# Patient Record
Sex: FEMALE | Race: WHITE | NOT HISPANIC OR LATINO | Employment: OTHER | URBAN - METROPOLITAN AREA
[De-identification: names, ages, dates, MRNs, and addresses within clinical notes are randomized per-mention and may not be internally consistent; named-entity substitution may affect disease eponyms.]

---

## 2017-01-04 ENCOUNTER — HOSPITAL ENCOUNTER (OUTPATIENT)
Dept: INFUSION THERAPY | Facility: HOSPITAL | Age: 62
Discharge: HOME | End: 2017-01-04
Admitting: ALLERGY & IMMUNOLOGY

## 2017-01-04 VITALS
RESPIRATION RATE: 20 BRPM | DIASTOLIC BLOOD PRESSURE: 90 MMHG | WEIGHT: 185 LBS | OXYGEN SATURATION: 99 % | HEART RATE: 91 BPM | TEMPERATURE: 98.1 F | BODY MASS INDEX: 30.79 KG/M2 | SYSTOLIC BLOOD PRESSURE: 124 MMHG

## 2017-01-04 DIAGNOSIS — D83.9 COMMON VARIABLE IMMUNODEFICIENCY (HCC): ICD-10-CM

## 2017-01-04 PROCEDURE — 96365 THER/PROPH/DIAG IV INF INIT: CPT

## 2017-01-04 PROCEDURE — 25010000002 IMMUNE GLOBULIN (HUMAN) 20 GM/200ML SOLUTION: Performed by: ALLERGY & IMMUNOLOGY

## 2017-01-04 PROCEDURE — 96361 HYDRATE IV INFUSION ADD-ON: CPT

## 2017-01-04 RX ORDER — ACETAMINOPHEN 500 MG
500 TABLET ORAL ONCE
Status: CANCELLED | OUTPATIENT
Start: 2017-01-04

## 2017-01-04 RX ORDER — EPINEPHRINE 1 MG/ML
0.3 INJECTION INTRAMUSCULAR; INTRAVENOUS; SUBCUTANEOUS AS NEEDED
Status: DISCONTINUED | OUTPATIENT
Start: 2017-01-04 | End: 2017-01-06 | Stop reason: HOSPADM

## 2017-01-04 RX ORDER — DIPHENHYDRAMINE HYDROCHLORIDE 50 MG/ML
50 INJECTION INTRAMUSCULAR; INTRAVENOUS AS NEEDED
Status: CANCELLED | OUTPATIENT
Start: 2017-01-04

## 2017-01-04 RX ORDER — ACETAMINOPHEN 500 MG
500 TABLET ORAL ONCE
Status: DISCONTINUED | OUTPATIENT
Start: 2017-01-04 | End: 2017-01-06 | Stop reason: HOSPADM

## 2017-01-04 RX ORDER — DIPHENHYDRAMINE HCL 25 MG
25 CAPSULE ORAL ONCE
Status: DISCONTINUED | OUTPATIENT
Start: 2017-01-04 | End: 2017-01-06 | Stop reason: HOSPADM

## 2017-01-04 RX ORDER — DIPHENHYDRAMINE HYDROCHLORIDE 50 MG/ML
50 INJECTION INTRAMUSCULAR; INTRAVENOUS AS NEEDED
Status: DISCONTINUED | OUTPATIENT
Start: 2017-01-04 | End: 2017-01-06 | Stop reason: HOSPADM

## 2017-01-04 RX ORDER — DIPHENHYDRAMINE HCL 25 MG
25 CAPSULE ORAL ONCE
Status: CANCELLED | OUTPATIENT
Start: 2017-01-04

## 2017-01-04 RX ORDER — EPINEPHRINE 1 MG/ML
0.3 INJECTION INTRAMUSCULAR; INTRAVENOUS; SUBCUTANEOUS AS NEEDED
Status: CANCELLED
Start: 2017-01-04

## 2017-01-04 RX ADMIN — IMMUNE GLOBULIN INFUSION (HUMAN) 20 G: 100 INJECTION, SOLUTION INTRAVENOUS; SUBCUTANEOUS at 09:23

## 2017-01-04 RX ADMIN — IMMUNE GLOBULIN INFUSION (HUMAN) 20 G: 100 INJECTION, SOLUTION INTRAVENOUS; SUBCUTANEOUS at 11:27

## 2017-01-04 NOTE — IP AVS SNAPSHOT
Dara Medina   1/4/2017  8:30 AM   IVIG    Provider:  ROOM 3 [facility] ACU   Department:  [facility] CARE   Dept Phone:  [phone]                Your Full Care Plan           To Do List     1/11/2017 9:30 AM     Appointment with Randi Yang MD at [facility] INTERNAL MEDICINE ([phone])   Arrive 15 minutes prior to appointment.   [address]  [facility] [address]       1/30/2017 10:00 AM     Appointment with ANJEL ENDOCRINOLOGY ANIL SANDERSON at [facility] ENDOCRINOLOGY ([phone])   [address]  [facility] [address]       2/6/2017 9:00 AM     Appointment with ROOM 4 [facility] ACU at [facility] ([phone])   [address]  [facility] [address]       2/13/2017 9:20 AM     Appointment with AIYANA Spear at [facility] ENDOCRINOLOGY ([phone])   Arrive 15 minutes prior to appointment.   [address]  [facility] [address]       7/31/2017 10:00 AM     Appointment with ANJEL ENDOCRINOLOGY ANIL SANDERSON at [facility] ENDOCRINOLOGY ([phone])   [address]  [facility] [address]       8/14/2017 9:40 AM     Appointment with Aidan Devries MD at [facility] ENDOCRINOLOGY ([phone])   Arrive 15 minutes prior to appointment.   [address]  [facility] [address]            Your Updated Medication List      ASK your doctor about these medications     buPROPion  MG 24 hr tablet   Commonly known as:  WELLBUTRIN XL   TAKE 1 TABLET BY MOUTH EVERY DAY       CELLCEPT INTRAVENOUS 500 MG injection   Generic drug:  mycophenolate       Cyanocobalamin 1000 MCG/ML kit   Inject 1,000 mcg as directed every 30 (thirty) days.       cyclobenzaprine 10 MG tablet   Commonly known as:  FLEXERIL       CYTOMEL 25 MCG tablet   Generic drug:  liothyronine   Take 1/2 twice daily       * DULoxetine 30  MG capsule   Commonly known as:  CYMBALTA       * DULoxetine 60 MG capsule   Commonly known as:  CYMBALTA       folic acid 1 MG tablet   Commonly known as:  FOLVITE       omeprazole 40 MG capsule   Commonly known as:  priLOSEC       SYNTHROID 125 MCG tablet   Generic drug:  levothyroxine   1 daily       vitamin D 15377 UNITS capsule capsule   Commonly known as:  ERGOCALCIFEROL   Take 1 cap twice weekly       VYTORIN 10-40 MG per tablet   Generic drug:  ezetimibe-simvastatin   TAKE 1 TABLET EVERY DAY       * Notice:  This list has 2 medication(s) that are the same as other medications prescribed for you. Read the directions carefully, and ask your doctor or other care provider to review them with you.            [employer] Signup     Our records indicate that you have an active [employer] account.    You can view your After Visit Summary by going to [employer] and logging in with your [employer] username and password.  If you don't have a [employer] username and password but a parent or guardian has access to your record, the parent or guardian should login with their own [employer] username and password and access your record to view the After Visit Summary.    If you have questions, you can email [email or web address] or call [phone] to talk to our [employer] staff.  Remember, [employer] is NOT to be used for urgent needs.  For medical emergencies, dial 911.               Other Info from Your Visit           Allergies     Codeine       Reason for Visit     Outpatient Infusion           Vital Signs     Blood Pressure Pulse Temperature Respirations Weight Oxygen Saturation    129/50 85 98.1 °F (36.7 °C) (Oral) 20 185 lb (83.9 kg) 99%    Body Mass Index Smoking Status                30.79 kg/m2 Former Smoker          Medications Administered     immune globulin (human) (GAMMAGARD, GAMUNEX-C) injection 20 g                  immune globulin (human) (GAMMAGARD, GAMUNEX-C) injection 20 g                   immune globulin (human) (GAMMAGARD, GAMUNEX-C) injection 20 g                  immune globulin (human) (GAMMAGARD, GAMUNEX-C) injection 20 g                  immune globulin (human) (GAMMAGARD, GAMUNEX-C) injection 20 g                    Discharge Instructions     None         SYMPTOMS OF A STROKE    Call 911 or have someone take you to the Emergency Department if you have any of the following:    · Sudden numbness or weakness of your face, arm or leg especially on one side of the body  · Sudden confusion, diffiiculty speaking or trouble understanding   · Changes in your vision or loss of sight in one eye  · Sudden severe headache with no known cause  · sudden dizziness, trouble walking, loss of balance or coordination    It is important to seek emergency care right away if you have further stroke symptoms. If you get emergency help quickly, the powerful clot-dissolving medicines can reduce the disabilities caused by a stroke.     For more information:    American Stroke Association  8-443-3-STROKE  www.strokeassociation.org           IF YOU SMOKE OR USE TOBACCO PLEASE READ THE FOLLOWING:    Why is smoking bad for me?  Smoking increases the risk of heart disease, lung disease, vascular disease, stroke, and cancer.     If you smoke, STOP!    If you would like more information on quitting smoking, please visit the [employer] website: www.[employer]/Execate/healthier-[email or web address]   This link will provide additional resources including the QUIT line and the Beat the Pack support groups.     For more information:    American Cancer Society  ([phone] 440-6890    American Heart Association  1-232.893.4877

## 2017-01-04 NOTE — PROGRESS NOTES
Tolerated infusion well, no complaints or distress noted. No lunch wanted, brought snacks from home. Warm blankets given. D/C'd per ambulation @1316.

## 2017-01-13 ENCOUNTER — OFFICE VISIT (OUTPATIENT)
Dept: INTERNAL MEDICINE | Facility: CLINIC | Age: 62
End: 2017-01-13

## 2017-01-13 VITALS
BODY MASS INDEX: 31.16 KG/M2 | SYSTOLIC BLOOD PRESSURE: 150 MMHG | OXYGEN SATURATION: 98 % | WEIGHT: 187 LBS | HEART RATE: 117 BPM | HEIGHT: 65 IN | DIASTOLIC BLOOD PRESSURE: 78 MMHG

## 2017-01-13 DIAGNOSIS — E03.9 ACQUIRED HYPOTHYROIDISM: ICD-10-CM

## 2017-01-13 DIAGNOSIS — F32.A DEPRESSION, UNSPECIFIED DEPRESSION TYPE: ICD-10-CM

## 2017-01-13 DIAGNOSIS — E11.9 CONTROLLED TYPE 2 DIABETES MELLITUS WITHOUT COMPLICATION, WITHOUT LONG-TERM CURRENT USE OF INSULIN (HCC): Primary | ICD-10-CM

## 2017-01-13 DIAGNOSIS — D51.0 PERNICIOUS ANEMIA: ICD-10-CM

## 2017-01-13 DIAGNOSIS — E78.5 HYPERLIPIDEMIA, UNSPECIFIED HYPERLIPIDEMIA TYPE: ICD-10-CM

## 2017-01-13 LAB
ALBUMIN SERPL-MCNC: 4.4 G/DL (ref 3.5–5.2)
ALBUMIN/GLOB SERPL: 1.2 G/DL
ALP SERPL-CCNC: 173 U/L (ref 39–117)
ALT SERPL-CCNC: 16 U/L (ref 1–33)
AST SERPL-CCNC: 17 U/L (ref 1–32)
BASOPHILS # BLD AUTO: 0.01 10*3/MM3 (ref 0–0.2)
BASOPHILS NFR BLD AUTO: 0.1 % (ref 0–1.5)
BILIRUB SERPL-MCNC: 0.4 MG/DL (ref 0.1–1.2)
BUN SERPL-MCNC: 10 MG/DL (ref 8–23)
BUN/CREAT SERPL: 10.5 (ref 7–25)
CALCIUM SERPL-MCNC: 9.5 MG/DL (ref 8.6–10.5)
CHLORIDE SERPL-SCNC: 102 MMOL/L (ref 98–107)
CHOLEST SERPL-MCNC: 159 MG/DL (ref 0–200)
CO2 SERPL-SCNC: 22.6 MMOL/L (ref 22–29)
CREAT SERPL-MCNC: 0.95 MG/DL (ref 0.57–1)
EOSINOPHIL # BLD AUTO: 0 10*3/MM3 (ref 0–0.7)
EOSINOPHIL NFR BLD AUTO: 0 % (ref 0.3–6.2)
ERYTHROCYTE [DISTWIDTH] IN BLOOD BY AUTOMATED COUNT: 17 % (ref 11.7–13)
GLOBULIN SER CALC-MCNC: 3.7 GM/DL
GLUCOSE SERPL-MCNC: 134 MG/DL (ref 65–99)
HBA1C MFR BLD: 5.4 % (ref 4.8–5.6)
HCT VFR BLD AUTO: 39.4 % (ref 35.6–45.5)
HDLC SERPL-MCNC: 61 MG/DL (ref 40–60)
HGB BLD-MCNC: 11.7 G/DL (ref 11.9–15.5)
IMM GRANULOCYTES # BLD: 0.04 10*3/MM3 (ref 0–0.03)
IMM GRANULOCYTES NFR BLD: 0.3 % (ref 0–0.5)
LDLC SERPL CALC-MCNC: 77 MG/DL (ref 0–100)
LYMPHOCYTES # BLD AUTO: 1.12 10*3/MM3 (ref 0.9–4.8)
LYMPHOCYTES NFR BLD AUTO: 9.5 % (ref 19.6–45.3)
MCH RBC QN AUTO: 25.5 PG (ref 26.9–32)
MCHC RBC AUTO-ENTMCNC: 29.7 G/DL (ref 32.4–36.3)
MCV RBC AUTO: 86 FL (ref 80.5–98.2)
MONOCYTES # BLD AUTO: 0.3 10*3/MM3 (ref 0.2–1.2)
MONOCYTES NFR BLD AUTO: 2.5 % (ref 5–12)
NEUTROPHILS # BLD AUTO: 10.32 10*3/MM3 (ref 1.9–8.1)
NEUTROPHILS NFR BLD AUTO: 87.6 % (ref 42.7–76)
NRBC BLD AUTO-RTO: 0 /100 WBC (ref 0–0)
PLATELET # BLD AUTO: 381 10*3/MM3 (ref 140–500)
POTASSIUM SERPL-SCNC: 5.1 MMOL/L (ref 3.5–5.2)
PROT SERPL-MCNC: 8.1 G/DL (ref 6–8.5)
RBC # BLD AUTO: 4.58 10*6/MM3 (ref 3.9–5.2)
SODIUM SERPL-SCNC: 139 MMOL/L (ref 136–145)
T4 FREE SERPL-MCNC: 0.87 NG/DL (ref 0.93–1.7)
TRIGL SERPL-MCNC: 104 MG/DL (ref 0–150)
TSH SERPL DL<=0.005 MIU/L-ACNC: <0.01 MIU/ML (ref 0.27–4.2)
VLDLC SERPL CALC-MCNC: 20.8 MG/DL (ref 5–40)
WBC # BLD AUTO: 11.79 10*3/MM3 (ref 4.5–10.7)

## 2017-01-13 PROCEDURE — 99214 OFFICE O/P EST MOD 30 MIN: CPT | Performed by: INTERNAL MEDICINE

## 2017-01-13 RX ORDER — PREDNISONE 20 MG/1
20 TABLET ORAL DAILY
COMMUNITY
Start: 2017-01-13 | End: 2017-01-24 | Stop reason: HOSPADM

## 2017-01-13 NOTE — MR AVS SNAPSHOT
Dara Medina   1/13/2017 9:45 AM   Office Visit    Dept Phone:  [phone]   Encounter #:  [id number]    Provider:  Randi Yang MD   Department:  [facility] INTERNAL MEDICINE                Your Full Care Plan              Your Updated Medication List          This list is accurate as of: 1/13/17 10:22 AM.  Always use your most recent med list.                buPROPion  MG 24 hr tablet   Commonly known as:  WELLBUTRIN XL   TAKE 1 TABLET BY MOUTH EVERY DAY       CELLCEPT INTRAVENOUS 500 MG injection   Generic drug:  mycophenolate       Cyanocobalamin 1000 MCG/ML kit   Inject 1,000 mcg as directed every 30 (thirty) days.       cyclobenzaprine 10 MG tablet   Commonly known as:  FLEXERIL       CYTOMEL 25 MCG tablet   Generic drug:  liothyronine   Take 1/2 twice daily       * DULoxetine 30 MG capsule   Commonly known as:  CYMBALTA       * DULoxetine 60 MG capsule   Commonly known as:  CYMBALTA       folic acid 1 MG tablet   Commonly known as:  FOLVITE       omeprazole 40 MG capsule   Commonly known as:  priLOSEC       predniSONE 20 MG tablet   Commonly known as:  DELTASONE       SYNTHROID 125 MCG tablet   Generic drug:  levothyroxine   1 daily       vitamin D 44593 UNITS capsule capsule   Commonly known as:  ERGOCALCIFEROL   Take 1 cap twice weekly       VYTORIN 10-40 MG per tablet   Generic drug:  ezetimibe-simvastatin   TAKE 1 TABLET EVERY DAY       * Notice:  This list has 2 medication(s) that are the same as other medications prescribed for you. Read the directions carefully, and ask your doctor or other care provider to review them with you.            We Performed the Following     CBC & Differential     Comprehensive Metabolic Panel     Hemoglobin A1c     Lipid Panel     TSH Rfx On Abnormal To Free T4       You Were Diagnosed With        Codes Comments    Controlled type 2 diabetes mellitus without complication, without long-term current use of insulin    -   "Primary ICD-10-CM: E11.9  ICD-9-CM: 250.00     Hyperlipidemia, unspecified hyperlipidemia type     ICD-10-CM: E78.5  ICD-9-CM: 272.4     Pernicious anemia     ICD-10-CM: D51.0  ICD-9-CM: 281.0     Depression, unspecified depression type     ICD-10-CM: F32.9  ICD-9-CM: 311     Acquired hypothyroidism     ICD-10-CM: E03.9  ICD-9-CM: 244.9       Instructions    DASH Eating Plan  DASH stands for \"Dietary Approaches to Stop Hypertension.\" The DASH eating plan is a healthy eating plan that has been shown to reduce high blood pressure (hypertension). Additional health benefits may include reducing the risk of type 2 diabetes mellitus, heart disease, and stroke. The DASH eating plan may also help with weight loss.  WHAT DO I NEED TO KNOW ABOUT THE DASH EATING PLAN?  For the DASH eating plan, you will follow these general guidelines:  · Choose foods with a percent daily value for sodium of less than 5% (as listed on the food label).  · Use salt-free seasonings or herbs instead of table salt or sea salt.  · Check with your health care provider or pharmacist before using salt substitutes.  · Eat lower-sodium products, often labeled as \"lower sodium\" or \"no salt added.\"  · Eat fresh foods.  · Eat more vegetables, fruits, and low-fat dairy products.  · Choose whole grains. Look for the word \"whole\" as the first word in the ingredient list.  · Choose fish and skinless chicken or turkey more often than red meat. Limit fish, poultry, and meat to 6 oz (170 g) each day.  · Limit sweets, desserts, sugars, and sugary drinks.  · Choose heart-healthy fats.  · Limit cheese to 1 oz (28 g) per day.  · Eat more home-cooked food and less restaurant, buffet, and fast food.  · Limit fried foods.  · Cook foods using methods other than frying.  · Limit canned vegetables. If you do use them, rinse them well to decrease the sodium.  · When eating at a restaurant, ask that your food be prepared with less salt, or no salt if possible.  WHAT FOODS CAN " I EAT?  Seek help from a dietitian for individual calorie needs.  Grains  Whole grain or whole wheat bread. Brown rice. Whole grain or whole wheat pasta. Quinoa, bulgur, and whole grain cereals. Low-sodium cereals. Corn or whole wheat flour tortillas. Whole grain cornbread. Whole grain crackers. Low-sodium crackers.  Vegetables  Fresh or frozen vegetables (raw, steamed, roasted, or grilled). Low-sodium or reduced-sodium tomato and vegetable juices. Low-sodium or reduced-sodium tomato sauce and paste. Low-sodium or reduced-sodium canned vegetables.   Fruits  All fresh, canned (in natural juice), or frozen fruits.  Meat and Other Protein Products  Ground beef (85% or leaner), grass-fed beef, or beef trimmed of fat. Skinless chicken or turkey. Ground chicken or turkey. Pork trimmed of fat. All fish and seafood. Eggs. Dried beans, peas, or lentils. Unsalted nuts and seeds. Unsalted canned beans.  Dairy  Low-fat dairy products, such as skim or 1% milk, 2% or reduced-fat cheeses, low-fat ricotta or cottage cheese, or plain low-fat yogurt. Low-sodium or reduced-sodium cheeses.  Fats and Oils  Tub margarines without trans fats. Light or reduced-fat mayonnaise and salad dressings (reduced sodium). Avocado. Safflower, olive, or canola oils. Natural peanut or almond butter.  Other  Unsalted popcorn and pretzels.  The items listed above may not be a complete list of recommended foods or beverages. Contact your dietitian for more options.  WHAT FOODS ARE NOT RECOMMENDED?  Grains  White bread. White pasta. White rice. Refined cornbread. Bagels and croissants. Crackers that contain trans fat.  Vegetables  Creamed or fried vegetables. Vegetables in a cheese sauce. Regular canned vegetables. Regular canned tomato sauce and paste. Regular tomato and vegetable juices.  Fruits  Dried fruits. Canned fruit in light or heavy syrup. Fruit juice.  Meat and Other Protein Products  Fatty cuts of meat. Ribs, chicken wings, majano, sausage,  bologna, salami, chitterlings, fatback, hot dogs, bratwurst, and packaged luncheon meats. Salted nuts and seeds. Canned beans with salt.  Dairy  Whole or 2% milk, cream, half-and-half, and cream cheese. Whole-fat or sweetened yogurt. Full-fat cheeses or blue cheese. Nondairy creamers and whipped toppings. Processed cheese, cheese spreads, or cheese curds.  Condiments  Onion and garlic salt, seasoned salt, table salt, and sea salt. Canned and packaged gravies. Worcestershire sauce. Tartar sauce. Barbecue sauce. Teriyaki sauce. Soy sauce, including reduced sodium. Steak sauce. Fish sauce. Oyster sauce. Cocktail sauce. Horseradish. Ketchup and mustard. Meat flavorings and tenderizers. Bouillon cubes. Hot sauce. Tabasco sauce. Marinades. Taco seasonings. Relishes.  Fats and Oils  Butter, stick margarine, lard, shortening, ghee, and majano fat. Coconut, palm kernel, or palm oils. Regular salad dressings.  Other  Pickles and olives. Salted popcorn and pretzels.  The items listed above may not be a complete list of foods and beverages to avoid. Contact your dietitian for more information.  WHERE CAN I FIND MORE INFORMATION?  National Heart, Lung, and Blood [place]: www.nhlbi.nih.gov/health/health-topics/topics/dash/     This information is not intended to replace advice given to you by your health care provider. Make sure you discuss any questions you have with your health care provider.     Document Released: 12/06/2012 Document Revised: 01/08/2016 Document Reviewed: 10/22/2014  [employer] Interactive Patient Education ©2016 Else[employer] Inc.       Patient Instructions History      Upcoming Appointments     Visit Type Date Time Department    FOLLOW UP 1/13/2017  9:45 AM [facility]    [facility] 1/30/2017 10:00 AM [facility]    IV[facility] 2/6/2017  9:00 AM [facility] OP INFU NON ONC    OFFICE VISIT 2/13/2017  9:20 AM [facility] ENDO [place]    OFFICE VISIT 4/12/2017  9:45 AM [facility] PC PAVILION    [facility] 7/31/2017 10:00 AM ANJEL JACOME  SANJAY SANDERSON    OFFICE VISIT 8/14/2017  9:40 AM [facility] ENDO SANJAY SANDERSON      MyChart Signup     Our records indicate that you have an active [facility] [employer] account.    You can view your After Visit Summary by going to [email or web address] and logging in with your [employer] username and password.  If you don't have a [employer] username and password but a parent or guardian has access to your record, the parent or guardian should login with their own [employer] username and password and access your record to view the After Visit Summary.    If you have questions, you can email [email or web address] or call [phone] to talk to our [employer] staff.  Remember, [employer] is NOT to be used for urgent needs.  For medical emergencies, dial 911.               Other Info from Your Visit           Your Appointments     Jan 30, 2017 10:00 AM EST   [facility] with ANJEL ENDOCRINOLOGY KIN [facility] ENDOCRINOLOGY (--)    [address] [place], [place] [address] [phone]            Feb 06, 2017  9:00 AM EST   IVIG with ROOM 4 [facility]    [address] [id number] [phone]            Feb 13, 2017  9:20 AM EST   Office Visit with AIYANA Spear   [facility] ENDOCRINOLOGY (--)    [address] [place] [address] [phone]           Arrive 15 minutes prior to appointment.            Apr 12, 2017  9:45 AM EDT   Office Visit with Randi Yang MD   [facility] INTERNAL MEDICINE (--)    [address]  [place] [id number] [phone]           Arrive 15 minutes prior to appointment.            Jul 31, 2017 10:00 AM EDT   [facility] with K ENDOCRINOLOGY KIN LABCOYAHAIRA   [facility] ENDOCRINOLOGY (--)    [address]  [place] [id number] [phone]              Allergies     Codeine        Reason for Visit      "Diabetes     Hyperlipidemia     Hypothyroidism           Vital Signs     Blood Pressure Pulse Height Weight Oxygen Saturation Body Mass Index    150/78 117 65\" (165.1 cm) 187 lb (84.8 kg) 98% 31.12 kg/m2    Smoking Status                   Former Smoker           Problems and Diagnoses Noted     Depression    Type 2 diabetes mellitus, controlled    High cholesterol or triglycerides    Underactive thyroid    Pernicious anemia        "

## 2017-01-13 NOTE — PROGRESS NOTES
Subjective     Dara Medina is a 61 y.o. female who presents with   Chief Complaint   Patient presents with   • Diabetes   • Hyperlipidemia   • Hypothyroidism       History of Present Illness     Patient asks me to manage DM, HLD, Hypothyroidism.  She has been seeing endo but feels she has too many doctors to see.   DM-2.  She is on steroids for pulmonary fibrosis.  She is due check of A1c.  HLD.  She is on Vytorin and due for labs.   Hypothyroidism.  She is due for thyroid check on Synthroid and Cytomel.    Pernicious anemia.  She is due for CBC check.   Depression.  She is stable with current regimen.     Review of Systems   Constitutional: Positive for unexpected weight change.   Respiratory: Positive for shortness of breath.    Cardiovascular: Negative for chest pain.       The following portions of the patient's history were reviewed and updated as appropriate: allergies, current medications and problem list.    Patient Active Problem List    Diagnosis Date Noted   • Rheumatoid arthritis 07/07/2016   • Pulmonary fibrosis 07/06/2016   • Iron deficiency anemia 07/06/2016   • Pernicious anemia 07/06/2016   • Common variable immunodeficiency 07/01/2016   • Depression 05/23/2016   • Hyperlipidemia 05/23/2016   • Hypothyroidism 05/23/2016   • Menopause present 05/23/2016   • Sensory neuropathy 05/23/2016   • Osteoporosis 05/23/2016   • Vitamin D deficiency 05/23/2016   • Diabetes type 2, controlled 05/23/2016       Current Outpatient Prescriptions on File Prior to Visit   Medication Sig Dispense Refill   • buPROPion XL (WELLBUTRIN XL) 300 MG 24 hr tablet TAKE 1 TABLET BY MOUTH EVERY DAY 30 tablet 5   • Cyanocobalamin 1000 MCG/ML kit Inject 1,000 mcg as directed every 30 (thirty) days. 1 kit 11   • cyclobenzaprine (FLEXERIL) 10 MG tablet Take 10 mg by mouth as needed.     • CYTOMEL 25 MCG tablet Take 1/2 twice daily 30 tablet 5   • DULoxetine (CYMBALTA) 30 MG capsule Take 30 mg by mouth Daily. For a total of 90 mg dose  "once daily     • DULoxetine (CYMBALTA) 60 MG capsule Take 60 mg by mouth daily.     • folic acid (FOLVITE) 1 MG tablet Take 1 mg by mouth daily.     • mycophenolate (CELLCEPT INTRAVENOUS) 500 MG injection Take  by mouth 2 (two) times a day.     • omeprazole (PriLOSEC) 40 MG capsule Take 40 mg by mouth daily.     • SYNTHROID 125 MCG tablet 1 daily 30 tablet 5   • vitamin D (ERGOCALCIFEROL) 82869 UNITS capsule capsule Take 1 cap twice weekly 26 capsule 3   • VYTORIN 10-40 MG per tablet TAKE 1 TABLET EVERY DAY 30 tablet 5     No current facility-administered medications on file prior to visit.        Objective     Visit Vitals   • /78   • Pulse 117   • Ht 65\" (165.1 cm)   • Wt 187 lb (84.8 kg)   • SpO2 98%   • BMI 31.12 kg/m2       Physical Exam   Constitutional: She is oriented to person, place, and time. She appears well-developed and well-nourished.   HENT:   Head: Normocephalic and atraumatic.   Cardiovascular: Normal rate, regular rhythm and normal heart sounds.    Pulmonary/Chest: Effort normal. She has rales.   Neurological: She is alert and oriented to person, place, and time.   Skin: Skin is warm and dry.   Psychiatric: She has a normal mood and affect. Her behavior is normal.       Assessment/Plan   Dara was seen today for diabetes, hyperlipidemia and hypothyroidism.    Diagnoses and all orders for this visit:    Controlled type 2 diabetes mellitus without complication, without long-term current use of insulin  -     CBC & Differential  -     Comprehensive Metabolic Panel  -     Lipid Panel  -     TSH Rfx On Abnormal To Free T4  -     Hemoglobin A1c    Hyperlipidemia, unspecified hyperlipidemia type  -     CBC & Differential  -     Comprehensive Metabolic Panel  -     Lipid Panel  -     TSH Rfx On Abnormal To Free T4  -     Hemoglobin A1c    Pernicious anemia  -     CBC & Differential  -     Comprehensive Metabolic Panel  -     Lipid Panel  -     TSH Rfx On Abnormal To Free T4  -     Hemoglobin " A1c    Depression, unspecified depression type  -     CBC & Differential  -     Comprehensive Metabolic Panel  -     Lipid Panel  -     TSH Rfx On Abnormal To Free T4  -     Hemoglobin A1c    Acquired hypothyroidism  -     CBC & Differential  -     Comprehensive Metabolic Panel  -     Lipid Panel  -     TSH Rfx On Abnormal To Free T4  -     Hemoglobin A1c        Discussion  Dm-2.  Continued attention to healthy diet and check labs.   HLD.  Continue Vytorin and check labs.   Hypothyroidism.  Check labs.   Depression.  Continue combo of Welbutrin and Cymbalta for now.    Pernicious anemia.  Continue B12 shots.  Check CBC today.         Future Appointments  Date Time Provider Department Center   1/30/2017 10:00 AM [facility] ENDOCRINOLOGY [facility], [facility] END [facility] None   2/6/2017 9:00 AM ROOM 4 [facility] ACU  [facility] ACU [facility]   2/13/2017 9:20 AM AIYANA Spear [facility] END [facility] None   4/12/2017 9:45 AM Randi Yang MD [facility] PC PAVIL None   7/31/2017 10:00 AM [facility] ENDOCRINOLOGY [facility], [facility] END [facility] None   8/14/2017 9:40 AM Aidan Devries MD [facility] END K[facility] None

## 2017-01-13 NOTE — PATIENT INSTRUCTIONS

## 2017-01-17 ENCOUNTER — OFFICE VISIT (OUTPATIENT)
Dept: INTERNAL MEDICINE | Facility: CLINIC | Age: 62
End: 2017-01-17

## 2017-01-17 VITALS
HEART RATE: 116 BPM | BODY MASS INDEX: 31.16 KG/M2 | DIASTOLIC BLOOD PRESSURE: 72 MMHG | SYSTOLIC BLOOD PRESSURE: 126 MMHG | WEIGHT: 187 LBS | OXYGEN SATURATION: 98 % | TEMPERATURE: 98.4 F | HEIGHT: 65 IN

## 2017-01-17 DIAGNOSIS — K52.9 GASTROENTERITIS: Primary | ICD-10-CM

## 2017-01-17 PROCEDURE — 99213 OFFICE O/P EST LOW 20 MIN: CPT | Performed by: INTERNAL MEDICINE

## 2017-01-17 RX ORDER — METRONIDAZOLE 500 MG/1
500 TABLET ORAL 3 TIMES DAILY
Qty: 30 TABLET | Refills: 0 | Status: ON HOLD | OUTPATIENT
Start: 2017-01-17 | End: 2017-01-24

## 2017-01-17 NOTE — MR AVS SNAPSHOT
Dara Medina   1/17/2017 12:45 PM   Office Visit    Dept Phone:  [phone]   Encounter #:  [id number]    Provider:  Randi Yang MD   Department:  [facility] INTERNAL MEDICINE                Your Full Care Plan              Today's Medication Changes          These changes are accurate as of: 1/17/17 12:55 PM.  If you have any questions, ask your nurse or doctor.               New Medication(s)Ordered:     metroNIDAZOLE 500 MG tablet   Commonly known as:  FLAGYL   Take 1 tablet by mouth 3 (Three) Times a Day.            Where to Get Your Medications      These medications were sent to [facility] #[id number] - [place], [place] - [address] [place] [address] - [phone]  - [phone] [place] [address]     Phone:  [phone]     metroNIDAZOLE 500 MG tablet                  Your Updated Medication List          This list is accurate as of: 1/17/17 12:55 PM.  Always use your most recent med list.                buPROPion  MG 24 hr tablet   Commonly known as:  WELLBUTRIN XL   TAKE 1 TABLET BY MOUTH EVERY DAY       CELLCEPT INTRAVENOUS 500 MG injection   Generic drug:  mycophenolate       Cyanocobalamin 1000 MCG/ML kit   Inject 1,000 mcg as directed every 30 (thirty) days.       cyclobenzaprine 10 MG tablet   Commonly known as:  FLEXERIL       * DULoxetine 30 MG capsule   Commonly known as:  CYMBALTA       * DULoxetine 60 MG capsule   Commonly known as:  CYMBALTA       folic acid 1 MG tablet   Commonly known as:  FOLVITE       metroNIDAZOLE 500 MG tablet   Commonly known as:  FLAGYL   Take 1 tablet by mouth 3 (Three) Times a Day.       omeprazole 40 MG capsule   Commonly known as:  priLOSEC       predniSONE 20 MG tablet   Commonly known as:  DELTASONE       SYNTHROID 125 MCG tablet   Generic drug:  levothyroxine   1 daily       vitamin D 65907 UNITS capsule capsule   Commonly known as:  ERGOCALCIFEROL   Take 1 cap twice weekly       VYTORIN 10-40 MG  per tablet   Generic drug:  ezetimibe-simvastatin   TAKE 1 TABLET EVERY DAY       * Notice:  This list has 2 medication(s) that are the same as other medications prescribed for you. Read the directions carefully, and ask your doctor or other care provider to review them with you.            We Performed the Following     Clostridium Difficile Toxin, PCR       You Were Diagnosed With        Codes Comments    Gastroenteritis    -  Primary ICD-10-CM: K52.9  ICD-9-CM: 558.9       Instructions     None    Patient Instructions History      Upcoming Appointments     Visit Type Date Time Department    OFFICE VISIT 1/17/2017 12:45 PM [facility]    [facility] 1/30/2017 10:00 AM [facility]    [facility] 2/6/2017  9:00 AM [facility] OP INFU NON ONC    OFFICE VISIT 2/13/2017  9:20 AM [facility]    OFFICE VISIT 4/12/2017  9:45 AM [facility]    [facility] 7/31/2017 10:00 AM [facility]    OFFICE VISIT 8/14/2017  9:40 AM [facility]      MyChart Signup     Our records indicate that you have an active [employer] account.    You can view your After Visit Summary by going to [employer] and logging in with your [employer] username and password.  If you don't have a [employer] username and password but a parent or guardian has access to your record, the parent or guardian should login with their own [employer] username and password and access your record to view the After Visit Summary.    If you have questions, you can email [email or web address] or call [phone] to talk to our [employer] staff.  Remember, [employer] is NOT to be used for urgent needs.  For medical emergencies, dial 911.               Other Info from Your Visit           Your Appointments     Jan 30, 2017 10:00 AM EST   [facility] with [facility], [facility]   [facility] (--)    [address]  [facility] [address]   [phone]            Feb 06, 2017  9:00 AM EST  "  IVIG with ROOM 4 [facility] ACU   [facility]    [address] [facility] [address] [phone]            Feb 13, 2017  9:20 AM EST   Office Visit with AIYANA Spear   [facility] ENDOCRINOLOGY (--)    [address]  [facility] [address] [phone]           Arrive 15 minutes prior to appointment.            Apr 12, 2017  9:45 AM EDT   Office Visit with Randi Yang MD   [facility] INTERNAL MEDICINE (--)    [address]  [facility] [address] [phone]           Arrive 15 minutes prior to appointment.            Jul 31, 2017 10:00 AM EDT   [facility] with [facility], [facility]   [facility] ENDOCRINOLOGY (--)    [address]  [facility] [address] [phone]              Allergies     Codeine        Reason for Visit     Diarrhea     Fever           Vital Signs     Blood Pressure Pulse Temperature Height Weight Oxygen Saturation    126/72 116 98.4 °F (36.9 °C) 65\" (165.1 cm) 187 lb (84.8 kg) 98%    Body Mass Index Smoking Status                31.12 kg/m2 Former Smoker          Problems and Diagnoses Noted     Gastroenteritis    -  Primary        "

## 2017-01-17 NOTE — PROGRESS NOTES
Subjective     Dara Medina is a 61 y.o. female who presents with   Chief Complaint   Patient presents with   • Diarrhea   • Fever       History of Present Illness     Diarrhea started two days ago.  20 BMs yesterday.  Blood tinged on TP.  Low grade fever.  ABD pain.  Nausea but no vomiting.  Started amoxil one week ago.  Crampy.  Water, yellow diarrhea.      Review of Systems   Constitutional: Positive for fatigue and fever.       The following portions of the patient's history were reviewed and updated as appropriate: allergies, current medications and problem list.    Patient Active Problem List    Diagnosis Date Noted   • Rheumatoid arthritis 07/07/2016   • Pulmonary fibrosis 07/06/2016   • Iron deficiency anemia 07/06/2016   • Pernicious anemia 07/06/2016   • Common variable immunodeficiency 07/01/2016   • Depression 05/23/2016   • Hyperlipidemia 05/23/2016   • Hypothyroidism 05/23/2016   • Menopause present 05/23/2016   • Sensory neuropathy 05/23/2016   • Osteoporosis 05/23/2016   • Vitamin D deficiency 05/23/2016   • Diabetes type 2, controlled 05/23/2016       Current Outpatient Prescriptions on File Prior to Visit   Medication Sig Dispense Refill   • buPROPion XL (WELLBUTRIN XL) 300 MG 24 hr tablet TAKE 1 TABLET BY MOUTH EVERY DAY 30 tablet 5   • Cyanocobalamin 1000 MCG/ML kit Inject 1,000 mcg as directed every 30 (thirty) days. 1 kit 11   • cyclobenzaprine (FLEXERIL) 10 MG tablet Take 10 mg by mouth as needed.     • DULoxetine (CYMBALTA) 30 MG capsule Take 30 mg by mouth Daily. For a total of 90 mg dose once daily     • DULoxetine (CYMBALTA) 60 MG capsule Take 60 mg by mouth daily.     • folic acid (FOLVITE) 1 MG tablet Take 1 mg by mouth daily.     • mycophenolate (CELLCEPT INTRAVENOUS) 500 MG injection Take  by mouth 2 (two) times a day.     • omeprazole (PriLOSEC) 40 MG capsule Take 40 mg by mouth daily.     • predniSONE (DELTASONE) 20 MG tablet Take 1 tablet by mouth Daily.     • SYNTHROID 125 MCG tablet  "1 daily 30 tablet 5   • vitamin D (ERGOCALCIFEROL) 93243 UNITS capsule capsule Take 1 cap twice weekly 26 capsule 3   • VYTORIN 10-40 MG per tablet TAKE 1 TABLET EVERY DAY 30 tablet 5     No current facility-administered medications on file prior to visit.        Objective     Visit Vitals   • /72   • Pulse 116   • Temp 98.4 °F (36.9 °C)   • Ht 65\" (165.1 cm)   • Wt 187 lb (84.8 kg)   • SpO2 98%   • BMI 31.12 kg/m2       Physical Exam   Constitutional: She is oriented to person, place, and time. She appears well-developed and well-nourished.   HENT:   Head: Normocephalic and atraumatic.   Pulmonary/Chest: Effort normal.   Abdominal: Soft. Bowel sounds are normal. She exhibits no distension and no mass. There is tenderness. There is no rebound and no guarding.   Neurological: She is alert and oriented to person, place, and time.   Psychiatric: She has a normal mood and affect. Her behavior is normal.       Assessment/Plan   Dara was seen today for diarrhea and fever.    Diagnoses and all orders for this visit:    Gastroenteritis  -     Clostridium Difficile Toxin, PCR    Other orders  -     metroNIDAZOLE (FLAGYL) 500 MG tablet; Take 1 tablet by mouth 3 (Three) Times a Day.        Discussion    Patient presents with gastroenteritis.  Discussed increasing fluids.  Empirically treat for C. Diff given her risk factors.  Check stool for C.Diff and get back with her on that.  Let me know if not feeling better over the next 2 days or if there is any change in symptoms.             Future Appointments  Date Time Provider Department Center   1/30/2017 10:00 AM [facility], [facility] None   2/6/2017 9:00 AM ROOM 4 [facility] ACU  [facility]   2/13/2017 9:20 AM AIYANA Spear [facility] None   4/12/2017 9:45 AM Randi Yang MD [facility]IL None   7/31/2017 10:00 AM [facility], [facility] None   8/14/2017 9:40 AM Aidan Devries MD [facility] None         "

## 2017-01-18 ENCOUNTER — APPOINTMENT (OUTPATIENT)
Dept: CT IMAGING | Facility: HOSPITAL | Age: 62
End: 2017-01-18

## 2017-01-18 ENCOUNTER — HOSPITAL ENCOUNTER (INPATIENT)
Facility: HOSPITAL | Age: 62
LOS: 6 days | Discharge: HOME | End: 2017-01-24
Attending: EMERGENCY MEDICINE | Admitting: INTERNAL MEDICINE

## 2017-01-18 DIAGNOSIS — K52.9 ACUTE COLITIS: Primary | ICD-10-CM

## 2017-01-18 PROBLEM — A41.9 SEPSIS, UNSPECIFIED ORGANISM: Status: ACTIVE | Noted: 2017-01-18

## 2017-01-18 PROBLEM — D84.9 IMMUNOSUPPRESSION: Status: ACTIVE | Noted: 2017-01-18

## 2017-01-18 LAB
ALBUMIN SERPL-MCNC: 3.5 G/DL (ref 3.5–5.2)
ALBUMIN/GLOB SERPL: 1 G/DL
ALP SERPL-CCNC: 128 U/L (ref 39–117)
ALT SERPL W P-5'-P-CCNC: 14 U/L (ref 1–33)
ANION GAP SERPL CALCULATED.3IONS-SCNC: 12.8 MMOL/L
AST SERPL-CCNC: 20 U/L (ref 1–32)
BASOPHILS # BLD AUTO: 0.02 10*3/MM3 (ref 0–0.2)
BASOPHILS NFR BLD AUTO: 0.1 % (ref 0–1.5)
BILIRUB SERPL-MCNC: 0.4 MG/DL (ref 0.1–1.2)
BILIRUB UR QL STRIP: NEGATIVE
BUN BLD-MCNC: 7 MG/DL (ref 8–23)
BUN/CREAT SERPL: 7.4 (ref 7–25)
C DIFF TOX GENS STL QL NAA+PROBE: NEGATIVE
C DIFF TOX GENS STL QL NAA+PROBE: NEGATIVE
CALCIUM SPEC-SCNC: 8.5 MG/DL (ref 8.6–10.5)
CHLORIDE SERPL-SCNC: 102 MMOL/L (ref 98–107)
CLARITY UR: CLEAR
CO2 SERPL-SCNC: 22.2 MMOL/L (ref 22–29)
COLOR UR: YELLOW
CREAT BLD-MCNC: 0.95 MG/DL (ref 0.57–1)
D-LACTATE SERPL-SCNC: 0.7 MMOL/L (ref 0.5–2)
DEPRECATED RDW RBC AUTO: 51.5 FL (ref 37–54)
EOSINOPHIL # BLD AUTO: 0.03 10*3/MM3 (ref 0–0.7)
EOSINOPHIL NFR BLD AUTO: 0.2 % (ref 0.3–6.2)
ERYTHROCYTE [DISTWIDTH] IN BLOOD BY AUTOMATED COUNT: 16.9 % (ref 11.7–13)
GFR SERPL CREATININE-BSD FRML MDRD: 60 ML/MIN/1.73
GLOBULIN UR ELPH-MCNC: 3.6 GM/DL
GLUCOSE BLD-MCNC: 108 MG/DL (ref 65–99)
GLUCOSE UR STRIP-MCNC: NEGATIVE MG/DL
HCT VFR BLD AUTO: 36.2 % (ref 35.6–45.5)
HGB BLD-MCNC: 11.2 G/DL (ref 11.9–15.5)
HGB UR QL STRIP.AUTO: NEGATIVE
HOLD SPECIMEN: NORMAL
HOLD SPECIMEN: NORMAL
IMM GRANULOCYTES # BLD: 0.04 10*3/MM3 (ref 0–0.03)
IMM GRANULOCYTES NFR BLD: 0.2 % (ref 0–0.5)
KETONES UR QL STRIP: NEGATIVE
LEUKOCYTE ESTERASE UR QL STRIP.AUTO: NEGATIVE
LIPASE SERPL-CCNC: 18 U/L (ref 13–60)
LYMPHOCYTES # BLD AUTO: 1.88 10*3/MM3 (ref 0.9–4.8)
LYMPHOCYTES NFR BLD AUTO: 11.5 % (ref 19.6–45.3)
MCH RBC QN AUTO: 26 PG (ref 26.9–32)
MCHC RBC AUTO-ENTMCNC: 30.9 G/DL (ref 32.4–36.3)
MCV RBC AUTO: 84 FL (ref 80.5–98.2)
MONOCYTES # BLD AUTO: 1.32 10*3/MM3 (ref 0.2–1.2)
MONOCYTES NFR BLD AUTO: 8.1 % (ref 5–12)
NEUTROPHILS # BLD AUTO: 13 10*3/MM3 (ref 1.9–8.1)
NEUTROPHILS NFR BLD AUTO: 79.9 % (ref 42.7–76)
NITRITE UR QL STRIP: NEGATIVE
PH UR STRIP.AUTO: 5.5 [PH] (ref 5–8)
PLATELET # BLD AUTO: 333 10*3/MM3 (ref 140–500)
PMV BLD AUTO: 10.6 FL (ref 6–12)
POTASSIUM BLD-SCNC: 3.7 MMOL/L (ref 3.5–5.2)
PROT SERPL-MCNC: 7.1 G/DL (ref 6–8.5)
PROT UR QL STRIP: NEGATIVE
RBC # BLD AUTO: 4.31 10*6/MM3 (ref 3.9–5.2)
SODIUM BLD-SCNC: 137 MMOL/L (ref 136–145)
SP GR UR STRIP: >=1.03 (ref 1–1.03)
UROBILINOGEN UR QL STRIP: NORMAL
WBC NRBC COR # BLD: 16.29 10*3/MM3 (ref 4.5–10.7)
WHOLE BLOOD HOLD SPECIMEN: NORMAL
WHOLE BLOOD HOLD SPECIMEN: NORMAL

## 2017-01-18 PROCEDURE — 81003 URINALYSIS AUTO W/O SCOPE: CPT | Performed by: EMERGENCY MEDICINE

## 2017-01-18 PROCEDURE — 25010000002 DICYCLOMINE PER 20 MG: Performed by: NURSE PRACTITIONER

## 2017-01-18 PROCEDURE — 25010000002 HYDROMORPHONE PER 4 MG: Performed by: INTERNAL MEDICINE

## 2017-01-18 PROCEDURE — 0 IOPAMIDOL 61 % SOLUTION: Performed by: EMERGENCY MEDICINE

## 2017-01-18 PROCEDURE — 25810000003 SODIUM CHLORIDE 0.9 % WITH KCL 20 MEQ 20-0.9 MEQ/L-% SOLUTION: Performed by: INTERNAL MEDICINE

## 2017-01-18 PROCEDURE — 74177 CT ABD & PELVIS W/CONTRAST: CPT

## 2017-01-18 PROCEDURE — 87046 STOOL CULTR AEROBIC BACT EA: CPT | Performed by: INTERNAL MEDICINE

## 2017-01-18 PROCEDURE — 87045 FECES CULTURE AEROBIC BACT: CPT | Performed by: INTERNAL MEDICINE

## 2017-01-18 PROCEDURE — 99284 EMERGENCY DEPT VISIT MOD MDM: CPT

## 2017-01-18 PROCEDURE — 83690 ASSAY OF LIPASE: CPT | Performed by: EMERGENCY MEDICINE

## 2017-01-18 PROCEDURE — 87493 C DIFF AMPLIFIED PROBE: CPT | Performed by: INTERNAL MEDICINE

## 2017-01-18 PROCEDURE — 25010000002 ONDANSETRON PER 1 MG: Performed by: NURSE PRACTITIONER

## 2017-01-18 PROCEDURE — 80053 COMPREHEN METABOLIC PANEL: CPT | Performed by: EMERGENCY MEDICINE

## 2017-01-18 PROCEDURE — 85025 COMPLETE CBC W/AUTO DIFF WBC: CPT | Performed by: EMERGENCY MEDICINE

## 2017-01-18 PROCEDURE — 25010000002 HYDROMORPHONE PER 4 MG: Performed by: NURSE PRACTITIONER

## 2017-01-18 PROCEDURE — 83605 ASSAY OF LACTIC ACID: CPT | Performed by: INTERNAL MEDICINE

## 2017-01-18 RX ORDER — LEVOTHYROXINE SODIUM 0.12 MG/1
125 TABLET ORAL
Status: DISCONTINUED | OUTPATIENT
Start: 2017-01-19 | End: 2017-01-24 | Stop reason: HOSPADM

## 2017-01-18 RX ORDER — MYCOPHENOLATE MOFETIL 500 MG/1
1000 TABLET ORAL 2 TIMES DAILY
COMMUNITY
End: 2017-01-24 | Stop reason: HOSPADM

## 2017-01-18 RX ORDER — SODIUM CHLORIDE 0.9 % (FLUSH) 0.9 %
10 SYRINGE (ML) INJECTION AS NEEDED
Status: DISCONTINUED | OUTPATIENT
Start: 2017-01-18 | End: 2017-01-24 | Stop reason: HOSPADM

## 2017-01-18 RX ORDER — BUPROPION HYDROCHLORIDE 300 MG/1
300 TABLET ORAL DAILY
Status: DISCONTINUED | OUTPATIENT
Start: 2017-01-18 | End: 2017-01-22

## 2017-01-18 RX ORDER — NALOXONE HCL 0.4 MG/ML
0.4 VIAL (ML) INJECTION
Status: DISCONTINUED | OUTPATIENT
Start: 2017-01-18 | End: 2017-01-20

## 2017-01-18 RX ORDER — SODIUM CHLORIDE AND POTASSIUM CHLORIDE 150; 900 MG/100ML; MG/100ML
50 INJECTION, SOLUTION INTRAVENOUS CONTINUOUS
Status: DISCONTINUED | OUTPATIENT
Start: 2017-01-18 | End: 2017-01-23

## 2017-01-18 RX ORDER — ACETAMINOPHEN 325 MG/1
650 TABLET ORAL EVERY 4 HOURS PRN
Status: DISCONTINUED | OUTPATIENT
Start: 2017-01-18 | End: 2017-01-24 | Stop reason: HOSPADM

## 2017-01-18 RX ORDER — ONDANSETRON 2 MG/ML
4 INJECTION INTRAMUSCULAR; INTRAVENOUS ONCE
Status: COMPLETED | OUTPATIENT
Start: 2017-01-18 | End: 2017-01-18

## 2017-01-18 RX ORDER — SODIUM CHLORIDE 0.9 % (FLUSH) 0.9 %
1-10 SYRINGE (ML) INJECTION AS NEEDED
Status: DISCONTINUED | OUTPATIENT
Start: 2017-01-18 | End: 2017-01-24 | Stop reason: HOSPADM

## 2017-01-18 RX ORDER — ONDANSETRON 2 MG/ML
4 INJECTION INTRAMUSCULAR; INTRAVENOUS EVERY 6 HOURS PRN
Status: DISCONTINUED | OUTPATIENT
Start: 2017-01-18 | End: 2017-01-24 | Stop reason: HOSPADM

## 2017-01-18 RX ORDER — METRONIDAZOLE 500 MG/1
500 TABLET ORAL EVERY 8 HOURS SCHEDULED
Status: DISCONTINUED | OUTPATIENT
Start: 2017-01-18 | End: 2017-01-24 | Stop reason: HOSPADM

## 2017-01-18 RX ORDER — DICYCLOMINE HYDROCHLORIDE 10 MG/ML
20 INJECTION INTRAMUSCULAR ONCE
Status: COMPLETED | OUTPATIENT
Start: 2017-01-18 | End: 2017-01-18

## 2017-01-18 RX ORDER — PANTOPRAZOLE SODIUM 40 MG/1
40 TABLET, DELAYED RELEASE ORAL
Status: DISCONTINUED | OUTPATIENT
Start: 2017-01-19 | End: 2017-01-24 | Stop reason: HOSPADM

## 2017-01-18 RX ORDER — FOLIC ACID 1 MG/1
1 TABLET ORAL DAILY
Status: DISCONTINUED | OUTPATIENT
Start: 2017-01-18 | End: 2017-01-24 | Stop reason: HOSPADM

## 2017-01-18 RX ORDER — HYDROMORPHONE HYDROCHLORIDE 1 MG/ML
0.5 INJECTION, SOLUTION INTRAMUSCULAR; INTRAVENOUS; SUBCUTANEOUS
Status: DISCONTINUED | OUTPATIENT
Start: 2017-01-18 | End: 2017-01-20

## 2017-01-18 RX ADMIN — IOPAMIDOL 85 ML: 612 INJECTION, SOLUTION INTRAVENOUS at 10:00

## 2017-01-18 RX ADMIN — METRONIDAZOLE 500 MG: 500 TABLET ORAL at 15:47

## 2017-01-18 RX ADMIN — BUPROPION HYDROCHLORIDE 300 MG: 300 TABLET, EXTENDED RELEASE ORAL at 17:41

## 2017-01-18 RX ADMIN — FOLIC ACID 1 MG: 1 TABLET ORAL at 17:41

## 2017-01-18 RX ADMIN — HYDROMORPHONE HYDROCHLORIDE 0.5 MG: 1 INJECTION, SOLUTION INTRAMUSCULAR; INTRAVENOUS; SUBCUTANEOUS at 19:26

## 2017-01-18 RX ADMIN — HYDROMORPHONE HYDROCHLORIDE 1 MG: 1 INJECTION, SOLUTION INTRAMUSCULAR; INTRAVENOUS; SUBCUTANEOUS at 09:49

## 2017-01-18 RX ADMIN — DICYCLOMINE HYDROCHLORIDE 20 MG: 20 INJECTION, SOLUTION INTRAMUSCULAR at 09:54

## 2017-01-18 RX ADMIN — ONDANSETRON 4 MG: 2 INJECTION INTRAMUSCULAR; INTRAVENOUS at 09:49

## 2017-01-18 RX ADMIN — POTASSIUM CHLORIDE AND SODIUM CHLORIDE 100 ML/HR: 900; 150 INJECTION, SOLUTION INTRAVENOUS at 15:48

## 2017-01-18 RX ADMIN — METRONIDAZOLE 500 MG: 500 TABLET ORAL at 21:55

## 2017-01-18 RX ADMIN — SODIUM CHLORIDE 1000 ML: 9 INJECTION, SOLUTION INTRAVENOUS at 09:48

## 2017-01-18 RX ADMIN — HYDROMORPHONE HYDROCHLORIDE 0.5 MG: 1 INJECTION, SOLUTION INTRAMUSCULAR; INTRAVENOUS; SUBCUTANEOUS at 23:21

## 2017-01-18 RX ADMIN — DULOXETINE HYDROCHLORIDE 90 MG: 60 CAPSULE, DELAYED RELEASE ORAL at 17:41

## 2017-01-18 RX ADMIN — HYDROMORPHONE HYDROCHLORIDE 0.5 MG: 1 INJECTION, SOLUTION INTRAMUSCULAR; INTRAVENOUS; SUBCUTANEOUS at 14:22

## 2017-01-18 NOTE — H&P
"    Name: Dara Medina ADMIT: 2017   : 1955  PCP: Randi Yang MD    MRN: [id number] LOS: 0 days   AGE/SEX: 61 y.o. female  ROOM:      Chief Complaint   Patient presents with   • Abdominal Pain   • Diarrhea     \"THEY THINK I HAVE C.DIFF\"   • Rectal Bleeding       Subjective   Patient is a 61 y.o. female presents with the following...    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  She was placed on amoxicillin one week ago for ear infection.  2 days ago she started with severe diarrhea up to 50 times which was bloody and with mucus.  It's associated with severe abdominal pain and nausea.  Every time she eats she has a bowel movement.  It is improved now since she is not eating as much.  Yesterday her primary doctor checked a C. difficile PCR which is still pending.  Empirically started her on oral Flagyl.  She presented to the emergency room hemodynamically stable with a white blood cell count of 16,000.  She also had fever at home up to 101 Fahrenheit.  A CT of her abdomen revealed ascending colitis with surrounding inflammatory changes and a mild associated small bowel ileus.  He also showed interstitial lung changes at the bases (she is followed by Dr. Camara at [facility]).  She was admitted to our service for further care and management.    Note, Hemoccult was positive in the emergency room  Past Medical History   Diagnosis Date   • Diabetes mellitus    • Fracture of rib    • Hyperlipidemia    • Hypothyroidism    • Hypothyroidism    • Iron deficiency    • Obesity    • Osteoporosis    • RA (rheumatoid arthritis)    • Type 2 diabetes mellitus    • Vitamin D deficiency      Past Surgical History "   Procedure Laterality Date   • Adenoidectomy     • Hysterectomy     • Tonsillectomy     • Sinus surgery       Family History   Problem Relation Age of Onset   • Hyperlipidemia Mother    • Hypertension Mother    • Colon cancer Father    • Diabetes Father    • Hyperlipidemia Brother      Social History   Substance Use Topics   • Smoking status: Former Smoker   • Smokeless tobacco: None      Comment: QUIT AGE 23   • Alcohol use No      Comment: STOPPED 1997       (Not in a hospital admission)  Allergies:  Codeine    Review of Systems   Constitutional: Positive for appetite change, fatigue and fever. Negative for activity change and unexpected weight change.   HENT: Negative for mouth sores, sore throat and trouble swallowing.    Eyes: Negative for pain and visual disturbance.   Respiratory: Negative for cough, chest tightness and shortness of breath.    Cardiovascular: Negative for chest pain, palpitations and leg swelling.   Gastrointestinal: Positive for abdominal pain, blood in stool, diarrhea and nausea. Negative for constipation and vomiting.   Endocrine:        Negative for diabetes but positive for thyroid disease   Genitourinary: Negative for dysuria and hematuria.   Musculoskeletal: Positive for arthralgias. Negative for back pain, neck pain and neck stiffness.        Chronic   Skin: Negative for rash and wound.   Neurological: Positive for weakness and headaches. Negative for dizziness, syncope and light-headedness.        Headache currently   Hematological: Negative for adenopathy. Does not bruise/bleed easily.   Psychiatric/Behavioral: Negative for agitation, behavioral problems and confusion.        Objective    Vital Signs  Temp:  [99.7 °F (37.6 °C)] 99.7 °F (37.6 °C)  Heart Rate:  [] 90  Resp:  [16-18] 18  BP: (128-149)/(62-95) 149/74  SpO2:  [90 %-96 %] 92 %  on   ;   O2 Device: room air  Body mass index is 30.95 kg/(m^2).    Physical Exam   Constitutional: She is oriented to person, place, and  time. She appears well-developed and well-nourished.   Appears uncomfortable and in pain in the abdomen   HENT:   Head: Normocephalic and atraumatic.   Mouth/Throat: Oropharynx is clear and moist. No oropharyngeal exudate.   Eyes: Conjunctivae and EOM are normal. Pupils are equal, round, and reactive to light. No scleral icterus.   Neck: Normal range of motion. Neck supple. No JVD present. No thyromegaly present.   Cardiovascular: Normal rate, regular rhythm and normal heart sounds.  Exam reveals no gallop and no friction rub.    No murmur heard.  Pulmonary/Chest: Effort normal and breath sounds normal. No stridor. No respiratory distress.   Dry crackles related to her chronic interstitial lung disease   Abdominal: Soft. Bowel sounds are normal. She exhibits distension. There is tenderness. There is guarding. There is no rebound.   Diffuse tenderness   Musculoskeletal: She exhibits no edema or tenderness.   Lymphadenopathy:     She has no cervical adenopathy.   Neurological: She is alert and oriented to person, place, and time. No cranial nerve deficit.   Skin: Skin is warm and dry.   Psychiatric: She has a normal mood and affect. Her behavior is normal.       Results Review:   I reviewed the patient's new clinical results.  I have reviewed the CT scan and she does appear to have thickening of the ascending colon consistent with colitis  Imaging Results (last 24 hours)     Procedure Component Value Units Date/Time    CT Abdomen Pelvis With Contrast [[id number]] Collected:  01/18/17 1142     Updated:  01/18/17 1142    Narrative:       CT ABDOMEN PELVIS WITH CONTRAST     HISTORY: Abdominal pain and distention, nausea     TECHNIQUE: Spiral axial CT imaging of the abdomen and pelvis was  performed at 3 mm intervals throughout. Intravenous contrast was  administered for this imaging.     COMPARISON: Previous contrasted CT imaging of the abdomen and pelvis  05/26/2015 is reviewed.     FINDINGS: There appears to be some  minimal fatty infiltration of the  liver. Gallbladder appears unremarkable. There is no biliary ductal  dilatation. Spleen is normal in size and configuration. The pancreas,  adrenal glands, and kidneys are unremarkable.     There is abnormal colonic wall thickening extending from the cecum  through the ascending colon to the hepatic flexure. There is some mild  associated pericolonic stranding and fluid within the region. There are  number of small shotty nodes within the adjacent mesentery. There also  appears to be a localized small bowel ileus within the immediately  adjacent ileum. No ileal wall thickening is present. A normal appendix  is visualized. There are few fluid-filled loops of small bowel, however  none of these are pathologically distended. I see no evidence for small  bowel obstruction. Stomach appears unremarkable. There has been a  hysterectomy. No adnexal masses are present. There is a trace amount  free fluid. There is no free air. Visualized lung bases demonstrate  chronic interstitial changes which clearly have progressed compared with  imaging of 05/26/2015, now with some early fibrosis present.       Impression:       1. There is an ascending colitis. Surrounding inflammatory changes are  present and there appears to be associated mild small bowel ileus.  2. No further acute process is identified at the abdomen or pelvis.  There is noted progressive interstitial change at the lung bases with  some developing fibrosis.     Note: The findings were discussed with AIYANA Woo in the  ER at the time of the dictation             Assessment/Plan     Principal Problem:    Acute colitis  Active Problems:    Hypothyroidism    Sepsis, unspecified organism    Immunosuppression      Assessment & Plan    Acute Colitis  -certainly has risk factor for c. Diff, recheck C. Diff pcr, continue PO flagyl as not severe C. Diff  -Has mild SB ileus so if has c. Diff and doesn't improve with PO flagyl  "then will change to PO vanc and Vanc suppository  -If doesn't have c. Diff will ask GI to see her for further recs since she does have h/o microscopic colitis and is immunosuppressed so other agents possible such as CMV or autoimmune colitis  -pain control  -IVF  -f/u cultures  -Clears for now but if develops nausea and vomiting will stop clears, get KUB and highly consider Vanc Supp (if C. Diff +)    Sepsis  -due to above  -fever 101, wbc, HR 90  -f/u cx and cont abx    RA on immunosuppression  -no active issues  -only intermittently takes prednisone so will not continue, only takes during \"flare\" of RA    ILD  -apparent progression on our CT scan  -on d/c will ask her Pulmonologist to f/u with this (Dr. Camara at [facility])    Cont wellbutrin, cymbalta, synthroid    I have discussed the CODE STATUS with the patient and she is a DO NOT RESUSCITATE.  Her healthcare surrogate is her daughter-in-law, Kera.  I also discussed the case with Kera who is known to me from school and residency with the permission of the patient.    I discussed the patients findings and my recommendations with patient and family.          German Mckeon MD  [facility]  01/18/17  1:19 PM      "

## 2017-01-18 NOTE — ED PROVIDER NOTES
I supervised care provided by the midlevel provider.    We have discussed this patient's history, physical exam, and treatment plan.   I have reviewed the note and personally saw and examined the patient and agree with the plan of care.    Pt with abd pain and diarrhea onset several days ago. She reports had blood and mucus in the diarrhea. Pt was started on Flagyl for the hematochezia. Pt states that sxs have not improved since taking Flagyl.    On exam, she is A&Ox3 and in NAD. She is non-toxic appearing. There is mild diffuse abd pain.     Iabs and CT show evidence of colitis. Will start on IV abx and check stool sample for possible C. Diff.       Documentation assistance provided by hany Alvarado for Dr. Gilmore.  Information recorded by the scribe was done at my direction and has been verified and validated by me.       Garrett Alvarado  01/18/17 1215       Tyrese Gilmore MD  01/18/17 9813

## 2017-01-18 NOTE — ED PROVIDER NOTES
EMERGENCY DEPARTMENT ENCOUNTER    CHIEF COMPLAINT  Chief Complaint: Abdominal Pain  History given by:Patient  History limited by:Nothing  Room Number: 03/03  PMD: Randi Yang MD      HPI:  Pt is a 61 y.o. female who complains of diffuse abdominal cramping, which began several days ago with no known injury. Patient states that the abdominal pain is worsened with palpation and improved with nothing. The patient was recently dx with an ear infection and started a course of Amoxicillin. Three days later she developed diarrhea  and was started on Flagyl for possible C Diff. The patient has had a fever as high as 100 and also developed nausea /vomiting two nights ago but denies chest pain or SOA. She denies any previous similar episodes and has no other complaints.       Past Medical History of RA, DM    Duration: 2-3 days  Timing:Constant  Location:Diffuse abdomen  Radiation:None   Quality:Cramping  Intensity/Severity:Moderate  Progression:No Change  Associated Symptoms:Abd pain, diarrhea, hematochezia, fever, vomiting, nausea   Aggravating Factors:Palpation  Alleviating Factors:None  Previous Episodes:None  Treatment before arrival:Amoxicillin for ear infection. Flagyl 1 day ago    PAST MEDICAL HISTORY  Active Ambulatory Problems     Diagnosis Date Noted   • Depression 05/23/2016   • Hyperlipidemia 05/23/2016   • Hypothyroidism 05/23/2016   • Menopause present 05/23/2016   • Sensory neuropathy 05/23/2016   • Osteoporosis 05/23/2016   • Vitamin D deficiency 05/23/2016   • Diabetes type 2, controlled 05/23/2016   • Common variable immunodeficiency 07/01/2016   • Pulmonary fibrosis 07/06/2016   • Iron deficiency anemia 07/06/2016   • Pernicious anemia 07/06/2016   • Rheumatoid arthritis 07/07/2016     Resolved Ambulatory Problems     Diagnosis Date Noted   • Adiposity 05/23/2016   • Uncontrolled type 2 diabetes mellitus 05/23/2016   • Pneumonia of both lungs due to infectious organism 06/06/2016   • Osteopenia  07/06/2016     Past Medical History   Diagnosis Date   • Diabetes mellitus    • Fracture of rib    • Iron deficiency    • Obesity    • RA (rheumatoid arthritis)    • Type 2 diabetes mellitus        PAST SURGICAL HISTORY  Past Surgical History   Procedure Laterality Date   • Adenoidectomy     • Hysterectomy     • Tonsillectomy     • Sinus surgery         FAMILY HISTORY  Family History   Problem Relation Age of Onset   • Hyperlipidemia Mother    • Hypertension Mother    • Colon cancer Father    • Diabetes Father    • Hyperlipidemia Brother        SOCIAL HISTORY  Social History     Social History   • Marital status:      Spouse name: N/A   • Number of children: N/A   • Years of education: N/A     Occupational History   • Not on file.     Social History Main Topics   • Smoking status: Former Smoker   • Smokeless tobacco: Not on file      Comment: QUIT AGE 23   • Alcohol use No      Comment: STOPPED 1997   • Drug use: No   • Sexual activity: Defer     Other Topics Concern   • Not on file     Social History Narrative     ALLERGIES  Codeine    REVIEW OF SYSTEMS  Review of Systems   Constitutional: Positive for fever. Negative for chills.   HENT: Negative for sore throat.    Respiratory: Negative for shortness of breath.    Cardiovascular: Negative for chest pain.   Gastrointestinal: Positive for abdominal pain, blood in stool, diarrhea, nausea and vomiting.   Genitourinary: Negative for dysuria.   Musculoskeletal: Negative for back pain.   Skin: Negative for rash.   Neurological: Negative for dizziness.   Psychiatric/Behavioral: The patient is not nervous/anxious.        PHYSICAL EXAM  ED Triage Vitals   Temp Heart Rate Resp BP SpO2   01/18/17 0834 01/18/17 0834 01/18/17 0838 01/18/17 0838 01/18/17 0834   99.7 °F (37.6 °C) 123 16 145/67 94 %      Temp src Heart Rate Source Patient Position BP Location FiO2 (%)   01/18/17 0834 01/18/17 0834 01/18/17 0838 -- --   Tympanic Monitor Sitting         Physical Exam    Constitutional: She is well-developed, well-nourished, and in no distress.   HENT:   Head: Normocephalic.   Mouth/Throat: Mucous membranes are normal.   Eyes: No scleral icterus.   Neck: Normal range of motion.   Cardiovascular: Normal rate, regular rhythm and normal heart sounds.    Pulmonary/Chest: Effort normal and breath sounds normal.   Abdominal: She exhibits distension. Bowel sounds are hyperactive. There is generalized tenderness.   Genitourinary:   Genitourinary Comments: Heme positive rectal exam with no gross blood.   Musculoskeletal: Normal range of motion.   Neurological: She is alert.   Skin: Skin is warm and dry.   Psychiatric: Mood and affect normal.   Nursing note and vitals reviewed.      LAB RESULTS  Recent Results (from the past 24 hour(s))   Comprehensive Metabolic Panel    Collection Time: 01/18/17  8:57 AM   Result Value Ref Range    Glucose 108 (H) 65 - 99 mg/dL    BUN 7 (L) 8 - 23 mg/dL    Creatinine 0.95 0.57 - 1.00 mg/dL    Sodium 137 136 - 145 mmol/L    Potassium 3.7 3.5 - 5.2 mmol/L    Chloride 102 98 - 107 mmol/L    CO2 22.2 22.0 - 29.0 mmol/L    Calcium 8.5 (L) 8.6 - 10.5 mg/dL    Total Protein 7.1 6.0 - 8.5 g/dL    Albumin 3.50 3.50 - 5.20 g/dL    ALT (SGPT) 14 1 - 33 U/L    AST (SGOT) 20 1 - 32 U/L    Alkaline Phosphatase 128 (H) 39 - 117 U/L    Total Bilirubin 0.4 0.1 - 1.2 mg/dL    eGFR Non African Amer 60 (L) >60 mL/min/1.73    Globulin 3.6 gm/dL    A/G Ratio 1.0 g/dL    BUN/Creatinine Ratio 7.4 7.0 - 25.0    Anion Gap 12.8 mmol/L   Lipase    Collection Time: 01/18/17  8:57 AM   Result Value Ref Range    Lipase 18 13 - 60 U/L   CBC Auto Differential    Collection Time: 01/18/17  8:57 AM   Result Value Ref Range    WBC 16.29 (H) 4.50 - 10.70 10*3/mm3    RBC 4.31 3.90 - 5.20 10*6/mm3    Hemoglobin 11.2 (L) 11.9 - 15.5 g/dL    Hematocrit 36.2 35.6 - 45.5 %    MCV 84.0 80.5 - 98.2 fL    MCH 26.0 (L) 26.9 - 32.0 pg    MCHC 30.9 (L) 32.4 - 36.3 g/dL    RDW 16.9 (H) 11.7 - 13.0 %     RDW-SD 51.5 37.0 - 54.0 fl    MPV 10.6 6.0 - 12.0 fL    Platelets 333 140 - 500 10*3/mm3    Neutrophil % 79.9 (H) 42.7 - 76.0 %    Lymphocyte % 11.5 (L) 19.6 - 45.3 %    Monocyte % 8.1 5.0 - 12.0 %    Eosinophil % 0.2 (L) 0.3 - 6.2 %    Basophil % 0.1 0.0 - 1.5 %    Immature Grans % 0.2 0.0 - 0.5 %    Neutrophils, Absolute 13.00 (H) 1.90 - 8.10 10*3/mm3    Lymphocytes, Absolute 1.88 0.90 - 4.80 10*3/mm3    Monocytes, Absolute 1.32 (H) 0.20 - 1.20 10*3/mm3    Eosinophils, Absolute 0.03 0.00 - 0.70 10*3/mm3    Basophils, Absolute 0.02 0.00 - 0.20 10*3/mm3    Immature Grans, Absolute 0.04 (H) 0.00 - 0.03 10*3/mm3       I ordered the above labs and reviewed the results    RADIOLOGY  CT Abdomen Pelvis With Contrast     Impression:      1. There is an ascending colitis. Surrounding inflammatory changes are  present and there appears to be associated mild small bowel ileus.  2. No further acute process is identified at the abdomen or pelvis.  There is noted progressive interstitial change at the lung bases with  some developing fibrosis.            I ordered the above noted radiological studies and reviewed the images on the PACS system.  Spoke with Dr. Shane regarding CT scan results      PROGRESS AND CONSULTS    09:48  Reviewed pt's history and workup with Dr. Gilmore.  At bedside evaluation, they agree with the plan of care.    10:42  Rechecked patient and they are resting comfortably. Discussed pertinent lab and imaging results, including CT abd/pel shows colitis and her WBC is elevated. Discussed the plan to admit the patient for further treatment. Patient agrees with plan and all questions were addressed.     11:16  Discussed case with Dr. Mckeon. Reviewed history, exam, results and treatments. Discussed concerns and plan of care. Dr Mckeon accepts pt to be admitted.      ADMISSION    Discussed treatment plan and reason for admission with pt/family and admitting physician.  Pt/family voiced understanding of  "the plan for admission for further testing/treatment as needed.      DIAGNOSIS  Final diagnoses:   Acute colitis       COURSE & MEDICAL DECISION MAKING  Pertinent Labs and Imaging studies that were ordered and reviewed are noted above.  Results were reviewed/discussed with the patient and they were also made aware of online assess.   Pt also made aware that some labs, such as cultures, will not be resulted during ER visit and follow up with PMD is necessary.     MEDICATIONS GIVEN IN ER  Medications   sodium chloride 0.9 % flush 10 mL (not administered)   sodium chloride 0.9 % flush 10 mL (not administered)   sodium chloride 0.9 % bolus 1,000 mL (1,000 mL Intravenous New Bag 1/18/17 0948)   ondansetron (ZOFRAN) injection 4 mg (4 mg Intravenous Given 1/18/17 0949)   dicyclomine (BENTYL) injection 20 mg (20 mg Intramuscular Given 1/18/17 0954)   HYDROmorphone (DILAUDID) injection 1 mg (1 mg Intravenous Given 1/18/17 0949)   iopamidol (ISOVUE-300) 61 % injection 100 mL (85 mL Intravenous Given 1/18/17 1000)       Visit Vitals   • /77 (BP Location: Right arm, Patient Position: Lying)   • Pulse 90   • Temp 99.7 °F (37.6 °C) (Tympanic)   • Resp 18   • Ht 65\" (165.1 cm)   • Wt 186 lb (84.4 kg)   • SpO2 95%   • BMI 30.95 kg/m2         I personally reviewed the past medical history, past surgical history, social history, family history, current medications and allergies as they appear in this chart.  The scribe's note accurately reflects the work and decisions made by me.     I personally scribed for VEDA Pa on 1/18/2017 at 9:26 AM.  Electronically signed by Delbert Khanna on 1/18/2017 at time 9:26 AM         Delbert Khanna  01/18/17 1118       AIYANA White  01/18/17 1408    "

## 2017-01-18 NOTE — Clinical Note
Level of Care: Med/Surg [1]   Diagnosis: Acute colitis [[id number]]   Admitting Physician: SUZIE MANUEL [[id number]]   Attending Physician: SUZIE MANUEL [[id number]]

## 2017-01-18 NOTE — PLAN OF CARE
Problem: Patient Care Overview (Adult)  Goal: Plan of Care Review  Outcome: Ongoing (interventions implemented as appropriate)    01/18/17 1350 01/18/17 1838   Patient Care Overview   Progress --  no change   Outcome Evaluation   Outcome Summary/Follow up Plan --  Patient admitted from ED today for colitis. On antibiotics and IV fluids. No stools since admission. PCP sent a damaso yesterday. Waiting to rule out C-diff.    Coping/Psychosocial Response Interventions   Plan Of Care Reviewed With patient --        Goal: Adult Individualization and Mutuality  Outcome: Ongoing (interventions implemented as appropriate)  Goal: Discharge Needs Assessment  Outcome: Ongoing (interventions implemented as appropriate)    Problem: Pain, Acute (Adult)  Goal: Acceptable Pain Control/Comfort Level  Outcome: Ongoing (interventions implemented as appropriate)    Problem: Diarrhea (Adult)  Goal: Improved/Reduced Symptoms  Outcome: Ongoing (interventions implemented as appropriate)    Problem: Fluid Volume Deficit (Adult)  Goal: Fluid/Electrolyte Balance  Outcome: Ongoing (interventions implemented as appropriate)  Goal: Comfort/Well Being  Outcome: Ongoing (interventions implemented as appropriate)

## 2017-01-18 NOTE — PLAN OF CARE
Problem: Pain, Acute (Adult)  Goal: Identify Related Risk Factors and Signs and Symptoms  Outcome: Outcome(s) achieved Date Met:  01/18/17    Problem: Diarrhea (Adult)  Goal: Identify Related Risk Factors and Signs and Symptoms  Outcome: Outcome(s) achieved Date Met:  01/18/17    Problem: Fluid Volume Deficit (Adult)  Goal: Identify Related Risk Factors and Signs and Symptoms  Outcome: Outcome(s) achieved Date Met:  01/18/17

## 2017-01-18 NOTE — IP AVS SNAPSHOT
AFTER VISIT SUMMARY             Dara Medina           About your hospitalization     You were admitted on:  January 18, 2017 You last received care in the:  [facility]       Procedures & Surgeries         Medications    If you or your caregiver advised us that you are currently taking a medication and that medication is marked below as “Resume”, this simply indicates that we have reviewed those medications to make sure our new therapy recommendations do not interfere.  If you have concerns about medications other than those new ones which we are prescribing today, please consult the physician who prescribed them (or your primary physician).  Our review of your home medications is not meant to indicate that we are directing their use.             Your Medications      CHANGE how you take these medications     DULoxetine 60 MG capsule   Take 60 mg by mouth daily.   Last time this was given:  1/24/2017  9:35 AM   Commonly known as:  CYMBALTA   What changed:  Another medication with the same name was removed. Continue taking this medication, and follow the directions you see here.           predniSONE 5 MG tablet   Take 3 tablets by mouth 2 (Two) Times a Day With Meals.   Last time this was given:  1/24/2017  9:35 AM   Commonly known as:  DELTASONE   What changed:    - medication strength  - how much to take  - when to take this             CONTINUE taking these medications     buPROPion  MG 24 hr tablet   TAKE 1 TABLET BY MOUTH EVERY DAY   Last time this was given:  1/24/2017  9:35 AM   Commonly known as:  WELLBUTRIN XL           Cyanocobalamin 1000 MCG/ML kit   Inject 1,000 mcg as directed every 30 (thirty) days.           cyclobenzaprine 10 MG tablet   Take 10 mg by mouth as needed.   Commonly known as:  FLEXERIL           DHEA PO   Take 5 mg by mouth Daily.           ESTRATEST PO   Take 2 doses by mouth 2 (Two) Times a Day. 1mg/ 35mg/ gm           FERREX 150 PO   Take 1 capsule by mouth  2 (Two) Times a Day.           folic acid 1 MG tablet   Take 1 mg by mouth daily.   Last time this was given:  1/24/2017  9:36 AM   Commonly known as:  FOLVITE           JOSE MAGNESIUM-POTASSIUM 250-100 MG tablet   Take 2 tablets by mouth Every Night.   Generic drug:  Magnesium-Potassium           metroNIDAZOLE 500 MG tablet   Take 1 tablet by mouth 3 (Three) Times a Day for 4 days.   Last time this was given:  1/24/2017  1:57 PM   Commonly known as:  FLAGYL           omeprazole 40 MG capsule   Take 40 mg by mouth daily.   Commonly known as:  priLOSEC           progesterone 200 MG capsule   Take 400 mg by mouth Daily.   Commonly known as:  PROMETRIUM           SYNTHROID 125 MCG tablet   1 daily   Last time this was given:  1/24/2017  6:07 AM   Generic drug:  levothyroxine           vitamin D 15880 UNITS capsule capsule   Take 1 cap twice weekly   Commonly known as:  ERGOCALCIFEROL           vitamin D3 5000 UNITS capsule capsule   Take 5,000 Units by mouth 2 (Two) Times a Day.             STOP taking these medications     CELLCEPT INTRAVENOUS 500 MG injection   Generic drug:  mycophenolate           mycophenolate 500 MG tablet   Commonly known as:  CELLCEPT           VYTORIN 10-40 MG per tablet   Generic drug:  ezetimibe-simvastatin                Where to Get Your Medications      You can get these medications from any pharmacy     Bring a paper prescription for each of these medications     metroNIDAZOLE 500 MG tablet    predniSONE 5 MG tablet                  Your Medications      Your Medication List           Morning Noon Evening Bedtime As Needed    buPROPion  MG 24 hr tablet   TAKE 1 TABLET BY MOUTH EVERY DAY   Commonly known as:  WELLBUTRIN XL                                   Cyanocobalamin 1000 MCG/ML kit   Inject 1,000 mcg as directed every 30 (thirty) days.                         Once a month       cyclobenzaprine 10 MG tablet   Take 10 mg by mouth as needed.   Commonly known as:  FLEXERIL                             For muscle spasms       DHEA PO   Take 5 mg by mouth Daily.                                   DULoxetine 60 MG capsule   Take 60 mg by mouth daily.   Commonly known as:  CYMBALTA                                   ESTRATEST PO   Take 2 doses by mouth 2 (Two) Times a Day. 1mg/ 35mg/ gm                                      FERREX 150 PO   Take 1 capsule by mouth 2 (Two) Times a Day.                                      folic acid 1 MG tablet   Take 1 mg by mouth daily.   Commonly known as:  FOLVITE                                   JOSE MAGNESIUM-POTASSIUM 250-100 MG tablet   Take 2 tablets by mouth Every Night.   Generic drug:  Magnesium-Potassium                                   metroNIDAZOLE 500 MG tablet   Take 1 tablet by mouth 3 (Three) Times a Day for 4 days.   Commonly known as:  FLAGYL                                         omeprazole 40 MG capsule   Take 40 mg by mouth daily.   Commonly known as:  priLOSEC                                   predniSONE 5 MG tablet   Take 3 tablets by mouth 2 (Two) Times a Day With Meals.   Commonly known as:  DELTASONE            With breakfast           With dinner               progesterone 200 MG capsule   Take 400 mg by mouth Daily.   Commonly known as:  PROMETRIUM                                   SYNTHROID 125 MCG tablet   1 daily   Generic drug:  levothyroxine                                   vitamin D 22151 UNITS capsule capsule   Take 1 cap twice weekly   Commonly known as:  ERGOCALCIFEROL                         Twice a week       vitamin D3 5000 UNITS capsule capsule   Take 5,000 Units by mouth 2 (Two) Times a Day.                                               Instructions for After Discharge        Activity Instructions     Activity as Tolerated                 Diet Instructions     Advance Diet As Tolerated                 Discharge References/Attachments     DULOXETINE DELAYED-RELEASE CAPSULES (ENGLISH)    PREDNISONE TABLETS (ENGLISH)     COLITIS (ENGLISH)       Follow-ups for After Discharge        Follow-up Information     Follow up with Prasad Celeste MD. Schedule an appointment as soon as possible for a visit in 4 week(s).    Specialty:  Gastroenterology    Contact information:    [address]  [facility] [address]  [place] [id number] [phone]          Follow up with Randi Yang MD. Schedule an appointment as soon as possible for a visit in 1 week(s).    Specialty:  Internal Medicine    Contact information:    [address] [facility] [address]  [place] [id number] [phone]          Follow up with Howard Contreras MD. Schedule an appointment as soon as possible for a visit in 2 week(s).    Specialty:  Rheumatology    Contact information:    [address] [facility] [address]  [facility] [id number] [phone]        Referrals and Follow-ups to Schedule     Follow-Up    As directed    Follow up with Rheumatology as directed.  Follow up with Gastroenterology as directed.   Follow Up Details:  PCP in 1 week             Scheduled Appointments     Feb 06, 2017  9:00 AM EST   IVIG with ROOM 4 [facility]    [address]  [facility] [address] [phone]            Apr 12, 2017  9:45 AM EDT   Office Visit with Randi Yang MD   [facility] INTERNAL MEDICINE (--)    [address] [facility]  [facility] [address] [phone]           Arrive 15 minutes prior to appointment.            Additional instructions:      [facility] patient and will need a one week hospital follow up appointment.               Shopparity Signup     Our records indicate that you have an active [facility] Shopparity account.    You can view your After Visit Summary by going to [place] and logging in with your Shopparity username and password.  If you don't have a Sh[employer] username and password but a parent or guardian has access to your record, the parent or guardian should login with their own  F[employer] username and password and access your record to view the After Visit Summary.    If you have questions, you can email [email or web address] or call [phone] to talk to our [employer] staff.  Remember, [employer] is NOT to be used for urgent needs.  For medical emergencies, dial 911.           Summary of Your Hospitalization        Reason for Hospitalization     Your primary diagnosis was:  Acute Colitis    Your diagnoses also included:  Infection In Bloodstream, Type 2 Diabetes Mellitus, Controlled, Underactive Thyroid, Immunosuppression      Care Providers     Provider Service Role Specialty    Sánchez Kumar MD Internal Medicine Attending Provider Internal Medicine    Prasad Celeste MD Gastroenterology Consulting Physician  Gastroenterology    Howard Contreras MD Rheumatology Consulting Physician  Rheumatology      Your Allergies  Date Reviewed: 1/18/2017    Allergen Reactions    Codeine Not Noted      Patient Belongings Returned     Document Return of Belongings Flowsheet     Were the patient bedside belongings sent home?   Yes   Belongings Retrieved from Security & Sent Home   N/A    Belongings Sent to Safe   --   Medications Retrieved from Pharmacy & Sent Home   N/A              More Information      Duloxetine delayed-release capsules  What is this medicine?  DULOXETINE (doo LOX e teen) is used to treat depression, anxiety, and different types of chronic pain.  This medicine may be used for other purposes; ask your health care provider or pharmacist if you have questions.  What should I tell my health care provider before I take this medicine?  They need to know if you have any of these conditions:  -bipolar disorder or a family history of bipolar disorder  -glaucoma  -kidney disease  -liver disease  -suicidal thoughts or a previous suicide attempt  -taken medicines called MAOIs like Carbex, Eldepryl, Marplan, Nardil, and Parnate within 14 days  -an unusual reaction to duloxetine,  other medicines, foods, dyes, or preservatives  -pregnant or trying to get pregnant  -breast-feeding  How should I use this medicine?  Take this medicine by mouth with a glass of water. Follow the directions on the prescription label. Do not cut, crush or chew this medicine. You can take this medicine with or without food. Take your medicine at regular intervals. Do not take your medicine more often than directed. Do not stop taking this medicine suddenly except upon the advice of your doctor. Stopping this medicine too quickly may cause serious side effects or your condition may worsen.  A special MedGuide will be given to you by the pharmacist with each prescription and refill. Be sure to read this information carefully each time.  Talk to your pediatrician regarding the use of this medicine in children. While this drug may be prescribed for children as young as 7 years of age for selected conditions, precautions do apply.  Overdosage: If you think you have taken too much of this medicine contact a poison control center or emergency room at once.  NOTE: This medicine is only for you. Do not share this medicine with others.  What if I miss a dose?  If you miss a dose, take it as soon as you can. If it is almost time for your next dose, take only that dose. Do not take double or extra doses.  What may interact with this medicine?  Do not take this medicine with any of the following medications:  -certain diet drugs like dexfenfluramine, fenfluramine  -desvenlafaxine  -linezolid  -MAOIs like Azilect, Carbex, Eldepryl, Marplan, Nardil, and Parnate  -methylene blue (intravenous)  -milnacipran  -thioridazine  -venlafaxine  This medicine may also interact with the following medications:  -alcohol  -aspirin and aspirin-like medicines  -certain antibiotics like ciprofloxacin and enoxacin  -certain medicines for blood pressure, heart disease, irregular heart beat  -certain medicines for depression, anxiety, or psychotic  disturbances  -certain medicines for migraine headache like almotriptan, eletriptan, frovatriptan, naratriptan, rizatriptan, sumatriptan, zolmitriptan  -certain medicines that treat or prevent blood clots like warfarin, enoxaparin, and dalteparin  -cimetidine  -fentanyl  -lithium  -NSAIDS, medicines for pain and inflammation, like ibuprofen or naproxen  -phentermine  -procarbazine  -sibutramine  -[place]'s wort  -theophylline  -tramadol  -tryptophan  This list may not describe all possible interactions. Give your health care provider a list of all the medicines, herbs, non-prescription drugs, or dietary supplements you use. Also tell them if you smoke, drink alcohol, or use illegal drugs. Some items may interact with your medicine.  What should I watch for while using this medicine?  Tell your doctor if your symptoms do not get better or if they get worse. Visit your doctor or health care professional for regular checks on your progress. Because it may take several weeks to see the full effects of this medicine, it is important to continue your treatment as prescribed by your doctor.  Patients and their families should watch out for new or worsening thoughts of suicide or depression. Also watch out for sudden changes in feelings such as feeling anxious, agitated, panicky, irritable, hostile, aggressive, impulsive, severely restless, overly excited and hyperactive, or not being able to sleep. If this happens, especially at the beginning of treatment or after a change in dose, call your health care professional.  You may get drowsy or dizzy. Do not drive, use machinery, or do anything that needs mental alertness until you know how this medicine affects you. Do not stand or sit up quickly, especially if you are an older patient. This reduces the risk of dizzy or fainting spells. Alcohol may interfere with the effect of this medicine. Avoid alcoholic drinks.  This medicine can cause an increase in blood pressure. This  medicine can also cause a sudden drop in your blood pressure, which may make you feel faint and increase the chance of a fall. These effects are most common when you first start the medicine or when the dose is increased, or during use of other medicines that can cause a sudden drop in blood pressure. Check with your doctor for instructions on monitoring your blood pressure while taking this medicine.  Your mouth may get dry. Chewing sugarless gum or sucking hard candy, and drinking plenty of water may help. Contact your doctor if the problem does not go away or is severe.  What side effects may I notice from receiving this medicine?  Side effects that you should report to your doctor or health care professional as soon as possible:  -allergic reactions like skin rash, itching or hives, swelling of the face, lips, or tongue  -changes in blood pressure  -confusion  -dark urine  -dizziness  -fast talking and excited feelings or actions that are out of control  -fast, irregular heartbeat  -fever  -general ill feeling or flu-like symptoms  -hallucination, loss of contact with reality  -light-colored stools  -loss of balance or coordination  -redness, blistering, peeling or loosening of the skin, including inside the mouth  -right upper belly pain  -seizures  -suicidal thoughts or other mood changes  -trouble concentrating  -trouble passing urine or change in the amount of urine  -unusual bleeding or bruising  -unusually weak or tired  -yellowing of the eyes or skin  Side effects that usually do not require medical attention (report to your doctor or health care professional if they continue or are bothersome):  -blurred vision  -change in appetite  -change in sex drive or performance  -headache  -increased sweating  -nausea  This list may not describe all possible side effects. Call your doctor for medical advice about side effects. You may report side effects to FDA at 6-357-FDA-6344.  Where should I keep my  medicine?  Keep out of the reach of children.  Store at room temperature between 20 and 25 degrees C (68 to 77 degrees F). Throw away any unused medicine after the expiration date.  NOTE: This sheet is a summary. It may not cover all possible information. If you have questions about this medicine, talk to your doctor, pharmacist, or health care provider.     © 2016, Elsevier/Gold Standard. (2014-12-09 16:28:32)          Prednisone tablets  What is this medicine?  PREDNISONE (PRED ni sone) is a corticosteroid. It is commonly used to treat inflammation of the skin, joints, lungs, and other organs. Common conditions treated include asthma, allergies, and arthritis. It is also used for other conditions, such as blood disorders and diseases of the adrenal glands.  This medicine may be used for other purposes; ask your health care provider or pharmacist if you have questions.  What should I tell my health care provider before I take this medicine?  They need to know if you have any of these conditions:  -Cushing's syndrome  -diabetes  -glaucoma  -heart disease  -high blood pressure  -infection (especially a virus infection such as chickenpox, cold sores, or herpes)  -kidney disease  -liver disease  -mental illness  -myasthenia gravis  -osteoporosis  -seizures  -stomach or intestine problems  -thyroid disease  -an unusual or allergic reaction to lactose, prednisone, other medicines, foods, dyes, or preservatives  -pregnant or trying to get pregnant  -breast-feeding  How should I use this medicine?  Take this medicine by mouth with a glass of water. Follow the directions on the prescription label. Take this medicine with food. If you are taking this medicine once a day, take it in the morning. Do not take more medicine than you are told to take. Do not suddenly stop taking your medicine because you may develop a severe reaction. Your doctor will tell you how much medicine to take. If your doctor wants you to stop the  medicine, the dose may be slowly lowered over time to avoid any side effects.  Talk to your pediatrician regarding the use of this medicine in children. Special care may be needed.  Overdosage: If you think you have taken too much of this medicine contact a poison control center or emergency room at once.  NOTE: This medicine is only for you. Do not share this medicine with others.  What if I miss a dose?  If you miss a dose, take it as soon as you can. If it is almost time for your next dose, talk to your doctor or health care professional. You may need to miss a dose or take an extra dose. Do not take double or extra doses without advice.  What may interact with this medicine?  Do not take this medicine with any of the following medications:  -metyrapone  -mifepristone  This medicine may also interact with the following medications:  -aminoglutethimide  -amphotericin B  -aspirin and aspirin-like medicines  -barbiturates  -certain medicines for diabetes, like glipizide or glyburide  -cholestyramine  -cholinesterase inhibitors  -cyclosporine  -digoxin  -diuretics  -ephedrine  -female hormones, like estrogens and birth control pills  -isoniazid  -ketoconazole  -NSAIDS, medicines for pain and inflammation, like ibuprofen or naproxen  -phenytoin  -rifampin  -toxoids  -vaccines  -warfarin  This list may not describe all possible interactions. Give your health care provider a list of all the medicines, herbs, non-prescription drugs, or dietary supplements you use. Also tell them if you smoke, drink alcohol, or use illegal drugs. Some items may interact with your medicine.  What should I watch for while using this medicine?  Visit your doctor or health care professional for regular checks on your progress. If you are taking this medicine over a prolonged period, carry an identification card with your name and address, the type and dose of your medicine, and your doctor's name and address.  This medicine may increase your  risk of getting an infection. Tell your doctor or health care professional if you are around anyone with measles or chickenpox, or if you develop sores or blisters that do not heal properly.  If you are going to have surgery, tell your doctor or health care professional that you have taken this medicine within the last twelve months.  Ask your doctor or health care professional about your diet. You may need to lower the amount of salt you eat.  This medicine may affect blood sugar levels. If you have diabetes, check with your doctor or health care professional before you change your diet or the dose of your diabetic medicine.  What side effects may I notice from receiving this medicine?  Side effects that you should report to your doctor or health care professional as soon as possible:  -allergic reactions like skin rash, itching or hives, swelling of the face, lips, or tongue  -changes in emotions or moods  -changes in vision  -depressed mood  -eye pain  -fever or chills, cough, sore throat, pain or difficulty passing urine  -increased thirst  -swelling of ankles, feet  Side effects that usually do not require medical attention (report to your doctor or health care professional if they continue or are bothersome):  -confusion, excitement, restlessness  -headache  -nausea, vomiting  -skin problems, acne, thin and shiny skin  -trouble sleeping  -weight gain  This list may not describe all possible side effects. Call your doctor for medical advice about side effects. You may report side effects to FDA at 0-980-FDA-8408.  Where should I keep my medicine?  Keep out of the reach of children.  Store at room temperature between 15 and 30 degrees C (59 and 86 degrees F). Protect from light. Keep container tightly closed. Throw away any unused medicine after the expiration date.  NOTE: This sheet is a summary. It may not cover all possible information. If you have questions about this medicine, talk to your doctor,  pharmacist, or health care provider.     © 2016, Elsevier/Gold Standard. (2012-08-02 10:57:14)          Colitis  Colitis is inflammation of the colon. Colitis may last a short time (acute) or it may last a long time (chronic).  CAUSES  This condition may be caused by:  · Viruses.  · Bacteria.  · Reactions to medicine.  · Certain autoimmune diseases, such as Crohn disease or ulcerative colitis.  SYMPTOMS  Symptoms of this condition include:  · Diarrhea.  · Passing bloody or tarry stool.  · Pain.  · Fever.  · Vomiting.  · Tiredness (fatigue).  · Weight loss.  · Bloating.  · Sudden increase in abdominal pain.  · Having fewer bowel movements than usual.  DIAGNOSIS  This condition is diagnosed with a stool test or a blood test. You may also have other tests, including X-rays, a CT scan, or a colonoscopy.  TREATMENT  Treatment may include:  · Resting the bowel. This involves not eating or drinking for a period of time.  · Fluids that are given through an IV tube.  · Medicine for pain and diarrhea.  · Antibiotic medicines.  · Cortisone medicines.  · Surgery.  HOME CARE INSTRUCTIONS  Eating and Drinking  · Follow instructions from your health care provider about eating or drinking restrictions.  · Drink enough fluid to keep your urine clear or pale yellow.  · Work with a dietitian to determine which foods cause your condition to flare up.  · Avoid foods that cause flare-ups.  · Eat a well-balanced diet.  Medicines  · Take over-the-counter and prescription medicines only as told by your health care provider.  · If you were prescribed an antibiotic medicine, take it as told by your health care provider. Do not stop taking the antibiotic even if you start to feel better.  General Instructions  · Keep all follow-up visits as told by your health care provider. This is important.  SEEK MEDICAL CARE IF:  · Your symptoms do not go away.  · You develop new symptoms.  SEEK IMMEDIATE MEDICAL CARE IF:  · You have a fever that does not  go away with treatment.  · You develop chills.  · You have extreme weakness, fainting, or dehydration.  · You have repeated vomiting.  · You develop severe pain in your abdomen.  · You pass bloody or tarry stool.     This information is not intended to replace advice given to you by your health care provider. Make sure you discuss any questions you have with your health care provider.     Document Released: 01/25/2006 Document Revised: 09/07/2016 Document Reviewed: 04/11/2016  [employer] Patient Education ©2016 [employer]            SYMPTOMS OF A STROKE    Call 911 or have someone take you to the Emergency Department if you have any of the following:    · Sudden numbness or weakness of your face, arm or leg especially on one side of the body  · Sudden confusion, diffiiculty speaking or trouble understanding   · Changes in your vision or loss of sight in one eye  · Sudden severe headache with no known cause  · sudden dizziness, trouble walking, loss of balance or coordination    It is important to seek emergency care right away if you have further stroke symptoms. If you get emergency help quickly, the powerful clot-dissolving medicines can reduce the disabilities caused by a stroke.     For more information:    American Stroke Association  6-265-9-STROKE  www.strokeassociation.org           IF YOU SMOKE OR USE TOBACCO PLEASE READ THE FOLLOWING:    Why is smoking bad for me?  Smoking increases the risk of heart disease, lung disease, vascular disease, stroke, and cancer.     If you smoke, STOP!    If you would like more information on quitting smoking, please visit the [employer] website: [email or web address]   This link will provide additional resources including the QUIT line and the [employer] the [employer] support groups.     For more information:    American Cancer Society  ([phone]    American Heart Association  1-581.744.1623               YOU ARE THE  MOST IMPORTANT FACTOR IN YOUR RECOVERY.     Follow all instructions carefully.     I have reviewed my discharge instructions with my nurse, including the following information, if applicable:     Information about my illness and diagnosis   Follow up appointments (including lab draws)   Wound Care   Equipment Needs   Medications (new and continuing) along with side effects   Preventative information such as vaccines and smoking cessations   Diet   Pain   I know when to contact my Doctor's office or seek emergency care      I want my nurse to describe the side effects of my medications: YES NO   If the answer is no, I understand the side effects of my medications: YES NO   My nurse described the side effects of my medications in a way that I could understand: YES NO   I have taken my personal belongings and my own medications with me at discharge: YES NO            I have received this information and my questions have been answered. I have discussed any concerns I see with this plan with the nurse or physician. I understand these instructions.    Signature of Patient or Responsible Person: _____________________________________    Date: _________________  Time: __________________    Signature of Healthcare Provider: _______________________________________  Date: _________________  Time: __________________

## 2017-01-19 ENCOUNTER — INPATIENT HOSPITAL (OUTPATIENT)
Dept: URBAN - METROPOLITAN AREA HOSPITAL 113 | Facility: HOSPITAL | Age: 62
End: 2017-01-19
Payer: COMMERCIAL

## 2017-01-19 DIAGNOSIS — K52.9 NONINFECTIVE GASTROENTERITIS AND COLITIS, UNSPECIFIED: ICD-10-CM

## 2017-01-19 LAB
ANION GAP SERPL CALCULATED.3IONS-SCNC: 6.4 MMOL/L
BASOPHILS # BLD AUTO: 0.02 10*3/MM3 (ref 0–0.2)
BASOPHILS NFR BLD AUTO: 0.2 % (ref 0–1.5)
BUN BLD-MCNC: 6 MG/DL (ref 8–23)
BUN/CREAT SERPL: 7.2 (ref 7–25)
CALCIUM SPEC-SCNC: 7.9 MG/DL (ref 8.6–10.5)
CHLORIDE SERPL-SCNC: 113 MMOL/L (ref 98–107)
CO2 SERPL-SCNC: 23.6 MMOL/L (ref 22–29)
CREAT BLD-MCNC: 0.83 MG/DL (ref 0.57–1)
DEPRECATED RDW RBC AUTO: 54.2 FL (ref 37–54)
EOSINOPHIL # BLD AUTO: 0.12 10*3/MM3 (ref 0–0.7)
EOSINOPHIL NFR BLD AUTO: 1.3 % (ref 0.3–6.2)
ERYTHROCYTE [DISTWIDTH] IN BLOOD BY AUTOMATED COUNT: 17.3 % (ref 11.7–13)
GFR SERPL CREATININE-BSD FRML MDRD: 70 ML/MIN/1.73
GLUCOSE BLD-MCNC: 182 MG/DL (ref 65–99)
HCT VFR BLD AUTO: 33.2 % (ref 35.6–45.5)
HGB BLD-MCNC: 10 G/DL (ref 11.9–15.5)
IMM GRANULOCYTES # BLD: 0.02 10*3/MM3 (ref 0–0.03)
IMM GRANULOCYTES NFR BLD: 0.2 % (ref 0–0.5)
LYMPHOCYTES # BLD AUTO: 1.59 10*3/MM3 (ref 0.9–4.8)
LYMPHOCYTES NFR BLD AUTO: 17.2 % (ref 19.6–45.3)
MCH RBC QN AUTO: 26.1 PG (ref 26.9–32)
MCHC RBC AUTO-ENTMCNC: 30.1 G/DL (ref 32.4–36.3)
MCV RBC AUTO: 86.7 FL (ref 80.5–98.2)
MONOCYTES # BLD AUTO: 0.7 10*3/MM3 (ref 0.2–1.2)
MONOCYTES NFR BLD AUTO: 7.6 % (ref 5–12)
NEUTROPHILS # BLD AUTO: 6.77 10*3/MM3 (ref 1.9–8.1)
NEUTROPHILS NFR BLD AUTO: 73.5 % (ref 42.7–76)
PLAT MORPH BLD: NORMAL
PLATELET # BLD AUTO: 271 10*3/MM3 (ref 140–500)
PMV BLD AUTO: 10.4 FL (ref 6–12)
POTASSIUM BLD-SCNC: 4.5 MMOL/L (ref 3.5–5.2)
RBC # BLD AUTO: 3.83 10*6/MM3 (ref 3.9–5.2)
RBC MORPH BLD: NORMAL
SODIUM BLD-SCNC: 143 MMOL/L (ref 136–145)
WBC MORPH BLD: NORMAL
WBC NRBC COR # BLD: 9.22 10*3/MM3 (ref 4.5–10.7)

## 2017-01-19 PROCEDURE — 25010000002 HYDROMORPHONE PER 4 MG: Performed by: INTERNAL MEDICINE

## 2017-01-19 PROCEDURE — 25010000002 LEVOFLOXACIN PER 250 MG: Performed by: INTERNAL MEDICINE

## 2017-01-19 PROCEDURE — 85007 BL SMEAR W/DIFF WBC COUNT: CPT | Performed by: INTERNAL MEDICINE

## 2017-01-19 PROCEDURE — 80048 BASIC METABOLIC PNL TOTAL CA: CPT | Performed by: INTERNAL MEDICINE

## 2017-01-19 PROCEDURE — 99253 IP/OBS CNSLTJ NEW/EST LOW 45: CPT

## 2017-01-19 PROCEDURE — 85025 COMPLETE CBC W/AUTO DIFF WBC: CPT | Performed by: INTERNAL MEDICINE

## 2017-01-19 PROCEDURE — 25810000003 SODIUM CHLORIDE 0.9 % WITH KCL 20 MEQ 20-0.9 MEQ/L-% SOLUTION: Performed by: INTERNAL MEDICINE

## 2017-01-19 RX ORDER — LEVOFLOXACIN 5 MG/ML
500 INJECTION, SOLUTION INTRAVENOUS EVERY 24 HOURS
Status: DISCONTINUED | OUTPATIENT
Start: 2017-01-19 | End: 2017-01-20

## 2017-01-19 RX ORDER — HYDROCODONE BITARTRATE AND ACETAMINOPHEN 5; 325 MG/1; MG/1
1 TABLET ORAL EVERY 6 HOURS PRN
Status: DISCONTINUED | OUTPATIENT
Start: 2017-01-19 | End: 2017-01-20

## 2017-01-19 RX ORDER — DICYCLOMINE HYDROCHLORIDE 10 MG/1
20 CAPSULE ORAL 4 TIMES DAILY
Status: DISCONTINUED | OUTPATIENT
Start: 2017-01-19 | End: 2017-01-24 | Stop reason: HOSPADM

## 2017-01-19 RX ORDER — SIMETHICONE 80 MG
80 TABLET,CHEWABLE ORAL 4 TIMES DAILY PRN
Status: DISCONTINUED | OUTPATIENT
Start: 2017-01-19 | End: 2017-01-24 | Stop reason: HOSPADM

## 2017-01-19 RX ADMIN — SIMETHICONE CHEW TAB 80 MG 80 MG: 80 TABLET ORAL at 09:22

## 2017-01-19 RX ADMIN — DULOXETINE HYDROCHLORIDE 90 MG: 60 CAPSULE, DELAYED RELEASE ORAL at 09:22

## 2017-01-19 RX ADMIN — HYDROMORPHONE HYDROCHLORIDE 0.5 MG: 1 INJECTION, SOLUTION INTRAMUSCULAR; INTRAVENOUS; SUBCUTANEOUS at 21:01

## 2017-01-19 RX ADMIN — DICYCLOMINE HYDROCHLORIDE 20 MG: 10 CAPSULE ORAL at 17:14

## 2017-01-19 RX ADMIN — HYDROCODONE BITARTRATE AND ACETAMINOPHEN 1 TABLET: 5; 325 TABLET ORAL at 09:25

## 2017-01-19 RX ADMIN — POTASSIUM CHLORIDE AND SODIUM CHLORIDE 100 ML/HR: 900; 150 INJECTION, SOLUTION INTRAVENOUS at 09:26

## 2017-01-19 RX ADMIN — SIMETHICONE CHEW TAB 80 MG 80 MG: 80 TABLET ORAL at 15:12

## 2017-01-19 RX ADMIN — BUPROPION HYDROCHLORIDE 300 MG: 300 TABLET, EXTENDED RELEASE ORAL at 09:22

## 2017-01-19 RX ADMIN — DICYCLOMINE HYDROCHLORIDE 20 MG: 10 CAPSULE ORAL at 11:49

## 2017-01-19 RX ADMIN — LEVOTHYROXINE SODIUM 125 MCG: 125 TABLET ORAL at 05:57

## 2017-01-19 RX ADMIN — METRONIDAZOLE 500 MG: 500 TABLET ORAL at 05:57

## 2017-01-19 RX ADMIN — METRONIDAZOLE 500 MG: 500 TABLET ORAL at 21:01

## 2017-01-19 RX ADMIN — LEVOFLOXACIN 500 MG: 500 INJECTION, SOLUTION INTRAVENOUS at 10:50

## 2017-01-19 RX ADMIN — FOLIC ACID 1 MG: 1 TABLET ORAL at 09:22

## 2017-01-19 RX ADMIN — POTASSIUM CHLORIDE AND SODIUM CHLORIDE 100 ML/HR: 900; 150 INJECTION, SOLUTION INTRAVENOUS at 01:05

## 2017-01-19 RX ADMIN — METRONIDAZOLE 500 MG: 500 TABLET ORAL at 13:26

## 2017-01-19 RX ADMIN — HYDROMORPHONE HYDROCHLORIDE 0.5 MG: 1 INJECTION, SOLUTION INTRAMUSCULAR; INTRAVENOUS; SUBCUTANEOUS at 15:05

## 2017-01-19 RX ADMIN — PANTOPRAZOLE SODIUM 40 MG: 40 TABLET, DELAYED RELEASE ORAL at 05:57

## 2017-01-19 RX ADMIN — HYDROMORPHONE HYDROCHLORIDE 0.5 MG: 1 INJECTION, SOLUTION INTRAMUSCULAR; INTRAVENOUS; SUBCUTANEOUS at 06:02

## 2017-01-19 RX ADMIN — DICYCLOMINE HYDROCHLORIDE 20 MG: 10 CAPSULE ORAL at 21:01

## 2017-01-19 NOTE — PLAN OF CARE
Problem: Patient Care Overview (Adult)  Goal: Plan of Care Review  Outcome: Ongoing (interventions implemented as appropriate)    01/19/17 0424   Patient Care Overview   Progress no change   Outcome Evaluation   Outcome Summary/Follow up Plan Pt c diff results negative. Pain controlled with dilaudid. Ambulating in room well. Tolerating clear liquid diet.   Coping/Psychosocial Response Interventions   Plan Of Care Reviewed With patient       Goal: Adult Individualization and Mutuality  Outcome: Ongoing (interventions implemented as appropriate)  Goal: Discharge Needs Assessment  Outcome: Ongoing (interventions implemented as appropriate)    Problem: Pain, Acute (Adult)  Goal: Acceptable Pain Control/Comfort Level  Outcome: Ongoing (interventions implemented as appropriate)    Problem: Diarrhea (Adult)  Goal: Improved/Reduced Symptoms  Outcome: Ongoing (interventions implemented as appropriate)    Problem: Fluid Volume Deficit (Adult)  Goal: Fluid/Electrolyte Balance  Outcome: Ongoing (interventions implemented as appropriate)  Goal: Comfort/Well Being  Outcome: Ongoing (interventions implemented as appropriate)

## 2017-01-19 NOTE — PAYOR COMM NOTE
"Dara Medina (61 y.o. Female)     Date of Birth Social Security Number Address Home Phone MRN    1955  [id number] [id number] [phone] [id number]    [employer] Marital Status          Unknown        Admission Date Admission Type Admitting Provider Attending Provider Department, Room/Bed    17 Emergency German Mckeon MD [place], German Barfield MD [address], [address]477/    Discharge Date Discharge Disposition Discharge Destination                      Attending Provider: German Mckeon MD     Allergies:  Codeine    Isolation:  None   Infection:  None   Code Status:  Conditional    Ht:  65\" (165.1 cm)   Wt:  188 lb 3.2 oz (85.4 kg)    Admission Cmt:  None   Principal Problem:  Acute colitis [K52.9]                 Active Insurance as of 2017     Primary Coverage     Payor Plan Insurance Group Employer/Plan Group    [employer] BLUE [employer]OSS [employer] BLUE CROSS BLUE [employer]IELD PPO [id number]     Payor Plan Address Payor Plan Phone Number Effective From Effective To    [address] [phone] 2016     [place] [address]       Subscriber Name Subscriber Birth Date Member ID       DARA MEDINA 1955 [id number]                 Emergency Contacts      (Rel.) Home Phone Work Phone Mobile Phone    Mildred Hendricks (Daughter) [phone] -- --    MaribelValeriano briscoe (Son) -- -- [phone]    LilinaKera (Daughter) [phone] -- --               History & Physical      German Mckeon MD at 2017  1:12 PM              Name: Dara Medina ADMIT: 2017   : 1955  PCP: Randi Yang MD    MRN: [id number] LOS: 0 days   AGE/SEX: 61 y.o. female  ROOM:      Chief Complaint   Patient presents with   • Abdominal Pain   • Diarrhea     \"THEY THINK I HAVE C.DIFF\"   • Rectal Bleeding       Subjective   Patient is a 61 y.o. female presents with the following...    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  She was placed on amoxicillin one week ago for ear infection.  2 days ago she started with severe diarrhea up to 50 times which was bloody and with mucus.  It's associated with severe abdominal pain and nausea.  Every time she eats she has a bowel movement.  It is improved now since she is not eating as much.  Yesterday her primary doctor checked a C. difficile PCR which is still pending.  Empirically started her on oral Flagyl.  She presented to the emergency room hemodynamically stable with a white blood cell count of 16,000.  She also had fever at home up to 101 Fahrenheit.  A CT of her abdomen revealed ascending colitis with surrounding inflammatory changes and a mild associated small bowel ileus.  He also showed interstitial lung changes at the bases (she is followed by Dr. Camara at [facility]).  She was admitted to our service for further care and management.    Note, Hemoccult was positive in the emergency room  Past Medical History   Diagnosis Date   • Diabetes mellitus    • Fracture of rib    • Hyperlipidemia    • Hypothyroidism    • Hypothyroidism    • Iron deficiency    • Obesity    • Osteoporosis    • RA (rheumatoid arthritis)    • Type 2 diabetes mellitus    • Vitamin D deficiency      Past Surgical History   Procedure Laterality Date   • Adenoidectomy     • Hysterectomy     • Tonsillectomy     • Sinus surgery       Family History   Problem Relation Age of Onset   • Hyperlipidemia Mother    • Hypertension Mother    • Colon cancer Father    • Diabetes Father    • Hyperlipidemia Brother      Social History   Substance Use Topics   • Smoking status: Former Smoker   • Smokeless tobacco: None      Comment: QUIT AGE 23    • Alcohol use No      Comment: STOPPED 1997       (Not in a hospital admission)  Allergies:  Codeine    Review of Systems   Constitutional: Positive for appetite change, fatigue and fever. Negative for activity change and unexpected weight change.   HENT: Negative for mouth sores, sore throat and trouble swallowing.    Eyes: Negative for pain and visual disturbance.   Respiratory: Negative for cough, chest tightness and shortness of breath.    Cardiovascular: Negative for chest pain, palpitations and leg swelling.   Gastrointestinal: Positive for abdominal pain, blood in stool, diarrhea and nausea. Negative for constipation and vomiting.   Endocrine:        Negative for diabetes but positive for thyroid disease   Genitourinary: Negative for dysuria and hematuria.   Musculoskeletal: Positive for arthralgias. Negative for back pain, neck pain and neck stiffness.        Chronic   Skin: Negative for rash and wound.   Neurological: Positive for weakness and headaches. Negative for dizziness, syncope and light-headedness.        Headache currently   Hematological: Negative for adenopathy. Does not bruise/bleed easily.   Psychiatric/Behavioral: Negative for agitation, behavioral problems and confusion.        Objective    Vital Signs  Temp:  [99.7 °F (37.6 °C)] 99.7 °F (37.6 °C)  Heart Rate:  [] 90  Resp:  [16-18] 18  BP: (128-149)/(62-95) 149/74  SpO2:  [90 %-96 %] 92 %  on   ;   O2 Device: room air  Body mass index is 30.95 kg/(m^2).    Physical Exam   Constitutional: She is oriented to person, place, and time. She appears well-developed and well-nourished.   Appears uncomfortable and in pain in the abdomen   HENT:   Head: Normocephalic and atraumatic.   Mouth/Throat: Oropharynx is clear and moist. No oropharyngeal exudate.   Eyes: Conjunctivae and EOM are normal. Pupils are equal, round, and reactive to light. No scleral icterus.   Neck: Normal range of motion. Neck supple. No JVD present. No thyromegaly  present.   Cardiovascular: Normal rate, regular rhythm and normal heart sounds.  Exam reveals no gallop and no friction rub.    No murmur heard.  Pulmonary/Chest: Effort normal and breath sounds normal. No stridor. No respiratory distress.   Dry crackles related to her chronic interstitial lung disease   Abdominal: Soft. Bowel sounds are normal. She exhibits distension. There is tenderness. There is guarding. There is no rebound.   Diffuse tenderness   Musculoskeletal: She exhibits no edema or tenderness.   Lymphadenopathy:     She has no cervical adenopathy.   Neurological: She is alert and oriented to person, place, and time. No cranial nerve deficit.   Skin: Skin is warm and dry.   Psychiatric: She has a normal mood and affect. Her behavior is normal.       Results Review:   I reviewed the patient's new clinical results.  I have reviewed the CT scan and she does appear to have thickening of the ascending colon consistent with colitis  Imaging Results (last 24 hours)     Procedure Component Value Units Date/Time    CT Abdomen Pelvis With Contrast [[id number]] Collected:  01/18/17 1142     Updated:  01/18/17 1142    Narrative:       CT ABDOMEN PELVIS WITH CONTRAST     HISTORY: Abdominal pain and distention, nausea     TECHNIQUE: Spiral axial CT imaging of the abdomen and pelvis was  performed at 3 mm intervals throughout. Intravenous contrast was  administered for this imaging.     COMPARISON: Previous contrasted CT imaging of the abdomen and pelvis  05/26/2015 is reviewed.     FINDINGS: There appears to be some minimal fatty infiltration of the  liver. Gallbladder appears unremarkable. There is no biliary ductal  dilatation. Spleen is normal in size and configuration. The pancreas,  adrenal glands, and kidneys are unremarkable.     There is abnormal colonic wall thickening extending from the cecum  through the ascending colon to the hepatic flexure. There is some mild  associated pericolonic stranding and fluid  within the region. There are  number of small shotty nodes within the adjacent mesentery. There also  appears to be a localized small bowel ileus within the immediately  adjacent ileum. No ileal wall thickening is present. A normal appendix  is visualized. There are few fluid-filled loops of small bowel, however  none of these are pathologically distended. I see no evidence for small  bowel obstruction. Stomach appears unremarkable. There has been a  hysterectomy. No adnexal masses are present. There is a trace amount  free fluid. There is no free air. Visualized lung bases demonstrate  chronic interstitial changes which clearly have progressed compared with  imaging of 05/26/2015, now with some early fibrosis present.       Impression:       1. There is an ascending colitis. Surrounding inflammatory changes are  present and there appears to be associated mild small bowel ileus.  2. No further acute process is identified at the abdomen or pelvis.  There is noted progressive interstitial change at the lung bases with  some developing fibrosis.     Note: The findings were discussed with AIYANA Woo in the  ER at the time of the dictation             Assessment/Plan     Principal Problem:    Acute colitis  Active Problems:    Hypothyroidism    Sepsis, unspecified organism    Immunosuppression      Assessment & Plan    Acute Colitis  -certainly has risk factor for c. Diff, recheck C. Diff pcr, continue PO flagyl as not severe C. Diff  -Has mild SB ileus so if has c. Diff and doesn't improve with PO flagyl then will change to PO vanc and Vanc suppository  -If doesn't have c. Diff will ask GI to see her for further recs since she does have h/o microscopic colitis and is immunosuppressed so other agents possible such as CMV or autoimmune colitis  -pain control  -IVF  -f/u cultures  -Clears for now but if develops nausea and vomiting will stop clears, get KUB and highly consider Vanc Supp (if C. Diff  "+)    Sepsis  -due to above  -fever 101, wbc, HR 90  -f/u cx and cont abx    RA on immunosuppression  -no active issues  -only intermittently takes prednisone so will not continue, only takes during \"flare\" of RA    ILD  -apparent progression on our CT scan  -on d/c will ask her Pulmonologist to f/u with this (Dr. Camara at [facility])    Cont wellbutrin, cymbalta, synthroid    I have discussed the CODE STATUS with the patient and she is a DO NOT RESUSCITATE.  Her healthcare surrogate is her daughter-in-law, Kera.  I also discussed the case with Kera who is known to me from school and residency with the permission of the patient.    I discussed the patients findings and my recommendations with patient and family.          German Mckeon MD  [facility]  01/18/17  1:19 PM         Electronically signed by German Mckeon MD at 1/18/2017  1:29 PM           Emergency Department Notes      Amy Magaña APRN at 1/18/2017  9:04 AM     Attestation signed by Tyrese Gilmore MD at 1/18/2017  6:04 PM        I supervised care provided by the midlevel provider.    We have discussed this patient's history, physical exam, and treatment plan.   I have reviewed the note and personally saw and examined the patient and agree with the plan of care.        For this patient encounter, I reviewed the NP or PA documentation, treatment plan, and medical decision making. Tyrese Gilmore MD 1/18/2017 6:04 PM                                 EMERGENCY DEPARTMENT ENCOUNTER    CHIEF COMPLAINT  Chief Complaint: Abdominal Pain  History given by:Patient  History limited by:Nothing  Room Number: 03/03  PMD: Randi Yang MD      HPI:  Pt is a 61 y.o. female who complains of diffuse abdominal cramping, which began several days ago with no known injury. Patient states that the abdominal pain is worsened with palpation and improved with nothing. The patient was recently dx with an ear infection and " started a course of Amoxicillin. Three days later she developed diarrhea  and was started on Flagyl for possible C Diff. The patient has had a fever as high as 100 and also developed nausea /vomiting two nights ago but denies chest pain or SOA. She denies any previous similar episodes and has no other complaints.       Past Medical History of RA, DM    Duration: 2-3 days  Timing:Constant  Location:Diffuse abdomen  Radiation:None   Quality:Cramping  Intensity/Severity:Moderate  Progression:No Change  Associated Symptoms:Abd pain, diarrhea, hematochezia, fever, vomiting, nausea   Aggravating Factors:Palpation  Alleviating Factors:None  Previous Episodes:None  Treatment before arrival:Amoxicillin for ear infection. Flagyl 1 day ago    PAST MEDICAL HISTORY  Active Ambulatory Problems     Diagnosis Date Noted   • Depression 05/23/2016   • Hyperlipidemia 05/23/2016   • Hypothyroidism 05/23/2016   • Menopause present 05/23/2016   • Sensory neuropathy 05/23/2016   • Osteoporosis 05/23/2016   • Vitamin D deficiency 05/23/2016   • Diabetes type 2, controlled 05/23/2016   • Common variable immunodeficiency 07/01/2016   • Pulmonary fibrosis 07/06/2016   • Iron deficiency anemia 07/06/2016   • Pernicious anemia 07/06/2016   • Rheumatoid arthritis 07/07/2016     Resolved Ambulatory Problems     Diagnosis Date Noted   • Adiposity 05/23/2016   • Uncontrolled type 2 diabetes mellitus 05/23/2016   • Pneumonia of both lungs due to infectious organism 06/06/2016   • Osteopenia 07/06/2016     Past Medical History   Diagnosis Date   • Diabetes mellitus    • Fracture of rib    • Iron deficiency    • Obesity    • RA (rheumatoid arthritis)    • Type 2 diabetes mellitus        PAST SURGICAL HISTORY  Past Surgical History   Procedure Laterality Date   • Adenoidectomy     • Hysterectomy     • Tonsillectomy     • Sinus surgery         FAMILY HISTORY  Family History   Problem Relation Age of Onset   • Hyperlipidemia Mother    • Hypertension  Mother    • Colon cancer Father    • Diabetes Father    • Hyperlipidemia Brother        SOCIAL HISTORY  Social History     Social History   • Marital status:      Spouse name: N/A   • Number of children: N/A   • Years of education: N/A     Occupational History   • Not on file.     Social History Main Topics   • Smoking status: Former Smoker   • Smokeless tobacco: Not on file      Comment: QUIT AGE 23   • Alcohol use No      Comment: STOPPED 1997   • Drug use: No   • Sexual activity: Defer     Other Topics Concern   • Not on file     Social History Narrative     ALLERGIES  Codeine    REVIEW OF SYSTEMS  Review of Systems   Constitutional: Positive for fever. Negative for chills.   HENT: Negative for sore throat.    Respiratory: Negative for shortness of breath.    Cardiovascular: Negative for chest pain.   Gastrointestinal: Positive for abdominal pain, blood in stool, diarrhea, nausea and vomiting.   Genitourinary: Negative for dysuria.   Musculoskeletal: Negative for back pain.   Skin: Negative for rash.   Neurological: Negative for dizziness.   Psychiatric/Behavioral: The patient is not nervous/anxious.        PHYSICAL EXAM  ED Triage Vitals   Temp Heart Rate Resp BP SpO2   01/18/17 0834 01/18/17 0834 01/18/17 0838 01/18/17 0838 01/18/17 0834   99.7 °F (37.6 °C) 123 16 145/67 94 %      Temp src Heart Rate Source Patient Position BP Location FiO2 (%)   01/18/17 0834 01/18/17 0834 01/18/17 0838 -- --   Tympanic Monitor Sitting         Physical Exam   Constitutional: She is well-developed, well-nourished, and in no distress.   HENT:   Head: Normocephalic.   Mouth/Throat: Mucous membranes are normal.   Eyes: No scleral icterus.   Neck: Normal range of motion.   Cardiovascular: Normal rate, regular rhythm and normal heart sounds.    Pulmonary/Chest: Effort normal and breath sounds normal.   Abdominal: She exhibits distension. Bowel sounds are hyperactive. There is generalized tenderness.   Genitourinary:    Genitourinary Comments: Heme positive rectal exam with no gross blood.   Musculoskeletal: Normal range of motion.   Neurological: She is alert.   Skin: Skin is warm and dry.   Psychiatric: Mood and affect normal.   Nursing note and vitals reviewed.      LAB RESULTS  Recent Results (from the past 24 hour(s))   Comprehensive Metabolic Panel    Collection Time: 01/18/17  8:57 AM   Result Value Ref Range    Glucose 108 (H) 65 - 99 mg/dL    BUN 7 (L) 8 - 23 mg/dL    Creatinine 0.95 0.57 - 1.00 mg/dL    Sodium 137 136 - 145 mmol/L    Potassium 3.7 3.5 - 5.2 mmol/L    Chloride 102 98 - 107 mmol/L    CO2 22.2 22.0 - 29.0 mmol/L    Calcium 8.5 (L) 8.6 - 10.5 mg/dL    Total Protein 7.1 6.0 - 8.5 g/dL    Albumin 3.50 3.50 - 5.20 g/dL    ALT (SGPT) 14 1 - 33 U/L    AST (SGOT) 20 1 - 32 U/L    Alkaline Phosphatase 128 (H) 39 - 117 U/L    Total Bilirubin 0.4 0.1 - 1.2 mg/dL    eGFR Non African Amer 60 (L) >60 mL/min/1.73    Globulin 3.6 gm/dL    A/G Ratio 1.0 g/dL    BUN/Creatinine Ratio 7.4 7.0 - 25.0    Anion Gap 12.8 mmol/L   Lipase    Collection Time: 01/18/17  8:57 AM   Result Value Ref Range    Lipase 18 13 - 60 U/L   CBC Auto Differential    Collection Time: 01/18/17  8:57 AM   Result Value Ref Range    WBC 16.29 (H) 4.50 - 10.70 10*3/mm3    RBC 4.31 3.90 - 5.20 10*6/mm3    Hemoglobin 11.2 (L) 11.9 - 15.5 g/dL    Hematocrit 36.2 35.6 - 45.5 %    MCV 84.0 80.5 - 98.2 fL    MCH 26.0 (L) 26.9 - 32.0 pg    MCHC 30.9 (L) 32.4 - 36.3 g/dL    RDW 16.9 (H) 11.7 - 13.0 %    RDW-SD 51.5 37.0 - 54.0 fl    MPV 10.6 6.0 - 12.0 fL    Platelets 333 140 - 500 10*3/mm3    Neutrophil % 79.9 (H) 42.7 - 76.0 %    Lymphocyte % 11.5 (L) 19.6 - 45.3 %    Monocyte % 8.1 5.0 - 12.0 %    Eosinophil % 0.2 (L) 0.3 - 6.2 %    Basophil % 0.1 0.0 - 1.5 %    Immature Grans % 0.2 0.0 - 0.5 %    Neutrophils, Absolute 13.00 (H) 1.90 - 8.10 10*3/mm3    Lymphocytes, Absolute 1.88 0.90 - 4.80 10*3/mm3    Monocytes, Absolute 1.32 (H) 0.20 - 1.20 10*3/mm3     Eosinophils, Absolute 0.03 0.00 - 0.70 10*3/mm3    Basophils, Absolute 0.02 0.00 - 0.20 10*3/mm3    Immature Grans, Absolute 0.04 (H) 0.00 - 0.03 10*3/mm3       I ordered the above labs and reviewed the results    RADIOLOGY  CT Abdomen Pelvis With Contrast     Impression:      1. There is an ascending colitis. Surrounding inflammatory changes are  present and there appears to be associated mild small bowel ileus.  2. No further acute process is identified at the abdomen or pelvis.  There is noted progressive interstitial change at the lung bases with  some developing fibrosis.            I ordered the above noted radiological studies and reviewed the images on the PACS system.  Spoke with Dr. Shane regarding CT scan results      PROGRESS AND CONSULTS    09:48  Reviewed pt's history and workup with Dr. Gilmore.  At bedside evaluation, they agree with the plan of care.    10:42  Rechecked patient and they are resting comfortably. Discussed pertinent lab and imaging results, including CT abd/pel shows colitis and her WBC is elevated. Discussed the plan to admit the patient for further treatment. Patient agrees with plan and all questions were addressed.     11:16  Discussed case with Dr. Mckeon. Reviewed history, exam, results and treatments. Discussed concerns and plan of care. Dr Mckeon accepts pt to be admitted.      ADMISSION    Discussed treatment plan and reason for admission with pt/family and admitting physician.  Pt/family voiced understanding of the plan for admission for further testing/treatment as needed.      DIAGNOSIS  Final diagnoses:   Acute colitis       COURSE & MEDICAL DECISION MAKING  Pertinent Labs and Imaging studies that were ordered and reviewed are noted above.  Results were reviewed/discussed with the patient and they were also made aware of online assess.   Pt also made aware that some labs, such as cultures, will not be resulted during ER visit and follow up with PMD is necessary.  "    MEDICATIONS GIVEN IN ER  Medications   sodium chloride 0.9 % flush 10 mL (not administered)   sodium chloride 0.9 % flush 10 mL (not administered)   sodium chloride 0.9 % bolus 1,000 mL (1,000 mL Intravenous New Bag 1/18/17 0948)   ondansetron (ZOFRAN) injection 4 mg (4 mg Intravenous Given 1/18/17 0949)   dicyclomine (BENTYL) injection 20 mg (20 mg Intramuscular Given 1/18/17 0954)   HYDROmorphone (DILAUDID) injection 1 mg (1 mg Intravenous Given 1/18/17 0949)   iopamidol (ISOVUE-300) 61 % injection 100 mL (85 mL Intravenous Given 1/18/17 1000)       Visit Vitals   • /77 (BP Location: Right arm, Patient Position: Lying)   • Pulse 90   • Temp 99.7 °F (37.6 °C) (Tympanic)   • Resp 18   • Ht 65\" (165.1 cm)   • Wt 186 lb (84.4 kg)   • SpO2 95%   • BMI 30.95 kg/m2         I personally reviewed the past medical history, past surgical history, social history, family history, current medications and allergies as they appear in this chart.  The scribe's note accurately reflects the work and decisions made by me.     I personally scribed for VEDA Pa on 1/18/2017 at 9:26 AM.  Electronically signed by Delbert Khanna on 1/18/2017 at time 9:26 AM         Delbert Khanna  01/18/17 1118       AIYANA White  01/18/17 1408       Electronically signed by Tyrese Gilmore MD at 1/18/2017  6:04 PM      Tyrese Gilmore MD at 1/18/2017 11:49 AM          I supervised care provided by the midlevel provider.    We have discussed this patient's history, physical exam, and treatment plan.   I have reviewed the note and personally saw and examined the patient and agree with the plan of care.    Pt with abd pain and diarrhea onset several days ago. She reports had blood and mucus in the diarrhea. Pt was started on Flagyl for the hematochezia. Pt states that sxs have not improved since taking Flagyl.    On exam, she is A&Ox3 and in NAD. She is non-toxic appearing. There is mild diffuse abd pain.     Iabs " "and CT show evidence of colitis. Will start on IV abx and check stool sample for possible C. Diff.       Documentation assistance provided by hany Alvarado for Dr. Gilmore.  Information recorded by the scribe was done at my direction and has been verified and validated by me.       Garrett Alvarado  01/18/17 1211       Tyrese Gilmore MD  01/18/17 6471       Electronically signed by Tyrese Gilmore MD at 1/18/2017  6:04 PM        Hospital Medications (active)       Dose Frequency Start End    acetaminophen (TYLENOL) tablet 650 mg 650 mg Every 4 Hours PRN 1/18/2017     Sig - Route: Take 2 tablets by mouth Every 4 (Four) Hours As Needed for mild pain (1-3). - Oral    buPROPion XL (WELLBUTRIN XL) 24 hr tablet 300 mg 300 mg Daily 1/18/2017     Sig - Route: Take 1 tablet by mouth Daily. - Oral    dicyclomine (BENTYL) capsule 20 mg 20 mg 4 Times Daily 1/19/2017     Sig - Route: Take 2 capsules by mouth 4 (Four) Times a Day. - Oral    DULoxetine (CYMBALTA) DR capsule 90 mg 90 mg Daily 1/18/2017     Sig - Route: Take 90 mg by mouth Daily. - Oral    folic acid (FOLVITE) tablet 1 mg 1 mg Daily 1/18/2017     Sig - Route: Take 1 tablet by mouth Daily. - Oral    HYDROcodone-acetaminophen (NORCO) 5-325 MG per tablet 1 tablet 1 tablet Every 6 Hours PRN 1/19/2017 1/29/2017    Sig - Route: Take 1 tablet by mouth Every 6 (Six) Hours As Needed for moderate pain (4-6). - Oral    HYDROmorphone (DILAUDID) injection 0.5 mg 0.5 mg Every 2 Hours PRN 1/18/2017 1/28/2017    Sig - Route: Infuse 0.5 mg into a venous catheter Every 2 (Two) Hours As Needed for severe pain (7-10). - Intravenous    Linked Group 1:  \"And\" Linked Group Details        iopamidol (ISOVUE-300) 61 % injection 100 mL 100 mL Once in Imaging 1/18/2017 1/18/2017    Sig - Route: Infuse 100 mL into a venous catheter Once. - Intravenous    levoFLOXacin (LEVAQUIN) 500 mg/100 mL D5W (premix) (LEVAQUIN) 500 mg 500 mg Every 24 Hours 1/19/2017     Sig - Route: Infuse 100 mL " "into a venous catheter Daily. - Intravenous    levothyroxine (SYNTHROID, LEVOTHROID) tablet 125 mcg 125 mcg Every Early Morning 1/19/2017     Sig - Route: Take 1 tablet by mouth Every Morning. - Oral    metroNIDAZOLE (FLAGYL) tablet 500 mg 500 mg Every 8 Hours Scheduled 1/18/2017     Sig - Route: Take 1 tablet by mouth Every 8 (Eight) Hours. - Oral    naloxone (NARCAN) injection 0.4 mg 0.4 mg Every 5 Minutes PRN 1/18/2017     Sig - Route: Infuse 1 mL into a venous catheter Every 5 (Five) Minutes As Needed for respiratory depression. - Intravenous    Linked Group 1:  \"And\" Linked Group Details        ondansetron (ZOFRAN) injection 4 mg 4 mg Every 6 Hours PRN 1/18/2017     Sig - Route: Infuse 2 mL into a venous catheter Every 6 (Six) Hours As Needed for nausea or vomiting. - Intravenous    pantoprazole (PROTONIX) EC tablet 40 mg 40 mg Every Early Morning 1/19/2017     Sig - Route: Take 1 tablet by mouth Every Morning. - Oral    simethicone (MYLICON) chewable tablet 80 mg 80 mg 4 Times Daily PRN 1/19/2017     Sig - Route: Chew 1 tablet 4 (Four) Times a Day As Needed for flatulence. - Oral    sodium chloride 0.9 % bolus 1,000 mL 1,000 mL Once 1/18/2017 1/18/2017    Sig - Route: Infuse 1,000 mL into a venous catheter 1 (One) Time. - Intravenous    sodium chloride 0.9 % flush 1-10 mL 1-10 mL As Needed 1/18/2017     Sig - Route: Infuse 1-10 mL into a venous catheter As Needed for line care. - Intravenous    sodium chloride 0.9 % flush 10 mL 10 mL As Needed 1/18/2017     Sig - Route: Infuse 10 mL into a venous catheter As Needed for line care. - Intravenous    Cosign for Ordering: Accepted by Tyrese Gilmore MD on 1/18/2017  5:13 PM    sodium chloride 0.9 % flush 10 mL 10 mL As Needed 1/18/2017     Sig - Route: Infuse 10 mL into a venous catheter As Needed for line care. - Intravenous    Linked Group 2:  \"And\" Linked Group Details        sodium chloride 0.9 % with KCl 20 mEq/L infusion 100 mL/hr Continuous 1/18/2017     " "Sig - Route: Infuse 100 mL/hr into a venous catheter Continuous. - Intravenous             Physician Progress Notes (last 72 hours) (Notes from 2017  9:56 AM through 2017  9:56 AM)      German Mckeon MD at 2017  8:44 AM  Version 1 of 1               Name: Dara Medina ADMIT: 2017   : 1955  PCP: Randi Yang MD    MRN: [id number] LOS: 1 days   AGE/SEX: 61 y.o. female  ROOM: [facility]     Subjective   Still with abd cramping and pain, not passing much gas, only 1BM here which was non-bloody  Having gas pain  Tolerating full liquid diet  No cp or soa      Objective   Vital Signs  Temp:  [97.1 °F (36.2 °C)-99.5 °F (37.5 °C)] 97.1 °F (36.2 °C)  Heart Rate:  [84-90] 84  Resp:  [16-18] 16  BP: (123-149)/(62-95) 123/63  SpO2:  [90 %-100 %] 100 %  on   ;   O2 Device: room air  Body mass index is 31.32 kg/(m^2).    Objective:  General Appearance:  Comfortable, well-appearing, not in pain and in no acute distress.    Vital signs: (most recent): Blood pressure 123/63, pulse 84, temperature 97.1 °F (36.2 °C), temperature source Oral, resp. rate 16, height 65\" (165.1 cm), weight 188 lb 3.2 oz (85.4 kg), SpO2 100 %.  Vital signs are normal.  No fever.    Lungs:  Normal respiratory rate and normal effort.  Breath sounds clear to auscultation.    Heart: Normal rate.  Regular rhythm.  S1 normal and S2 normal.  No murmur.   Abdomen: Abdomen is soft and non-distended.  Bowel sounds are normal.   There is generalized tenderness.  There is no rebound tenderness.  There is guarding.  (Voluntary  ).     Extremities: Normal range of motion.  There is no dependent edema.    Neurological: Patient is alert and oriented to person, place and time.    Skin:  Warm and dry.          Results Review:       I reviewed the patient's new clinical results.    Results from last 7 days  Lab Units 17  0536 17  0857 17  1017   WBC 10*3/mm3 9.22 16.29* 11.79*   HEMOGLOBIN g/dL 10.0* 11.2* 11.7*   PLATELETS " "10*3/mm3 271 333 381     Results from last 7 days  Lab Units 01/19/17  0536 01/18/17  0857 01/13/17  1017   SODIUM mmol/L 143 137 139   POTASSIUM mmol/L 4.5 3.7 5.1   CHLORIDE mmol/L 113* 102 102   TOTAL CO2 mmol/L 23.6 22.2  --    TOTAL CO2, ARTERIAL mmol/L  --   --  22.6   BUN mg/dL 6* 7* 10   CREATININE mg/dL 0.83 0.95 0.95   GLUCOSE mg/dL 182* 108*  --    Estimated Creatinine Clearance: 76.9 mL/min (by C-G formula based on Cr of 0.83).  Results from last 7 days  Lab Units 01/19/17  0536 01/18/17  0857 01/13/17  1017   CALCIUM mg/dL 7.9* 8.5* 9.5   ALBUMIN g/dL  --  3.50 4.40         buPROPion  mg Oral Daily   dicyclomine 20 mg Oral 4x Daily   DULoxetine 90 mg Oral Daily   folic acid 1 mg Oral Daily   levothyroxine 125 mcg Oral Q AM   metroNIDAZOLE 500 mg Oral Q8H   pantoprazole 40 mg Oral Q AM       sodium chloride 0.9 % with KCl 20 mEq 100 mL/hr Last Rate: 100 mL/hr (01/19/17 0105)   Diet Clear Liquid      Assessment&#47;Plan   Active Hospital Problems (** Indicates Principal Problem)    Diagnosis Date Noted   • **Acute colitis [K52.9] 01/18/2017   • Sepsis, unspecified organism [A41.9] 01/18/2017   • Immunosuppression [D89.9] 01/18/2017   • Hypothyroidism [E03.9] 05/23/2016      Resolved Hospital Problems    Diagnosis Date Noted Date Resolved   • Diabetes type 2, controlled [E11.9] 05/23/2016 01/18/2017       Plan:     Acute Colitis  -certainly has risk factor for c. Diff but PCR negative  -will ask GI to see her for further recs since she does have h/o microscopic colitis and is immunosuppressed so other agents possible such as CMV or autoimmune colitis  -pain control, add norco  -IVF  -f/u cultures  -Advance diet  -bentyl and simethicone     Sepsis  -due to above  -fever 101, wbc, HR 90  -f/u cx and cont abx  wbc normalized today     RA on immunosuppression  -no active issues  -only intermittently takes prednisone so will not continue, only takes during \"flare\" of RA     ILD  -apparent progression on " our CT scan  -on d/c will ask her Pulmonologist to f/u with this (Dr. Camara at [facility])     Cont wellbutrin, cymbalta, synthroid    Disposition  TBD.      German Mckeon MD  [facility]  01/19/17  8:44 AM       Electronically signed by German Mckeon MD at 1/19/2017  8:48 AM        Consult Notes (last 72 hours) (Notes from 1/16/2017  9:56 AM through 1/19/2017  9:56 AM)     No notes of this type exist for this encounter.        ATTN: NURSE REVIEW REF#[id number]   PLEASE CALL BACK TO LILIAM [phone] OR [phone]  THANKS!  LILIAM

## 2017-01-19 NOTE — CONSULTS
Patient Care Team:  Randi Yang MD as PCP - General (Internal Medicine)    CHIEF COMPLAINT: Colitis    HISTORY OF PRESENT ILLNESS:    60 yo WF w CVID as well as a history of RA and an episode of C.Diff in 2015. Had EGD Colon in 8/2016 for IGNACIO with no real source of blood loss and no other findings than a polyp and reflux. Has been doing well but with symptoms of an ear infection took ampicillin from Wed thru Sun but on Sun developed Diarrhea and by Mon PM had cramps fever and bloody diarrhea. CT here shows right sided colitis only and stooll for C.Diff is neg x 2 and a stool cult is in progress. Empiric flagyl and LVQ have been started. Pt also has interstitial lung disease as below...    The patient is a very pleasant 61-year-old female with a complicated past medical history including rheumatoid arthritis on immunosuppression, history of microscopic colitis, interstitial lung disease from methotrexate, CVID on IVIG who presented to the emergency room for 2 day history of diarrhea. She was placed on amoxicillin one week ago for ear infection. 2 days ago she started with severe diarrhea up to 50 times which was bloody and with mucus. It's associated with severe abdominal pain and nausea. Every time she eats she has a bowel movement. It is improved now since she is not eating as much. Yesterday her primary doctor checked a C. difficile PCR which is still pending. Empirically started her on oral Flagyl. She presented to the emergency room hemodynamically stable with a white blood cell count of 16,000. She also had fever at home up to 101 Fahrenheit. A CT of her abdomen revealed ascending colitis with surrounding inflammatory changes and a mild associated small bowel ileus. He also showed interstitial lung changes at the bases (she is followed by Dr. Camara at [facility]). She was admitted to our service for further care and management.      Past Medical History   Diagnosis Date   • Fracture of rib     • Hyperlipidemia    • Hypothyroidism    • Hypothyroidism    • Iron deficiency    • Obesity    • Osteoporosis    • RA (rheumatoid arthritis)    • Vitamin D deficiency      Past Surgical History   Procedure Laterality Date   • Adenoidectomy     • Hysterectomy     • Tonsillectomy     • Sinus surgery       Family History   Problem Relation Age of Onset   • Hyperlipidemia Mother    • Hypertension Mother    • Colon cancer Father    • Diabetes Father    • Hyperlipidemia Brother      Social History   Substance Use Topics   • Smoking status: Former Smoker   • Smokeless tobacco: None      Comment: QUIT AGE 23   • Alcohol use No      Comment: STOPPED 1997     Prescriptions Prior to Admission   Medication Sig Dispense Refill Last Dose   • buPROPion XL (WELLBUTRIN XL) 300 MG 24 hr tablet TAKE 1 TABLET BY MOUTH EVERY DAY 30 tablet 5 1/17/2017 at Unknown time   • Cholecalciferol (VITAMIN D3) 5000 UNITS capsule capsule Take 5,000 Units by mouth 2 (Two) Times a Day.   1/17/2017 at Unknown time   • Cyanocobalamin 1000 MCG/ML kit Inject 1,000 mcg as directed every 30 (thirty) days. 1 kit 11 Taking   • cyclobenzaprine (FLEXERIL) 10 MG tablet Take 10 mg by mouth as needed.   1/17/2017 at Unknown time   • DULoxetine (CYMBALTA) 30 MG capsule Take 30 mg by mouth Daily. For a total of 90 mg dose once daily   1/17/2017 at Unknown time   • DULoxetine (CYMBALTA) 60 MG capsule Take 60 mg by mouth daily.   1/17/2017 at Unknown time   • Est Estrogens-Methyltest (ESTRATEST PO) Take 2 doses by mouth 2 (Two) Times a Day. 1mg/ 35mg/ gm      • folic acid (FOLVITE) 1 MG tablet Take 1 mg by mouth daily.   1/17/2017 at Unknown time   • Magnesium-Potassium (JOSE MAGNESIUM-POTASSIUM) 250-100 MG tablet Take 2 tablets by mouth Every Night.   1/17/2017 at Unknown time   • metroNIDAZOLE (FLAGYL) 500 MG tablet Take 1 tablet by mouth 3 (Three) Times a Day. 30 tablet 0    • mycophenolate (CELLCEPT INTRAVENOUS) 500 MG injection Take  by mouth 2 (two) times a  "day.   Taking   • mycophenolate (CELLCEPT) 500 MG tablet Take 1,000 mg by mouth 2 (Two) Times a Day.      • Nutritional Supplements (DHEA PO) Take 5 mg by mouth Daily.   1/17/2017 at Unknown time   • omeprazole (PriLOSEC) 40 MG capsule Take 40 mg by mouth daily.   1/17/2017 at Unknown time   • Polysaccharide Iron Complex (FERREX 150 PO) Take 1 capsule by mouth 2 (Two) Times a Day.   1/17/2017 at Unknown time   • predniSONE (DELTASONE) 20 MG tablet Take 1 tablet by mouth Daily.   Taking   • progesterone (PROMETRIUM) 200 MG capsule Take 400 mg by mouth Daily.      • SYNTHROID 125 MCG tablet 1 daily 30 tablet 5 1/17/2017 at Unknown time   • vitamin D (ERGOCALCIFEROL) 04732 UNITS capsule capsule Take 1 cap twice weekly 26 capsule 3 Taking   • VYTORIN 10-40 MG per tablet TAKE 1 TABLET EVERY DAY 30 tablet 5 1/17/2017 at Unknown time     Allergies:  Codeine    REVIEW OF SYSTEMS:  Please see the above history of present illness for pertinent positives and negatives.  The remainder of the patient's systems have been reviewed and are negative.       Vital Signs  Temp:  [97.1 °F (36.2 °C)-98 °F (36.7 °C)] 97.1 °F (36.2 °C)  Heart Rate:  [84-94] 94  Resp:  [16] 16  BP: ()/(47-74) 114/66    Flowsheet Rows         First Filed Value    Admission Height  65\" (165.1 cm) Documented at 01/18/2017 0838    Admission Weight  186 lb (84.4 kg) Documented at 01/18/2017 0838           Physical Exam:  Physical Exam   Constitutional: Patient appears well-developed and well-nourished and in no acute distress   HEENT:   Head: Normocephalic and atraumatic.   Eyes:  Pupils are equal, round, and reactive to light. EOM are intact. Sclera are anicteric and non-injected.  Mouth and Throat: Patient has moist mucous membranes. Oropharynx is clear of any erythema or exudate.     Neck: Neck supple. No JVD present. No thyromegaly present. No lymphadenopathy present.  Cardiovascular: Regular rate, regular rhythm, S1 normal and S2 normal.  Exam reveals " no gallop and no friction rub.  No murmur heard.  Pulmonary/Chest: Lungs are clear to auscultation bilaterally. No respiratory distress. No wheezes. No rhonchi. No rales.   Abdominal: Soft. Bowel sounds are normal. Mod distension and no mass. There is no hepatosplenomegaly. There is diffuse moderate tenderness worse on R>L but no real rebound.   Musculoskeletal: Normal Muscle tone  Extremities: No edema. Pulses are palpable in all 4 extremities.  Neurological: Patient is alert and oriented to person, place, and time. Cranial nerves II-XII are grossly intact with no focal deficits.  Skin: Skin is warm. No rash noted. Nails show no clubbing.  No cyanosis or erythema.     Results Review:    I reviewed the patient's new clinical results.  Lab Results (most recent)     Procedure Component Value Units Date/Time    CBC & Differential [[id number]] Collected:  01/18/17 0857    Specimen:  Blood Updated:  01/18/17 0908    Narrative:       The following orders were created for panel order CBC & Differential.  Procedure                               Abnormality         Status                     ---------                               -----------         ------                     CBC Auto Differential[[id number]]         Abnormal            Final result                 Please view results for these tests on the individual orders.    CBC Auto Differential [[id number]]  (Abnormal) Collected:  01/18/17 0857    Specimen:  Blood Updated:  01/18/17 0908     WBC 16.29 (H) 10*3/mm3      RBC 4.31 10*6/mm3      Hemoglobin 11.2 (L) g/dL      Hematocrit 36.2 %      MCV 84.0 fL      MCH 26.0 (L) pg      MCHC 30.9 (L) g/dL      RDW 16.9 (H) %      RDW-SD 51.5 fl      MPV 10.6 fL      Platelets 333 10*3/mm3      Neutrophil % 79.9 (H) %      Lymphocyte % 11.5 (L) %      Monocyte % 8.1 %      Eosinophil % 0.2 (L) %      Basophil % 0.1 %      Immature Grans % 0.2 %      Neutrophils, Absolute 13.00 (H) 10*3/mm3      Lymphocytes, Absolute 1.88 10*3/mm3       Monocytes, Absolute 1.32 (H) 10*3/mm3      Eosinophils, Absolute 0.03 10*3/mm3      Basophils, Absolute 0.02 10*3/mm3      Immature Grans, Absolute 0.04 (H) 10*3/mm3     Lipase [[id number]]  (Normal) Collected:  01/18/17 0857    Specimen:  Blood Updated:  01/18/17 0927     Lipase 18 U/L     Comprehensive Metabolic Panel [[id number]]  (Abnormal) Collected:  01/18/17 0857    Specimen:  Blood Updated:  01/18/17 0927     Glucose 108 (H) mg/dL      BUN 7 (L) mg/dL      Creatinine 0.95 mg/dL      Sodium 137 mmol/L      Potassium 3.7 mmol/L      Chloride 102 mmol/L      CO2 22.2 mmol/L      Calcium 8.5 (L) mg/dL      Total Protein 7.1 g/dL      Albumin 3.50 g/dL      ALT (SGPT) 14 U/L      AST (SGOT) 20 U/L      Alkaline Phosphatase 128 (H) U/L      Total Bilirubin 0.4 mg/dL      eGFR Non African Amer 60 (L) mL/min/1.73      Globulin 3.6 gm/dL      A/G Ratio 1.0 g/dL      BUN/Creatinine Ratio 7.4      Anion Gap 12.8 mmol/L     Urinalysis With / Culture If Indicated [[id number]]  (Normal) Collected:  01/18/17 1212    Specimen:  Urine from Urine, Clean Catch Updated:  01/18/17 1228     Color, UA Yellow      Appearance, UA Clear      pH, UA 5.5      Specific Gravity, UA >=1.030      Glucose, UA Negative      Ketones, UA Negative      Bilirubin, UA Negative      Blood, UA Negative      Protein, UA Negative      Leuk Esterase, UA Negative      Nitrite, UA Negative      Urobilinogen, UA 0.2 E.U./dL     Narrative:       Urine microscopic not indicated.    [place] Draw [[id number]] Collected:  01/18/17 0857    Specimen:  Blood Updated:  01/18/17 1301    Narrative:       The following orders were created for panel order [place] Draw.  Procedure                               Abnormality         Status                     ---------                               -----------         ------                     Light Blue Top[[id number]]                                    Final result               Green Top (Gel)[[id number]]                                    Final result               Lavender Top[[id number]]                                      Final result               Gold Top - SST[[id number]]                                    Final result                 Please view results for these tests on the individual orders.    Light Blue Top [[id number]] Collected:  01/18/17 0857    Specimen:  Blood Updated:  01/18/17 1301     Extra Tube hold for add-on       Auto resulted       Green Top (Gel) [[id number]] Collected:  01/18/17 0857    Specimen:  Blood Updated:  01/18/17 1301     Extra Tube Hold for add-ons.       Auto resulted.       Lavender Top [[id number]] Collected:  01/18/17 0857    Specimen:  Blood Updated:  01/18/17 1301     Extra Tube hold for add-on       Auto resulted       Gold Top - SST [[id number]] Collected:  01/18/17 0857    Specimen:  Blood Updated:  01/18/17 1301     Extra Tube Hold for add-ons.       Auto resulted.       Clostridium Difficile Toxin, PCR [[id number]]  (Normal) Collected:  01/18/17 1751    Specimen:  Stool from Per Rectum Updated:  01/18/17 2115     C. Difficile Toxins by PCR Negative     Lactic Acid, Plasma [[id number]]  (Normal) Collected:  01/18/17 2109    Specimen:  Blood Updated:  01/18/17 2137     Lactate 0.7 mmol/L     Basic Metabolic Panel [[id number]]  (Abnormal) Collected:  01/19/17 0536    Specimen:  Blood Updated:  01/19/17 0639     Glucose 182 (H) mg/dL      BUN 6 (L) mg/dL      Creatinine 0.83 mg/dL      Sodium 143 mmol/L      Potassium 4.5 mmol/L      Chloride 113 (H) mmol/L      CO2 23.6 mmol/L      Calcium 7.9 (L) mg/dL      eGFR Non African Amer 70 mL/min/1.73      BUN/Creatinine Ratio 7.2      Anion Gap 6.4 mmol/L     Narrative:       GFR Normal >60  Chronic Kidney Disease <60  Kidney Failure <15    CBC & Differential [[id number]] Collected:  01/19/17 0536    Specimen:  Blood Updated:  01/19/17 0718    Narrative:       The following orders were created for panel order CBC & Differential.  Procedure                                Abnormality         Status                     ---------                               -----------         ------                     Scan Slide[[id number]]                    Normal              Final result               CBC Auto Differential[[id number]]         Abnormal            Final result                 Please view results for these tests on the individual orders.    CBC Auto Differential [[id number]]  (Abnormal) Collected:  01/19/17 0536    Specimen:  Blood Updated:  01/19/17 0718     WBC 9.22 10*3/mm3      RBC 3.83 (L) 10*6/mm3      Hemoglobin 10.0 (L) g/dL      Hematocrit 33.2 (L) %      MCV 86.7 fL      MCH 26.1 (L) pg      MCHC 30.1 (L) g/dL      RDW 17.3 (H) %      RDW-SD 54.2 (H) fl      MPV 10.4 fL      Platelets 271 10*3/mm3      Neutrophil % 73.5 %      Lymphocyte % 17.2 (L) %      Monocyte % 7.6 %      Eosinophil % 1.3 %      Basophil % 0.2 %      Immature Grans % 0.2 %      Neutrophils, Absolute 6.77 10*3/mm3      Lymphocytes, Absolute 1.59 10*3/mm3      Monocytes, Absolute 0.70 10*3/mm3      Eosinophils, Absolute 0.12 10*3/mm3      Basophils, Absolute 0.02 10*3/mm3      Immature Grans, Absolute 0.02 10*3/mm3     Scan Slide [[id number]]  (Normal) Collected:  01/19/17 0536    Specimen:  Blood Updated:  01/19/17 0718     RBC Morphology Normal      WBC Morphology Normal      Platelet Morphology Normal     Stool Culture [[id number]]  (Normal) Collected:  01/18/17 1751    Specimen:  Stool from Per Rectum Updated:  01/19/17 0947     Stool Culture Culture in progress           Imaging Results (most recent)     Procedure Component Value Units Date/Time    CT Abdomen Pelvis With Contrast [[id number]] Collected:  01/18/17 1142     Updated:  01/18/17 1718    Narrative:       CT ABDOMEN PELVIS WITH CONTRAST     HISTORY: Abdominal pain and distention, nausea     TECHNIQUE: Spiral axial CT imaging of the abdomen and pelvis was  performed at 3 mm intervals throughout. Intravenous contrast was  administered for this  imaging.     COMPARISON: Previous contrasted CT imaging of the abdomen and pelvis  05/26/2015 is reviewed.     FINDINGS: There appears to be some minimal fatty infiltration of the  liver. Gallbladder appears unremarkable. There is no biliary ductal  dilatation. Spleen is normal in size and configuration. The pancreas,  adrenal glands, and kidneys are unremarkable.     There is abnormal colonic wall thickening extending from the cecum  through the ascending colon to the hepatic flexure. There is some mild  associated pericolonic stranding and fluid within the region. There are  number of small shotty nodes within the adjacent mesentery. There also  appears to be a localized small bowel ileus within the immediately  adjacent ileum. No ileal wall thickening is present. A normal appendix  is visualized. There are few fluid-filled loops of small bowel, however  none of these are pathologically distended. I see no evidence for small  bowel obstruction. Stomach appears unremarkable. There has been a  hysterectomy. No adnexal masses are present. There is a trace amount  free fluid. There is no free air. Visualized lung bases demonstrate  chronic interstitial changes which clearly have progressed compared with  imaging of 05/26/2015, now with some early fibrosis present.       Impression:       1. There is an ascending colitis. Surrounding inflammatory changes are  present and there appears to be associated mild small bowel ileus.  2. No further acute process is identified at the abdomen or pelvis.  There is noted progressive interstitial change at the lung bases with  some developing fibrosis.     Note: The findings were discussed with AIYANA Woo in the  ER at the time of the dictation     This report was finalized on 1/18/2017 5:15 PM by Dr. Fabian Shane MD.           reviewed    ECG/EMG Results (most recent)     None        reviewed    Assessment/Plan     Colitis- despite recent abx feel this is consistant  with acute bacterial colitis, Agree with present abx while we wait on culture. Doubt this is C.Diff with negative toxin studies. And feel review of CT is NOT consistant with ischemia.   CVID  RA  Interstitial Lung disease  DM  IGNACIO    Plan is to continue Abx and follow stool culture if pt does not continue to improve then will consider endoscopy for diagnosis.     I discussed the patients findings and my recommendations with patient.     Prasad Celeste MD  01/19/17  1:59 PM    Time: 10 min prior to procedure.

## 2017-01-19 NOTE — PROGRESS NOTES
"      Name: Dara Medina ADMIT: 2017   : 1955  PCP: Randi Yang MD    MRN: [id number] LOS: 1 days   AGE/SEX: 61 y.o. female  ROOM: [facility]     Subjective   Still with abd cramping and pain, not passing much gas, only 1BM here which was non-bloody  Having gas pain  Tolerating full liquid diet  No cp or soa      Objective   Vital Signs  Temp:  [97.1 °F (36.2 °C)-99.5 °F (37.5 °C)] 97.1 °F (36.2 °C)  Heart Rate:  [84-90] 84  Resp:  [16-18] 16  BP: (123-149)/(62-95) 123/63  SpO2:  [90 %-100 %] 100 %  on   ;   O2 Device: room air  Body mass index is 31.32 kg/(m^2).    Objective:  General Appearance:  Comfortable, well-appearing, not in pain and in no acute distress.    Vital signs: (most recent): Blood pressure 123/63, pulse 84, temperature 97.1 °F (36.2 °C), temperature source Oral, resp. rate 16, height 65\" (165.1 cm), weight 188 lb 3.2 oz (85.4 kg), SpO2 100 %.  Vital signs are normal.  No fever.    Lungs:  Normal respiratory rate and normal effort.  Breath sounds clear to auscultation.    Heart: Normal rate.  Regular rhythm.  S1 normal and S2 normal.  No murmur.   Abdomen: Abdomen is soft and non-distended.  Bowel sounds are normal.   There is generalized tenderness.  There is no rebound tenderness.  There is guarding.  (Voluntary  ).     Extremities: Normal range of motion.  There is no dependent edema.    Neurological: Patient is alert and oriented to person, place and time.    Skin:  Warm and dry.          Results Review:       I reviewed the patient's new clinical results.    Results from last 7 days  Lab Units 17  0536 17  0857 17  1017   WBC 10*3/mm3 9.22 16.29* 11.79*   HEMOGLOBIN g/dL 10.0* 11.2* 11.7*   PLATELETS 10*3/mm3 271 333 381     Results from last 7 days  Lab Units 17  0536 17  0857 17  1017   SODIUM mmol/L 143 137 139   POTASSIUM mmol/L 4.5 3.7 5.1   CHLORIDE mmol/L 113* 102 102   TOTAL CO2 mmol/L 23.6 22.2  --    TOTAL CO2, ARTERIAL mmol/L  --   -- " " 22.6   BUN mg/dL 6* 7* 10   CREATININE mg/dL 0.83 0.95 0.95   GLUCOSE mg/dL 182* 108*  --    Estimated Creatinine Clearance: 76.9 mL/min (by C-G formula based on Cr of 0.83).  Results from last 7 days  Lab Units 01/19/17  0536 01/18/17  0857 01/13/17  1017   CALCIUM mg/dL 7.9* 8.5* 9.5   ALBUMIN g/dL  --  3.50 4.40         buPROPion  mg Oral Daily   dicyclomine 20 mg Oral 4x Daily   DULoxetine 90 mg Oral Daily   folic acid 1 mg Oral Daily   levothyroxine 125 mcg Oral Q AM   metroNIDAZOLE 500 mg Oral Q8H   pantoprazole 40 mg Oral Q AM       sodium chloride 0.9 % with KCl 20 mEq 100 mL/hr Last Rate: 100 mL/hr (01/19/17 0105)   Diet Clear Liquid      Assessment/Plan   Active Hospital Problems (** Indicates Principal Problem)    Diagnosis Date Noted   • **Acute colitis [K52.9] 01/18/2017   • Sepsis, unspecified organism [A41.9] 01/18/2017   • Immunosuppression [D89.9] 01/18/2017   • Hypothyroidism [E03.9] 05/23/2016      Resolved Hospital Problems    Diagnosis Date Noted Date Resolved   • Diabetes type 2, controlled [E11.9] 05/23/2016 01/18/2017       Plan:     Acute Colitis  -certainly has risk factor for c. Diff but PCR negative  -will ask GI to see her for further recs since she does have h/o microscopic colitis and is immunosuppressed so other agents possible such as CMV or autoimmune colitis  -pain control, add norco  -IVF  -f/u cultures  -Advance diet  -bentyl and simethicone     Sepsis  -due to above  -fever 101, wbc, HR 90  -f/u cx and cont abx  -wbc normalized today     RA on immunosuppression  -no active issues  -only intermittently takes prednisone so will not continue, only takes during \"flare\" of RA     ILD  -apparent progression on our CT scan  -on d/c will ask her Pulmonologist to f/u with this (Dr. Camara at [facility])     Cont wellbutrin, cymbalta, synthroid    Disposition  TBD.      German Mckeon MD  [facility]  01/19/17  8:44 AM    "

## 2017-01-19 NOTE — PLAN OF CARE
Problem: Patient Care Overview (Adult)  Goal: Plan of Care Review  Outcome: Ongoing (interventions implemented as appropriate)    01/19/17 9095   Patient Care Overview   Progress improving   Outcome Evaluation   Outcome Summary/Follow up Plan PRN Lortab and Dilaudid given for pain, Bentyl and Simethicone started - helping. IVF and IV abx continue.    Coping/Psychosocial Response Interventions   Plan Of Care Reviewed With patient         Problem: Pain, Acute (Adult)  Goal: Acceptable Pain Control/Comfort Level  Outcome: Ongoing (interventions implemented as appropriate)    Problem: Diarrhea (Adult)  Goal: Improved/Reduced Symptoms  Outcome: Ongoing (interventions implemented as appropriate)    Problem: Fluid Volume Deficit (Adult)  Goal: Fluid/Electrolyte Balance  Outcome: Ongoing (interventions implemented as appropriate)  Goal: Comfort/Well Being  Outcome: Ongoing (interventions implemented as appropriate)

## 2017-01-20 ENCOUNTER — APPOINTMENT (OUTPATIENT)
Dept: MRI IMAGING | Facility: HOSPITAL | Age: 62
End: 2017-01-20
Attending: INTERNAL MEDICINE

## 2017-01-20 LAB
ANION GAP SERPL CALCULATED.3IONS-SCNC: 12.5 MMOL/L
BASOPHILS # BLD AUTO: 0.02 10*3/MM3 (ref 0–0.2)
BASOPHILS NFR BLD AUTO: 0.2 % (ref 0–1.5)
BUN BLD-MCNC: 7 MG/DL (ref 8–23)
BUN/CREAT SERPL: 8.2 (ref 7–25)
CALCIUM SPEC-SCNC: 8.3 MG/DL (ref 8.6–10.5)
CHLORIDE SERPL-SCNC: 107 MMOL/L (ref 98–107)
CO2 SERPL-SCNC: 20.5 MMOL/L (ref 22–29)
CREAT BLD-MCNC: 0.85 MG/DL (ref 0.57–1)
DEPRECATED RDW RBC AUTO: 54.6 FL (ref 37–54)
EOSINOPHIL # BLD AUTO: 0.13 10*3/MM3 (ref 0–0.7)
EOSINOPHIL NFR BLD AUTO: 1.3 % (ref 0.3–6.2)
ERYTHROCYTE [DISTWIDTH] IN BLOOD BY AUTOMATED COUNT: 17.3 % (ref 11.7–13)
GFR SERPL CREATININE-BSD FRML MDRD: 68 ML/MIN/1.73
GLUCOSE BLD-MCNC: 132 MG/DL (ref 65–99)
HCT VFR BLD AUTO: 33.4 % (ref 35.6–45.5)
HGB BLD-MCNC: 9.8 G/DL (ref 11.9–15.5)
IMM GRANULOCYTES # BLD: 0.03 10*3/MM3 (ref 0–0.03)
IMM GRANULOCYTES NFR BLD: 0.3 % (ref 0–0.5)
LYMPHOCYTES # BLD AUTO: 1.36 10*3/MM3 (ref 0.9–4.8)
LYMPHOCYTES NFR BLD AUTO: 13.5 % (ref 19.6–45.3)
MCH RBC QN AUTO: 25.6 PG (ref 26.9–32)
MCHC RBC AUTO-ENTMCNC: 29.3 G/DL (ref 32.4–36.3)
MCV RBC AUTO: 87.2 FL (ref 80.5–98.2)
MONOCYTES # BLD AUTO: 0.55 10*3/MM3 (ref 0.2–1.2)
MONOCYTES NFR BLD AUTO: 5.5 % (ref 5–12)
NEUTROPHILS # BLD AUTO: 7.97 10*3/MM3 (ref 1.9–8.1)
NEUTROPHILS NFR BLD AUTO: 79.2 % (ref 42.7–76)
PLATELET # BLD AUTO: 308 10*3/MM3 (ref 140–500)
PMV BLD AUTO: 10.4 FL (ref 6–12)
POTASSIUM BLD-SCNC: 4.2 MMOL/L (ref 3.5–5.2)
RBC # BLD AUTO: 3.83 10*6/MM3 (ref 3.9–5.2)
SODIUM BLD-SCNC: 140 MMOL/L (ref 136–145)
WBC NRBC COR # BLD: 10.06 10*3/MM3 (ref 4.5–10.7)

## 2017-01-20 PROCEDURE — 99231 SBSQ HOSP IP/OBS SF/LOW 25: CPT

## 2017-01-20 PROCEDURE — 25010000002 LEVOFLOXACIN PER 250 MG: Performed by: INTERNAL MEDICINE

## 2017-01-20 PROCEDURE — 25810000003 SODIUM CHLORIDE 0.9 % WITH KCL 20 MEQ 20-0.9 MEQ/L-% SOLUTION: Performed by: INTERNAL MEDICINE

## 2017-01-20 PROCEDURE — 25010000002 HYDROMORPHONE PER 4 MG: Performed by: INTERNAL MEDICINE

## 2017-01-20 PROCEDURE — 80048 BASIC METABOLIC PNL TOTAL CA: CPT | Performed by: INTERNAL MEDICINE

## 2017-01-20 PROCEDURE — 25010000002 ONDANSETRON PER 1 MG: Performed by: INTERNAL MEDICINE

## 2017-01-20 PROCEDURE — 85025 COMPLETE CBC W/AUTO DIFF WBC: CPT | Performed by: INTERNAL MEDICINE

## 2017-01-20 PROCEDURE — 73221 MRI JOINT UPR EXTREM W/O DYE: CPT

## 2017-01-20 RX ORDER — LEVOFLOXACIN 5 MG/ML
750 INJECTION, SOLUTION INTRAVENOUS EVERY 24 HOURS
Status: DISCONTINUED | OUTPATIENT
Start: 2017-01-20 | End: 2017-01-22

## 2017-01-20 RX ORDER — NALOXONE HCL 0.4 MG/ML
0.4 VIAL (ML) INJECTION
Status: DISCONTINUED | OUTPATIENT
Start: 2017-01-20 | End: 2017-01-21

## 2017-01-20 RX ORDER — HYDROCODONE BITARTRATE AND ACETAMINOPHEN 10; 325 MG/1; MG/1
1 TABLET ORAL EVERY 6 HOURS PRN
Status: DISCONTINUED | OUTPATIENT
Start: 2017-01-20 | End: 2017-01-24 | Stop reason: HOSPADM

## 2017-01-20 RX ADMIN — DULOXETINE HYDROCHLORIDE 90 MG: 60 CAPSULE, DELAYED RELEASE ORAL at 08:08

## 2017-01-20 RX ADMIN — METRONIDAZOLE 500 MG: 500 TABLET ORAL at 05:49

## 2017-01-20 RX ADMIN — LEVOFLOXACIN 750 MG: 750 INJECTION, SOLUTION INTRAVENOUS at 21:29

## 2017-01-20 RX ADMIN — SIMETHICONE CHEW TAB 80 MG 80 MG: 80 TABLET ORAL at 08:08

## 2017-01-20 RX ADMIN — DICYCLOMINE HYDROCHLORIDE 20 MG: 10 CAPSULE ORAL at 11:54

## 2017-01-20 RX ADMIN — HYDROMORPHONE HYDROCHLORIDE 0.5 MG: 1 INJECTION, SOLUTION INTRAMUSCULAR; INTRAVENOUS; SUBCUTANEOUS at 05:50

## 2017-01-20 RX ADMIN — PANTOPRAZOLE SODIUM 40 MG: 40 TABLET, DELAYED RELEASE ORAL at 05:49

## 2017-01-20 RX ADMIN — BUPROPION HYDROCHLORIDE 300 MG: 300 TABLET, EXTENDED RELEASE ORAL at 08:08

## 2017-01-20 RX ADMIN — HYDROMORPHONE HYDROCHLORIDE 1 MG: 1 INJECTION, SOLUTION INTRAMUSCULAR; INTRAVENOUS; SUBCUTANEOUS at 14:56

## 2017-01-20 RX ADMIN — HYDROCODONE BITARTRATE AND ACETAMINOPHEN 1 TABLET: 5; 325 TABLET ORAL at 08:09

## 2017-01-20 RX ADMIN — HYDROMORPHONE HYDROCHLORIDE 1 MG: 1 INJECTION, SOLUTION INTRAMUSCULAR; INTRAVENOUS; SUBCUTANEOUS at 21:04

## 2017-01-20 RX ADMIN — FOLIC ACID 1 MG: 1 TABLET ORAL at 08:08

## 2017-01-20 RX ADMIN — HYDROMORPHONE HYDROCHLORIDE 1 MG: 1 INJECTION, SOLUTION INTRAMUSCULAR; INTRAVENOUS; SUBCUTANEOUS at 11:59

## 2017-01-20 RX ADMIN — DICYCLOMINE HYDROCHLORIDE 20 MG: 10 CAPSULE ORAL at 21:04

## 2017-01-20 RX ADMIN — METRONIDAZOLE 500 MG: 500 TABLET ORAL at 14:56

## 2017-01-20 RX ADMIN — LEVOFLOXACIN 500 MG: 500 INJECTION, SOLUTION INTRAVENOUS at 09:31

## 2017-01-20 RX ADMIN — METRONIDAZOLE 500 MG: 500 TABLET ORAL at 21:04

## 2017-01-20 RX ADMIN — POTASSIUM CHLORIDE AND SODIUM CHLORIDE 100 ML/HR: 900; 150 INJECTION, SOLUTION INTRAVENOUS at 00:33

## 2017-01-20 RX ADMIN — HYDROMORPHONE HYDROCHLORIDE 0.5 MG: 1 INJECTION, SOLUTION INTRAMUSCULAR; INTRAVENOUS; SUBCUTANEOUS at 09:59

## 2017-01-20 RX ADMIN — DICYCLOMINE HYDROCHLORIDE 20 MG: 10 CAPSULE ORAL at 17:02

## 2017-01-20 RX ADMIN — ONDANSETRON 4 MG: 2 INJECTION INTRAMUSCULAR; INTRAVENOUS at 14:48

## 2017-01-20 RX ADMIN — DICYCLOMINE HYDROCHLORIDE 20 MG: 10 CAPSULE ORAL at 08:08

## 2017-01-20 RX ADMIN — LEVOTHYROXINE SODIUM 125 MCG: 125 TABLET ORAL at 05:50

## 2017-01-20 RX ADMIN — HYDROMORPHONE HYDROCHLORIDE 1 MG: 1 INJECTION, SOLUTION INTRAMUSCULAR; INTRAVENOUS; SUBCUTANEOUS at 18:44

## 2017-01-20 RX ADMIN — POTASSIUM CHLORIDE AND SODIUM CHLORIDE 100 ML/HR: 900; 150 INJECTION, SOLUTION INTRAVENOUS at 11:54

## 2017-01-20 NOTE — PLAN OF CARE
Problem: Patient Care Overview (Adult)  Goal: Plan of Care Review  Outcome: Ongoing (interventions implemented as appropriate)    01/20/17 0504   Patient Care Overview   Progress improving   Outcome Evaluation   Outcome Summary/Follow up Plan Pain better this shift. IVF continued. No other c/o.   Coping/Psychosocial Response Interventions   Plan Of Care Reviewed With patient       Goal: Adult Individualization and Mutuality  Outcome: Ongoing (interventions implemented as appropriate)  Goal: Discharge Needs Assessment  Outcome: Ongoing (interventions implemented as appropriate)    Problem: Pain, Acute (Adult)  Goal: Acceptable Pain Control/Comfort Level  Outcome: Ongoing (interventions implemented as appropriate)    Problem: Diarrhea (Adult)  Goal: Improved/Reduced Symptoms  Outcome: Ongoing (interventions implemented as appropriate)    Problem: Fluid Volume Deficit (Adult)  Goal: Fluid/Electrolyte Balance  Outcome: Ongoing (interventions implemented as appropriate)  Goal: Comfort/Well Being  Outcome: Ongoing (interventions implemented as appropriate)

## 2017-01-20 NOTE — PROGRESS NOTES
"      Name: Dara Medina ADMIT: 2017   : 1955  PCP: Randi Yang MD    MRN: [id number] LOS: 2 days   AGE/SEX: 61 y.o. female  ROOM: [facility]/     Subjective   Still with abd cramping and pain, not passing much gas, abd more distended  Tried to pull up in bed last night with left arm and now has severe left shoulder pain, can't move it due to pain, norco 5 didn't help, dilaudid 0.5 only lasted 1 hour  Having gas pain improved with simethicone  Tolerating diet  No cp or soa      Objective   Vital Signs  Temp:  [97.6 °F (36.4 °C)-97.9 °F (36.6 °C)] 97.9 °F (36.6 °C)  Heart Rate:  [] 82  Resp:  [16] 16  BP: (118-136)/(60-81) 118/60  SpO2:  [98 %-99 %] 98 %  on   ;   O2 Device: room air  Body mass index is 31.32 kg/(m^2).    Objective:  General Appearance:  Comfortable, well-appearing, in no acute distress and in pain.    Vital signs: (most recent): Blood pressure 118/60, pulse 82, temperature 97.9 °F (36.6 °C), temperature source Oral, resp. rate 16, height 65\" (165.1 cm), weight 188 lb 3.2 oz (85.4 kg), SpO2 98 %.  Vital signs are normal.  No fever.    Lungs:  Normal respiratory rate and normal effort.  Breath sounds clear to auscultation.    Heart: Normal rate.  Regular rhythm.  S1 normal and S2 normal.  No murmur.   Abdomen: Abdomen is soft and distended.  Bowel sounds are normal.   There is generalized tenderness.  There is no rebound tenderness.  There is guarding.  (Voluntary  ).     Extremities: Decreased range of motion.  There is no dependent edema.  (Severe tenderness along left AC )  Neurological: Patient is alert and oriented to person, place and time.    Skin:  Warm and dry.          Results Review:       I reviewed the patient's new clinical results.    Results from last 7 days  Lab Units 17  0603 17  0536 17  0857   WBC 10*3/mm3 10.06 9.22 16.29*   HEMOGLOBIN g/dL 9.8* 10.0* 11.2*   PLATELETS 10*3/mm3 308 271 333       Results from last 7 days  Lab Units 17  0603 " 01/19/17  0536 01/18/17  0857   SODIUM mmol/L 140 143 137   POTASSIUM mmol/L 4.2 4.5 3.7   CHLORIDE mmol/L 107 113* 102   TOTAL CO2 mmol/L 20.5* 23.6 22.2   BUN mg/dL 7* 6* 7*   CREATININE mg/dL 0.85 0.83 0.95   GLUCOSE mg/dL 132* 182* 108*   Estimated Creatinine Clearance: 75.1 mL/min (by C-G formula based on Cr of 0.85).    Results from last 7 days  Lab Units 01/20/17  0603 01/19/17  0536 01/18/17  0857   CALCIUM mg/dL 8.3* 7.9* 8.5*   ALBUMIN g/dL  --   --  3.50         buPROPion  mg Oral Daily   dicyclomine 20 mg Oral 4x Daily   DULoxetine 90 mg Oral Daily   folic acid 1 mg Oral Daily   levothyroxine 125 mcg Oral Q AM   metroNIDAZOLE 500 mg Oral Q8H   pantoprazole 40 mg Oral Q AM       sodium chloride 0.9 % with KCl 20 mEq 100 mL/hr Last Rate: 100 mL/hr (01/20/17 1154)   Diet Regular; GI Soft / [place]      Assessment/Plan   Active Hospital Problems (** Indicates Principal Problem)    Diagnosis Date Noted   • **Acute colitis [K52.9] 01/18/2017   • Sepsis, unspecified organism [A41.9] 01/18/2017   • Immunosuppression [D89.9] 01/18/2017   • Hypothyroidism [E03.9] 05/23/2016      Resolved Hospital Problems    Diagnosis Date Noted Date Resolved   • Diabetes type 2, controlled [E11.9] 05/23/2016 01/18/2017       Plan:     Acute left shoulder pain  -check MRI since could be torn tendon as on levaquin and intermittent steroids  -increase IV dilaudid and norco  -follow results    Acute Colitis  -cont flagyl but d/c levaquin due to above  -certainly has risk factor for c. Diff but PCR negative  -Appreciated GI   -has h/o microscopic colitis, do ? CMV given immunosuppression or autoimmune colitis, ? Scope if doesn't improve  -pain control, added norco  -decrease IVF  -f/u cultures, NGTD  -Advance diet  -bentyl and simethicone     Sepsis  -due to above  -fever 101, wbc, HR 90  -f/u cx and cont abx  -wbc normalized today     RA on immunosuppression  -no active issues  -only intermittently takes prednisone so will not  "continue, only takes during \"flare\" of RA     ILD  -apparent progression on our CT scan  -on d/c will ask her Pulmonologist to f/u with this (Dr. Camara at [facility])     Cont wellbutrin, cymbalta, synthroid    Disposition  TBD.      German Mckeon MD  [facility]  01/20/17  2:21 PM    "

## 2017-01-20 NOTE — PLAN OF CARE
Problem: Patient Care Overview (Adult)  Goal: Plan of Care Review  Outcome: Ongoing (interventions implemented as appropriate)    01/20/17 3851   Patient Care Overview   Progress no change   Outcome Evaluation   Outcome Summary/Follow up Plan Abd pain improving, however, now pt c/o severe left shoulder pain. Apparently she hurt it overnight while pulling herself up in the bed. MRI completed on shoulder and pain meds increased, pain improved.   Coping/Psychosocial Response Interventions   Plan Of Care Reviewed With patient         Problem: Pain, Acute (Adult)  Goal: Acceptable Pain Control/Comfort Level  Outcome: Ongoing (interventions implemented as appropriate)    Problem: Diarrhea (Adult)  Goal: Improved/Reduced Symptoms  Outcome: Ongoing (interventions implemented as appropriate)    Problem: Fluid Volume Deficit (Adult)  Goal: Fluid/Electrolyte Balance  Outcome: Ongoing (interventions implemented as appropriate)  Goal: Comfort/Well Being  Outcome: Ongoing (interventions implemented as appropriate)

## 2017-01-20 NOTE — PROGRESS NOTES
GI Daily Progress Note    Assessment/Plan:    Principal Problem:    Acute colitis  Active Problems:    Hypothyroidism    Sepsis, unspecified organism    Immunosuppression       LOS: 2 days     Dara Medina is a 61 y.o. female who was admitted with Acute colitis. She reports his symptoms are improving with treatment. No BMs but continued abd pain mayela after meals but is keeping regular diet down    Subjective:    Patient expresses abdominal pain  Patient denies diarrhea and bloody stools    Objective:    Vital signs in last 24 hours:  Temp:  [97.1 °F (36.2 °C)-97.9 °F (36.6 °C)] 97.9 °F (36.6 °C)  Heart Rate:  [] 82  Resp:  [16] 16  BP: ()/(47-81) 118/60    Intake/Output last 3 shifts:  I/O last 3 completed shifts:  In: 5420 [P.O.:2360; I.V.:2960; IV Piggyback:100]  Out: -   Intake/Output this shift:       Results from last 7 days  Lab Units 01/20/17  0603 01/19/17  0536 01/18/17  0857   WBC 10*3/mm3 10.06 9.22 16.29*   HEMOGLOBIN g/dL 9.8* 10.0* 11.2*   HEMATOCRIT % 33.4* 33.2* 36.2   PLATELETS 10*3/mm3 308 271 333     Results from last 7 days  Lab Units 01/20/17  0603  01/18/17  0857   SODIUM mmol/L 140  < > 137   POTASSIUM mmol/L 4.2  < > 3.7   CHLORIDE mmol/L 107  < > 102   TOTAL CO2 mmol/L 20.5*  < > 22.2   BUN mg/dL 7*  < > 7*   CREATININE mg/dL 0.85  < > 0.95   CALCIUM mg/dL 8.3*  < > 8.5*   BILIRUBIN mg/dL  --   --  0.4   ALK PHOS U/L  --   --  128*   ALT (SGPT) U/L  --   --  14   AST (SGOT) U/L  --   --  20   GLUCOSE mg/dL 132*  < > 108*   < > = values in this interval not displayed.      Physical Exam:Abdomen  Sounds Normal Active Bowel Sounds   Distension Distended   Tenderness Moderately Tender     Acute Colitis prob bacterial but culture neg to date-mon Flagyl/LVQ  CVID  DM  RA  Interstitial Lung Disease    Will have BGA check on this weekend will need 2-3 more days as a guess from here.

## 2017-01-21 LAB
ANION GAP SERPL CALCULATED.3IONS-SCNC: 10 MMOL/L
BACTERIA SPEC AEROBE CULT: NORMAL
BACTERIA SPEC AEROBE CULT: NORMAL
BASOPHILS # BLD AUTO: 0.02 10*3/MM3 (ref 0–0.2)
BASOPHILS NFR BLD AUTO: 0.2 % (ref 0–1.5)
BUN BLD-MCNC: 7 MG/DL (ref 8–23)
BUN/CREAT SERPL: 7.9 (ref 7–25)
CALCIUM SPEC-SCNC: 9 MG/DL (ref 8.6–10.5)
CHLORIDE SERPL-SCNC: 100 MMOL/L (ref 98–107)
CK SERPL-CCNC: 120 U/L (ref 20–180)
CO2 SERPL-SCNC: 25 MMOL/L (ref 22–29)
CREAT BLD-MCNC: 0.89 MG/DL (ref 0.57–1)
DEPRECATED RDW RBC AUTO: 56 FL (ref 37–54)
EOSINOPHIL # BLD AUTO: 0.11 10*3/MM3 (ref 0–0.7)
EOSINOPHIL NFR BLD AUTO: 1.2 % (ref 0.3–6.2)
ERYTHROCYTE [DISTWIDTH] IN BLOOD BY AUTOMATED COUNT: 17.5 % (ref 11.7–13)
GFR SERPL CREATININE-BSD FRML MDRD: 64 ML/MIN/1.73
GLUCOSE BLD-MCNC: 123 MG/DL (ref 65–99)
HCT VFR BLD AUTO: 32.9 % (ref 35.6–45.5)
HGB BLD-MCNC: 9.6 G/DL (ref 11.9–15.5)
IMM GRANULOCYTES # BLD: 0.02 10*3/MM3 (ref 0–0.03)
IMM GRANULOCYTES NFR BLD: 0.2 % (ref 0–0.5)
LYMPHOCYTES # BLD AUTO: 1.35 10*3/MM3 (ref 0.9–4.8)
LYMPHOCYTES NFR BLD AUTO: 15 % (ref 19.6–45.3)
MCH RBC QN AUTO: 25.7 PG (ref 26.9–32)
MCHC RBC AUTO-ENTMCNC: 29.2 G/DL (ref 32.4–36.3)
MCV RBC AUTO: 88 FL (ref 80.5–98.2)
MONOCYTES # BLD AUTO: 0.78 10*3/MM3 (ref 0.2–1.2)
MONOCYTES NFR BLD AUTO: 8.6 % (ref 5–12)
NEUTROPHILS # BLD AUTO: 6.75 10*3/MM3 (ref 1.9–8.1)
NEUTROPHILS NFR BLD AUTO: 74.8 % (ref 42.7–76)
PLATELET # BLD AUTO: 348 10*3/MM3 (ref 140–500)
PMV BLD AUTO: 10.4 FL (ref 6–12)
POTASSIUM BLD-SCNC: 4.7 MMOL/L (ref 3.5–5.2)
RBC # BLD AUTO: 3.74 10*6/MM3 (ref 3.9–5.2)
SODIUM BLD-SCNC: 135 MMOL/L (ref 136–145)
WBC NRBC COR # BLD: 9.03 10*3/MM3 (ref 4.5–10.7)

## 2017-01-21 PROCEDURE — 99231 SBSQ HOSP IP/OBS SF/LOW 25: CPT | Performed by: INTERNAL MEDICINE

## 2017-01-21 PROCEDURE — 63710000001 PREDNISONE PER 5 MG: Performed by: HOSPITALIST

## 2017-01-21 PROCEDURE — 25010000002 HYDROMORPHONE PER 4 MG: Performed by: HOSPITALIST

## 2017-01-21 PROCEDURE — 25010000002 HYDROMORPHONE PER 4 MG: Performed by: INTERNAL MEDICINE

## 2017-01-21 PROCEDURE — 82550 ASSAY OF CK (CPK): CPT | Performed by: HOSPITALIST

## 2017-01-21 PROCEDURE — 36415 COLL VENOUS BLD VENIPUNCTURE: CPT | Performed by: INTERNAL MEDICINE

## 2017-01-21 PROCEDURE — 25810000003 SODIUM CHLORIDE 0.9 % WITH KCL 20 MEQ 20-0.9 MEQ/L-% SOLUTION: Performed by: INTERNAL MEDICINE

## 2017-01-21 PROCEDURE — 85025 COMPLETE CBC W/AUTO DIFF WBC: CPT | Performed by: INTERNAL MEDICINE

## 2017-01-21 PROCEDURE — 80048 BASIC METABOLIC PNL TOTAL CA: CPT | Performed by: INTERNAL MEDICINE

## 2017-01-21 PROCEDURE — 25010000002 LEVOFLOXACIN PER 250 MG: Performed by: INTERNAL MEDICINE

## 2017-01-21 RX ORDER — PREDNISONE 20 MG/1
40 TABLET ORAL ONCE
Status: COMPLETED | OUTPATIENT
Start: 2017-01-21 | End: 2017-01-21

## 2017-01-21 RX ORDER — HYDROMORPHONE HCL 110MG/55ML
2 PATIENT CONTROLLED ANALGESIA SYRINGE INTRAVENOUS
Status: DISCONTINUED | OUTPATIENT
Start: 2017-01-21 | End: 2017-01-23

## 2017-01-21 RX ORDER — PREDNISONE 20 MG/1
20 TABLET ORAL 2 TIMES DAILY WITH MEALS
Status: DISCONTINUED | OUTPATIENT
Start: 2017-01-21 | End: 2017-01-23

## 2017-01-21 RX ORDER — NALOXONE HCL 0.4 MG/ML
0.4 VIAL (ML) INJECTION
Status: DISCONTINUED | OUTPATIENT
Start: 2017-01-21 | End: 2017-01-23

## 2017-01-21 RX ADMIN — HYDROMORPHONE HYDROCHLORIDE 1 MG: 1 INJECTION, SOLUTION INTRAMUSCULAR; INTRAVENOUS; SUBCUTANEOUS at 08:33

## 2017-01-21 RX ADMIN — ACETAMINOPHEN 650 MG: 325 TABLET ORAL at 01:44

## 2017-01-21 RX ADMIN — HYDROMORPHONE HYDROCHLORIDE 2 MG: 2 INJECTION, SOLUTION INTRAMUSCULAR; INTRAVENOUS; SUBCUTANEOUS at 19:03

## 2017-01-21 RX ADMIN — PANTOPRAZOLE SODIUM 40 MG: 40 TABLET, DELAYED RELEASE ORAL at 06:37

## 2017-01-21 RX ADMIN — METRONIDAZOLE 500 MG: 500 TABLET ORAL at 14:08

## 2017-01-21 RX ADMIN — DICYCLOMINE HYDROCHLORIDE 20 MG: 10 CAPSULE ORAL at 07:44

## 2017-01-21 RX ADMIN — DICYCLOMINE HYDROCHLORIDE 20 MG: 10 CAPSULE ORAL at 20:39

## 2017-01-21 RX ADMIN — METRONIDAZOLE 500 MG: 500 TABLET ORAL at 06:37

## 2017-01-21 RX ADMIN — DULOXETINE HYDROCHLORIDE 90 MG: 60 CAPSULE, DELAYED RELEASE ORAL at 08:29

## 2017-01-21 RX ADMIN — HYDROMORPHONE HYDROCHLORIDE 1 MG: 1 INJECTION, SOLUTION INTRAMUSCULAR; INTRAVENOUS; SUBCUTANEOUS at 16:28

## 2017-01-21 RX ADMIN — LEVOFLOXACIN 750 MG: 750 INJECTION, SOLUTION INTRAVENOUS at 20:39

## 2017-01-21 RX ADMIN — DICYCLOMINE HYDROCHLORIDE 20 MG: 10 CAPSULE ORAL at 12:07

## 2017-01-21 RX ADMIN — HYDROMORPHONE HYDROCHLORIDE 1 MG: 1 INJECTION, SOLUTION INTRAMUSCULAR; INTRAVENOUS; SUBCUTANEOUS at 05:21

## 2017-01-21 RX ADMIN — POTASSIUM CHLORIDE AND SODIUM CHLORIDE 50 ML/HR: 900; 150 INJECTION, SOLUTION INTRAVENOUS at 06:38

## 2017-01-21 RX ADMIN — LEVOTHYROXINE SODIUM 125 MCG: 125 TABLET ORAL at 06:37

## 2017-01-21 RX ADMIN — DICYCLOMINE HYDROCHLORIDE 20 MG: 10 CAPSULE ORAL at 18:07

## 2017-01-21 RX ADMIN — FOLIC ACID 1 MG: 1 TABLET ORAL at 08:29

## 2017-01-21 RX ADMIN — HYDROMORPHONE HYDROCHLORIDE 1 MG: 1 INJECTION, SOLUTION INTRAMUSCULAR; INTRAVENOUS; SUBCUTANEOUS at 12:06

## 2017-01-21 RX ADMIN — HYDROMORPHONE HYDROCHLORIDE 1 MG: 1 INJECTION, SOLUTION INTRAMUSCULAR; INTRAVENOUS; SUBCUTANEOUS at 14:04

## 2017-01-21 RX ADMIN — BUPROPION HYDROCHLORIDE 300 MG: 300 TABLET, EXTENDED RELEASE ORAL at 08:29

## 2017-01-21 RX ADMIN — PREDNISONE 40 MG: 20 TABLET ORAL at 16:28

## 2017-01-21 RX ADMIN — HYDROCODONE BITARTRATE AND ACETAMINOPHEN 1 TABLET: 10; 325 TABLET ORAL at 14:00

## 2017-01-21 NOTE — PLAN OF CARE
Problem: Patient Care Overview (Adult)  Goal: Plan of Care Review  Outcome: Ongoing (interventions implemented as appropriate)    01/21/17 0503   Patient Care Overview   Progress improving   Outcome Evaluation   Outcome Summary/Follow up Plan pt c/o of pain in left shoulder, prn's given for pain. tylenol given x1 for headache, iv fluids and antibx continued will continue to monitor   Coping/Psychosocial Response Interventions   Plan Of Care Reviewed With patient       Goal: Adult Individualization and Mutuality  Outcome: Ongoing (interventions implemented as appropriate)  Goal: Discharge Needs Assessment  Outcome: Ongoing (interventions implemented as appropriate)    Problem: Pain, Acute (Adult)  Goal: Acceptable Pain Control/Comfort Level  Outcome: Ongoing (interventions implemented as appropriate)    Problem: Diarrhea (Adult)  Goal: Improved/Reduced Symptoms  Outcome: Ongoing (interventions implemented as appropriate)    Problem: Fluid Volume Deficit (Adult)  Goal: Fluid/Electrolyte Balance  Outcome: Ongoing (interventions implemented as appropriate)  Goal: Comfort/Well Being  Outcome: Ongoing (interventions implemented as appropriate)

## 2017-01-21 NOTE — PLAN OF CARE
Problem: Patient Care Overview (Adult)  Goal: Plan of Care Review  Outcome: Ongoing (interventions implemented as appropriate)    01/21/17 8540   Patient Care Overview   Progress no change   Outcome Evaluation   Outcome Summary/Follow up Plan ptn has had significant pain in the right shoulder, MD added oral steroids, and just changed pain medicine, a doppler of the arm has also been ordered. continue to monitor    Coping/Psychosocial Response Interventions   Plan Of Care Reviewed With patient       Goal: Adult Individualization and Mutuality  Outcome: Ongoing (interventions implemented as appropriate)  Goal: Discharge Needs Assessment  Outcome: Ongoing (interventions implemented as appropriate)    Problem: Pain, Acute (Adult)  Goal: Acceptable Pain Control/Comfort Level  Outcome: Ongoing (interventions implemented as appropriate)    Problem: Diarrhea (Adult)  Goal: Improved/Reduced Symptoms  Outcome: Ongoing (interventions implemented as appropriate)    Problem: Fluid Volume Deficit (Adult)  Goal: Fluid/Electrolyte Balance  Outcome: Ongoing (interventions implemented as appropriate)  Goal: Comfort/Well Being  Outcome: Ongoing (interventions implemented as appropriate)

## 2017-01-21 NOTE — PROGRESS NOTES
"DAILY PROGRESS NOTE  [facility]    Patient Identification:  Name: Dara Medina  Age: 61 y.o.  Sex: female  :  1955  MRN: [id number]         Primary Care Physician: Randi Yang MD      Subjective  No BM this AM yet.  Abd cramping improving.  Lt shoulder pain improved.     Objective:  General Appearance:  Comfortable, well-appearing, in no acute distress and not in pain.    Vital signs: (most recent): Blood pressure 109/56, pulse 87, temperature 98.6 °F (37 °C), resp. rate 16, height 65\" (165.1 cm), weight 188 lb 3.2 oz (85.4 kg), SpO2 97 %.    Lungs:  Normal respiratory rate and normal effort.  She is not in respiratory distress.  No stridor.  There are rales (basilar. ).  No wheezes, rhonchi or decreased breath sounds.    Heart: Normal rate.  Regular rhythm.    Abdomen: Abdomen is soft and distended (Minimal.).  Hypoactive bowel sounds.   (Mild diffuse tenderness but more marked rt flank. ).     Extremities: There is no dependent edema.    Neurological: Patient is alert and oriented to person, place and time.    Skin:  Warm and dry.                Vital signs in last 24 hours:  Temp:  [97 °F (36.1 °C)-98.6 °F (37 °C)] 98.6 °F (37 °C)  Heart Rate:  [] 87  Resp:  [16] 16  BP: (109-131)/(51-67) 109/56    Intake/Output:    Intake/Output Summary (Last 24 hours) at 17 1052  Last data filed at 17 0637   Gross per 24 hour   Intake   2505 ml   Output      0 ml   Net   2505 ml         Exam:    Physical Exam   Cardiovascular: Normal rate and regular rhythm.    Pulmonary/Chest: Effort normal. No stridor. No respiratory distress. She has no decreased breath sounds. She has no wheezes. She has no rhonchi. She has rales (basilar. ).   Abdominal: Soft. She exhibits distension (Minimal.). Bowel sounds are hypoactive.   Neurological: She is alert.   Skin: Skin is warm and dry.         Results from last 7 days  Lab Units 17  0754 17  0603 17  0536 17  0857   WBC " 10*3/mm3 9.03 10.06 9.22 16.29*   HEMOGLOBIN g/dL 9.6* 9.8* 10.0* 11.2*   PLATELETS 10*3/mm3 348 308 271 333     Results from last 7 days  Lab Units 01/21/17  0754 01/20/17  0603 01/19/17  0536 01/18/17  0857   SODIUM mmol/L 135* 140 143 137   POTASSIUM mmol/L 4.7 4.2 4.5 3.7   CHLORIDE mmol/L 100 107 113* 102   TOTAL CO2 mmol/L 25.0 20.5* 23.6 22.2   BUN mg/dL 7* 7* 6* 7*   CREATININE mg/dL 0.89 0.85 0.83 0.95   GLUCOSE mg/dL 123* 132* 182* 108*   Estimated Creatinine Clearance: 71.7 mL/min (by C-G formula based on Cr of 0.89).  Results from last 7 days  Lab Units 01/21/17  0754 01/20/17  0603 01/19/17  0536 01/18/17  0857   CALCIUM mg/dL 9.0 8.3* 7.9* 8.5*   ALBUMIN g/dL  --   --   --  3.50     Results from last 7 days  Lab Units 01/18/17  0857   ALBUMIN g/dL 3.50   BILIRUBIN mg/dL 0.4   ALK PHOS U/L 128*   AST (SGOT) U/L 20   ALT (SGPT) U/L 14       Assessment:  Acute Colitis:  Sepsis:  RA:  Immunosuppressed:   Lt deltoid sprain:  ILD:  Hyperglycemia:  Anemia: Check TIBC, ....  Hypothyroidism +/- Euthyroid sick syndr:       Plan:  See above.  Cont antibiotics and supportive care.     Sánchez Kumar MD  1/21/2017  10:52 AM

## 2017-01-22 ENCOUNTER — APPOINTMENT (OUTPATIENT)
Dept: CARDIOLOGY | Facility: HOSPITAL | Age: 62
End: 2017-01-22
Attending: HOSPITALIST

## 2017-01-22 LAB
ANION GAP SERPL CALCULATED.3IONS-SCNC: 14.5 MMOL/L
BASOPHILS # BLD AUTO: 0.02 10*3/MM3 (ref 0–0.2)
BASOPHILS NFR BLD AUTO: 0.1 % (ref 0–1.5)
BH CV UPPER VENOUS LEFT INTERNAL JUGULAR AUGMENT: NORMAL
BH CV UPPER VENOUS LEFT INTERNAL JUGULAR COMPETENT: NORMAL
BH CV UPPER VENOUS LEFT INTERNAL JUGULAR COMPRESS: NORMAL
BH CV UPPER VENOUS LEFT INTERNAL JUGULAR PHASIC: NORMAL
BH CV UPPER VENOUS LEFT INTERNAL JUGULAR SPONT: NORMAL
BH CV UPPER VENOUS LEFT SUBCLAVIAN AUGMENT: NORMAL
BH CV UPPER VENOUS LEFT SUBCLAVIAN COMPETENT: NORMAL
BH CV UPPER VENOUS LEFT SUBCLAVIAN COMPRESS: NORMAL
BH CV UPPER VENOUS LEFT SUBCLAVIAN PHASIC: NORMAL
BH CV UPPER VENOUS LEFT SUBCLAVIAN SPONT: NORMAL
BH CV UPPER VENOUS RIGHT AXILLARY AUGMENT: NORMAL
BH CV UPPER VENOUS RIGHT AXILLARY COMPETENT: NORMAL
BH CV UPPER VENOUS RIGHT AXILLARY COMPRESS: NORMAL
BH CV UPPER VENOUS RIGHT AXILLARY PHASIC: NORMAL
BH CV UPPER VENOUS RIGHT AXILLARY SPONT: NORMAL
BH CV UPPER VENOUS RIGHT BASILIC FOREARM COMPRESS: NORMAL
BH CV UPPER VENOUS RIGHT BASILIC UPPER COMPRESS: NORMAL
BH CV UPPER VENOUS RIGHT BRACHIAL COMPRESS: NORMAL
BH CV UPPER VENOUS RIGHT CEPHALIC FOREARM COMPRESS: NORMAL
BH CV UPPER VENOUS RIGHT CEPHALIC UPPER COMPRESS: NORMAL
BH CV UPPER VENOUS RIGHT INTERNAL JUGULAR AUGMENT: NORMAL
BH CV UPPER VENOUS RIGHT INTERNAL JUGULAR COMPETENT: NORMAL
BH CV UPPER VENOUS RIGHT INTERNAL JUGULAR COMPRESS: NORMAL
BH CV UPPER VENOUS RIGHT INTERNAL JUGULAR PHASIC: NORMAL
BH CV UPPER VENOUS RIGHT INTERNAL JUGULAR SPONT: NORMAL
BH CV UPPER VENOUS RIGHT RADIAL COMPRESS: NORMAL
BH CV UPPER VENOUS RIGHT SUBCLAVIAN AUGMENT: NORMAL
BH CV UPPER VENOUS RIGHT SUBCLAVIAN COMPETENT: NORMAL
BH CV UPPER VENOUS RIGHT SUBCLAVIAN COMPRESS: NORMAL
BH CV UPPER VENOUS RIGHT SUBCLAVIAN PHASIC: NORMAL
BH CV UPPER VENOUS RIGHT SUBCLAVIAN SPONT: NORMAL
BH CV UPPER VENOUS RIGHT ULNAR COMPRESS: NORMAL
BUN BLD-MCNC: 10 MG/DL (ref 8–23)
BUN/CREAT SERPL: 9.4 (ref 7–25)
CALCIUM SPEC-SCNC: 9.8 MG/DL (ref 8.6–10.5)
CHLORIDE SERPL-SCNC: 97 MMOL/L (ref 98–107)
CO2 SERPL-SCNC: 21.5 MMOL/L (ref 22–29)
CREAT BLD-MCNC: 1.06 MG/DL (ref 0.57–1)
DEPRECATED RDW RBC AUTO: 53 FL (ref 37–54)
EOSINOPHIL # BLD AUTO: 0 10*3/MM3 (ref 0–0.7)
EOSINOPHIL NFR BLD AUTO: 0 % (ref 0.3–6.2)
ERYTHROCYTE [DISTWIDTH] IN BLOOD BY AUTOMATED COUNT: 17.2 % (ref 11.7–13)
FOLATE SERPL-MCNC: >20 NG/ML (ref 4.78–24.2)
GFR SERPL CREATININE-BSD FRML MDRD: 53 ML/MIN/1.73
GLUCOSE BLD-MCNC: 158 MG/DL (ref 65–99)
HCT VFR BLD AUTO: 38.2 % (ref 35.6–45.5)
HGB BLD-MCNC: 11.4 G/DL (ref 11.9–15.5)
IMM GRANULOCYTES # BLD: 0.08 10*3/MM3 (ref 0–0.03)
IMM GRANULOCYTES NFR BLD: 0.3 % (ref 0–0.5)
IRON 24H UR-MRATE: 31 MCG/DL (ref 37–145)
IRON SATN MFR SERPL: 7 % (ref 20–50)
LYMPHOCYTES # BLD AUTO: 1.08 10*3/MM3 (ref 0.9–4.8)
LYMPHOCYTES NFR BLD AUTO: 4.6 % (ref 19.6–45.3)
MCH RBC QN AUTO: 25.4 PG (ref 26.9–32)
MCHC RBC AUTO-ENTMCNC: 29.8 G/DL (ref 32.4–36.3)
MCV RBC AUTO: 85.1 FL (ref 80.5–98.2)
MONOCYTES # BLD AUTO: 0.83 10*3/MM3 (ref 0.2–1.2)
MONOCYTES NFR BLD AUTO: 3.5 % (ref 5–12)
NEUTROPHILS # BLD AUTO: 21.71 10*3/MM3 (ref 1.9–8.1)
NEUTROPHILS NFR BLD AUTO: 91.5 % (ref 42.7–76)
NRBC BLD MANUAL-RTO: 0 /100 WBC (ref 0–0)
PLATELET # BLD AUTO: 487 10*3/MM3 (ref 140–500)
PMV BLD AUTO: 11 FL (ref 6–12)
POTASSIUM BLD-SCNC: 4.9 MMOL/L (ref 3.5–5.2)
RBC # BLD AUTO: 4.49 10*6/MM3 (ref 3.9–5.2)
RETICS/RBC NFR AUTO: 3.25 % (ref 0.5–1.5)
SODIUM BLD-SCNC: 133 MMOL/L (ref 136–145)
TIBC SERPL-MCNC: 463 MCG/DL (ref 298–536)
TRANSFERRIN SERPL-MCNC: 311 MG/DL (ref 200–360)
VIT B12 BLD-MCNC: 1022 PG/ML (ref 211–946)
WBC NRBC COR # BLD: 23.72 10*3/MM3 (ref 4.5–10.7)

## 2017-01-22 PROCEDURE — 85025 COMPLETE CBC W/AUTO DIFF WBC: CPT | Performed by: INTERNAL MEDICINE

## 2017-01-22 PROCEDURE — 93971 EXTREMITY STUDY: CPT

## 2017-01-22 PROCEDURE — 84466 ASSAY OF TRANSFERRIN: CPT | Performed by: HOSPITALIST

## 2017-01-22 PROCEDURE — 63710000001 PREDNISONE PER 5 MG: Performed by: HOSPITALIST

## 2017-01-22 PROCEDURE — 25010000002 HYDROMORPHONE PER 4 MG: Performed by: HOSPITALIST

## 2017-01-22 PROCEDURE — 80048 BASIC METABOLIC PNL TOTAL CA: CPT | Performed by: INTERNAL MEDICINE

## 2017-01-22 PROCEDURE — 85045 AUTOMATED RETICULOCYTE COUNT: CPT | Performed by: HOSPITALIST

## 2017-01-22 PROCEDURE — 82607 VITAMIN B-12: CPT | Performed by: HOSPITALIST

## 2017-01-22 PROCEDURE — 99232 SBSQ HOSP IP/OBS MODERATE 35: CPT | Performed by: INTERNAL MEDICINE

## 2017-01-22 PROCEDURE — 82746 ASSAY OF FOLIC ACID SERUM: CPT | Performed by: HOSPITALIST

## 2017-01-22 PROCEDURE — 83540 ASSAY OF IRON: CPT | Performed by: HOSPITALIST

## 2017-01-22 RX ORDER — BUPROPION HYDROCHLORIDE 150 MG/1
150 TABLET ORAL DAILY
Status: DISCONTINUED | OUTPATIENT
Start: 2017-01-23 | End: 2017-01-24 | Stop reason: HOSPADM

## 2017-01-22 RX ORDER — BISACODYL 10 MG
10 SUPPOSITORY, RECTAL RECTAL DAILY PRN
Status: DISCONTINUED | OUTPATIENT
Start: 2017-01-22 | End: 2017-01-24 | Stop reason: HOSPADM

## 2017-01-22 RX ADMIN — DICYCLOMINE HYDROCHLORIDE 20 MG: 10 CAPSULE ORAL at 18:06

## 2017-01-22 RX ADMIN — HYDROCODONE BITARTRATE AND ACETAMINOPHEN 1 TABLET: 10; 325 TABLET ORAL at 23:09

## 2017-01-22 RX ADMIN — LEVOTHYROXINE SODIUM 125 MCG: 125 TABLET ORAL at 06:41

## 2017-01-22 RX ADMIN — HYDROMORPHONE HYDROCHLORIDE 2 MG: 2 INJECTION, SOLUTION INTRAMUSCULAR; INTRAVENOUS; SUBCUTANEOUS at 13:38

## 2017-01-22 RX ADMIN — METRONIDAZOLE 500 MG: 500 TABLET ORAL at 01:00

## 2017-01-22 RX ADMIN — HYDROMORPHONE HYDROCHLORIDE 2 MG: 2 INJECTION, SOLUTION INTRAMUSCULAR; INTRAVENOUS; SUBCUTANEOUS at 03:33

## 2017-01-22 RX ADMIN — HYDROMORPHONE HYDROCHLORIDE 2 MG: 2 INJECTION, SOLUTION INTRAMUSCULAR; INTRAVENOUS; SUBCUTANEOUS at 16:48

## 2017-01-22 RX ADMIN — DICYCLOMINE HYDROCHLORIDE 20 MG: 10 CAPSULE ORAL at 08:08

## 2017-01-22 RX ADMIN — METRONIDAZOLE 500 MG: 500 TABLET ORAL at 21:04

## 2017-01-22 RX ADMIN — METRONIDAZOLE 500 MG: 500 TABLET ORAL at 13:43

## 2017-01-22 RX ADMIN — BUPROPION HYDROCHLORIDE 300 MG: 300 TABLET, EXTENDED RELEASE ORAL at 08:08

## 2017-01-22 RX ADMIN — PANTOPRAZOLE SODIUM 40 MG: 40 TABLET, DELAYED RELEASE ORAL at 06:41

## 2017-01-22 RX ADMIN — DICYCLOMINE HYDROCHLORIDE 20 MG: 10 CAPSULE ORAL at 12:03

## 2017-01-22 RX ADMIN — HYDROMORPHONE HYDROCHLORIDE 2 MG: 2 INJECTION, SOLUTION INTRAMUSCULAR; INTRAVENOUS; SUBCUTANEOUS at 21:04

## 2017-01-22 RX ADMIN — DICYCLOMINE HYDROCHLORIDE 20 MG: 10 CAPSULE ORAL at 21:04

## 2017-01-22 RX ADMIN — HYDROMORPHONE HYDROCHLORIDE 2 MG: 2 INJECTION, SOLUTION INTRAMUSCULAR; INTRAVENOUS; SUBCUTANEOUS at 06:41

## 2017-01-22 RX ADMIN — DULOXETINE HYDROCHLORIDE 90 MG: 60 CAPSULE, DELAYED RELEASE ORAL at 08:08

## 2017-01-22 RX ADMIN — FOLIC ACID 1 MG: 1 TABLET ORAL at 08:08

## 2017-01-22 RX ADMIN — METRONIDAZOLE 500 MG: 500 TABLET ORAL at 06:41

## 2017-01-22 RX ADMIN — HYDROMORPHONE HYDROCHLORIDE 2 MG: 2 INJECTION, SOLUTION INTRAMUSCULAR; INTRAVENOUS; SUBCUTANEOUS at 10:19

## 2017-01-22 RX ADMIN — PREDNISONE 20 MG: 20 TABLET ORAL at 08:08

## 2017-01-22 RX ADMIN — HYDROMORPHONE HYDROCHLORIDE 2 MG: 2 INJECTION, SOLUTION INTRAMUSCULAR; INTRAVENOUS; SUBCUTANEOUS at 01:00

## 2017-01-22 RX ADMIN — PREDNISONE 20 MG: 20 TABLET ORAL at 18:06

## 2017-01-22 NOTE — PROGRESS NOTES
[facility], [place], operated by [facility] - for Dr Celeste  Inpatient Progress Note    Reason for Follow Up: acute colitis    Subjective     Interval History:   Sedated - unable to awaken - per nursing she had been in significant pain with her right arm today and has just rcvd dilaudid.  Nursing reports no diarrhea, n/v.  Ate earlier but buggest issue today was arm pain.    Current Facility-Administered Medications:   •  acetaminophen (TYLENOL) tablet 650 mg, 650 mg, Oral, Q4H PRN, German Mckeon MD, 650 mg at 01/21/17 0144  •  buPROPion XL (WELLBUTRIN XL) 24 hr tablet 300 mg, 300 mg, Oral, Daily, German Mckeon MD, 300 mg at 01/21/17 0829  •  dicyclomine (BENTYL) capsule 20 mg, 20 mg, Oral, 4x Daily, German Mckeon MD, 20 mg at 01/21/17 1807  •  DULoxetine (CYMBALTA) DR capsule 90 mg, 90 mg, Oral, Daily, German Mckeon MD, 90 mg at 01/21/17 0829  •  folic acid (FOLVITE) tablet 1 mg, 1 mg, Oral, Daily, German Mckeon MD, 1 mg at 01/21/17 0829  •  HYDROcodone-acetaminophen (NORCO)  MG per tablet 1 tablet, 1 tablet, Oral, Q6H PRN, German Mckeon MD, 1 tablet at 01/21/17 1400  •  HYDROmorphone (DILAUDID) injection 2 mg, 2 mg, Intravenous, Q2H PRN, 2 mg at 01/21/17 1903 **AND** naloxone (NARCAN) injection 0.4 mg, 0.4 mg, Intravenous, Q5 Min PRN, Sánchez Kumar MD  •  levoFLOXacin (LEVAQUIN) 750 mg/150 mL D5W (premix) (LEVAQUIN) 750 mg, 750 mg, Intravenous, Q24H, German Mckeon MD, 750 mg at 01/20/17 2129  •  levothyroxine (SYNTHROID, LEVOTHROID) tablet 125 mcg, 125 mcg, Oral, Q AM, German Mckeon MD, 125 mcg at 01/21/17 0637  •  metroNIDAZOLE (FLAGYL) tablet 500 mg, 500 mg, Oral, Q8H, German Mckeon MD, 500 mg at 01/21/17 1408  •  ondansetron (ZOFRAN) injection 4 mg, 4 mg, Intravenous, Q6H PRN, German Mckeon MD, 4 mg at 01/20/17 1448  •  pantoprazole (PROTONIX) EC tablet 40 mg, 40 mg, Oral, Q AM, German Mckeon MD, 40 mg at 01/21/17  0637  •  predniSONE (DELTASONE) tablet 20 mg, 20 mg, Oral, BID With Meals, Sánchez Kumar MD, 20 mg at 01/21/17 1805  •  simethicone (MYLICON) chewable tablet 80 mg, 80 mg, Oral, 4x Daily PRN, German Mckeon MD, 80 mg at 01/20/17 0808  •  sodium chloride 0.9 % flush 1-10 mL, 1-10 mL, Intravenous, PRN, German Mckeon MD  •  sodium chloride 0.9 % flush 10 mL, 10 mL, Intravenous, PRN, Tyrese Gilmore MD  •  Insert peripheral IV, , , Once **AND** sodium chloride 0.9 % flush 10 mL, 10 mL, Intravenous, PRN, Amy Magaña, APRN  •  sodium chloride 0.9 % with KCl 20 mEq/L infusion, 50 mL/hr, Intravenous, Continuous, German Mckeon MD, Last Rate: 50 mL/hr at 01/21/17 0638, 50 mL/hr at 01/21/17 0638  Review of Systems:    Review of systems could not be obtained due to  patient sedation status.    Objective     Vital Signs  Temp:  [97 °F (36.1 °C)-98.9 °F (37.2 °C)] 98.8 °F (37.1 °C)  Heart Rate:  [] 100  Resp:  [16-18] 18  BP: (109-181)/(51-82) 175/79  Body mass index is 31.32 kg/(m^2).    Intake/Output Summary (Last 24 hours) at 01/21/17 2016  Last data filed at 01/21/17 1805   Gross per 24 hour   Intake   2610 ml   Output      0 ml   Net   2610 ml             Physical Exam:   General: unable to arouse   Eyes: Normal lids and lashes, no scleral icterus   Neck: supple, normal ROM   Skin: warm and dry, not jaundiced   Pulm: clear to auscultation bilaterally, regular and unlabored   Abdomen: soft, nontender, nondistended; normal bowel sounds   Rectal: deferred   Extremities: no rash or edema   Psychiatric: sedated     Results Review:     I reviewed the patient's new clinical results.      Results from last 7 days  Lab Units 01/21/17  0754 01/20/17  0603 01/19/17  0536   WBC 10*3/mm3 9.03 10.06 9.22   HEMOGLOBIN g/dL 9.6* 9.8* 10.0*   HEMATOCRIT % 32.9* 33.4* 33.2*   PLATELETS 10*3/mm3 348 308 271         Results from last 7 days  Lab Units 01/21/17  0754 01/20/17  0603 01/19/17  0536  01/18/17  0857   SODIUM mmol/L 135* 140 143 137   POTASSIUM mmol/L 4.7 4.2 4.5 3.7   CHLORIDE mmol/L 100 107 113* 102   TOTAL CO2 mmol/L 25.0 20.5* 23.6 22.2   BUN mg/dL 7* 7* 6* 7*   CREATININE mg/dL 0.89 0.85 0.83 0.95   CALCIUM mg/dL 9.0 8.3* 7.9* 8.5*   BILIRUBIN mg/dL  --   --   --  0.4   ALK PHOS U/L  --   --   --  128*   ALT (SGPT) U/L  --   --   --  14   AST (SGOT) U/L  --   --   --  20   GLUCOSE mg/dL 123* 132* 182* 108*               0  Lab Value Date/Time   LIPASE 18 01/18/2017 0857       Radiology:    Imaging Results (last 24 hours)     ** No results found for the last 24 hours. **            Assessment/Plan     Patient Active Problem List   Diagnosis Code   • Depression F32.9   • Hyperlipidemia E78.5   • Hypothyroidism E03.9   • Menopause present Z78.0   • Sensory neuropathy G62.9   • Osteoporosis M81.0   • Vitamin D deficiency E55.9   • Common variable immunodeficiency D83.9   • Pulmonary fibrosis J84.10   • Iron deficiency anemia D50.9   • Pernicious anemia D51.0   • Rheumatoid arthritis M06.9   • Acute colitis K52.9   • Sepsis, unspecified organism A41.9   • Immunosuppression D89.9     Impression:  Acute Colitis prob bacterial but culture neg to date- on Flagyl/LVQ  CVID  DM  RA  Interstitial Lung Disease     Plan:  - labs stable   - GI symptoms improved per nursing  - asked nursing to put her on oxygen sat monitor due to sedation      Kianna Walker MD  01/21/17  8:16 PM

## 2017-01-22 NOTE — PROGRESS NOTES
[facility], [facility] - for Dr Celeste  Inpatient Progress Note    Reason for Follow Up:  Acute colitis    Subjective     Interval History:   Feels better today than yesterday but still with severe right arm pain.  No BM - c/o abd distension and right sided abdominal pain.  No n/v - appetite good - has been out of bed today.    Current Facility-Administered Medications:   •  acetaminophen (TYLENOL) tablet 650 mg, 650 mg, Oral, Q4H PRN, German Mckeon MD, 650 mg at 01/21/17 0144  •  buPROPion XL (WELLBUTRIN XL) 24 hr tablet 300 mg, 300 mg, Oral, Daily, German Mckeon MD, 300 mg at 01/22/17 0808  •  dicyclomine (BENTYL) capsule 20 mg, 20 mg, Oral, 4x Daily, German Mckeon MD, 20 mg at 01/22/17 1203  •  DULoxetine (CYMBALTA) DR capsule 90 mg, 90 mg, Oral, Daily, German Mckeon MD, 90 mg at 01/22/17 0808  •  folic acid (FOLVITE) tablet 1 mg, 1 mg, Oral, Daily, German Mckeon MD, 1 mg at 01/22/17 0808  •  HYDROcodone-acetaminophen (NORCO)  MG per tablet 1 tablet, 1 tablet, Oral, Q6H PRN, German Mckeon MD, 1 tablet at 01/21/17 1400  •  HYDROmorphone (DILAUDID) injection 2 mg, 2 mg, Intravenous, Q2H PRN, 2 mg at 01/22/17 1648 **AND** naloxone (NARCAN) injection 0.4 mg, 0.4 mg, Intravenous, Q5 Min PRN, Sánchez Kumar MD  •  levoFLOXacin (LEVAQUIN) 750 mg/150 mL D5W (premix) (LEVAQUIN) 750 mg, 750 mg, Intravenous, Q24H, German Mckeon MD, 750 mg at 01/21/17 2039  •  levothyroxine (SYNTHROID, LEVOTHROID) tablet 125 mcg, 125 mcg, Oral, Q AM, German Mckeon MD, 125 mcg at 01/22/17 0641  •  metroNIDAZOLE (FLAGYL) tablet 500 mg, 500 mg, Oral, Q8H, German Mckeon MD, 500 mg at 01/22/17 1343  •  ondansetron (ZOFRAN) injection 4 mg, 4 mg, Intravenous, Q6H PRN, German Mckeon MD, 4 mg at 01/20/17 1448  •  pantoprazole (PROTONIX) EC tablet 40 mg, 40 mg, Oral, Q AM, German Mckeon MD, 40 mg at 01/22/17 0641  •  predniSONE  (DELTASONE) tablet 20 mg, 20 mg, Oral, BID With Meals, Sánchez Kumar MD, 20 mg at 01/22/17 0808  •  simethicone (MYLICON) chewable tablet 80 mg, 80 mg, Oral, 4x Daily PRN, German Mckeon MD, 80 mg at 01/20/17 0808  •  sodium chloride 0.9 % flush 1-10 mL, 1-10 mL, Intravenous, PRN, German Mckeon MD  •  sodium chloride 0.9 % flush 10 mL, 10 mL, Intravenous, PRN, Tyrese Gilmore MD  •  Insert peripheral IV, , , Once **AND** sodium chloride 0.9 % flush 10 mL, 10 mL, Intravenous, PRN, Amy Magaña APRN  •  sodium chloride 0.9 % with KCl 20 mEq/L infusion, 50 mL/hr, Intravenous, Continuous, German Mckeon MD, Last Rate: 50 mL/hr at 01/21/17 0638, 50 mL/hr at 01/21/17 0638  Review of Systems:    + right arm pain, abd distension/pain    Objective     Vital Signs  Temp:  [97.6 °F (36.4 °C)-99.4 °F (37.4 °C)] 97.6 °F (36.4 °C)  Heart Rate:  [] 89  Resp:  [16-20] 16  BP: (121-175)/(68-93) 121/68  Body mass index is 31.32 kg/(m^2).    Intake/Output Summary (Last 24 hours) at 01/22/17 1755  Last data filed at 01/22/17 1333   Gross per 24 hour   Intake   1670 ml   Output      0 ml   Net   1670 ml     I/O this shift:  In: 750 [P.O.:600; I.V.:150]  Out: -        Physical Exam:   General: patient awake, alert and cooperative   Eyes: Normal lids and lashes, no scleral icterus   Neck: supple, normal ROM   Skin: warm and dry, not jaundiced   Abdomen: soft, tender rl/ruq, distended; normal bowel sounds   Rectal: deferred   Extremities: no rash or edema le   Psychiatric: Normal mood and behavior; memory intact     Results Review:     I reviewed the patient's new clinical results.      Results from last 7 days  Lab Units 01/22/17  0521 01/21/17  0754 01/20/17  0603   WBC 10*3/mm3 23.72* 9.03 10.06   HEMOGLOBIN g/dL 11.4* 9.6* 9.8*   HEMATOCRIT % 38.2 32.9* 33.4*   PLATELETS 10*3/mm3 487 348 308         Results from last 7 days  Lab Units 01/22/17  0521 01/21/17  0754 01/20/17  0603   01/18/17  0857   SODIUM mmol/L 133* 135* 140  < > 137   POTASSIUM mmol/L 4.9 4.7 4.2  < > 3.7   CHLORIDE mmol/L 97* 100 107  < > 102   TOTAL CO2 mmol/L 21.5* 25.0 20.5*  < > 22.2   BUN mg/dL 10 7* 7*  < > 7*   CREATININE mg/dL 1.06* 0.89 0.85  < > 0.95   CALCIUM mg/dL 9.8 9.0 8.3*  < > 8.5*   BILIRUBIN mg/dL  --   --   --   --  0.4   ALK PHOS U/L  --   --   --   --  128*   ALT (SGPT) U/L  --   --   --   --  14   AST (SGOT) U/L  --   --   --   --  20   GLUCOSE mg/dL 158* 123* 132*  < > 108*   < > = values in this interval not displayed.            0  Lab Value Date/Time   LIPASE 18 01/18/2017 0857       Radiology:    Imaging Results (last 24 hours)     ** No results found for the last 24 hours. **            Assessment/Plan     Patient Active Problem List   Diagnosis Code   • Depression F32.9   • Hyperlipidemia E78.5   • Hypothyroidism E03.9   • Menopause present Z78.0   • Sensory neuropathy G62.9   • Osteoporosis M81.0   • Vitamin D deficiency E55.9   • Common variable immunodeficiency D83.9   • Pulmonary fibrosis J84.10   • Iron deficiency anemia D50.9   • Pernicious anemia D51.0   • Rheumatoid arthritis M06.9   • Acute colitis K52.9   • Sepsis, unspecified organism A41.9   • Immunosuppression D89.9     Impression:  Acute Colitis prob bacterial but culture neg to date- on Flagyl/LVQ  CVID  DM  RA  Interstitial Lung Disease      Plan:  -  Significant leukocytosis today- likely steroid related  -  Will give suppository given no bm and abd distension  - cont empiric antibx - cellcept on hold given presumed infection            Kianna Walker MD  01/22/17  5:55 PM

## 2017-01-22 NOTE — PROGRESS NOTES
Vascular lab right upper extremity venous doppler complete, prelim negative dvt - RASHAWN Hanna aware

## 2017-01-22 NOTE — PLAN OF CARE
Problem: Patient Care Overview (Adult)  Goal: Plan of Care Review  Outcome: Ongoing (interventions implemented as appropriate)    01/22/17 040   Patient Care Overview   Progress no change   Outcome Evaluation   Outcome Summary/Follow up Plan pt continues to have significant pain throughout the night, continuous pulse ox applied to pt d/t sedation. will conitinue to monitor   Coping/Psychosocial Response Interventions   Plan Of Care Reviewed With patient       Goal: Adult Individualization and Mutuality  Outcome: Ongoing (interventions implemented as appropriate)  Goal: Discharge Needs Assessment  Outcome: Ongoing (interventions implemented as appropriate)    Problem: Pain, Acute (Adult)  Goal: Acceptable Pain Control/Comfort Level  Outcome: Ongoing (interventions implemented as appropriate)    Problem: Fluid Volume Deficit (Adult)  Goal: Fluid/Electrolyte Balance  Outcome: Ongoing (interventions implemented as appropriate)  Goal: Comfort/Well Being  Outcome: Ongoing (interventions implemented as appropriate)

## 2017-01-23 ENCOUNTER — INPATIENT HOSPITAL (OUTPATIENT)
Dept: URBAN - METROPOLITAN AREA HOSPITAL 113 | Facility: HOSPITAL | Age: 62
End: 2017-01-23
Payer: COMMERCIAL

## 2017-01-23 DIAGNOSIS — K52.9 NONINFECTIVE GASTROENTERITIS AND COLITIS, UNSPECIFIED: ICD-10-CM

## 2017-01-23 LAB
ALBUMIN SERPL-MCNC: 3.5 G/DL (ref 3.5–5.2)
ALP SERPL-CCNC: 98 U/L (ref 39–117)
ALT SERPL W P-5'-P-CCNC: 22 U/L (ref 1–33)
ANION GAP SERPL CALCULATED.3IONS-SCNC: 12 MMOL/L
AST SERPL-CCNC: 20 U/L (ref 1–32)
BASOPHILS # BLD AUTO: 0.01 10*3/MM3 (ref 0–0.2)
BASOPHILS NFR BLD AUTO: 0 % (ref 0–1.5)
BILIRUB CONJ SERPL-MCNC: <0.2 MG/DL (ref 0–0.3)
BILIRUB INDIRECT SERPL-MCNC: NORMAL MG/DL
BILIRUB SERPL-MCNC: 0.2 MG/DL (ref 0.1–1.2)
BUN BLD-MCNC: 19 MG/DL (ref 8–23)
BUN/CREAT SERPL: 23.5 (ref 7–25)
CALCIUM SPEC-SCNC: 9 MG/DL (ref 8.6–10.5)
CHLORIDE SERPL-SCNC: 100 MMOL/L (ref 98–107)
CK SERPL-CCNC: 155 U/L (ref 20–180)
CO2 SERPL-SCNC: 25 MMOL/L (ref 22–29)
CREAT BLD-MCNC: 0.81 MG/DL (ref 0.57–1)
DEPRECATED RDW RBC AUTO: 54.8 FL (ref 37–54)
EOSINOPHIL # BLD AUTO: 0 10*3/MM3 (ref 0–0.7)
EOSINOPHIL NFR BLD AUTO: 0 % (ref 0.3–6.2)
ERYTHROCYTE [DISTWIDTH] IN BLOOD BY AUTOMATED COUNT: 17.6 % (ref 11.7–13)
GFR SERPL CREATININE-BSD FRML MDRD: 72 ML/MIN/1.73
GLUCOSE BLD-MCNC: 95 MG/DL (ref 65–99)
HCT VFR BLD AUTO: 32.3 % (ref 35.6–45.5)
HGB BLD-MCNC: 9.5 G/DL (ref 11.9–15.5)
IMM GRANULOCYTES # BLD: 0.09 10*3/MM3 (ref 0–0.03)
IMM GRANULOCYTES NFR BLD: 0.4 % (ref 0–0.5)
LYMPHOCYTES # BLD AUTO: 1.68 10*3/MM3 (ref 0.9–4.8)
LYMPHOCYTES NFR BLD AUTO: 7.2 % (ref 19.6–45.3)
MAGNESIUM SERPL-MCNC: 2.1 MG/DL (ref 1.6–2.4)
MCH RBC QN AUTO: 25.1 PG (ref 26.9–32)
MCHC RBC AUTO-ENTMCNC: 29.4 G/DL (ref 32.4–36.3)
MCV RBC AUTO: 85.4 FL (ref 80.5–98.2)
MONOCYTES # BLD AUTO: 1.36 10*3/MM3 (ref 0.2–1.2)
MONOCYTES NFR BLD AUTO: 5.8 % (ref 5–12)
NEUTROPHILS # BLD AUTO: 20.34 10*3/MM3 (ref 1.9–8.1)
NEUTROPHILS NFR BLD AUTO: 86.6 % (ref 42.7–76)
NRBC BLD MANUAL-RTO: 0 /100 WBC (ref 0–0)
PHOSPHATE SERPL-MCNC: 2.6 MG/DL (ref 2.5–4.5)
PLATELET # BLD AUTO: 433 10*3/MM3 (ref 140–500)
PMV BLD AUTO: 10.7 FL (ref 6–12)
POTASSIUM BLD-SCNC: 4.1 MMOL/L (ref 3.5–5.2)
PROT SERPL-MCNC: 7.3 G/DL (ref 6–8.5)
RBC # BLD AUTO: 3.78 10*6/MM3 (ref 3.9–5.2)
SODIUM BLD-SCNC: 137 MMOL/L (ref 136–145)
WBC NRBC COR # BLD: 23.48 10*3/MM3 (ref 4.5–10.7)

## 2017-01-23 PROCEDURE — 82550 ASSAY OF CK (CPK): CPT | Performed by: HOSPITALIST

## 2017-01-23 PROCEDURE — 25010000002 HYDROMORPHONE PER 4 MG: Performed by: HOSPITALIST

## 2017-01-23 PROCEDURE — 84100 ASSAY OF PHOSPHORUS: CPT | Performed by: HOSPITALIST

## 2017-01-23 PROCEDURE — 80076 HEPATIC FUNCTION PANEL: CPT | Performed by: HOSPITALIST

## 2017-01-23 PROCEDURE — 85025 COMPLETE CBC W/AUTO DIFF WBC: CPT | Performed by: INTERNAL MEDICINE

## 2017-01-23 PROCEDURE — 83735 ASSAY OF MAGNESIUM: CPT | Performed by: HOSPITALIST

## 2017-01-23 PROCEDURE — 99231 SBSQ HOSP IP/OBS SF/LOW 25: CPT

## 2017-01-23 PROCEDURE — 80048 BASIC METABOLIC PNL TOTAL CA: CPT | Performed by: INTERNAL MEDICINE

## 2017-01-23 PROCEDURE — 25810000003 SODIUM CHLORIDE 0.9 % WITH KCL 20 MEQ 20-0.9 MEQ/L-% SOLUTION: Performed by: INTERNAL MEDICINE

## 2017-01-23 PROCEDURE — 63710000001 PREDNISONE PER 5 MG: Performed by: HOSPITALIST

## 2017-01-23 RX ADMIN — PREDNISONE 20 MG: 20 TABLET ORAL at 08:03

## 2017-01-23 RX ADMIN — METRONIDAZOLE 500 MG: 500 TABLET ORAL at 21:39

## 2017-01-23 RX ADMIN — PANTOPRAZOLE SODIUM 40 MG: 40 TABLET, DELAYED RELEASE ORAL at 06:36

## 2017-01-23 RX ADMIN — BUPROPION HYDROCHLORIDE 150 MG: 150 TABLET, FILM COATED, EXTENDED RELEASE ORAL at 08:03

## 2017-01-23 RX ADMIN — LEVOTHYROXINE SODIUM 125 MCG: 125 TABLET ORAL at 06:36

## 2017-01-23 RX ADMIN — BISACODYL 10 MG: 10 SUPPOSITORY RECTAL at 08:05

## 2017-01-23 RX ADMIN — DULOXETINE HYDROCHLORIDE 90 MG: 60 CAPSULE, DELAYED RELEASE ORAL at 08:03

## 2017-01-23 RX ADMIN — HYDROCODONE BITARTRATE AND ACETAMINOPHEN 1 TABLET: 10; 325 TABLET ORAL at 16:28

## 2017-01-23 RX ADMIN — DICYCLOMINE HYDROCHLORIDE 20 MG: 10 CAPSULE ORAL at 21:39

## 2017-01-23 RX ADMIN — DICYCLOMINE HYDROCHLORIDE 20 MG: 10 CAPSULE ORAL at 11:59

## 2017-01-23 RX ADMIN — DICYCLOMINE HYDROCHLORIDE 20 MG: 10 CAPSULE ORAL at 18:14

## 2017-01-23 RX ADMIN — PREDNISONE 20 MG: 20 TABLET ORAL at 18:14

## 2017-01-23 RX ADMIN — METRONIDAZOLE 500 MG: 500 TABLET ORAL at 06:36

## 2017-01-23 RX ADMIN — FOLIC ACID 1 MG: 1 TABLET ORAL at 08:03

## 2017-01-23 RX ADMIN — METRONIDAZOLE 500 MG: 500 TABLET ORAL at 16:28

## 2017-01-23 RX ADMIN — HYDROMORPHONE HYDROCHLORIDE 2 MG: 2 INJECTION, SOLUTION INTRAMUSCULAR; INTRAVENOUS; SUBCUTANEOUS at 08:16

## 2017-01-23 RX ADMIN — HYDROMORPHONE HYDROCHLORIDE 2 MG: 2 INJECTION, SOLUTION INTRAMUSCULAR; INTRAVENOUS; SUBCUTANEOUS at 11:59

## 2017-01-23 RX ADMIN — HYDROCODONE BITARTRATE AND ACETAMINOPHEN 1 TABLET: 10; 325 TABLET ORAL at 23:31

## 2017-01-23 RX ADMIN — DICYCLOMINE HYDROCHLORIDE 20 MG: 10 CAPSULE ORAL at 08:03

## 2017-01-23 RX ADMIN — POTASSIUM CHLORIDE AND SODIUM CHLORIDE 50 ML/HR: 900; 150 INJECTION, SOLUTION INTRAVENOUS at 00:09

## 2017-01-23 NOTE — PROGRESS NOTES
GI Daily Progress Note    Assessment/Plan:    Principal Problem:    Acute colitis  Active Problems:    Hypothyroidism    Sepsis, unspecified organism    Immunosuppression       LOS: 5 days     Dara Medina is a 61 y.o. female who was admitted with Acute colitis. She reports his symptoms are improving with treatment. Bm following suppository, abd distension but less cramping tolerating diet well. Still with migratory shoulder pain.    Subjective:    Patient expresses constipation and abd distension  Patient denies vomiting and diarrhea    Objective:    Vital signs in last 24 hours:  Temp:  [96.7 °F (35.9 °C)-97.9 °F (36.6 °C)] 97.5 °F (36.4 °C)  Heart Rate:  [] 146  Resp:  [16] 16  BP: (115-150)/(64-74) 150/74    Intake/Output last 3 shifts:  I/O last 3 completed shifts:  In: 2684 [P.O.:1560; I.V.:1124]  Out: -   Intake/Output this shift:  I/O this shift:  In: -   Out: 1 [Stool:1]    Results from last 7 days  Lab Units 01/23/17  0726   WBC 10*3/mm3 23.48*   HEMOGLOBIN g/dL 9.5*   HEMATOCRIT % 32.3*   PLATELETS 10*3/mm3 433       Results from last 7 days  Lab Units 01/23/17  0726   SODIUM mmol/L 137   POTASSIUM mmol/L 4.1   CHLORIDE mmol/L 100   TOTAL CO2 mmol/L 25.0   BUN mg/dL 19   CREATININE mg/dL 0.81   CALCIUM mg/dL 9.0   BILIRUBIN mg/dL 0.2   ALK PHOS U/L 98   ALT (SGPT) U/L 22   AST (SGOT) U/L 20   GLUCOSE mg/dL 95         Physical Exam:Abdomen  Sounds Normal Active Bowel Sounds   Distension Soft and Distended   Tenderness Mildly Tender     Acute Colitis prob bacterial but culture neg to date- on Flagyl/LVQ  CVID  DM  RA  Interstitial Lung Disease  Bilat shoulder Pain  Ileus/constipation    At this point would like to decrease her narcotics but her shoulder pain has become the issue but her constipation and exam are most likely from her Narcs at this point, Ok to d/c IVFs

## 2017-01-23 NOTE — PLAN OF CARE
"Problem: Patient Care Overview (Adult)  Goal: Plan of Care Review  Outcome: Ongoing (interventions implemented as appropriate)    01/23/17 0307   Patient Care Overview   Progress improving   Outcome Evaluation   Outcome Summary/Follow up Plan Pt rested/slept well for several hours. Pt has been med x2 for c/o pain (at time of note), see MAR and chart for details. Continuous IVF, Levaquin DC'd, order for rheumatology consult, called by US. Pt reports no BM x multiple days, pt refused PRN med (Dulcolax supp offered) for BM, pt reported \"I'm afraid it will keep me awake, I'll take something in the morning\" Continue to monitor ad tx per POC and MD orders.   Coping/Psychosocial Response Interventions   Plan Of Care Reviewed With patient       Goal: Adult Individualization and Mutuality  Outcome: Ongoing (interventions implemented as appropriate)  Goal: Discharge Needs Assessment  Outcome: Ongoing (interventions implemented as appropriate)    Problem: Pain, Acute (Adult)  Goal: Acceptable Pain Control/Comfort Level  Outcome: Ongoing (interventions implemented as appropriate)    Problem: Fluid Volume Deficit (Adult)  Goal: Fluid/Electrolyte Balance  Outcome: Ongoing (interventions implemented as appropriate)  Goal: Comfort/Well Being  Outcome: Ongoing (interventions implemented as appropriate)      "

## 2017-01-23 NOTE — PROGRESS NOTES
"DAILY PROGRESS NOTE  [facility]    Patient Identification:  Name: Dara Medina  Age: 61 y.o.  Sex: female  :  1955  MRN: [id number]         Primary Care Physician: Randi Yang MD      Subjective  States rt shoulder improved and lt shoulder now hurting again.  Abd a little improved. Had a loose incontinent stool today which upset her greatly.  Staff notes she was pacing around the room and very anxious.  Only one stool.      Objective:  General Appearance:  Comfortable, well-appearing, in no acute distress and not in pain (Pt very comfortable on entering the room.  Fanning herself with her rt arm that she could hardly move yesterday. ).    Vital signs: (most recent): Blood pressure 150/74, pulse 80, temperature 97.5 °F (36.4 °C), temperature source Oral, resp. rate 16, height 65\" (165.1 cm), weight 188 lb 3.2 oz (85.4 kg), SpO2 94 %.    Lungs:  Normal respiratory rate and normal effort.  She is not in respiratory distress.  No stridor.  There are rales (Fine dry basilar rales. ).  No wheezes, rhonchi or decreased breath sounds.    Heart: Normal rate.  Regular rhythm.    Abdomen: Abdomen is non-distended.  (Pt resists attempt to examine with palpation. )Bowel sounds are normal.     Extremities: There is no dependent edema.  (Pt jumpy with palpation of shoulder and deltoids but no appreciable swelling today.  No increased warmth and no erythema.  FROM with passive exam.  No active tenosynovitis.  )  Pulses: Distal pulses are intact.    Neurological: Patient is alert and oriented to person, place and time.    Skin:  Warm and dry.                Vital signs in last 24 hours:  Temp:  [96.7 °F (35.9 °C)-97.9 °F (36.6 °C)] 97.5 °F (36.4 °C)  Heart Rate:  [] 80  Resp:  [16] 16  BP: (115-150)/(64-74) 150/74    Intake/Output:    Intake/Output Summary (Last 24 hours) at 17 1849  Last data filed at 17 1820   Gross per 24 hour   Intake   4074 ml   Output      0 ml   Net   4074 ml "         Exam:    Physical Exam   Cardiovascular: Normal rate, regular rhythm and intact distal pulses.    Pulmonary/Chest: Effort normal. No stridor. No respiratory distress. She has no decreased breath sounds. She has no wheezes. She has no rhonchi. She has rales (Fine dry basilar rales. ).   Abdominal: Bowel sounds are normal. She exhibits no distension.   Neurological: She is alert.   Skin: Skin is warm and dry.         Results from last 7 days  Lab Units 01/23/17  0726 01/22/17 0521 01/21/17 0754 01/20/17  0603 01/19/17  0536 01/18/17  0857   WBC 10*3/mm3 23.48* 23.72* 9.03 10.06 9.22 16.29*   HEMOGLOBIN g/dL 9.5* 11.4* 9.6* 9.8* 10.0* 11.2*   PLATELETS 10*3/mm3 433 487 348 308 271 333     Results from last 7 days  Lab Units 01/23/17  0726 01/22/17 0521 01/21/17 0754 01/20/17  0603 01/19/17  0536 01/18/17  0857   SODIUM mmol/L 137 133* 135* 140 143 137   POTASSIUM mmol/L 4.1 4.9 4.7 4.2 4.5 3.7   CHLORIDE mmol/L 100 97* 100 107 113* 102   TOTAL CO2 mmol/L 25.0 21.5* 25.0 20.5* 23.6 22.2   BUN mg/dL 19 10 7* 7* 6* 7*   CREATININE mg/dL 0.81 1.06* 0.89 0.85 0.83 0.95   GLUCOSE mg/dL 95 158* 123* 132* 182* 108*   Estimated Creatinine Clearance: 78.8 mL/min (by C-G formula based on Cr of 0.81).  Results from last 7 days  Lab Units 01/23/17  0726 01/22/17 0521 01/21/17 0754 01/20/17  0603 01/19/17  0536 01/18/17  0857   CALCIUM mg/dL 9.0 9.8 9.0 8.3* 7.9* 8.5*   ALBUMIN g/dL 3.50  --   --   --   --  3.50   MAGNESIUM mg/dL 2.1  --   --   --   --   --    PHOSPHORUS mg/dL 2.6  --   --   --   --   --      Results from last 7 days  Lab Units 01/23/17  0726 01/18/17  0857   ALBUMIN g/dL 3.50 3.50   BILIRUBIN mg/dL 0.2 0.4   ALK PHOS U/L 98 128*   AST (SGOT) U/L 20 20   ALT (SGPT) U/L 22 14       Assessment:  Acute Colitis:  Resolving  Sepsis:  Resolved.   RA:  Chronic.   Immunosuppressed:   Myositis?:  CK nl and today's exam benign.  Continue to hold Levaquin, wean Welbutrin, stay off statins. Wean steroids..    ILD:  Hyperglycemia:  Anemia - IGNACIO/ACD:   Hypothyroidism - Euthyroid sick syndr:   Leukocytosis 2nd to steroids.   Anxiety:  Appears to be playing a significant role in her clinical presentation.       Plan:  Please see above.  Discussed with GI as well as her Rheumatologist (Dr Contreras)  D/C planning.  Over 35 min with over 1/2 time in counseling and medical management.     Sánchez Kumar MD  1/23/2017  6:49 PM

## 2017-01-23 NOTE — PLAN OF CARE
Problem: Patient Care Overview (Adult)  Goal: Plan of Care Review  Outcome: Ongoing (interventions implemented as appropriate)    01/23/17 5150   Patient Care Overview   Progress improving   Outcome Evaluation   Outcome Summary/Follow up Plan Pt medicated x2 dilaudid and x1 with lortab. Ambulating independently. IVF continued. Rheumatology consulted. BM today per pt. Will continue to monitor.   Coping/Psychosocial Response Interventions   Plan Of Care Reviewed With patient       Goal: Adult Individualization and Mutuality  Outcome: Ongoing (interventions implemented as appropriate)  Goal: Discharge Needs Assessment  Outcome: Ongoing (interventions implemented as appropriate)    Problem: Pain, Acute (Adult)  Goal: Acceptable Pain Control/Comfort Level  Outcome: Ongoing (interventions implemented as appropriate)    Problem: Fluid Volume Deficit (Adult)  Goal: Fluid/Electrolyte Balance  Outcome: Ongoing (interventions implemented as appropriate)  Goal: Comfort/Well Being  Outcome: Ongoing (interventions implemented as appropriate)

## 2017-01-23 NOTE — PROGRESS NOTES
"DAILY PROGRESS NOTE  [facility]    Patient Identification:  Name: Dara Medina  Age: 61 y.o.  Sex: female  :  1955  MRN: [id number]         Primary Care Physician: Randi Yang MD      Subjective  Pt developed severe rt shoulder pain yesterday.  Improved today.  Points to shoulder and deltoid.  Same sympt she had on the lt earlier (note MRI report.)    Abd pain improved.  No BM.     Objective:  General Appearance:  Comfortable, well-appearing, in no acute distress and not in pain.    Vital signs: (most recent): Blood pressure 121/68, pulse 89, temperature 97.6 °F (36.4 °C), temperature source Oral, resp. rate 16, height 65\" (165.1 cm), weight 188 lb 3.2 oz (85.4 kg), SpO2 97 %.    Lungs:  Normal respiratory rate and normal effort.  She is not in respiratory distress.  No stridor.  There are rales (fine, dry, basilar. ).  No wheezes, rhonchi or decreased breath sounds.    Heart: Normal rate.  Regular rhythm.    Abdomen: Abdomen is soft and non-distended.  Bowel sounds are normal.   There is right upper quadrant (Mild.) and right lower quadrant tenderness. There is no rebound tenderness.  There is no guarding.     Extremities: There is no dependent edema.  (Tender over rt deltoid.  Pain w ROM rt shoulder. )  Neurological: Patient is alert and oriented to person, place and time.    Skin:  Warm and dry.                Vital signs in last 24 hours:  Temp:  [97.6 °F (36.4 °C)-99.4 °F (37.4 °C)] 97.6 °F (36.4 °C)  Heart Rate:  [] 89  Resp:  [16-20] 16  BP: (121-145)/(68-93) 121/68    Intake/Output:    Intake/Output Summary (Last 24 hours) at 17  Last data filed at 17 1901   Gross per 24 hour   Intake   1542 ml   Output      0 ml   Net   1542 ml         Exam:    Physical Exam   Cardiovascular: Normal rate and regular rhythm.    Pulmonary/Chest: Effort normal. No stridor. No respiratory distress. She has no decreased breath sounds. She has no wheezes. She has no rhonchi. She " has rales (fine, dry, basilar. ).   Abdominal: Soft. Bowel sounds are normal. She exhibits no distension. There is tenderness in the right upper quadrant (Mild.) and right lower quadrant. There is no rebound and no guarding.   Neurological: She is alert.   Skin: Skin is warm and dry.         Results from last 7 days  Lab Units 01/22/17  0521 01/21/17 0754 01/20/17  0603 01/19/17  0536 01/18/17  0857   WBC 10*3/mm3 23.72* 9.03 10.06 9.22 16.29*   HEMOGLOBIN g/dL 11.4* 9.6* 9.8* 10.0* 11.2*   PLATELETS 10*3/mm3 487 348 308 271 333     Results from last 7 days  Lab Units 01/22/17 0521 01/21/17 0754 01/20/17  0603 01/19/17  0536 01/18/17  0857   SODIUM mmol/L 133* 135* 140 143 137   POTASSIUM mmol/L 4.9 4.7 4.2 4.5 3.7   CHLORIDE mmol/L 97* 100 107 113* 102   TOTAL CO2 mmol/L 21.5* 25.0 20.5* 23.6 22.2   BUN mg/dL 10 7* 7* 6* 7*   CREATININE mg/dL 1.06* 0.89 0.85 0.83 0.95   GLUCOSE mg/dL 158* 123* 132* 182* 108*   Estimated Creatinine Clearance: 60.2 mL/min (by C-G formula based on Cr of 1.06).  Results from last 7 days  Lab Units 01/22/17  0521 01/21/17 0754 01/20/17  0603 01/19/17  0536 01/18/17  0857   CALCIUM mg/dL 9.8 9.0 8.3* 7.9* 8.5*   ALBUMIN g/dL  --   --   --   --  3.50     Results from last 7 days  Lab Units 01/18/17  0857   ALBUMIN g/dL 3.50   BILIRUBIN mg/dL 0.4   ALK PHOS U/L 128*   AST (SGOT) U/L 20   ALT (SGPT) U/L 14       Assessment:  Acute Colitis:  Improving.   Sepsis:  Resolved.   RA:  Immunosuppressed:   Myositis - deltoids:  ?etiology.  Possibly meds.  Hold Levaquin, wean Welbutrin, stay off statins. Continue steroids (sympt proceeded increase in prednisone).   Monitor CK....  ILD:  Hyperglycemia 2nd steroids:  Anemia - IGNACIO/ACD: Would probably benefit from IV Iron sucrose but do not want to cloud the picture at this point.   Hypothyroidism - Euthyroid sick syndr:   Leukocytosis probably 2nd to steroids. Hopefully wean soon.       Plan:  GI input appreciated.  Will attempt to get Rheum input.  Please see above.     Sánchez Kumar MD  1/22/2017  7:35 PM

## 2017-01-24 ENCOUNTER — INPATIENT HOSPITAL (OUTPATIENT)
Dept: URBAN - METROPOLITAN AREA HOSPITAL 113 | Facility: HOSPITAL | Age: 62
End: 2017-01-24
Payer: COMMERCIAL

## 2017-01-24 VITALS
TEMPERATURE: 98 F | WEIGHT: 188.2 LBS | OXYGEN SATURATION: 97 % | SYSTOLIC BLOOD PRESSURE: 140 MMHG | HEIGHT: 65 IN | DIASTOLIC BLOOD PRESSURE: 78 MMHG | RESPIRATION RATE: 16 BRPM | HEART RATE: 80 BPM | BODY MASS INDEX: 31.36 KG/M2

## 2017-01-24 DIAGNOSIS — K59.09 OTHER CONSTIPATION: ICD-10-CM

## 2017-01-24 DIAGNOSIS — K52.9 NONINFECTIVE GASTROENTERITIS AND COLITIS, UNSPECIFIED: ICD-10-CM

## 2017-01-24 LAB
ANION GAP SERPL CALCULATED.3IONS-SCNC: 10.6 MMOL/L
BASOPHILS # BLD AUTO: 0 10*3/MM3 (ref 0–0.2)
BASOPHILS NFR BLD AUTO: 0 % (ref 0–1.5)
BUN BLD-MCNC: 5 MG/DL (ref 8–23)
BUN/CREAT SERPL: 5.5 (ref 7–25)
CALCIUM SPEC-SCNC: 8.8 MG/DL (ref 8.6–10.5)
CHLORIDE SERPL-SCNC: 108 MMOL/L (ref 98–107)
CK SERPL-CCNC: 102 U/L (ref 20–180)
CO2 SERPL-SCNC: 26.4 MMOL/L (ref 22–29)
CREAT BLD-MCNC: 0.91 MG/DL (ref 0.57–1)
CRP SERPL-MCNC: 6.91 MG/DL (ref 0–0.5)
DEPRECATED RDW RBC AUTO: 54.9 FL (ref 37–54)
EOSINOPHIL # BLD AUTO: 0 10*3/MM3 (ref 0–0.7)
EOSINOPHIL NFR BLD AUTO: 0 % (ref 0.3–6.2)
ERYTHROCYTE [DISTWIDTH] IN BLOOD BY AUTOMATED COUNT: 17.6 % (ref 11.7–13)
ERYTHROCYTE [SEDIMENTATION RATE] IN BLOOD: 65 MM/HR (ref 0–30)
GFR SERPL CREATININE-BSD FRML MDRD: 63 ML/MIN/1.73
GLUCOSE BLD-MCNC: 162 MG/DL (ref 65–99)
HCT VFR BLD AUTO: 31.4 % (ref 35.6–45.5)
HGB BLD-MCNC: 9.4 G/DL (ref 11.9–15.5)
IMM GRANULOCYTES # BLD: 0.05 10*3/MM3 (ref 0–0.03)
IMM GRANULOCYTES NFR BLD: 0.4 % (ref 0–0.5)
LYMPHOCYTES # BLD AUTO: 1.13 10*3/MM3 (ref 0.9–4.8)
LYMPHOCYTES NFR BLD AUTO: 7.9 % (ref 19.6–45.3)
MCH RBC QN AUTO: 25.5 PG (ref 26.9–32)
MCHC RBC AUTO-ENTMCNC: 29.9 G/DL (ref 32.4–36.3)
MCV RBC AUTO: 85.3 FL (ref 80.5–98.2)
MONOCYTES # BLD AUTO: 0.33 10*3/MM3 (ref 0.2–1.2)
MONOCYTES NFR BLD AUTO: 2.3 % (ref 5–12)
NEUTROPHILS # BLD AUTO: 12.75 10*3/MM3 (ref 1.9–8.1)
NEUTROPHILS NFR BLD AUTO: 89.4 % (ref 42.7–76)
PLATELET # BLD AUTO: 424 10*3/MM3 (ref 140–500)
PMV BLD AUTO: 10.3 FL (ref 6–12)
POTASSIUM BLD-SCNC: 5.1 MMOL/L (ref 3.5–5.2)
RBC # BLD AUTO: 3.68 10*6/MM3 (ref 3.9–5.2)
SODIUM BLD-SCNC: 145 MMOL/L (ref 136–145)
WBC NRBC COR # BLD: 14.26 10*3/MM3 (ref 4.5–10.7)

## 2017-01-24 PROCEDURE — 82550 ASSAY OF CK (CPK): CPT | Performed by: HOSPITALIST

## 2017-01-24 PROCEDURE — 86140 C-REACTIVE PROTEIN: CPT | Performed by: HOSPITALIST

## 2017-01-24 PROCEDURE — 85652 RBC SED RATE AUTOMATED: CPT | Performed by: HOSPITALIST

## 2017-01-24 PROCEDURE — 85025 COMPLETE CBC W/AUTO DIFF WBC: CPT | Performed by: INTERNAL MEDICINE

## 2017-01-24 PROCEDURE — 99231 SBSQ HOSP IP/OBS SF/LOW 25: CPT

## 2017-01-24 PROCEDURE — 80048 BASIC METABOLIC PNL TOTAL CA: CPT | Performed by: INTERNAL MEDICINE

## 2017-01-24 PROCEDURE — 63710000001 PREDNISONE PER 5 MG: Performed by: HOSPITALIST

## 2017-01-24 RX ORDER — PREDNISONE 1 MG/1
15 TABLET ORAL 2 TIMES DAILY WITH MEALS
Qty: 60 TABLET | Refills: 0 | Status: SHIPPED | OUTPATIENT
Start: 2017-01-24 | End: 2017-12-07 | Stop reason: SDUPTHER

## 2017-01-24 RX ORDER — METRONIDAZOLE 500 MG/1
500 TABLET ORAL 3 TIMES DAILY
Qty: 12 TABLET | Refills: 0 | Status: SHIPPED | OUTPATIENT
Start: 2017-01-24 | End: 2017-01-28

## 2017-01-24 RX ADMIN — DICYCLOMINE HYDROCHLORIDE 20 MG: 10 CAPSULE ORAL at 09:35

## 2017-01-24 RX ADMIN — PANTOPRAZOLE SODIUM 40 MG: 40 TABLET, DELAYED RELEASE ORAL at 06:07

## 2017-01-24 RX ADMIN — PREDNISONE 15 MG: 5 TABLET ORAL at 09:35

## 2017-01-24 RX ADMIN — DICYCLOMINE HYDROCHLORIDE 20 MG: 10 CAPSULE ORAL at 19:06

## 2017-01-24 RX ADMIN — METRONIDAZOLE 500 MG: 500 TABLET ORAL at 06:07

## 2017-01-24 RX ADMIN — FOLIC ACID 1 MG: 1 TABLET ORAL at 09:36

## 2017-01-24 RX ADMIN — DICYCLOMINE HYDROCHLORIDE 20 MG: 10 CAPSULE ORAL at 11:53

## 2017-01-24 RX ADMIN — METRONIDAZOLE 500 MG: 500 TABLET ORAL at 13:57

## 2017-01-24 RX ADMIN — LEVOTHYROXINE SODIUM 125 MCG: 125 TABLET ORAL at 06:07

## 2017-01-24 RX ADMIN — PREDNISONE 15 MG: 5 TABLET ORAL at 19:06

## 2017-01-24 RX ADMIN — BUPROPION HYDROCHLORIDE 150 MG: 150 TABLET, FILM COATED, EXTENDED RELEASE ORAL at 09:35

## 2017-01-24 RX ADMIN — DULOXETINE HYDROCHLORIDE 90 MG: 60 CAPSULE, DELAYED RELEASE ORAL at 09:35

## 2017-01-24 NOTE — PLAN OF CARE
Problem: Patient Care Overview (Adult)  Goal: Plan of Care Review  Outcome: Ongoing (interventions implemented as appropriate)    01/24/17 5869   Patient Care Overview   Progress improving   Outcome Evaluation   Outcome Summary/Follow up Plan Pt med x1 with po PRN pain meds, pt reports improved after med. Pt appears to have slept well through NOC. Continuous IVF DC'd. Continue to monitor and tx per POC and MD orders.    Coping/Psychosocial Response Interventions   Plan Of Care Reviewed With patient       Goal: Adult Individualization and Mutuality  Outcome: Ongoing (interventions implemented as appropriate)  Goal: Discharge Needs Assessment  Outcome: Ongoing (interventions implemented as appropriate)    Problem: Pain, Acute (Adult)  Goal: Acceptable Pain Control/Comfort Level  Outcome: Ongoing (interventions implemented as appropriate)    Problem: Fluid Volume Deficit (Adult)  Goal: Fluid/Electrolyte Balance  Outcome: Ongoing (interventions implemented as appropriate)  Goal: Comfort/Well Being  Outcome: Ongoing (interventions implemented as appropriate)

## 2017-01-24 NOTE — PROGRESS NOTES
GI Daily Progress Note    Assessment/Plan:    Principal Problem:    Acute colitis  Active Problems:    Hypothyroidism    Sepsis, unspecified organism    Immunosuppression       LOS: 6 days     Dara Medina is a 61 y.o. female who was admitted with Acute colitis. She reports his symptoms are improving with treatment. Overall better, less shoulder pain and positive BM tolerating food and has walked in the [place] twice today is waiting to be D/C'd    Subjective:    Patient expresses Abd distension  Patient denies vomiting, diarrhea and bloody stools    Objective:    Vital signs in last 24 hours:  Temp:  [97.5 °F (36.4 °C)-98 °F (36.7 °C)] 98 °F (36.7 °C)  Heart Rate:  [] 80  Resp:  [16-18] 16  BP: (134-150)/(71-79) 140/78    Intake/Output last 3 shifts:  I/O last 3 completed shifts:  In: 4794 [P.O.:3820; I.V.:974]  Out: -   Intake/Output this shift:        Results from last 7 days  Lab Units 01/24/17  0410   WBC 10*3/mm3 14.26*   HEMOGLOBIN g/dL 9.4*   HEMATOCRIT % 31.4*   PLATELETS 10*3/mm3 424       Results from last 7 days  Lab Units 01/24/17  0410 01/23/17  0726   SODIUM mmol/L 145 137   POTASSIUM mmol/L 5.1 4.1   CHLORIDE mmol/L 108* 100   TOTAL CO2 mmol/L 26.4 25.0   BUN mg/dL 5* 19   CREATININE mg/dL 0.91 0.81   CALCIUM mg/dL 8.8 9.0   BILIRUBIN mg/dL  --  0.2   ALK PHOS U/L  --  98   ALT (SGPT) U/L  --  22   AST (SGOT) U/L  --  20   GLUCOSE mg/dL 162* 95         Physical Exam:Abdomen  Sounds Normal Active Bowel Sounds   Distension Soft   Tenderness Mildly Tender     Colitis- resolving nicely on flagyl only would complete 10 day total course- to f/u in 4 weeks  CVID  Interstitial lung disease  Arthritis- shoulders aggravated while in hosp so Levaquin was D/C'd and prednisone redosed  Constipation most likely from narcotics and is resolving on its own    No new recs will see as an OP

## 2017-01-24 NOTE — DISCHARGE SUMMARY
PHYSICIAN DISCHARGE SUMMARY                                                                        [facility]    Patient Identification:  Name: Dara Medina  Age: 61 y.o.  Sex: female  :  1955  MRN: [id number]  Primary Care Physician: Randi Yang MD    Admit date: 2017  Discharge date and time: 2017     Discharged Condition: good    Discharge Diagnoses:   Acute Colitis:   Sepsis:    RA:  Immunosuppressed:   Deltoid muscular strain, tendonopathy, possible myocytis:   ILD:  Hyperglycemia:  Anemia - IGNACIO/ACD:   Hypothyroidism - Euthyroid sick syndr:   Leukocytosis 2nd to steroids.   Anxiety:        Hospital Course:  Pleasant 61-year-old female with a history of rheumatoid arthritis presenting with abdominal pain.  Please H&P for full details.  CT scan did show changes consistent with an ascending colitis.  She had leukocytosis at 16 but was afebrile although borderline.  She was admitted and her gastroenterologist consult on the case.  She was initially started on Flagyl and Levaquin.  C. difficile studies were negative as well as stool cultures.  Her CellCept was put on hold.  She did go significant pain in the right deltoid area.  She believes she may have pulled the shoulder while trying to just Rocephin bed earlier.  MRI did show inflammation of the muscle as well as fluid within the bursa and possible tendinopathy.  She was treated symptomatically.  Of interest however the left shoulder symptoms improved and then she developed the same symptomatology in the right deltoid area without any history of trauma.  In both cases there is very tender.  There is no increase in his CK levels however.  She began complaining of increased joint stiffness although on exam there is no active tenosynovitis.   In looking for possible causes myopathy did discontinue her lisinopril and decreased her dose of Wellbutrin.  She was  already off the Vytorin by that time.  We increased her steroid dose to prednisone 20 mg twice a day and she responded very nicely to this.  We have decreased it down to 15 mg twice that this point.  From the GI point of view she has been slowly improving.  She is now tolerating by mouth intake well.  She is on Flagyl alone at this point looking 10 uterine finish that course.  She'll follow-up with gastroneurology after discharge and also has appointment with her rheumatologist will assist in weaning the prednisone further and assisting in the timing of her restarting CellCept.      Consults:     Consults     Date and Time Order Name Status Description    1/22/2017 1944 Inpatient Consult to Rheumatology      1/19/2017 0770 Inpatient Consult to Gastroenterology Completed     1/18/2017 1101 [facility] (on-call MD unless specified) Completed             Discharge Exam:  Physical Exam   Afebrile vital signs stable.  Well-developed well-nourished female in no apparent distress.  Lungs with fine dry rales in the basilar areas bilaterally.  Heart regular rate and rhythm.  Abdomen with normal bowel sounds with some mild right-sided tenderness.  No guarding or rebound.  Abdomen is soft.  Patient is subjectively tender in the deltoid areas and no visible abnormalities and no palpable evidence of inflammation at this point.  No active tenosynovitis appreciated.  Alert oriented conversant cooperative and pleasant.       Disposition:  Home    Patient Instructions:    Dara Medina   Home Medication Instructions ANAY:[id number]    Printed on:01/24/17 3451   Medication Information                      buPROPion XL (WELLBUTRIN XL) 300 MG 24 hr tablet  TAKE 1 TABLET BY MOUTH EVERY DAY             Cholecalciferol (VITAMIN D3) 5000 UNITS capsule capsule  Take 5,000 Units by mouth 2 (Two) Times a Day.             Cyanocobalamin 1000 MCG/ML kit  Inject 1,000 mcg as directed every 30 (thirty) days.             cyclobenzaprine (FLEXERIL) 10 MG  tablet  Take 10 mg by mouth as needed.             DULoxetine (CYMBALTA) 60 MG capsule  Take 60 mg by mouth daily.             Est Estrogens-Methyltest (ESTRATEST PO)  Take 2 doses by mouth 2 (Two) Times a Day. 1mg/ 35mg/ gm             folic acid (FOLVITE) 1 MG tablet  Take 1 mg by mouth daily.             Magnesium-Potassium (JOSE MAGNESIUM-POTASSIUM) 250-100 MG tablet  Take 2 tablets by mouth Every Night.             metroNIDAZOLE (FLAGYL) 500 MG tablet  Take 1 tablet by mouth 3 (Three) Times a Day for 4 days.             Nutritional Supplements (DHEA PO)  Take 5 mg by mouth Daily.             omeprazole (PriLOSEC) 40 MG capsule  Take 40 mg by mouth daily.             Polysaccharide Iron Complex (FERREX 150 PO)  Take 1 capsule by mouth 2 (Two) Times a Day.             predniSONE (DELTASONE) 5 MG tablet  Take 3 tablets by mouth 2 (Two) Times a Day With Meals.             progesterone (PROMETRIUM) 200 MG capsule  Take 400 mg by mouth Daily.             SYNTHROID 125 MCG tablet  1 daily             vitamin D (ERGOCALCIFEROL) 31256 UNITS capsule capsule  Take 1 cap twice weekly               Future Appointments  Date Time Provider Department [place]   2/6/2017 9:00 AM ROOM 4 [facility]   4/12/2017 9:45 AM Randi Yang MD [facility] None     Referrals and Follow-ups to Schedule     Follow-Up    As directed    Follow up with Rheumatology as directed.  Follow up with Gastroenterology as directed.   Follow Up Details:  PCP in 1 week             Follow-up Information     Follow up with Prasad Celeste MD. Schedule an appointment as soon as possible for a visit in 4 week(s).    Specialty:  Gastroenterology    Contact information:    [address] [facility] [address]  [place] [address]  [phone]          Follow up with Randi Yang MD .    Specialty:  Internal Medicine    Contact information:    [address] [address]  [place] [id number] [phone]          Discharge Order     Start      Ordered    01/24/17 1704  Discharge patient  Once     Expected Discharge Date:  01/24/17    Discharge Disposition:  Home or Self Care        01/24/17 1705            Total time spent discharging patient including evaluation,post hospitalization follow up,  medication and post hospitalization instructions and education total time exceeds 30 minutes.    Signed:  Sánchez Kumar MD  1/24/2017  5:05 PM

## 2017-01-25 NOTE — PROGRESS NOTES
Discharge Planning Assessment  [facility]     Patient Name: Dara Medina  MRN: [id number]  Today's Date: 1/25/2017    Admit Date: 1/18/2017          Discharge Needs Assessment     None            Discharge Plan       01/25/17 1547    Final Note    Final Note Discharged to home on 1/24/17        Discharge Placement     No information found        Expected Discharge Date and Time     Expected Discharge Date Expected Discharge Time    Jan 24, 2017  7:26 PM              Demographic Summary     None            Functional Status     None            Psychosocial     None            Abuse/Neglect     None            Legal     None            Substance Abuse     None            Patient Forms     None          Kourtney Salcido RN

## 2017-02-06 ENCOUNTER — HOSPITAL ENCOUNTER (OUTPATIENT)
Dept: INFUSION THERAPY | Facility: HOSPITAL | Age: 62
Discharge: HOME | End: 2017-02-06
Admitting: ALLERGY & IMMUNOLOGY

## 2017-02-06 VITALS
WEIGHT: 185 LBS | HEART RATE: 84 BPM | SYSTOLIC BLOOD PRESSURE: 131 MMHG | TEMPERATURE: 97.2 F | OXYGEN SATURATION: 99 % | RESPIRATION RATE: 16 BRPM | BODY MASS INDEX: 30.79 KG/M2 | DIASTOLIC BLOOD PRESSURE: 57 MMHG

## 2017-02-06 DIAGNOSIS — D83.9 COMMON VARIABLE IMMUNODEFICIENCY (HCC): ICD-10-CM

## 2017-02-06 PROCEDURE — 25010000002 IMMUNE GLOBULIN (HUMAN) 20 GM/200ML SOLUTION: Performed by: ALLERGY & IMMUNOLOGY

## 2017-02-06 PROCEDURE — 96366 THER/PROPH/DIAG IV INF ADDON: CPT

## 2017-02-06 PROCEDURE — 96365 THER/PROPH/DIAG IV INF INIT: CPT

## 2017-02-06 RX ORDER — EPINEPHRINE 1 MG/ML
0.3 INJECTION INTRAMUSCULAR; INTRAVENOUS; SUBCUTANEOUS AS NEEDED
Status: DISCONTINUED | OUTPATIENT
Start: 2017-02-06 | End: 2017-02-08 | Stop reason: HOSPADM

## 2017-02-06 RX ORDER — DULOXETIN HYDROCHLORIDE 30 MG/1
30 CAPSULE, DELAYED RELEASE ORAL DAILY
COMMUNITY
End: 2017-07-18

## 2017-02-06 RX ORDER — ACETAMINOPHEN 500 MG
500 TABLET ORAL ONCE
Status: DISCONTINUED | OUTPATIENT
Start: 2017-02-06 | End: 2017-02-08 | Stop reason: HOSPADM

## 2017-02-06 RX ORDER — ACETAMINOPHEN 500 MG
500 TABLET ORAL ONCE
Status: CANCELLED | OUTPATIENT
Start: 2017-02-06

## 2017-02-06 RX ORDER — DIPHENHYDRAMINE HCL 25 MG
25 CAPSULE ORAL ONCE
Status: DISCONTINUED | OUTPATIENT
Start: 2017-02-06 | End: 2017-02-08 | Stop reason: HOSPADM

## 2017-02-06 RX ORDER — DIPHENHYDRAMINE HYDROCHLORIDE 50 MG/ML
50 INJECTION INTRAMUSCULAR; INTRAVENOUS AS NEEDED
Status: CANCELLED | OUTPATIENT
Start: 2017-02-06

## 2017-02-06 RX ORDER — EPINEPHRINE 1 MG/ML
0.3 INJECTION INTRAMUSCULAR; INTRAVENOUS; SUBCUTANEOUS AS NEEDED
Status: CANCELLED
Start: 2017-02-06

## 2017-02-06 RX ORDER — DIPHENHYDRAMINE HCL 25 MG
25 CAPSULE ORAL ONCE
Status: CANCELLED | OUTPATIENT
Start: 2017-02-06

## 2017-02-06 RX ADMIN — IMMUNE GLOBULIN INFUSION (HUMAN) 20 G: 100 INJECTION, SOLUTION INTRAVENOUS; SUBCUTANEOUS at 09:35

## 2017-02-06 RX ADMIN — IMMUNE GLOBULIN INFUSION (HUMAN) 20 G: 100 INJECTION, SOLUTION INTRAVENOUS; SUBCUTANEOUS at 11:24

## 2017-02-06 NOTE — PROGRESS NOTES
TOLERATED INFUSION WITHOUT DIFFICULTY. VSS. BROUGHT SNACKS AND DRINKS FROM HOME. WARM BLANKET FOR COMFORT.  BRP AD ARTURO WITH MINIMAL ASST. WITH PUMP.  DC'D HOME AMB AFTER TREATMENT.

## 2017-02-07 ENCOUNTER — OFFICE (OUTPATIENT)
Dept: URBAN - METROPOLITAN AREA OTHER 6 | Facility: OTHER | Age: 62
End: 2017-02-07

## 2017-02-07 VITALS
HEIGHT: 66 IN | WEIGHT: 185 LBS | SYSTOLIC BLOOD PRESSURE: 118 MMHG | HEART RATE: 88 BPM | DIASTOLIC BLOOD PRESSURE: 74 MMHG

## 2017-02-07 DIAGNOSIS — K21.0 GASTRO-ESOPHAGEAL REFLUX DISEASE WITH ESOPHAGITIS: ICD-10-CM

## 2017-02-07 DIAGNOSIS — K59.1 FUNCTIONAL DIARRHEA: ICD-10-CM

## 2017-02-07 PROCEDURE — 99213 OFFICE O/P EST LOW 20 MIN: CPT

## 2017-02-08 ENCOUNTER — RESULTS ENCOUNTER (OUTPATIENT)
Dept: ENDOCRINOLOGY | Age: 62
End: 2017-02-08

## 2017-02-08 DIAGNOSIS — E55.9 VITAMIN D DEFICIENCY: ICD-10-CM

## 2017-02-08 DIAGNOSIS — E03.9 ACQUIRED HYPOTHYROIDISM: ICD-10-CM

## 2017-02-08 DIAGNOSIS — E78.5 HYPERLIPIDEMIA: ICD-10-CM

## 2017-02-08 DIAGNOSIS — Z78.0 MENOPAUSE PRESENT: ICD-10-CM

## 2017-02-08 DIAGNOSIS — E11.9 DIABETES TYPE 2, CONTROLLED (HCC): ICD-10-CM

## 2017-02-14 ENCOUNTER — TELEPHONE (OUTPATIENT)
Dept: INTERNAL MEDICINE | Facility: CLINIC | Age: 62
End: 2017-02-14

## 2017-02-14 RX ORDER — FLUCONAZOLE 150 MG/1
150 TABLET ORAL ONCE
Qty: 1 TABLET | Refills: 2 | Status: SHIPPED | OUTPATIENT
Start: 2017-02-14 | End: 2017-04-20 | Stop reason: SDUPTHER

## 2017-02-28 RX ORDER — BUPROPION HYDROCHLORIDE 300 MG/1
TABLET ORAL
Qty: 30 TABLET | Refills: 5 | OUTPATIENT
Start: 2017-02-28

## 2017-03-01 RX ORDER — BUPROPION HYDROCHLORIDE 300 MG/1
TABLET ORAL
Qty: 30 TABLET | Refills: 5 | Status: SHIPPED | OUTPATIENT
Start: 2017-03-01 | End: 2017-07-18 | Stop reason: SDUPTHER

## 2017-03-06 ENCOUNTER — HOSPITAL ENCOUNTER (OUTPATIENT)
Dept: INFUSION THERAPY | Facility: HOSPITAL | Age: 62
Discharge: HOME | End: 2017-03-06
Admitting: ALLERGY & IMMUNOLOGY

## 2017-03-06 VITALS
BODY MASS INDEX: 32.03 KG/M2 | RESPIRATION RATE: 16 BRPM | DIASTOLIC BLOOD PRESSURE: 63 MMHG | TEMPERATURE: 94.9 F | OXYGEN SATURATION: 98 % | HEART RATE: 93 BPM | WEIGHT: 192.46 LBS | SYSTOLIC BLOOD PRESSURE: 135 MMHG

## 2017-03-06 DIAGNOSIS — D83.9 COMMON VARIABLE IMMUNODEFICIENCY (HCC): ICD-10-CM

## 2017-03-06 PROCEDURE — 96366 THER/PROPH/DIAG IV INF ADDON: CPT

## 2017-03-06 PROCEDURE — 96365 THER/PROPH/DIAG IV INF INIT: CPT

## 2017-03-06 PROCEDURE — 25010000002 IMMUNE GLOBULIN (HUMAN) 20 GM/200ML SOLUTION: Performed by: ALLERGY & IMMUNOLOGY

## 2017-03-06 RX ORDER — DIPHENHYDRAMINE HYDROCHLORIDE 50 MG/ML
50 INJECTION INTRAMUSCULAR; INTRAVENOUS AS NEEDED
Status: CANCELLED | OUTPATIENT
Start: 2017-03-06

## 2017-03-06 RX ORDER — ACETAMINOPHEN 500 MG
500 TABLET ORAL ONCE
Status: DISCONTINUED | OUTPATIENT
Start: 2017-03-06 | End: 2017-03-08 | Stop reason: HOSPADM

## 2017-03-06 RX ORDER — EPINEPHRINE 1 MG/ML
0.3 INJECTION INTRAMUSCULAR; INTRAVENOUS; SUBCUTANEOUS AS NEEDED
Status: DISCONTINUED | OUTPATIENT
Start: 2017-03-06 | End: 2017-03-08 | Stop reason: HOSPADM

## 2017-03-06 RX ORDER — ACETAMINOPHEN 500 MG
500 TABLET ORAL ONCE
Status: CANCELLED | OUTPATIENT
Start: 2017-03-06

## 2017-03-06 RX ORDER — DIPHENHYDRAMINE HCL 25 MG
25 CAPSULE ORAL ONCE
Status: DISCONTINUED | OUTPATIENT
Start: 2017-03-06 | End: 2017-03-08 | Stop reason: HOSPADM

## 2017-03-06 RX ORDER — DIPHENHYDRAMINE HCL 25 MG
25 CAPSULE ORAL ONCE
Status: CANCELLED | OUTPATIENT
Start: 2017-03-06

## 2017-03-06 RX ORDER — EPINEPHRINE 1 MG/ML
0.3 INJECTION INTRAMUSCULAR; INTRAVENOUS; SUBCUTANEOUS AS NEEDED
Status: CANCELLED
Start: 2017-03-06

## 2017-03-06 RX ADMIN — IMMUNE GLOBULIN INFUSION (HUMAN) 20 G: 100 INJECTION, SOLUTION INTRAVENOUS; SUBCUTANEOUS at 11:20

## 2017-03-06 RX ADMIN — IMMUNE GLOBULIN INFUSION (HUMAN) 20 G: 100 INJECTION, SOLUTION INTRAVENOUS; SUBCUTANEOUS at 09:31

## 2017-03-06 NOTE — PROGRESS NOTES
Pt tolerated well. VSS throughout infusion. Pt to return in 4 weeks. Appointment provided. D/C per ambulation @ 7116.

## 2017-03-29 DIAGNOSIS — E78.5 HYPERLIPIDEMIA, UNSPECIFIED HYPERLIPIDEMIA TYPE: ICD-10-CM

## 2017-03-29 DIAGNOSIS — E03.9 ACQUIRED HYPOTHYROIDISM: Primary | ICD-10-CM

## 2017-03-29 DIAGNOSIS — M81.0 OSTEOPOROSIS: ICD-10-CM

## 2017-03-29 DIAGNOSIS — E55.9 VITAMIN D DEFICIENCY: ICD-10-CM

## 2017-04-03 ENCOUNTER — INFUSION (OUTPATIENT)
Dept: ONCOLOGY | Facility: HOSPITAL | Age: 62
End: 2017-04-03

## 2017-04-03 VITALS
SYSTOLIC BLOOD PRESSURE: 130 MMHG | OXYGEN SATURATION: 99 % | TEMPERATURE: 97.8 F | HEART RATE: 88 BPM | BODY MASS INDEX: 31.45 KG/M2 | WEIGHT: 189 LBS | DIASTOLIC BLOOD PRESSURE: 78 MMHG

## 2017-04-03 DIAGNOSIS — E55.9 VITAMIN D DEFICIENCY: ICD-10-CM

## 2017-04-03 DIAGNOSIS — E03.9 ACQUIRED HYPOTHYROIDISM: ICD-10-CM

## 2017-04-03 DIAGNOSIS — E78.5 HYPERLIPIDEMIA, UNSPECIFIED HYPERLIPIDEMIA TYPE: ICD-10-CM

## 2017-04-03 DIAGNOSIS — M81.0 OSTEOPOROSIS: ICD-10-CM

## 2017-04-03 DIAGNOSIS — D83.9 COMMON VARIABLE IMMUNODEFICIENCY (HCC): Primary | ICD-10-CM

## 2017-04-03 LAB
25(OH)D3 SERPL-MCNC: 30.2 NG/ML (ref 30–100)
ALBUMIN SERPL-MCNC: 3.9 G/DL (ref 3.5–5.2)
ALBUMIN/GLOB SERPL: 1.1 G/DL
ALP SERPL-CCNC: 160 U/L (ref 39–117)
ALT SERPL W P-5'-P-CCNC: 19 U/L (ref 1–33)
ANION GAP SERPL CALCULATED.3IONS-SCNC: 11.8 MMOL/L
AST SERPL-CCNC: 20 U/L (ref 1–32)
BASOPHILS # BLD AUTO: 0.05 10*3/MM3 (ref 0–0.2)
BASOPHILS NFR BLD AUTO: 0.5 % (ref 0–1.5)
BILIRUB SERPL-MCNC: 0.3 MG/DL (ref 0.1–1.2)
BUN BLD-MCNC: 11 MG/DL (ref 8–23)
BUN/CREAT SERPL: 10.4 (ref 7–25)
CALCIUM SPEC-SCNC: 8.8 MG/DL (ref 8.6–10.5)
CHLORIDE SERPL-SCNC: 105 MMOL/L (ref 98–107)
CHOLEST SERPL-MCNC: 205 MG/DL (ref 0–200)
CO2 SERPL-SCNC: 22.2 MMOL/L (ref 22–29)
CREAT BLD-MCNC: 1.06 MG/DL (ref 0.57–1)
DEPRECATED RDW RBC AUTO: 52.1 FL (ref 37–54)
EOSINOPHIL # BLD AUTO: 0.17 10*3/MM3 (ref 0–0.7)
EOSINOPHIL NFR BLD AUTO: 1.7 % (ref 0.3–6.2)
ERYTHROCYTE [DISTWIDTH] IN BLOOD BY AUTOMATED COUNT: 17 % (ref 11.7–13)
GFR SERPL CREATININE-BSD FRML MDRD: 53 ML/MIN/1.73
GLOBULIN UR ELPH-MCNC: 3.5 GM/DL
GLUCOSE BLD-MCNC: 83 MG/DL (ref 65–99)
HBA1C MFR BLD: 5.7 % (ref 4.8–5.6)
HCT VFR BLD AUTO: 35.2 % (ref 35.6–45.5)
HDLC SERPL-MCNC: 49 MG/DL (ref 40–60)
HGB BLD-MCNC: 10.8 G/DL (ref 11.9–15.5)
IMM GRANULOCYTES # BLD: 0.03 10*3/MM3 (ref 0–0.03)
IMM GRANULOCYTES NFR BLD: 0.3 % (ref 0–0.5)
LDLC SERPL CALC-MCNC: 124 MG/DL (ref 0–100)
LDLC/HDLC SERPL: 2.53 {RATIO}
LYMPHOCYTES # BLD AUTO: 1.94 10*3/MM3 (ref 0.9–4.8)
LYMPHOCYTES NFR BLD AUTO: 19.5 % (ref 19.6–45.3)
MCH RBC QN AUTO: 25.7 PG (ref 26.9–32)
MCHC RBC AUTO-ENTMCNC: 30.7 G/DL (ref 32.4–36.3)
MCV RBC AUTO: 83.8 FL (ref 80.5–98.2)
MONOCYTES # BLD AUTO: 0.66 10*3/MM3 (ref 0.2–1.2)
MONOCYTES NFR BLD AUTO: 6.6 % (ref 5–12)
NEUTROPHILS # BLD AUTO: 7.09 10*3/MM3 (ref 1.9–8.1)
NEUTROPHILS NFR BLD AUTO: 71.4 % (ref 42.7–76)
NRBC BLD MANUAL-RTO: 0 /100 WBC (ref 0–0)
PLATELET # BLD AUTO: 456 10*3/MM3 (ref 140–500)
PMV BLD AUTO: 10.4 FL (ref 6–12)
POTASSIUM BLD-SCNC: 4.4 MMOL/L (ref 3.5–5.2)
PROT SERPL-MCNC: 7.4 G/DL (ref 6–8.5)
RBC # BLD AUTO: 4.2 10*6/MM3 (ref 3.9–5.2)
SODIUM BLD-SCNC: 139 MMOL/L (ref 136–145)
TRIGL SERPL-MCNC: 159 MG/DL (ref 0–150)
TSH SERPL DL<=0.05 MIU/L-ACNC: 0.09 MIU/ML (ref 0.27–4.2)
VLDLC SERPL-MCNC: 31.8 MG/DL (ref 5–40)
WBC NRBC COR # BLD: 9.94 10*3/MM3 (ref 4.5–10.7)

## 2017-04-03 PROCEDURE — 83036 HEMOGLOBIN GLYCOSYLATED A1C: CPT | Performed by: INTERNAL MEDICINE

## 2017-04-03 PROCEDURE — 25010000002 IMMUNE GLOBULIN (HUMAN) 20 GM/200ML SOLUTION: Performed by: ALLERGY & IMMUNOLOGY

## 2017-04-03 PROCEDURE — 96365 THER/PROPH/DIAG IV INF INIT: CPT | Performed by: NURSE PRACTITIONER

## 2017-04-03 PROCEDURE — 80061 LIPID PANEL: CPT | Performed by: INTERNAL MEDICINE

## 2017-04-03 PROCEDURE — 36415 COLL VENOUS BLD VENIPUNCTURE: CPT | Performed by: NURSE PRACTITIONER

## 2017-04-03 PROCEDURE — 96366 THER/PROPH/DIAG IV INF ADDON: CPT | Performed by: NURSE PRACTITIONER

## 2017-04-03 PROCEDURE — 84443 ASSAY THYROID STIM HORMONE: CPT | Performed by: INTERNAL MEDICINE

## 2017-04-03 PROCEDURE — 80053 COMPREHEN METABOLIC PANEL: CPT | Performed by: INTERNAL MEDICINE

## 2017-04-03 PROCEDURE — 85025 COMPLETE CBC W/AUTO DIFF WBC: CPT | Performed by: INTERNAL MEDICINE

## 2017-04-03 PROCEDURE — 82306 VITAMIN D 25 HYDROXY: CPT | Performed by: INTERNAL MEDICINE

## 2017-04-03 RX ORDER — EPINEPHRINE 1 MG/ML
0.3 INJECTION INTRAMUSCULAR; INTRAVENOUS; SUBCUTANEOUS AS NEEDED
Status: CANCELLED
Start: 2017-04-03

## 2017-04-03 RX ORDER — DIPHENHYDRAMINE HYDROCHLORIDE 50 MG/ML
50 INJECTION INTRAMUSCULAR; INTRAVENOUS AS NEEDED
Status: CANCELLED | OUTPATIENT
Start: 2017-04-03

## 2017-04-03 RX ORDER — DIPHENHYDRAMINE HCL 25 MG
25 CAPSULE ORAL ONCE
Status: CANCELLED | OUTPATIENT
Start: 2017-04-03

## 2017-04-03 RX ORDER — ACETAMINOPHEN 500 MG
500 TABLET ORAL ONCE
Status: CANCELLED | OUTPATIENT
Start: 2017-04-03

## 2017-04-03 RX ADMIN — IMMUNE GLOBULIN INFUSION (HUMAN) 20 G: 100 INJECTION, SOLUTION INTRAVENOUS; SUBCUTANEOUS at 09:15

## 2017-04-03 RX ADMIN — IMMUNE GLOBULIN INFUSION (HUMAN) 20 G: 100 INJECTION, SOLUTION INTRAVENOUS; SUBCUTANEOUS at 10:55

## 2017-04-12 ENCOUNTER — OFFICE VISIT (OUTPATIENT)
Dept: INTERNAL MEDICINE | Facility: CLINIC | Age: 62
End: 2017-04-12

## 2017-04-12 VITALS
OXYGEN SATURATION: 98 % | DIASTOLIC BLOOD PRESSURE: 80 MMHG | HEART RATE: 90 BPM | WEIGHT: 190 LBS | BODY MASS INDEX: 31.62 KG/M2 | SYSTOLIC BLOOD PRESSURE: 130 MMHG

## 2017-04-12 DIAGNOSIS — D50.9 IRON DEFICIENCY ANEMIA, UNSPECIFIED IRON DEFICIENCY ANEMIA TYPE: ICD-10-CM

## 2017-04-12 DIAGNOSIS — E03.9 ACQUIRED HYPOTHYROIDISM: ICD-10-CM

## 2017-04-12 DIAGNOSIS — E78.5 HYPERLIPIDEMIA, UNSPECIFIED HYPERLIPIDEMIA TYPE: Primary | ICD-10-CM

## 2017-04-12 DIAGNOSIS — D51.0 PERNICIOUS ANEMIA: ICD-10-CM

## 2017-04-12 PROBLEM — R73.01 IFG (IMPAIRED FASTING GLUCOSE): Status: ACTIVE | Noted: 2017-04-12

## 2017-04-12 PROBLEM — A41.9 SEPSIS, UNSPECIFIED ORGANISM (HCC): Status: RESOLVED | Noted: 2017-01-18 | Resolved: 2017-04-12

## 2017-04-12 PROBLEM — K52.9 ACUTE COLITIS: Status: RESOLVED | Noted: 2017-01-18 | Resolved: 2017-04-12

## 2017-04-12 PROCEDURE — 99214 OFFICE O/P EST MOD 30 MIN: CPT | Performed by: INTERNAL MEDICINE

## 2017-04-12 RX ORDER — AZATHIOPRINE 50 MG/1
50 TABLET ORAL 3 TIMES DAILY
Refills: 5 | COMMUNITY
Start: 2017-03-29 | End: 2019-03-25

## 2017-04-12 RX ORDER — LEVOTHYROXINE SODIUM 112 MCG
112 TABLET ORAL DAILY
Qty: 90 TABLET | Refills: 3 | Status: SHIPPED | OUTPATIENT
Start: 2017-04-12 | End: 2018-04-11 | Stop reason: SDUPTHER

## 2017-04-12 NOTE — PROGRESS NOTES
Subjective     Dara Medina is a 62 y.o. female who presents with   Chief Complaint   Patient presents with   • Hyperlipidemia   • Hypothyroidism   • Anemia       History of Present Illness     HLD.   Cholesterol is 205.  She is off Vytorin secondary to muscle issues.  It is an acceptable level.    Hypothyroidism.  She looks over controlled.  Discussed decreasing Synthroid from 125 to 112.   Anemia.  She has both iron deficiency, pernicious anemia and likely ACD.  She has improved.     Review of Systems   Constitutional: Positive for unexpected weight change.   Respiratory: Positive for shortness of breath.    Cardiovascular: Negative for chest pain.       The following portions of the patient's history were reviewed and updated as appropriate: allergies, current medications and problem list.    Patient Active Problem List    Diagnosis Date Noted   • IFG (impaired fasting glucose) 04/12/2017   • Immunosuppression 01/18/2017   • Rheumatoid arthritis 07/07/2016   • Pulmonary fibrosis 07/06/2016   • Iron deficiency anemia 07/06/2016   • Pernicious anemia 07/06/2016   • Common variable immunodeficiency 07/01/2016   • Depression 05/23/2016   • Hyperlipidemia 05/23/2016   • Hypothyroidism 05/23/2016   • Sensory neuropathy 05/23/2016   • Osteoporosis 05/23/2016     Note Last Updated: 4/12/2017     Treatment options limited by dental issues.      • Vitamin D deficiency 05/23/2016       Current Outpatient Prescriptions on File Prior to Visit   Medication Sig Dispense Refill   • buPROPion XL (WELLBUTRIN XL) 300 MG 24 hr tablet TAKE 1 TABLET BY MOUTH EVERY DAY 30 tablet 5   • Cholecalciferol (VITAMIN D3) 5000 UNITS capsule capsule Take 5,000 Units by mouth 2 (Two) Times a Day.     • Cyanocobalamin 1000 MCG/ML kit Inject 1,000 mcg as directed every 30 (thirty) days. 1 kit 11   • cyclobenzaprine (FLEXERIL) 10 MG tablet Take 10 mg by mouth as needed.     • DULoxetine (CYMBALTA) 30 MG capsule Take 30 mg by mouth Daily. Takes total  of 90 mg (60mg+30mg) changed while inpatient     • DULoxetine (CYMBALTA) 60 MG capsule Take 60 mg by mouth daily.     • Est Estrogens-Methyltest (ESTRATEST PO) Take 2 doses by mouth 2 (Two) Times a Day. 1mg/ 35mg/ gm     • folic acid (FOLVITE) 1 MG tablet Take 1 mg by mouth daily.     • Magnesium-Potassium (JOSE MAGNESIUM-POTASSIUM) 250-100 MG tablet Take 2 tablets by mouth Every Night.     • Nutritional Supplements (DHEA PO) Take 5 mg by mouth Daily.     • omeprazole (PriLOSEC) 40 MG capsule Take 40 mg by mouth daily.     • Polysaccharide Iron Complex (FERREX 150 PO) Take 1 capsule by mouth 2 (Two) Times a Day.     • predniSONE (DELTASONE) 5 MG tablet Take 3 tablets by mouth 2 (Two) Times a Day With Meals. (Patient taking differently: Take 5 mg by mouth 2 (Two) Times a Day With Meals. Now PRN FOR RHEUMATOID ARTHRITIS FLARE-UP) 60 tablet 0   • progesterone (PROMETRIUM) 200 MG capsule Take 400 mg by mouth Daily.     • vitamin D (ERGOCALCIFEROL) 34599 UNITS capsule capsule Take 1 cap twice weekly 26 capsule 3   • [DISCONTINUED] SYNTHROID 125 MCG tablet 1 daily 30 tablet 5     No current facility-administered medications on file prior to visit.        Objective     /80  Pulse 90  Wt 190 lb (86.2 kg)  SpO2 98%  BMI 31.62 kg/m2    Physical Exam   Constitutional: She is oriented to person, place, and time. She appears well-developed and well-nourished.   HENT:   Head: Normocephalic and atraumatic.   Cardiovascular: Normal rate, regular rhythm and normal heart sounds.    Pulmonary/Chest: Effort normal. She has rales.   Neurological: She is alert and oriented to person, place, and time.   Skin: Skin is warm and dry.   Psychiatric: She has a normal mood and affect. Her behavior is normal.       Assessment/Plan   Dara was seen today for hyperlipidemia, hypothyroidism and anemia.    Diagnoses and all orders for this visit:    Hyperlipidemia, unspecified hyperlipidemia type    Acquired hypothyroidism    Iron  deficiency anemia, unspecified iron deficiency anemia type    Pernicious anemia    Other orders  -     SYNTHROID 112 MCG tablet; Take 1 tablet by mouth Daily.        Discussion  HLD.  Continue current diet.  Hypothyroidism.  Decrease Synthroid to 112.   Anemia.  Improving.  Take monthly B12 shots.  Continue iron as tolerated.         Future Appointments  Date Time Provider Department Center   5/1/2017 9:00 AM NON CBC BED 1  [facility]   6/28/2017 9:40 AM [facility] None   7/12/2017 9:15 AM MD [facility] None

## 2017-04-21 RX ORDER — FLUCONAZOLE 150 MG/1
TABLET ORAL
Qty: 1 TABLET | Refills: 2 | Status: SHIPPED | OUTPATIENT
Start: 2017-04-21 | End: 2017-07-18

## 2017-05-01 ENCOUNTER — INFUSION (OUTPATIENT)
Dept: ONCOLOGY | Facility: HOSPITAL | Age: 62
End: 2017-05-01

## 2017-05-01 VITALS
SYSTOLIC BLOOD PRESSURE: 146 MMHG | BODY MASS INDEX: 31.78 KG/M2 | WEIGHT: 191 LBS | DIASTOLIC BLOOD PRESSURE: 87 MMHG | HEART RATE: 82 BPM | TEMPERATURE: 97.7 F

## 2017-05-01 DIAGNOSIS — D83.9 COMMON VARIABLE IMMUNODEFICIENCY (HCC): Primary | ICD-10-CM

## 2017-05-01 PROCEDURE — 25010000002 IMMUNE GLOBULIN (HUMAN) 20 GM/200ML SOLUTION: Performed by: ALLERGY & IMMUNOLOGY

## 2017-05-01 PROCEDURE — 96365 THER/PROPH/DIAG IV INF INIT: CPT | Performed by: NURSE PRACTITIONER

## 2017-05-01 PROCEDURE — 96366 THER/PROPH/DIAG IV INF ADDON: CPT | Performed by: NURSE PRACTITIONER

## 2017-05-01 RX ORDER — DIPHENHYDRAMINE HCL 25 MG
25 CAPSULE ORAL ONCE
Status: CANCELLED | OUTPATIENT
Start: 2017-05-01

## 2017-05-01 RX ORDER — ACETAMINOPHEN 500 MG
500 TABLET ORAL ONCE
Status: CANCELLED | OUTPATIENT
Start: 2017-05-01

## 2017-05-01 RX ORDER — DIPHENHYDRAMINE HYDROCHLORIDE 50 MG/ML
50 INJECTION INTRAMUSCULAR; INTRAVENOUS AS NEEDED
Status: CANCELLED | OUTPATIENT
Start: 2017-05-01

## 2017-05-01 RX ORDER — EPINEPHRINE 1 MG/ML
0.3 INJECTION INTRAMUSCULAR; INTRAVENOUS; SUBCUTANEOUS AS NEEDED
Status: CANCELLED
Start: 2017-05-01

## 2017-05-01 RX ADMIN — IMMUNE GLOBULIN INFUSION (HUMAN) 20 G: 100 INJECTION, SOLUTION INTRAVENOUS; SUBCUTANEOUS at 12:01

## 2017-05-01 RX ADMIN — IMMUNE GLOBULIN INFUSION (HUMAN) 20 G: 100 INJECTION, SOLUTION INTRAVENOUS; SUBCUTANEOUS at 10:10

## 2017-05-02 ENCOUNTER — TELEPHONE (OUTPATIENT)
Dept: INTERNAL MEDICINE | Facility: CLINIC | Age: 62
End: 2017-05-02

## 2017-05-04 ENCOUNTER — OFFICE VISIT (OUTPATIENT)
Dept: ORTHOPEDIC SURGERY | Facility: CLINIC | Age: 62
End: 2017-05-04

## 2017-05-04 VITALS — TEMPERATURE: 97.6 F | HEIGHT: 65 IN | WEIGHT: 190 LBS | BODY MASS INDEX: 31.65 KG/M2

## 2017-05-04 DIAGNOSIS — M17.11 OSTEOARTHROSIS, LOCALIZED, PRIMARY, KNEE, RIGHT: ICD-10-CM

## 2017-05-04 DIAGNOSIS — G89.29 CHRONIC PAIN OF RIGHT KNEE: Primary | ICD-10-CM

## 2017-05-04 DIAGNOSIS — M25.561 CHRONIC PAIN OF RIGHT KNEE: Primary | ICD-10-CM

## 2017-05-04 DIAGNOSIS — S83.241A TEAR OF MEDIAL MENISCUS OF RIGHT KNEE, CURRENT, UNSPECIFIED TEAR TYPE, INITIAL ENCOUNTER: ICD-10-CM

## 2017-05-04 PROCEDURE — 99213 OFFICE O/P EST LOW 20 MIN: CPT | Performed by: ORTHOPAEDIC SURGERY

## 2017-05-04 PROCEDURE — 73562 X-RAY EXAM OF KNEE 3: CPT | Performed by: ORTHOPAEDIC SURGERY

## 2017-05-05 ENCOUNTER — OFFICE VISIT (OUTPATIENT)
Dept: INTERNAL MEDICINE | Facility: CLINIC | Age: 62
End: 2017-05-05

## 2017-05-05 VITALS
HEART RATE: 90 BPM | SYSTOLIC BLOOD PRESSURE: 108 MMHG | DIASTOLIC BLOOD PRESSURE: 72 MMHG | OXYGEN SATURATION: 99 % | BODY MASS INDEX: 31.65 KG/M2 | WEIGHT: 190 LBS | HEIGHT: 65 IN

## 2017-05-05 DIAGNOSIS — M06.9 RHEUMATOID ARTHRITIS INVOLVING MULTIPLE SITES, UNSPECIFIED RHEUMATOID FACTOR PRESENCE: ICD-10-CM

## 2017-05-05 DIAGNOSIS — M79.10 MYALGIA: Primary | ICD-10-CM

## 2017-05-05 DIAGNOSIS — M81.0 OSTEOPOROSIS: ICD-10-CM

## 2017-05-05 PROBLEM — E78.2 MIXED HYPERLIPIDEMIA: Status: ACTIVE | Noted: 2017-05-05

## 2017-05-05 LAB
ALBUMIN SERPL-MCNC: 4.4 G/DL (ref 3.5–5.2)
ALBUMIN/GLOB SERPL: 1 G/DL
ALP SERPL-CCNC: 163 U/L (ref 39–117)
ALT SERPL-CCNC: 17 U/L (ref 1–33)
AST SERPL-CCNC: 22 U/L (ref 1–32)
BASOPHILS # BLD AUTO: 0.03 10*3/MM3 (ref 0–0.2)
BASOPHILS NFR BLD AUTO: 0.3 % (ref 0–1.5)
BILIRUB SERPL-MCNC: 0.3 MG/DL (ref 0.1–1.2)
BUN SERPL-MCNC: 17 MG/DL (ref 8–23)
BUN/CREAT SERPL: 13.5 (ref 7–25)
CALCIUM SERPL-MCNC: 9.5 MG/DL (ref 8.6–10.5)
CHLORIDE SERPL-SCNC: 99 MMOL/L (ref 98–107)
CK SERPL-CCNC: 126 U/L (ref 20–180)
CO2 SERPL-SCNC: 24.3 MMOL/L (ref 22–29)
CREAT SERPL-MCNC: 1.26 MG/DL (ref 0.57–1)
EOSINOPHIL # BLD AUTO: 0.07 10*3/MM3 (ref 0–0.7)
EOSINOPHIL NFR BLD AUTO: 0.7 % (ref 0.3–6.2)
ERYTHROCYTE [DISTWIDTH] IN BLOOD BY AUTOMATED COUNT: 17.3 % (ref 11.7–13)
GLOBULIN SER CALC-MCNC: 4.2 GM/DL
GLUCOSE SERPL-MCNC: 63 MG/DL (ref 65–99)
HCT VFR BLD AUTO: 37.9 % (ref 35.6–45.5)
HGB BLD-MCNC: 11.5 G/DL (ref 11.9–15.5)
IMM GRANULOCYTES # BLD: 0.02 10*3/MM3 (ref 0–0.03)
IMM GRANULOCYTES NFR BLD: 0.2 % (ref 0–0.5)
LYMPHOCYTES # BLD AUTO: 1 10*3/MM3 (ref 0.9–4.8)
LYMPHOCYTES NFR BLD AUTO: 9.4 % (ref 19.6–45.3)
MAGNESIUM SERPL-MCNC: 2.6 MG/DL (ref 1.6–2.4)
MCH RBC QN AUTO: 26.6 PG (ref 26.9–32)
MCHC RBC AUTO-ENTMCNC: 30.3 G/DL (ref 32.4–36.3)
MCV RBC AUTO: 87.7 FL (ref 80.5–98.2)
MONOCYTES # BLD AUTO: 0.57 10*3/MM3 (ref 0.2–1.2)
MONOCYTES NFR BLD AUTO: 5.3 % (ref 5–12)
NEUTROPHILS # BLD AUTO: 8.99 10*3/MM3 (ref 1.9–8.1)
NEUTROPHILS NFR BLD AUTO: 84.1 % (ref 42.7–76)
PLATELET # BLD AUTO: 416 10*3/MM3 (ref 140–500)
POTASSIUM SERPL-SCNC: 5.1 MMOL/L (ref 3.5–5.2)
PROT SERPL-MCNC: 8.6 G/DL (ref 6–8.5)
RBC # BLD AUTO: 4.32 10*6/MM3 (ref 3.9–5.2)
SODIUM SERPL-SCNC: 138 MMOL/L (ref 136–145)
T4 FREE SERPL-MCNC: 1.29 NG/DL (ref 0.93–1.7)
TSH SERPL DL<=0.005 MIU/L-ACNC: 0.13 MIU/ML (ref 0.27–4.2)
WBC # BLD AUTO: 10.68 10*3/MM3 (ref 4.5–10.7)

## 2017-05-05 PROCEDURE — 99214 OFFICE O/P EST MOD 30 MIN: CPT | Performed by: INTERNAL MEDICINE

## 2017-05-05 RX ORDER — SUCRALFATE 1 G/1
TABLET ORAL
Refills: 11 | COMMUNITY
Start: 2017-04-20 | End: 2017-07-18

## 2017-05-05 RX ORDER — ALENDRONATE SODIUM 70 MG/1
70 TABLET ORAL
Qty: 12 TABLET | Refills: 3 | Status: SHIPPED | OUTPATIENT
Start: 2017-05-05 | End: 2017-07-18

## 2017-05-05 RX ORDER — METHYLPREDNISOLONE 4 MG/1
TABLET ORAL
Refills: 5 | COMMUNITY
Start: 2017-04-20 | End: 2017-07-18

## 2017-05-11 ENCOUNTER — OFFICE VISIT (OUTPATIENT)
Dept: ORTHOPEDIC SURGERY | Facility: CLINIC | Age: 62
End: 2017-05-11

## 2017-05-11 DIAGNOSIS — M25.561 CHRONIC PAIN OF RIGHT KNEE: ICD-10-CM

## 2017-05-11 DIAGNOSIS — S83.241A TEAR OF MEDIAL MENISCUS OF RIGHT KNEE, CURRENT, UNSPECIFIED TEAR TYPE, INITIAL ENCOUNTER: ICD-10-CM

## 2017-05-11 DIAGNOSIS — G89.29 CHRONIC PAIN OF RIGHT KNEE: ICD-10-CM

## 2017-05-11 PROCEDURE — 73721 MRI JNT OF LWR EXTRE W/O DYE: CPT | Performed by: ORTHOPAEDIC SURGERY

## 2017-05-15 ENCOUNTER — TELEPHONE (OUTPATIENT)
Dept: ORTHOPEDIC SURGERY | Facility: CLINIC | Age: 62
End: 2017-05-15

## 2017-05-22 ENCOUNTER — OFFICE VISIT (OUTPATIENT)
Dept: ORTHOPEDIC SURGERY | Facility: CLINIC | Age: 62
End: 2017-05-22

## 2017-05-22 VITALS — BODY MASS INDEX: 31.99 KG/M2 | HEIGHT: 65 IN | TEMPERATURE: 97.5 F | WEIGHT: 192 LBS

## 2017-05-22 DIAGNOSIS — S83.241D TEAR OF MEDIAL MENISCUS OF RIGHT KNEE, CURRENT, UNSPECIFIED TEAR TYPE, SUBSEQUENT ENCOUNTER: ICD-10-CM

## 2017-05-22 DIAGNOSIS — R93.7 BONE MARROW EDEMA: ICD-10-CM

## 2017-05-22 DIAGNOSIS — E55.9 VITAMIN D DEFICIENCY: ICD-10-CM

## 2017-05-22 DIAGNOSIS — M84.361A STRESS FRACTURE OF RIGHT TIBIA, INITIAL ENCOUNTER: ICD-10-CM

## 2017-05-22 DIAGNOSIS — M06.9 RHEUMATOID ARTHRITIS INVOLVING MULTIPLE SITES, UNSPECIFIED RHEUMATOID FACTOR PRESENCE: Primary | ICD-10-CM

## 2017-05-22 PROCEDURE — 99213 OFFICE O/P EST LOW 20 MIN: CPT | Performed by: ORTHOPAEDIC SURGERY

## 2017-05-22 PROCEDURE — 27530 TREAT KNEE FRACTURE: CPT | Performed by: ORTHOPAEDIC SURGERY

## 2017-05-26 RX ORDER — ACETAMINOPHEN 500 MG
500 TABLET ORAL EVERY 4 HOURS PRN
Status: CANCELLED
Start: 2017-05-26

## 2017-05-26 RX ORDER — ACETAMINOPHEN 325 MG/1
650 TABLET ORAL ONCE
Status: CANCELLED
Start: 2017-05-26 | End: 2017-05-26

## 2017-05-30 ENCOUNTER — INFUSION (OUTPATIENT)
Dept: ONCOLOGY | Facility: HOSPITAL | Age: 62
End: 2017-05-30

## 2017-05-30 VITALS
SYSTOLIC BLOOD PRESSURE: 123 MMHG | DIASTOLIC BLOOD PRESSURE: 82 MMHG | WEIGHT: 186 LBS | OXYGEN SATURATION: 99 % | TEMPERATURE: 98.8 F | HEART RATE: 80 BPM | BODY MASS INDEX: 30.95 KG/M2

## 2017-05-30 DIAGNOSIS — D83.9 COMMON VARIABLE IMMUNODEFICIENCY (HCC): Primary | ICD-10-CM

## 2017-05-30 LAB — IGG1 SER-MCNC: 1266 MG/DL (ref 700–1600)

## 2017-05-30 PROCEDURE — 25010000002 IMMUNE GLOBULIN (HUMAN) 20 GM/200ML SOLUTION: Performed by: ALLERGY & IMMUNOLOGY

## 2017-05-30 PROCEDURE — 36415 COLL VENOUS BLD VENIPUNCTURE: CPT | Performed by: NURSE PRACTITIONER

## 2017-05-30 PROCEDURE — 96365 THER/PROPH/DIAG IV INF INIT: CPT | Performed by: NURSE PRACTITIONER

## 2017-05-30 PROCEDURE — 96366 THER/PROPH/DIAG IV INF ADDON: CPT | Performed by: NURSE PRACTITIONER

## 2017-05-30 RX ORDER — ACETAMINOPHEN 325 MG/1
650 TABLET ORAL ONCE
Status: CANCELLED
Start: 2017-05-30 | End: 2017-05-30

## 2017-05-30 RX ORDER — DIPHENHYDRAMINE HCL 25 MG
25 CAPSULE ORAL ONCE
Status: CANCELLED | OUTPATIENT
Start: 2017-05-30

## 2017-05-30 RX ORDER — EPINEPHRINE 1 MG/ML
0.3 INJECTION INTRAMUSCULAR; INTRAVENOUS; SUBCUTANEOUS AS NEEDED
Status: CANCELLED
Start: 2017-05-30

## 2017-05-30 RX ORDER — DIPHENHYDRAMINE HYDROCHLORIDE 50 MG/ML
50 INJECTION INTRAMUSCULAR; INTRAVENOUS AS NEEDED
Status: CANCELLED | OUTPATIENT
Start: 2017-05-30

## 2017-05-30 RX ORDER — ACETAMINOPHEN 500 MG
500 TABLET ORAL EVERY 4 HOURS PRN
Status: CANCELLED
Start: 2017-05-30

## 2017-05-30 RX ADMIN — IMMUNE GLOBULIN INFUSION (HUMAN) 20 G: 100 INJECTION, SOLUTION INTRAVENOUS; SUBCUTANEOUS at 11:09

## 2017-05-30 RX ADMIN — IMMUNE GLOBULIN INFUSION (HUMAN) 20 G: 100 INJECTION, SOLUTION INTRAVENOUS; SUBCUTANEOUS at 09:17

## 2017-06-19 ENCOUNTER — INFUSION (OUTPATIENT)
Dept: ONCOLOGY | Facility: HOSPITAL | Age: 62
End: 2017-06-19

## 2017-06-19 VITALS
SYSTOLIC BLOOD PRESSURE: 122 MMHG | DIASTOLIC BLOOD PRESSURE: 74 MMHG | WEIGHT: 187.8 LBS | OXYGEN SATURATION: 99 % | BODY MASS INDEX: 31.25 KG/M2 | HEART RATE: 91 BPM | TEMPERATURE: 98.7 F

## 2017-06-19 DIAGNOSIS — D83.9 COMMON VARIABLE IMMUNODEFICIENCY (HCC): ICD-10-CM

## 2017-06-19 RX ORDER — ACETAMINOPHEN 325 MG/1
650 TABLET ORAL ONCE
Status: CANCELLED
Start: 2017-06-19 | End: 2017-06-19

## 2017-06-19 RX ORDER — ACETAMINOPHEN 500 MG
500 TABLET ORAL EVERY 4 HOURS PRN
Status: CANCELLED
Start: 2017-06-19

## 2017-06-19 NOTE — PROGRESS NOTES
Pt scheduled one week early to receive IVIG.  Pt notified.  Verbalizes understanding.  Pt to contact Central Scheduling to rearrange appointments.  RASHAWN Miles

## 2017-06-27 ENCOUNTER — INFUSION (OUTPATIENT)
Dept: ONCOLOGY | Facility: HOSPITAL | Age: 62
End: 2017-06-27

## 2017-06-27 VITALS
DIASTOLIC BLOOD PRESSURE: 74 MMHG | BODY MASS INDEX: 30.95 KG/M2 | HEART RATE: 82 BPM | WEIGHT: 186 LBS | OXYGEN SATURATION: 99 % | TEMPERATURE: 98.8 F | SYSTOLIC BLOOD PRESSURE: 114 MMHG

## 2017-06-27 DIAGNOSIS — D83.9 COMMON VARIABLE IMMUNODEFICIENCY (HCC): Primary | ICD-10-CM

## 2017-06-27 PROCEDURE — 96366 THER/PROPH/DIAG IV INF ADDON: CPT | Performed by: NURSE PRACTITIONER

## 2017-06-27 PROCEDURE — 25010000002 IMMUNE GLOBULIN (HUMAN) 20 GM/200ML SOLUTION: Performed by: ALLERGY & IMMUNOLOGY

## 2017-06-27 PROCEDURE — 96365 THER/PROPH/DIAG IV INF INIT: CPT | Performed by: NURSE PRACTITIONER

## 2017-06-27 RX ORDER — ACETAMINOPHEN 500 MG
500 TABLET ORAL EVERY 4 HOURS PRN
Status: CANCELLED
Start: 2017-06-27

## 2017-06-27 RX ORDER — ACETAMINOPHEN 325 MG/1
650 TABLET ORAL ONCE
Status: CANCELLED
Start: 2017-06-27 | End: 2017-06-27

## 2017-06-27 RX ADMIN — IMMUNE GLOBULIN INFUSION (HUMAN) 20 G: 100 INJECTION, SOLUTION INTRAVENOUS; SUBCUTANEOUS at 11:07

## 2017-06-27 RX ADMIN — IMMUNE GLOBULIN INFUSION (HUMAN) 20 G: 100 INJECTION, SOLUTION INTRAVENOUS; SUBCUTANEOUS at 09:17

## 2017-06-28 DIAGNOSIS — E03.9 ACQUIRED HYPOTHYROIDISM: Primary | ICD-10-CM

## 2017-06-28 DIAGNOSIS — M81.0 OSTEOPOROSIS: ICD-10-CM

## 2017-06-28 DIAGNOSIS — E78.2 MIXED HYPERLIPIDEMIA: ICD-10-CM

## 2017-06-28 LAB
ALBUMIN SERPL-MCNC: 4.1 G/DL (ref 3.5–5.2)
ALBUMIN/GLOB SERPL: 0.9 G/DL
ALP SERPL-CCNC: 121 U/L (ref 39–117)
ALT SERPL-CCNC: 15 U/L (ref 1–33)
AST SERPL-CCNC: 19 U/L (ref 1–32)
BASOPHILS # BLD AUTO: 0.04 10*3/MM3 (ref 0–0.2)
BASOPHILS NFR BLD AUTO: 0.9 % (ref 0–1.5)
BILIRUB SERPL-MCNC: 0.3 MG/DL (ref 0.1–1.2)
BUN SERPL-MCNC: 14 MG/DL (ref 8–23)
BUN/CREAT SERPL: 12.1 (ref 7–25)
CALCIUM SERPL-MCNC: 8.5 MG/DL (ref 8.6–10.5)
CHLORIDE SERPL-SCNC: 100 MMOL/L (ref 98–107)
CHOLEST SERPL-MCNC: 214 MG/DL (ref 0–200)
CO2 SERPL-SCNC: 23.3 MMOL/L (ref 22–29)
CREAT SERPL-MCNC: 1.16 MG/DL (ref 0.57–1)
EOSINOPHIL # BLD AUTO: 0.21 10*3/MM3 (ref 0–0.7)
EOSINOPHIL NFR BLD AUTO: 4.5 % (ref 0.3–6.2)
ERYTHROCYTE [DISTWIDTH] IN BLOOD BY AUTOMATED COUNT: 16.6 % (ref 11.7–13)
GLOBULIN SER CALC-MCNC: 4.4 GM/DL
GLUCOSE SERPL-MCNC: 121 MG/DL (ref 65–99)
HCT VFR BLD AUTO: 35.3 % (ref 35.6–45.5)
HDLC SERPL-MCNC: 33 MG/DL (ref 40–60)
HGB BLD-MCNC: 10.7 G/DL (ref 11.9–15.5)
IMM GRANULOCYTES # BLD: 0 10*3/MM3 (ref 0–0.03)
IMM GRANULOCYTES NFR BLD: 0 % (ref 0–0.5)
LDLC SERPL CALC-MCNC: 126 MG/DL (ref 0–100)
LYMPHOCYTES # BLD AUTO: 1.05 10*3/MM3 (ref 0.9–4.8)
LYMPHOCYTES NFR BLD AUTO: 22.5 % (ref 19.6–45.3)
MCH RBC QN AUTO: 27 PG (ref 26.9–32)
MCHC RBC AUTO-ENTMCNC: 30.3 G/DL (ref 32.4–36.3)
MCV RBC AUTO: 88.9 FL (ref 80.5–98.2)
MONOCYTES # BLD AUTO: 0.65 10*3/MM3 (ref 0.2–1.2)
MONOCYTES NFR BLD AUTO: 13.9 % (ref 5–12)
NEUTROPHILS # BLD AUTO: 2.71 10*3/MM3 (ref 1.9–8.1)
NEUTROPHILS NFR BLD AUTO: 58.2 % (ref 42.7–76)
PLATELET # BLD AUTO: 323 10*3/MM3 (ref 140–500)
POTASSIUM SERPL-SCNC: 4.2 MMOL/L (ref 3.5–5.2)
PROT SERPL-MCNC: 8.5 G/DL (ref 6–8.5)
RBC # BLD AUTO: 3.97 10*6/MM3 (ref 3.9–5.2)
SODIUM SERPL-SCNC: 135 MMOL/L (ref 136–145)
TRIGL SERPL-MCNC: 276 MG/DL (ref 0–150)
TSH SERPL DL<=0.005 MIU/L-ACNC: 0.29 MIU/ML (ref 0.27–4.2)
VLDLC SERPL CALC-MCNC: 55.2 MG/DL (ref 5–40)
WBC # BLD AUTO: 4.66 10*3/MM3 (ref 4.5–10.7)

## 2017-07-17 ENCOUNTER — OFFICE VISIT (OUTPATIENT)
Dept: ORTHOPEDIC SURGERY | Facility: CLINIC | Age: 62
End: 2017-07-17

## 2017-07-17 VITALS — WEIGHT: 186 LBS | BODY MASS INDEX: 30.99 KG/M2 | HEIGHT: 65 IN | TEMPERATURE: 97.8 F

## 2017-07-17 DIAGNOSIS — M25.561 CHRONIC PAIN OF RIGHT KNEE: Primary | ICD-10-CM

## 2017-07-17 DIAGNOSIS — M06.9 RHEUMATOID ARTHRITIS INVOLVING MULTIPLE SITES, UNSPECIFIED RHEUMATOID FACTOR PRESENCE: ICD-10-CM

## 2017-07-17 DIAGNOSIS — M84.361G STRESS FRACTURE OF RIGHT TIBIA WITH DELAYED HEALING, SUBSEQUENT ENCOUNTER: ICD-10-CM

## 2017-07-17 DIAGNOSIS — E55.9 VITAMIN D DEFICIENCY: ICD-10-CM

## 2017-07-17 DIAGNOSIS — M17.11 OSTEOARTHROSIS, LOCALIZED, PRIMARY, KNEE, RIGHT: ICD-10-CM

## 2017-07-17 DIAGNOSIS — G89.29 CHRONIC PAIN OF RIGHT KNEE: Primary | ICD-10-CM

## 2017-07-17 PROBLEM — M17.10 OSTEOARTHROSIS, LOCALIZED, PRIMARY, KNEE: Status: ACTIVE | Noted: 2017-07-17

## 2017-07-17 PROCEDURE — 20610 DRAIN/INJ JOINT/BURSA W/O US: CPT | Performed by: ORTHOPAEDIC SURGERY

## 2017-07-17 PROCEDURE — 73562 X-RAY EXAM OF KNEE 3: CPT | Performed by: ORTHOPAEDIC SURGERY

## 2017-07-17 PROCEDURE — 99214 OFFICE O/P EST MOD 30 MIN: CPT | Performed by: ORTHOPAEDIC SURGERY

## 2017-07-17 RX ORDER — METHYLPREDNISOLONE ACETATE 80 MG/ML
80 INJECTION, SUSPENSION INTRA-ARTICULAR; INTRALESIONAL; INTRAMUSCULAR; SOFT TISSUE
Status: COMPLETED | OUTPATIENT
Start: 2017-07-17 | End: 2017-07-17

## 2017-07-17 RX ORDER — LIDOCAINE HYDROCHLORIDE 10 MG/ML
2 INJECTION, SOLUTION INFILTRATION; PERINEURAL
Status: COMPLETED | OUTPATIENT
Start: 2017-07-17 | End: 2017-07-17

## 2017-07-17 RX ORDER — BUPIVACAINE HYDROCHLORIDE 5 MG/ML
2 INJECTION, SOLUTION PERINEURAL
Status: COMPLETED | OUTPATIENT
Start: 2017-07-17 | End: 2017-07-17

## 2017-07-17 RX ADMIN — LIDOCAINE HYDROCHLORIDE 2 ML: 10 INJECTION, SOLUTION INFILTRATION; PERINEURAL at 13:08

## 2017-07-17 RX ADMIN — METHYLPREDNISOLONE ACETATE 80 MG: 80 INJECTION, SUSPENSION INTRA-ARTICULAR; INTRALESIONAL; INTRAMUSCULAR; SOFT TISSUE at 13:08

## 2017-07-17 RX ADMIN — BUPIVACAINE HYDROCHLORIDE 2 ML: 5 INJECTION, SOLUTION PERINEURAL at 13:08

## 2017-07-17 NOTE — PROGRESS NOTES
"Knee Exam      Patient: Dara Medina    YOB: 1955 62 y.o. female    Chief Complaints: knee hurts    History of Present Illness: Patient is seen back today with about a 3-4 month history of persistent moderate throbbing aching pain over the medial aspect of her right knee.  She had a history several years ago knee scope with medial sub-chondroplasty of the femur and partial medial meniscectomy.  Repeat the MRI back in May showed a subchondral stress fracture and quite a bit of marrow edema throughout the proximal medial tibia was some question where he along the medial meniscus but not clear evidence of recurrent tear.  At that time we discussed treatment options and she wanted to hold off on surgery.  She's had some flares of her rheumatoid arthritis intermittently since then.  She is recently been started on a \"new rheumatoid arthritis medication she received an injection about 2 weeks ago    She never did \"really well\" after her last set chondroplasty.  HPI    ROS: knee pain  Past Medical History:   Diagnosis Date   • Acute colitis 1/18/2017   • Fracture of rib    • Hyperlipidemia    • Hypothyroidism    • Hypothyroidism    • Iron deficiency    • Obesity    • Osteoporosis    • RA (rheumatoid arthritis)    • Sepsis, unspecified organism 1/18/2017   • Vitamin D deficiency        Physical Exam:   Vitals:    07/17/17 0950   Temp: 97.8 °F (36.6 °C)   TempSrc: Temporal Artery    Weight: 186 lb (84.4 kg)   Height: 65\" (165.1 cm)     Well developed with normal mood.Right knee shows moderate discomfort over the medial tibial plateau and medial joint line with some pain on Elvin's but no jorge luis locking or catching.  Minimal discomfort over the medial femoral condyle really no focal pain laterally over the femoral condyle or tibial plateau but some over the joint line.  0-115° range of motion with stable ligamentous exam.      Radiology: 3 views of the right knee were ordered to evaluate knee pain reviewed and " "x-rays taken previously in the office.  I don't see any sign of collapse.  There is some narrowing of the medial joint space.      Assessment/Plan:  1.  Persistent right knee pain with medial tibial stress reaction small area of stress fracture with previous meniscectomy and medial femoral condylar sub-chondroplasty rheumatoid arthritis with intermittent flares.  We discussed treatment options she's been very compliant with limiting her activity is remains quite miserable.  We discussed additional treatment options of possible repeat scope and sub-chondroplasty.  However she did not really do very well with the last 1 and a.  Given her rheumatoid flares in her lack of significant improvement after her last one several years ago that we would not give her significant relief from this.    Discussed with her that I think ultimately most relief with discomfort and possible knee replacement surgery.  She understands that I do not do those.  We'll set her up with one of my partners it does that to see if they feel this would give her better long-term relief of her right knee pain.  In the interim the right knee was injected with cortisone after sterile preparation and verbal consent.     Regarding her previous vitamin D deficiency she now takes 6000 units daily and says this was recently checked by her primary care physician and was told \"it was okay\".  She's been a see about having those results sent to us.    Large Joint Arthrocentesis  Date/Time: 7/17/2017 1:08 PM  Consent given by: patient  Site marked: site marked  Timeout: Immediately prior to procedure a time out was called to verify the correct patient, procedure, equipment, support staff and site/side marked as required   Supporting Documentation  Indications: pain   Procedure Details  Location: knee - R knee  Needle size: 22 G  Approach: anteromedial  Medications administered: 2 mL lidocaine 1 %; 80 mg methylPREDNISolone acetate 80 MG/ML; 2 mL " bupivacaine  Patient tolerance: patient tolerated the procedure well with no immediate complications

## 2017-07-18 ENCOUNTER — OFFICE VISIT (OUTPATIENT)
Dept: INTERNAL MEDICINE | Facility: CLINIC | Age: 62
End: 2017-07-18

## 2017-07-18 VITALS
DIASTOLIC BLOOD PRESSURE: 80 MMHG | OXYGEN SATURATION: 97 % | BODY MASS INDEX: 29.99 KG/M2 | HEIGHT: 65 IN | SYSTOLIC BLOOD PRESSURE: 130 MMHG | WEIGHT: 180 LBS | RESPIRATION RATE: 18 BRPM | HEART RATE: 97 BPM

## 2017-07-18 DIAGNOSIS — E03.9 ACQUIRED HYPOTHYROIDISM: ICD-10-CM

## 2017-07-18 DIAGNOSIS — Z12.31 ENCOUNTER FOR SCREENING MAMMOGRAM FOR BREAST CANCER: ICD-10-CM

## 2017-07-18 DIAGNOSIS — D50.9 IRON DEFICIENCY ANEMIA, UNSPECIFIED IRON DEFICIENCY ANEMIA TYPE: ICD-10-CM

## 2017-07-18 DIAGNOSIS — D51.0 PERNICIOUS ANEMIA: ICD-10-CM

## 2017-07-18 DIAGNOSIS — E78.2 MIXED HYPERLIPIDEMIA: Primary | ICD-10-CM

## 2017-07-18 DIAGNOSIS — M81.0 OSTEOPOROSIS: ICD-10-CM

## 2017-07-18 DIAGNOSIS — F32.A CHRONIC DEPRESSION: ICD-10-CM

## 2017-07-18 PROBLEM — D84.9 IMMUNOSUPPRESSION: Status: RESOLVED | Noted: 2017-01-18 | Resolved: 2017-07-18

## 2017-07-18 PROCEDURE — 99214 OFFICE O/P EST MOD 30 MIN: CPT | Performed by: INTERNAL MEDICINE

## 2017-07-18 RX ORDER — BUPROPION HYDROCHLORIDE 150 MG/1
150 TABLET ORAL EVERY MORNING
Qty: 30 TABLET | Refills: 3 | Status: SHIPPED | OUTPATIENT
Start: 2017-07-18 | End: 2017-11-08 | Stop reason: SDUPTHER

## 2017-07-18 NOTE — PROGRESS NOTES
Subjective     Dara Medina is a 62 y.o. female who presents with   Chief Complaint   Patient presents with   • Anemia     3 month   • Hypothyroidism   • Hyperlipidemia   • Osteoporosis       History of Present Illness     HLD.  Fair control with diet alone.   Hypothyroidism.  Control is good.   Anemia. Reviewed labs. She is stable.   OP.  She did not start Fosamax because of ongoing dental issues.  Discussed that Forteo may be a better drug for her.  Depression.  She would like to streamline med list.  Discussed trial of weaning Wellbutrin.      Review of Systems   Constitutional: Positive for fatigue.   Respiratory: Positive for shortness of breath.    Cardiovascular: Negative for chest pain.       The following portions of the patient's history were reviewed and updated as appropriate: allergies, current medications and problem list.    Patient Active Problem List    Diagnosis Date Noted   • Chronic depression 07/18/2017   • Osteoarthrosis, localized, primary, knee 07/17/2017   • Stress fracture of right tibia 05/22/2017   • Bone marrow edema 05/22/2017   • Mixed hyperlipidemia 05/05/2017   • Tear of medial meniscus of right knee, current 05/04/2017   • Chronic pain of right knee 05/04/2017   • IFG (impaired fasting glucose) 04/12/2017   • Rheumatoid arthritis 07/07/2016   • Pulmonary fibrosis 07/06/2016   • Iron deficiency anemia 07/06/2016   • Pernicious anemia 07/06/2016   • Common variable immunodeficiency 07/01/2016   • Hypothyroidism 05/23/2016   • Sensory neuropathy 05/23/2016   • Osteoporosis 05/23/2016     Note Last Updated: 5/5/2017     Treatment started in 5/5/2017     • Vitamin D deficiency 05/23/2016       Current Outpatient Prescriptions on File Prior to Visit   Medication Sig Dispense Refill   • azaTHIOprine (IMURAN) 50 MG tablet Take 50 mg by mouth 3 (Three) Times a Day.  5   • Cholecalciferol (VITAMIN D3) 5000 UNITS capsule capsule Take 5,000 Units by mouth 2 (Two) Times a Day.     • CIMZIA  PREFILLED 2 X 200 MG/ML kit injection 200 mg 1 (One) Time.     • Cyanocobalamin 1000 MCG/ML kit Inject 1,000 mcg as directed every 30 (thirty) days. 1 kit 11   • cyclobenzaprine (FLEXERIL) 10 MG tablet Take 10 mg by mouth as needed.     • DULoxetine (CYMBALTA) 60 MG capsule Take 60 mg by mouth daily.     • Est Estrogens-Methyltest (ESTRATEST PO) Take 2 doses by mouth 2 (Two) Times a Day. 1mg/ 35mg/ gm     • folic acid (FOLVITE) 1 MG tablet Take 1 mg by mouth daily.     • Magnesium-Potassium (JOSE MAGNESIUM-POTASSIUM) 250-100 MG tablet Take 2 tablets by mouth Every Night.     • Nutritional Supplements (DHEA PO) Take 5 mg by mouth Daily.     • omeprazole (PriLOSEC) 40 MG capsule Take 40 mg by mouth daily.     • Polysaccharide Iron Complex (FERREX 150 PO) Take 1 capsule by mouth 2 (Two) Times a Day.     • predniSONE (DELTASONE) 5 MG tablet Take 3 tablets by mouth 2 (Two) Times a Day With Meals. (Patient taking differently: Take 5 mg by mouth 2 (Two) Times a Day With Meals. Now PRN FOR RHEUMATOID ARTHRITIS FLARE-UP) 60 tablet 0   • progesterone (PROMETRIUM) 200 MG capsule Take 400 mg by mouth Daily.     • SYNTHROID 112 MCG tablet Take 1 tablet by mouth Daily. 90 tablet 3   • vitamin D (ERGOCALCIFEROL) 63210 UNITS capsule capsule Take 1 cap twice weekly 26 capsule 3   • [DISCONTINUED] alendronate (FOSAMAX) 70 MG tablet Take 1 tablet by mouth Every 7 (Seven) Days. 12 tablet 3   • [DISCONTINUED] buPROPion XL (WELLBUTRIN XL) 300 MG 24 hr tablet TAKE 1 TABLET BY MOUTH EVERY DAY 30 tablet 5   • [DISCONTINUED] DULoxetine (CYMBALTA) 30 MG capsule Take 30 mg by mouth Daily. Takes total of 90 mg (60mg+30mg) changed while inpatient     • [DISCONTINUED] fluconazole (DIFLUCAN) 150 MG tablet TAKE 1 TABLET BY MOUTH 1 (ONE) TIME FOR 1 DOSE. 1 tablet 2   • [DISCONTINUED] methylPREDNISolone (MEDROL) 4 MG tablet TAKE 1 TABLET EVERY DAY  5   • [DISCONTINUED] sucralfate (CARAFATE) 1 G tablet TAKE 1 TABLET BY MOUTH FOUR TIMES A DAY FOR 30  "DAYS  11     Current Facility-Administered Medications on File Prior to Visit   Medication Dose Route Frequency Provider Last Rate Last Dose   • [COMPLETED] bupivacaine (MARCAINE) injection 2 mL  2 mL  One-Time Injection Isaiah Arreaga MD   2 mL at 07/17/17 1308   • [COMPLETED] lidocaine (XYLOCAINE) 1 % injection 2 mL  2 mL  One-Time Injection Isaiah Arreaga MD   2 mL at 07/17/17 1308   • [COMPLETED] methylPREDNISolone acetate (DEPO-medrol) injection 80 mg  80 mg  One-Time Injection Isaiah Arreaga MD   80 mg at 07/17/17 1308       Objective     /80  Pulse 97  Resp 18  Ht 65\" (165.1 cm)  Wt 180 lb (81.6 kg)  SpO2 97%  BMI 29.95 kg/m2    Physical Exam   Constitutional: She is oriented to person, place, and time. She appears well-developed and well-nourished.   HENT:   Head: Normocephalic and atraumatic.   Pulmonary/Chest: Effort normal.   Neurological: She is alert and oriented to person, place, and time.   Psychiatric: She has a normal mood and affect. Her behavior is normal.       Assessment/Plan   Dara was seen today for anemia, hypothyroidism, hyperlipidemia and osteoporosis.    Diagnoses and all orders for this visit:    Mixed hyperlipidemia    Acquired hypothyroidism    Osteoporosis    Chronic depression    Encounter for screening mammogram for breast cancer  -     Mammo Screening Bilateral With CAD; Future    Iron deficiency anemia, unspecified iron deficiency anemia type    Pernicious anemia    Other orders  -     buPROPion XL (WELLBUTRIN XL) 150 MG 24 hr tablet; Take 1 tablet by mouth Every Morning.        Discussion  HLD. Continue diet.   Hypothyroidism.  Continue current regimen.  OP.  Schedule DEXa prior next visit.  Consider Forteo.   Anemia.  Pernicious and iron def.  Continue current regimen.  Depression.  Wean Wellbutrin.         Future Appointments  Date Time Provider Department Center   7/26/2017 9:00 AM NON CBC BED 1  [facility]   8/9/2017 9:20 AM Zion OSMAN" MD [facility] None   8/23/2017 9:00 AM NON CBC CHAIR 12 [facility]   9/20/2017 9:00 AM NON CBC CHAIR 12 [facility]   10/16/2017 9:00 AM [facility] None   10/16/2017 4:10 PM [facility] None   10/18/2017 9:00 AM NON CBC BED 1 [facility]   10/23/2017 11:15 AM Randi Yang MD [facility] None   11/15/2017 9:00 AM NON CBC CHAIR 12 [facility] INFUS EP [facility]   12/6/2017 9:00 AM NON CBC CHAIR 12 [facility]

## 2017-07-19 ENCOUNTER — TELEPHONE (OUTPATIENT)
Dept: ORTHOPEDIC SURGERY | Facility: CLINIC | Age: 62
End: 2017-07-19

## 2017-07-19 NOTE — TELEPHONE ENCOUNTER
----- Message from Alem Clemente sent at 2/24/2017  8:31 AM CST -----  Contact: mother, ana    Wants to know if Dr Arriola will do WWE for her daughter . Age 16  Call back     ----- Message from Maty Weinstein MA sent at 7/19/2017  9:39 AM EDT -----  Regarding: FW: Visit Follow-Up Question  Contact: [phone]      ----- Message -----     From: Dara Medina     Sent: 7/17/2017   4:44 PM       To: [facility]  Subject: Visit Follow-Up Question                         I am wondering if you could put me on a cancellation list to see Dr Guo sooner?  After my visit today, it hurts worse then it did before.  I would really appreciate it.  I'll go to either office.    Thank you so much,    Dara

## 2017-07-26 ENCOUNTER — APPOINTMENT (OUTPATIENT)
Dept: ONCOLOGY | Facility: HOSPITAL | Age: 62
End: 2017-07-26

## 2017-07-27 ENCOUNTER — INFUSION (OUTPATIENT)
Dept: ONCOLOGY | Facility: HOSPITAL | Age: 62
End: 2017-07-27

## 2017-07-27 VITALS
BODY MASS INDEX: 30.89 KG/M2 | DIASTOLIC BLOOD PRESSURE: 63 MMHG | OXYGEN SATURATION: 100 % | WEIGHT: 185.6 LBS | SYSTOLIC BLOOD PRESSURE: 128 MMHG | TEMPERATURE: 97.7 F | HEART RATE: 93 BPM

## 2017-07-27 DIAGNOSIS — D83.9 COMMON VARIABLE IMMUNODEFICIENCY (HCC): Primary | ICD-10-CM

## 2017-07-27 PROCEDURE — 96365 THER/PROPH/DIAG IV INF INIT: CPT | Performed by: NURSE PRACTITIONER

## 2017-07-27 PROCEDURE — 96366 THER/PROPH/DIAG IV INF ADDON: CPT | Performed by: NURSE PRACTITIONER

## 2017-07-27 PROCEDURE — 25010000002 IMMUNE GLOBULIN (HUMAN) 20 GM/200ML SOLUTION: Performed by: ALLERGY & IMMUNOLOGY

## 2017-07-27 RX ORDER — ACETAMINOPHEN 500 MG
500 TABLET ORAL EVERY 4 HOURS PRN
Status: CANCELLED
Start: 2017-07-27

## 2017-07-27 RX ORDER — ACETAMINOPHEN 325 MG/1
650 TABLET ORAL ONCE
Status: CANCELLED
Start: 2017-07-27 | End: 2017-07-27

## 2017-07-27 RX ADMIN — IMMUNE GLOBULIN INFUSION (HUMAN) 20 G: 100 INJECTION, SOLUTION INTRAVENOUS; SUBCUTANEOUS at 10:22

## 2017-07-27 RX ADMIN — IMMUNE GLOBULIN INFUSION (HUMAN) 20 G: 100 INJECTION, SOLUTION INTRAVENOUS; SUBCUTANEOUS at 08:39

## 2017-08-09 ENCOUNTER — CONSULT (OUTPATIENT)
Dept: ORTHOPEDIC SURGERY | Facility: CLINIC | Age: 62
End: 2017-08-09

## 2017-08-09 VITALS — BODY MASS INDEX: 30.32 KG/M2 | TEMPERATURE: 97.4 F | WEIGHT: 182 LBS | HEIGHT: 65 IN

## 2017-08-09 DIAGNOSIS — M17.11 PRIMARY OSTEOARTHRITIS OF RIGHT KNEE: Primary | ICD-10-CM

## 2017-08-09 PROCEDURE — 99214 OFFICE O/P EST MOD 30 MIN: CPT | Performed by: ORTHOPAEDIC SURGERY

## 2017-08-09 RX ORDER — BUPROPION HYDROCHLORIDE 300 MG/1
TABLET ORAL
COMMUNITY
Start: 2017-08-05 | End: 2017-08-09

## 2017-08-09 RX ORDER — SODIUM CHLORIDE 0.9 % (FLUSH) 0.9 %
1-10 SYRINGE (ML) INJECTION AS NEEDED
Status: CANCELLED | OUTPATIENT
Start: 2017-12-14

## 2017-08-10 NOTE — PROGRESS NOTES
"  Patient: Dara Medina    YOB: 1955    Medical Record Number: [id number]    Chief Complaints:  Referral for right knee pain    History of Present Illness:     62 y.o. female patient who presents for evaluation of her right knee.  She is referred by Dr. Arreaga.  She has a long history of problems with this knee dating back several years.  At one point, she had the knee scoped and a sub-chondroplasty was performed.  She tells me that this helped somewhat but the effects were very short-lived.  Her symptoms seem to have gotten progressively worse over the past 5-6 months.  Her pain has progressed to the point where she is now having trouble walking distances more than several 100 feet.  She also has to use an assistive device for ambulation, at times.  She has had multiple previous injections which did not help.  She has been experiencing occasional clicking and popping as well as subjective instability in the knee.  She tells me the knee \"gives out\".    Allergies:   Allergies   Allergen Reactions   • Amoxicillin-Pot Clavulanate Diarrhea     itching   • Codeine        Home Medications:    Current Outpatient Prescriptions:   •  azaTHIOprine (IMURAN) 50 MG tablet, Take 50 mg by mouth 3 (Three) Times a Day., Disp: , Rfl: 5  •  buPROPion XL (WELLBUTRIN XL) 150 MG 24 hr tablet, Take 1 tablet by mouth Every Morning., Disp: 30 tablet, Rfl: 3  •  Cholecalciferol (VITAMIN D3) 5000 UNITS capsule capsule, Take 5,000 Units by mouth 2 (Two) Times a Day., Disp: , Rfl:   •  CIMZIA PREFILLED 2 X 200 MG/ML kit injection, 200 mg 1 (One) Time., Disp: , Rfl:   •  Cyanocobalamin 1000 MCG/ML kit, Inject 1,000 mcg as directed every 30 (thirty) days., Disp: 1 kit, Rfl: 11  •  cyclobenzaprine (FLEXERIL) 10 MG tablet, Take 10 mg by mouth as needed., Disp: , Rfl:   •  DULoxetine (CYMBALTA) 60 MG capsule, Take 60 mg by mouth daily., Disp: , Rfl:   •  Est Estrogens-Methyltest (ESTRATEST PO), Take 2 doses by mouth 2 (Two) Times a " Day. 1mg/ 35mg/ gm, Disp: , Rfl:   •  folic acid (FOLVITE) 1 MG tablet, Take 1 mg by mouth daily., Disp: , Rfl:   •  omeprazole (PriLOSEC) 40 MG capsule, Take 40 mg by mouth daily., Disp: , Rfl:   •  Polysaccharide Iron Complex (FERREX 150 PO), Take 1 capsule by mouth 2 (Two) Times a Day., Disp: , Rfl:   •  predniSONE (DELTASONE) 5 MG tablet, Take 3 tablets by mouth 2 (Two) Times a Day With Meals. (Patient taking differently: Take 5 mg by mouth 2 (Two) Times a Day With Meals.), Disp: 60 tablet, Rfl: 0  •  progesterone (PROMETRIUM) 200 MG capsule, Take 400 mg by mouth Daily., Disp: , Rfl:   •  SYNTHROID 112 MCG tablet, Take 1 tablet by mouth Daily., Disp: 90 tablet, Rfl: 3  •  diclofenac (VOLTAREN) 1 % gel gel, , Disp: , Rfl:   •  Magnesium-Potassium (JOSE MAGNESIUM-POTASSIUM) 250-100 MG tablet, Take 2 tablets by mouth Every Night., Disp: , Rfl:   •  Nutritional Supplements (DHEA PO), Take 5 mg by mouth Daily., Disp: , Rfl:   •  vitamin D (ERGOCALCIFEROL) 20712 UNITS capsule capsule, Take 1 cap twice weekly, Disp: 26 capsule, Rfl: 3    Past Medical History:   Diagnosis Date   • Acute colitis 1/18/2017   • Fracture of rib    • Hyperlipidemia    • Hypothyroidism    • Hypothyroidism    • Iron deficiency    • Obesity    • Osteoporosis    • RA (rheumatoid arthritis)    • Sepsis, unspecified organism 1/18/2017   • Vitamin D deficiency        Past Surgical History:   Procedure Laterality Date   • ADENOIDECTOMY     • HYSTERECTOMY     • SINUS SURGERY     • TONSILLECTOMY         Social History     Occupational History   • Not on file.     Social History Main Topics   • Smoking status: Former Smoker   • Smokeless tobacco: Never Used      Comment: QUIT AGE 23   • Alcohol use No      Comment: STOPPED 1997   • Drug use: No   • Sexual activity: Defer      Social History     Social History Narrative       Family History   Problem Relation Age of Onset   • Hyperlipidemia Mother    • Hypertension Mother    • Colon cancer Father    •  "Diabetes Father    • Hyperlipidemia Brother        Review of Systems:      Constitutional: Denies fever, shaking or chills   Eyes: Denies change in visual acuity   HEENT: Denies nasal congestion or sore throat   Respiratory: Denies cough or shortness of breath   Cardiovascular: Denies chest pain or edema  Endocrine: Denies tremors, palpitations, intolerance of heat or cold, polyuria, polydipsia.  GI: Denies abdominal pain, nausea, vomiting, bloody stools or diarrhea  : Denies frequency, urgency, incontinence, retention, or nocturia.  Musculoskeletal: Denies numbness tingling or loss of motor function except as above  Integument: Denies rash, lesion or ulceration   Neurologic: Denies headache or focal weakness, deficits  Heme: Denies epistaxis, spontaneous or excessive bleeding, epistaxis, hematuria, melena, fatigue, enlarged or tender lymph nodes.      All other pertinent positives and negatives as noted above in HPI.    Physical Exam: 62 y.o. female  Vitals:    08/09/17 0915   Temp: 97.4 °F (36.3 °C)   TempSrc: Temporal Artery    Weight: 182 lb (82.6 kg)   Height: 65\" (165.1 cm)       General:  Patient is awake and alert.  Appears in no acute distress or discomfort.    Psych:  Affect and demeanor are appropriate.    Eyes:  Conjunctiva and sclera appear grossly normal.  Eyes track well and EOM seem to be intact.    Ears:  No gross abnormalities.  Hearing adequate for the exam.    Cardiovascular:  Regular rate and rhythm.    Lungs:  Good chest expansion.  Breathing unlabored.    Extremities: The right knee is examined.  Skin is benign.  Trace effusion.  Moderate to severe medial joint line tenderness.  Moderate tenderness along the proximal medial face of the tibia as well.  Knee motion is from full extension to 115° of flexion.  No instability.  Good strength with hip flexion, knee extension, ankle and toe plantar flexion and dorsal fashion.  Sensation is intact.  Palpable pedal pulses.  Brisk cap " refill.    Imaging:  Her previous x-rays and MRI of the right knee are reviewed along with the associated report for the MRI.  The x-rays show moderate medial compartment osteoarthritis along with changes in the medial femoral condyle consistent with her history of a sub-chondroplasty.  Her MRI shows more advanced arthritis of the medial compartment with partial extrusion of the medial meniscus and significant stress reaction in the medial tibial plateau.    Assessment/Plan:  Right knee osteoarthritis with medial tibial plateau stress fracture    We discussed options for her.  She has tried offloading the knee along with various other conservative treatments.  None of this has provided her any significant or long-lasting relief of her pain.  We talked about surgical options.  I would not recommend another sub-chondroplasty for her.  I think the likelihood of success with that would be very low.  From a surgical standpoint, I would recommend a total knee arthroplasty.  With the stress fracture, I would not feel comfortable performing a unicompartmental arthroplasty for her.      The risks, benefits, and alternatives to the total knee were discussed.  She wants to pursue that but her mother is going to be moving to the area and she is going to have to help her with the move.  She simply cannot recover from a knee surgery while helping her mother move and she wants to wait until October.  We will try to get that set up for her.  I do want to see her again for a preoperative visit.    In the meantime, I suggested an off  brace for her subjective instability.  I will send her over to the other office to get fitted for that.    Zion Guo MD    08/09/2017

## 2017-08-14 ENCOUNTER — HOSPITAL ENCOUNTER (OUTPATIENT)
Dept: MAMMOGRAPHY | Facility: HOSPITAL | Age: 62
Discharge: HOME | End: 2017-08-14
Admitting: INTERNAL MEDICINE

## 2017-08-14 DIAGNOSIS — Z12.31 ENCOUNTER FOR SCREENING MAMMOGRAM FOR BREAST CANCER: ICD-10-CM

## 2017-08-14 PROCEDURE — G0202 SCR MAMMO BI INCL CAD: HCPCS

## 2017-08-23 ENCOUNTER — INFUSION (OUTPATIENT)
Dept: ONCOLOGY | Facility: HOSPITAL | Age: 62
End: 2017-08-23

## 2017-08-23 VITALS
SYSTOLIC BLOOD PRESSURE: 138 MMHG | OXYGEN SATURATION: 97 % | WEIGHT: 190.8 LBS | HEART RATE: 94 BPM | BODY MASS INDEX: 31.75 KG/M2 | TEMPERATURE: 98.8 F | DIASTOLIC BLOOD PRESSURE: 78 MMHG

## 2017-08-23 DIAGNOSIS — D83.9 COMMON VARIABLE IMMUNODEFICIENCY (HCC): Primary | ICD-10-CM

## 2017-08-23 PROCEDURE — 96365 THER/PROPH/DIAG IV INF INIT: CPT | Performed by: NURSE PRACTITIONER

## 2017-08-23 PROCEDURE — 25010000002 IMMUNE GLOBULIN (HUMAN) 20 GM/200ML SOLUTION: Performed by: ALLERGY & IMMUNOLOGY

## 2017-08-23 PROCEDURE — 96366 THER/PROPH/DIAG IV INF ADDON: CPT | Performed by: NURSE PRACTITIONER

## 2017-08-23 RX ORDER — ACETAMINOPHEN 325 MG/1
650 TABLET ORAL ONCE
Status: CANCELLED
Start: 2017-08-23 | End: 2017-08-23

## 2017-08-23 RX ORDER — ACETAMINOPHEN 500 MG
500 TABLET ORAL EVERY 4 HOURS PRN
Status: CANCELLED
Start: 2017-08-23

## 2017-08-23 RX ADMIN — IMMUNE GLOBULIN INFUSION (HUMAN) 20 G: 100 INJECTION, SOLUTION INTRAVENOUS; SUBCUTANEOUS at 11:08

## 2017-08-23 RX ADMIN — IMMUNE GLOBULIN INFUSION (HUMAN) 20 G: 100 INJECTION, SOLUTION INTRAVENOUS; SUBCUTANEOUS at 09:21

## 2017-09-05 RX ORDER — CYANOCOBALAMIN 1000 UG/ML
INJECTION, SOLUTION INTRAMUSCULAR; SUBCUTANEOUS
Qty: 1 ML | Refills: 2 | Status: SHIPPED | OUTPATIENT
Start: 2017-09-05 | End: 2017-12-11 | Stop reason: SDUPTHER

## 2017-09-20 ENCOUNTER — INFUSION (OUTPATIENT)
Dept: ONCOLOGY | Facility: HOSPITAL | Age: 62
End: 2017-09-20

## 2017-09-20 VITALS
HEART RATE: 108 BPM | WEIGHT: 193 LBS | OXYGEN SATURATION: 98 % | SYSTOLIC BLOOD PRESSURE: 131 MMHG | DIASTOLIC BLOOD PRESSURE: 74 MMHG | TEMPERATURE: 98.5 F | BODY MASS INDEX: 32.12 KG/M2

## 2017-09-20 DIAGNOSIS — D83.9 COMMON VARIABLE IMMUNODEFICIENCY (HCC): Primary | ICD-10-CM

## 2017-09-20 PROCEDURE — 96365 THER/PROPH/DIAG IV INF INIT: CPT | Performed by: NURSE PRACTITIONER

## 2017-09-20 PROCEDURE — 25010000002 IMMUNE GLOBULIN (HUMAN) 20 GM/200ML SOLUTION: Performed by: ALLERGY & IMMUNOLOGY

## 2017-09-20 PROCEDURE — 96366 THER/PROPH/DIAG IV INF ADDON: CPT | Performed by: NURSE PRACTITIONER

## 2017-09-20 RX ORDER — ACETAMINOPHEN 325 MG/1
650 TABLET ORAL ONCE
Status: CANCELLED
Start: 2017-09-20 | End: 2017-09-20

## 2017-09-20 RX ORDER — ACETAMINOPHEN 500 MG
500 TABLET ORAL EVERY 4 HOURS PRN
Status: CANCELLED
Start: 2017-09-20

## 2017-09-20 RX ADMIN — IMMUNE GLOBULIN INFUSION (HUMAN) 20 G: 100 INJECTION, SOLUTION INTRAVENOUS; SUBCUTANEOUS at 11:19

## 2017-09-20 RX ADMIN — IMMUNE GLOBULIN INFUSION (HUMAN) 20 G: 100 INJECTION, SOLUTION INTRAVENOUS; SUBCUTANEOUS at 09:30

## 2017-10-17 ENCOUNTER — CLINICAL SUPPORT (OUTPATIENT)
Dept: INTERNAL MEDICINE | Facility: CLINIC | Age: 62
End: 2017-10-17

## 2017-10-17 ENCOUNTER — LAB (OUTPATIENT)
Dept: INTERNAL MEDICINE | Facility: CLINIC | Age: 62
End: 2017-10-17

## 2017-10-17 DIAGNOSIS — D83.9 COMMON VARIABLE IMMUNODEFICIENCY (HCC): ICD-10-CM

## 2017-10-17 DIAGNOSIS — Z78.0 POSTMENOPAUSAL: Primary | ICD-10-CM

## 2017-10-17 DIAGNOSIS — D50.9 IRON DEFICIENCY ANEMIA, UNSPECIFIED IRON DEFICIENCY ANEMIA TYPE: ICD-10-CM

## 2017-10-17 DIAGNOSIS — R73.01 IFG (IMPAIRED FASTING GLUCOSE): ICD-10-CM

## 2017-10-17 DIAGNOSIS — E55.9 VITAMIN D DEFICIENCY: ICD-10-CM

## 2017-10-17 DIAGNOSIS — E78.2 MIXED HYPERLIPIDEMIA: Primary | ICD-10-CM

## 2017-10-17 DIAGNOSIS — Z00.00 HEALTHCARE MAINTENANCE: ICD-10-CM

## 2017-10-17 DIAGNOSIS — E11.65 UNCONTROLLED TYPE 2 DIABETES MELLITUS WITH COMPLICATION, UNSPECIFIED LONG TERM INSULIN USE STATUS: ICD-10-CM

## 2017-10-17 DIAGNOSIS — E11.8 UNCONTROLLED TYPE 2 DIABETES MELLITUS WITH COMPLICATION, UNSPECIFIED LONG TERM INSULIN USE STATUS: ICD-10-CM

## 2017-10-17 DIAGNOSIS — E03.9 ACQUIRED HYPOTHYROIDISM: ICD-10-CM

## 2017-10-17 PROCEDURE — 77080 DXA BONE DENSITY AXIAL: CPT | Performed by: INTERNAL MEDICINE

## 2017-10-18 ENCOUNTER — INFUSION (OUTPATIENT)
Dept: ONCOLOGY | Facility: HOSPITAL | Age: 62
End: 2017-10-18

## 2017-10-18 VITALS
OXYGEN SATURATION: 96 % | DIASTOLIC BLOOD PRESSURE: 86 MMHG | TEMPERATURE: 97.9 F | WEIGHT: 195 LBS | HEART RATE: 63 BPM | SYSTOLIC BLOOD PRESSURE: 146 MMHG | BODY MASS INDEX: 32.45 KG/M2

## 2017-10-18 DIAGNOSIS — D83.9 COMMON VARIABLE IMMUNODEFICIENCY (HCC): Primary | ICD-10-CM

## 2017-10-18 PROCEDURE — 96366 THER/PROPH/DIAG IV INF ADDON: CPT | Performed by: NURSE PRACTITIONER

## 2017-10-18 PROCEDURE — 96365 THER/PROPH/DIAG IV INF INIT: CPT | Performed by: NURSE PRACTITIONER

## 2017-10-18 PROCEDURE — 25010000002 IMMUNE GLOBULIN (HUMAN) 20 GM/200ML SOLUTION: Performed by: ALLERGY & IMMUNOLOGY

## 2017-10-18 RX ORDER — ACETAMINOPHEN 325 MG/1
650 TABLET ORAL ONCE
Status: CANCELLED
Start: 2017-10-18 | End: 2017-10-18

## 2017-10-18 RX ORDER — ACETAMINOPHEN 500 MG
500 TABLET ORAL EVERY 4 HOURS PRN
Status: CANCELLED
Start: 2017-10-18

## 2017-10-18 RX ADMIN — IMMUNE GLOBULIN INFUSION (HUMAN) 20 G: 100 INJECTION, SOLUTION INTRAVENOUS; SUBCUTANEOUS at 09:30

## 2017-10-18 RX ADMIN — IMMUNE GLOBULIN INFUSION (HUMAN) 20 G: 100 INJECTION, SOLUTION INTRAVENOUS; SUBCUTANEOUS at 11:17

## 2017-10-19 LAB
25(OH)D3+25(OH)D2 SERPL-MCNC: 31.4 NG/ML (ref 30–100)
ALBUMIN SERPL-MCNC: 4.2 G/DL (ref 3.5–5.2)
ALBUMIN/CREAT UR: ABNORMAL MG/G CREAT (ref 0–30)
ALBUMIN/GLOB SERPL: 1.3 G/DL
ALP SERPL-CCNC: 125 U/L (ref 39–117)
ALT SERPL-CCNC: 20 U/L (ref 1–33)
APPEARANCE UR: CLEAR
AST SERPL-CCNC: 22 U/L (ref 1–32)
BACTERIA #/AREA URNS HPF: NORMAL /HPF
BACTERIA UR CULT: NORMAL
BACTERIA UR CULT: NORMAL
BASOPHILS # BLD AUTO: 0.02 10*3/MM3 (ref 0–0.2)
BASOPHILS NFR BLD AUTO: 0.2 % (ref 0–1.5)
BILIRUB SERPL-MCNC: 0.3 MG/DL (ref 0.1–1.2)
BILIRUB UR QL STRIP: NEGATIVE
BUN SERPL-MCNC: 13 MG/DL (ref 8–23)
BUN/CREAT SERPL: 11.3 (ref 7–25)
CALCIUM SERPL-MCNC: 9.2 MG/DL (ref 8.6–10.5)
CHLORIDE SERPL-SCNC: 104 MMOL/L (ref 98–107)
CHOLEST SERPL-MCNC: 281 MG/DL (ref 0–200)
CO2 SERPL-SCNC: 25.4 MMOL/L (ref 22–29)
COLOR UR: YELLOW
CREAT SERPL-MCNC: 1.15 MG/DL (ref 0.57–1)
CREAT UR-MCNC: 37 MG/DL
EOSINOPHIL # BLD AUTO: 0.19 10*3/MM3 (ref 0–0.7)
EOSINOPHIL NFR BLD AUTO: 1.9 % (ref 0.3–6.2)
EPI CELLS #/AREA URNS HPF: NORMAL /HPF
ERYTHROCYTE [DISTWIDTH] IN BLOOD BY AUTOMATED COUNT: 16.2 % (ref 11.7–13)
GLOBULIN SER CALC-MCNC: 3.2 GM/DL
GLUCOSE SERPL-MCNC: 99 MG/DL (ref 65–99)
GLUCOSE UR QL: NEGATIVE
HBA1C MFR BLD: 5.7 % (ref 4.8–5.6)
HCT VFR BLD AUTO: 39.1 % (ref 35.6–45.5)
HDLC SERPL-MCNC: 51 MG/DL (ref 40–60)
HGB BLD-MCNC: 11.7 G/DL (ref 11.9–15.5)
HGB UR QL STRIP: NEGATIVE
IMM GRANULOCYTES # BLD: 0 10*3/MM3 (ref 0–0.03)
IMM GRANULOCYTES NFR BLD: 0 % (ref 0–0.5)
KETONES UR QL STRIP: NEGATIVE
LDLC SERPL CALC-MCNC: 181 MG/DL (ref 0–100)
LEUKOCYTE ESTERASE UR QL STRIP: ABNORMAL
LYMPHOCYTES # BLD AUTO: 1.36 10*3/MM3 (ref 0.9–4.8)
LYMPHOCYTES NFR BLD AUTO: 13.3 % (ref 19.6–45.3)
MCH RBC QN AUTO: 27.9 PG (ref 26.9–32)
MCHC RBC AUTO-ENTMCNC: 29.9 G/DL (ref 32.4–36.3)
MCV RBC AUTO: 93.3 FL (ref 80.5–98.2)
MICRO URNS: ABNORMAL
MICROALBUMIN UR-MCNC: <3 UG/ML
MONOCYTES # BLD AUTO: 0.65 10*3/MM3 (ref 0.2–1.2)
MONOCYTES NFR BLD AUTO: 6.4 % (ref 5–12)
NEUTROPHILS # BLD AUTO: 7.97 10*3/MM3 (ref 1.9–8.1)
NEUTROPHILS NFR BLD AUTO: 78.2 % (ref 42.7–76)
NITRITE UR QL STRIP: NEGATIVE
PH UR STRIP: 7 [PH] (ref 5–7.5)
PLATELET # BLD AUTO: 399 10*3/MM3 (ref 140–500)
POTASSIUM SERPL-SCNC: 5 MMOL/L (ref 3.5–5.2)
PROT SERPL-MCNC: 7.4 G/DL (ref 6–8.5)
PROT UR QL STRIP: NEGATIVE
RBC # BLD AUTO: 4.19 10*6/MM3 (ref 3.9–5.2)
RBC #/AREA URNS HPF: NORMAL /HPF
SODIUM SERPL-SCNC: 143 MMOL/L (ref 136–145)
SP GR UR: 1.01 (ref 1–1.03)
TRIGL SERPL-MCNC: 243 MG/DL (ref 0–150)
TSH SERPL DL<=0.005 MIU/L-ACNC: 0.54 MIU/ML (ref 0.27–4.2)
URINALYSIS REFLEX: ABNORMAL
UROBILINOGEN UR STRIP-MCNC: 0.2 MG/DL (ref 0.2–1)
VIT B12 SERPL-MCNC: 570 PG/ML (ref 211–946)
VLDLC SERPL CALC-MCNC: 48.6 MG/DL (ref 5–40)
WBC # BLD AUTO: 10.19 10*3/MM3 (ref 4.5–10.7)
WBC #/AREA URNS HPF: NORMAL /HPF

## 2017-10-23 ENCOUNTER — OFFICE VISIT (OUTPATIENT)
Dept: INTERNAL MEDICINE | Facility: CLINIC | Age: 62
End: 2017-10-23

## 2017-10-23 VITALS
HEIGHT: 66 IN | WEIGHT: 195 LBS | SYSTOLIC BLOOD PRESSURE: 142 MMHG | HEART RATE: 100 BPM | RESPIRATION RATE: 18 BRPM | DIASTOLIC BLOOD PRESSURE: 66 MMHG | BODY MASS INDEX: 31.34 KG/M2 | OXYGEN SATURATION: 97 %

## 2017-10-23 DIAGNOSIS — Z00.00 WELL ADULT EXAM: Primary | ICD-10-CM

## 2017-10-23 PROBLEM — M84.361A STRESS FRACTURE OF RIGHT TIBIA: Status: RESOLVED | Noted: 2017-05-22 | Resolved: 2017-10-23

## 2017-10-23 PROBLEM — Z86.39 PERSONAL HISTORY OF DIABETES MELLITUS: Status: ACTIVE | Noted: 2017-10-23

## 2017-10-23 PROBLEM — R93.7 BONE MARROW EDEMA: Status: RESOLVED | Noted: 2017-05-22 | Resolved: 2017-10-23

## 2017-10-23 PROCEDURE — 90471 IMMUNIZATION ADMIN: CPT | Performed by: INTERNAL MEDICINE

## 2017-10-23 PROCEDURE — 99396 PREV VISIT EST AGE 40-64: CPT | Performed by: INTERNAL MEDICINE

## 2017-10-23 PROCEDURE — 90656 IIV3 VACC NO PRSV 0.5 ML IM: CPT | Performed by: INTERNAL MEDICINE

## 2017-10-23 PROCEDURE — 90715 TDAP VACCINE 7 YRS/> IM: CPT | Performed by: INTERNAL MEDICINE

## 2017-10-23 PROCEDURE — 90472 IMMUNIZATION ADMIN EACH ADD: CPT | Performed by: INTERNAL MEDICINE

## 2017-10-23 RX ORDER — ATORVASTATIN CALCIUM 20 MG/1
20 TABLET, FILM COATED ORAL DAILY
Qty: 90 TABLET | Refills: 3 | Status: SHIPPED | OUTPATIENT
Start: 2017-10-23 | End: 2017-12-07 | Stop reason: ALTCHOICE

## 2017-10-23 RX ORDER — TOFACITINIB 11 MG/1
11 TABLET, FILM COATED, EXTENDED RELEASE ORAL EVERY MORNING
COMMUNITY
Start: 2017-09-27 | End: 2017-12-15 | Stop reason: HOSPADM

## 2017-10-23 RX ORDER — OMEPRAZOLE 40 MG/1
40 CAPSULE, DELAYED RELEASE ORAL DAILY
Qty: 90 CAPSULE | Refills: 3 | Status: SHIPPED | OUTPATIENT
Start: 2017-10-23 | End: 2018-11-04 | Stop reason: SDUPTHER

## 2017-10-23 NOTE — PROGRESS NOTES
Subjective     Dara Medina is a 62 y.o. female who presents for a complete physical exam.      History of Present Illness      HLD.  Poor control with diet alone.   Hypothyroidism.  Control is good.   OP.  She did not start Fosamax because of ongoing dental issues.  Discussed that Forteo may be a better drug for her.  She has previously done one year of therapy years ago but stopped.       Review of Systems   Constitutional: Positive for unexpected weight change.   HENT: Negative.    Eyes: Negative.    Respiratory: Negative.    Cardiovascular: Negative.    Gastrointestinal: Negative.    Endocrine: Negative.    Genitourinary: Negative.    Musculoskeletal: Positive for arthralgias.   Skin: Negative.    Allergic/Immunologic: Negative.    Neurological: Negative.    Hematological: Negative.    Psychiatric/Behavioral: Negative.        The following portions of the patient's history were reviewed and updated as appropriate: allergies, current medications, past family history, past medical history, past social history, past surgical history and problem list.  Health maintenance tab was reviewed and updated with the patient.       Patient Active Problem List    Diagnosis Date Noted   • Personal history of diabetes mellitus 10/23/2017     Note Last Updated: 10/23/2017     Steroid induced only     • Chronic depression 07/18/2017   • Osteoarthrosis, localized, primary, knee 07/17/2017   • Mixed hyperlipidemia 05/05/2017   • Tear of medial meniscus of right knee, current 05/04/2017   • Chronic pain of right knee 05/04/2017   • IFG (impaired fasting glucose) 04/12/2017   • Rheumatoid arthritis 07/07/2016   • Pulmonary fibrosis 07/06/2016     Note Last Updated: 10/23/2017     Secondary to methotrexate.       • Iron deficiency anemia 07/06/2016   • Pernicious anemia 07/06/2016   • Common variable immunodeficiency 07/01/2016   • Hypothyroidism 05/23/2016   • Sensory neuropathy 05/23/2016   • Osteoporosis 05/23/2016     Note Last  Updated: 10/23/2017     H/o one year Forteo.  Not on bisphosphonates because of dental issues.  Fosamax in past caused stomach problems.       • Vitamin D deficiency 05/23/2016       Past Medical History:   Diagnosis Date   • Acute colitis 1/18/2017   • Allergic Codeine, some antibiotics   • Anemia    • Cataract Removed    2012   • Chronic depression    • Colon polyp 1 removed    2016   • Fracture of rib    • GERD (gastroesophageal reflux disease)    • Headache    • Hyperlipidemia    • Hypothyroidism    • Hypothyroidism    • Inflammatory bowel disease    • Iron deficiency    • Irritable bowel syndrome    • Obesity    • Osteopenia    • Osteoporosis    • Osteoporosis    • Pneumonia June 2016   • RA (rheumatoid arthritis)    • Sensory neuropathy    • Sepsis, unspecified organism 1/18/2017   • Vitamin D deficiency        Past Surgical History:   Procedure Laterality Date   • ADENOIDECTOMY     • ADENOIDECTOMY     • BREAST SURGERY     • COLONOSCOPY     • EYE SURGERY     • FRACTURE SURGERY     • HYSTERECTOMY     • SINUS SURGERY     • TONSILLECTOMY         Family History   Problem Relation Age of Onset   • Hyperlipidemia Mother    • Hypertension Mother    • Colon cancer Father    • Diabetes Father    • Cancer Father    • Hyperlipidemia Brother        Social History     Social History   • Marital status:      Spouse name: N/A   • Number of children: N/A   • Years of education: N/A     Occupational History   • Not on file.     Social History Main Topics   • Smoking status: Former Smoker     Packs/day: 0.50     Years: 5.00     Start date: 4/1/1974     Quit date: 4/1/1978   • Smokeless tobacco: Never Used      Comment: QUIT AGE 23   • Alcohol use No      Comment: STOPPED 1997   • Drug use: No   • Sexual activity: Not Currently     Partners: Male     Birth control/ protection: None     Other Topics Concern   • Not on file     Social History Narrative   • No narrative on file       Current Outpatient Prescriptions on File  Prior to Visit   Medication Sig Dispense Refill   • azaTHIOprine (IMURAN) 50 MG tablet Take 50 mg by mouth 3 (Three) Times a Day.  5   • buPROPion XL (WELLBUTRIN XL) 150 MG 24 hr tablet Take 1 tablet by mouth Every Morning. 30 tablet 3   • Cholecalciferol (VITAMIN D3) 5000 UNITS capsule capsule Take 5,000 Units by mouth 2 (Two) Times a Day.     • cyanocobalamin 1000 MCG/ML injection INJECT 1,000 MCG AS DIRECTED EVERY 30 (THIRTY) DAYS. 1 mL 2   • cyclobenzaprine (FLEXERIL) 10 MG tablet Take 10 mg by mouth as needed.     • DULoxetine (CYMBALTA) 60 MG capsule Take 60 mg by mouth daily.     • folic acid (FOLVITE) 1 MG tablet Take 1 mg by mouth daily.     • predniSONE (DELTASONE) 5 MG tablet Take 3 tablets by mouth 2 (Two) Times a Day With Meals. (Patient taking differently: Take 5 mg by mouth 2 (Two) Times a Day With Meals.) 60 tablet 0   • SYNTHROID 112 MCG tablet Take 1 tablet by mouth Daily. 90 tablet 3   • [DISCONTINUED] vitamin D (ERGOCALCIFEROL) 57269 UNITS capsule capsule Take 1 cap twice weekly 26 capsule 3   • diclofenac (VOLTAREN) 1 % gel gel      • Magnesium-Potassium (JOSE MAGNESIUM-POTASSIUM) 250-100 MG tablet Take 2 tablets by mouth Every Night.     • Nutritional Supplements (DHEA PO) Take 5 mg by mouth Daily.     • Polysaccharide Iron Complex (FERREX 150 PO) Take 1 capsule by mouth 2 (Two) Times a Day.     • [DISCONTINUED] CIMZIA PREFILLED 2 X 200 MG/ML kit injection 200 mg 1 (One) Time.     • [DISCONTINUED] Est Estrogens-Methyltest (ESTRATEST PO) Take 2 doses by mouth 2 (Two) Times a Day. 1mg/ 35mg/ gm     • [DISCONTINUED] omeprazole (PriLOSEC) 40 MG capsule Take 40 mg by mouth daily.     • [DISCONTINUED] progesterone (PROMETRIUM) 200 MG capsule Take 400 mg by mouth Daily.       No current facility-administered medications on file prior to visit.        Allergies   Allergen Reactions   • Amoxicillin-Pot Clavulanate Diarrhea     itching   • Codeine        Immunization History   Administered Date(s)  "Administered   • Flu Vaccine Quad PF >18YRS 10/10/2016   • Pneumococcal Polysaccharide 10/01/2014       Objective     /66  Pulse 100  Resp 18  Ht 65.5\" (166.4 cm)  Wt 195 lb (88.5 kg)  SpO2 97%  BMI 31.96 kg/m2    Physical Exam   Constitutional: She is oriented to person, place, and time. She appears well-developed and well-nourished.   HENT:   Head: Normocephalic and atraumatic.   Right Ear: Hearing, tympanic membrane and external ear normal.   Left Ear: Hearing, tympanic membrane and external ear normal.   Nose: Nose normal.   Mouth/Throat: Oropharynx is clear and moist.   Neck: Neck supple. No thyromegaly present.   Cardiovascular: Normal rate, regular rhythm and normal heart sounds.    No murmur heard.  Pulmonary/Chest: Effort normal and breath sounds normal. Right breast exhibits no mass. Left breast exhibits no mass.   Abdominal: Soft. She exhibits no distension. There is no hepatosplenomegaly. There is no tenderness.   Genitourinary: No breast tenderness.   Lymphadenopathy:     She has no cervical adenopathy.   Neurological: She is alert and oriented to person, place, and time.   Skin: Skin is warm and dry.   Psychiatric: She has a normal mood and affect. Her speech is normal and behavior is normal. Judgment and thought content normal. Cognition and memory are normal.       Assessment/Plan   Dara was seen today for annual exam.    Diagnoses and all orders for this visit:    Well adult exam    Other orders  -     Flu Vaccine Quad PF >18YR  -     teriparatide (FORTEO) 600 MCG/2.4ML injection; Inject 0.08 mL under the skin Daily.  -     omeprazole (priLOSEC) 40 MG capsule; Take 1 capsule by mouth Daily.  -     atorvastatin (LIPITOR) 20 MG tablet; Take 1 tablet by mouth Daily.  -     Tdap Vaccine Greater Than or Equal To 6yo IM        Discussion    Patient presents today for a CPE.      Patient follows a healthy diet.   Patient follows an adequate exercise regimen. Mammogram is up to date.   Colon " cancer screening is up to date.   Pap smear no longer performed secondary to hysterectomy.   Immunizations are as per orders.   DEXA is up to date.    HLD.  I reviewed cholesterol labs with the patient.  I think the patient needs to consider a cholesterol medication.  I would do blood work every 3 months initially to monitor liver enzymes.  Side effects could include muscle aches which the patient should let me know if they have.     Hypothyroidism.  Continue levothyroxine.    OP.  Rx for Forteo to complete to total year of therapy.    RA.  Followed by rheumatology.   IFG.  Continued attention to healthy diet.  Pulmonary fibrosis.  Continue with pulmonary.     Common variable immunodeficiency.  Continue with allergy.        Health Maintenance   Topic Date Due   • TDAP/TD VACCINES (1 - Tdap) 04/09/1974   • INFLUENZA VACCINE  08/01/2017   • HEMOGLOBIN A1C  04/17/2018   • LIPID PANEL  10/17/2018   • URINE MICROALBUMIN  10/17/2018   • MAMMOGRAM  08/14/2019   • DXA SCAN  10/17/2019   • COLONOSCOPY  01/01/2025   • PNEUMOCOCCAL VACCINE (19-64 MEDIUM RISK)  Completed   • HEPATITIS C SCREENING  Completed   • ZOSTER VACCINE  Addressed   • DIABETIC FOOT EXAM  Excluded   • PAP SMEAR  Excluded   • DIABETIC EYE EXAM  Excluded            Future Appointments  Date Time Provider Department Center   11/15/2017 9:00 AM NON CBC CHAIR 12  [facility]   12/6/2017 9:00 AM NON CBC CHAIR 12  [facility]   1/23/2018 8:45 AM Randi Yang MD [facility] None

## 2017-11-08 RX ORDER — BUPROPION HYDROCHLORIDE 150 MG/1
TABLET ORAL
Qty: 30 TABLET | Refills: 3 | Status: SHIPPED | OUTPATIENT
Start: 2017-11-08 | End: 2017-12-07 | Stop reason: SDUPTHER

## 2017-11-15 ENCOUNTER — INFUSION (OUTPATIENT)
Dept: ONCOLOGY | Facility: HOSPITAL | Age: 62
End: 2017-11-15

## 2017-11-15 VITALS
SYSTOLIC BLOOD PRESSURE: 152 MMHG | HEART RATE: 102 BPM | WEIGHT: 196 LBS | BODY MASS INDEX: 32.12 KG/M2 | TEMPERATURE: 97.8 F | OXYGEN SATURATION: 100 % | DIASTOLIC BLOOD PRESSURE: 84 MMHG

## 2017-11-15 DIAGNOSIS — D83.9 COMMON VARIABLE IMMUNODEFICIENCY (HCC): Primary | ICD-10-CM

## 2017-11-15 PROCEDURE — 25010000002 IMMUNE GLOBULIN (HUMAN) 20 GM/200ML SOLUTION: Performed by: ALLERGY & IMMUNOLOGY

## 2017-11-15 PROCEDURE — 96365 THER/PROPH/DIAG IV INF INIT: CPT | Performed by: NURSE PRACTITIONER

## 2017-11-15 PROCEDURE — 96366 THER/PROPH/DIAG IV INF ADDON: CPT | Performed by: NURSE PRACTITIONER

## 2017-11-15 RX ORDER — ACETAMINOPHEN 325 MG/1
650 TABLET ORAL ONCE
Status: CANCELLED
Start: 2017-11-15 | End: 2017-11-15

## 2017-11-15 RX ORDER — ACETAMINOPHEN 500 MG
500 TABLET ORAL EVERY 4 HOURS PRN
Status: CANCELLED
Start: 2017-11-15

## 2017-11-15 RX ADMIN — IMMUNE GLOBULIN INFUSION (HUMAN) 20 G: 100 INJECTION, SOLUTION INTRAVENOUS; SUBCUTANEOUS at 10:46

## 2017-11-15 RX ADMIN — IMMUNE GLOBULIN INFUSION (HUMAN) 20 G: 100 INJECTION, SOLUTION INTRAVENOUS; SUBCUTANEOUS at 08:54

## 2017-11-22 PROBLEM — M17.11 PRIMARY OSTEOARTHRITIS OF RIGHT KNEE: Status: ACTIVE | Noted: 2017-11-22

## 2017-12-06 ENCOUNTER — APPOINTMENT (OUTPATIENT)
Dept: ONCOLOGY | Facility: HOSPITAL | Age: 62
End: 2017-12-06

## 2017-12-06 NOTE — PROGRESS NOTES
Pre-Operative Orthopedic Assessment      Patient: Dara Medina    YOB: 1955      Age/Gender: 62 y.o. female  Medical Record Number: [id number]  Surgical Procedure Planned: OR TOTAL KNEE ARTHROPLASTY [71520] (TOTAL KNEE ARTHROPLASTY)     Surgeon: Zion Guo MD    Date of Surgery Planned: 12/14/2017    Question Value Score    Patient Score   1. What is your age group? 50-65 years  66-75 years  >75 years =2  =1  =0 2   2. Gender? Male  Female =2  =1 1   3. How far on average can you walk? (a block is 200 meters) Two blocks or more (+\- rest)  1-2 blocks (+\- rest)  Housebound (most of time) =2  =1  =0 1   4. Which gait aid to you use? (more often than not) None  Single-Point Stick  Crutches/Frame =2  =1  =0 2   5. Do you use community  supports? (home help, meals on wheels, district nursing) None or one per week  Two or more per week =1  =0 1   6. Will you live with someone who can care for you after your operation? Yes  No =3  =0 3      Your Score (out of 12)  10     Key Destination at Discharge from acute care predicted by score   Scores < 6  -- extended inpatient rehabilitation   Scores 6-9 -- additional intervention to discharge directly home (Rehabilitation in home)   Scores > 9 -- directly home         Discharge Disposition/Planning:     Patient Response   Discussed the Predicted discharge disposition needed based on RAPT Assessment with the patient.    yes   Patient selected discharge disposition:   home   Out Patient Rehabilitation Facility of Choice:    n/a   Home Health Services Preferred:   undecided   Has the patient completed or been scheduled to complete pre-operative testing? Scheduled for 12/7/17    Post-Operative Care Giver Name and Phone Number:    Valeriano (son) [phone]     Subacute Inpatient Rehabilitation:  Complete this section only if planning inpatient services at a Subacute Facility     Patient Response   Subacute Facility Preferred (Please list 2  facilities:   n/a   Requires pre-certification for inpatient rehabilitation services?      n/a   Planned source of transportation to inpatient rehabilitation facility?   n/a    If choosing inpatient services at an Acute or Subacute Facility please list a subsequent back-up plan (in case patient fails to qualify for inpatient rehabilitation). Back-up plans should include caregiver (family member or friend) for first 24-48 post- -operatively.    n/a   Home Equipment Patient Response   Does patient have a walker for home use?    yes   Does patient have a 3 in 1 commode for home use?    no   Does patient have a shower chair for home use?    yes   Does patient have an elevated commode seat for home use? Has higher height commode   Does patient have a cane for home use?    no   Is there any other medical equipment in the home? If so,  List in comment section below no   Pre-Operative Class Attendance Patient Response   Attended or scheduled to attend the pre-operative class within 1 year of total joint replacement? Provided info, will try to attend tomorrow morning 12/7   Not planning to attend the pre-operative class (see comments below)    n/a   Patient Education  Completed   Expected time of discharge discussed yes   Encouraged to attend Pre-Operative Class    yes   Education re: Back-up plan for patients planning discharge to subacute facilities n/a   Education re: Predicted Discharge Disposition based on RAPT score    yes   Patient receptive and voiced understanding of information given    yes                                                                                                            Comments:    Spoke with Dara Medina at [phone].  Verified home address to be correct on face sheet of [address] [place] [address].   Lives in a single story house with her mother who she just moved here from [place].  1 step into the home.  She informs that she isn't currently using any assistive devices  at this time.  Does plan home at discharge with assist of family as needed, and home health (would need list of home health agencies and obtain patients preference) vs outpatient (patient to discuss plan with MD).  Nicole Gatica RN                                           Signature: Nicole Gatica RN    Date:  12/6/2017

## 2017-12-07 ENCOUNTER — APPOINTMENT (OUTPATIENT)
Dept: PREADMISSION TESTING | Facility: HOSPITAL | Age: 62
End: 2017-12-07

## 2017-12-07 VITALS
OXYGEN SATURATION: 100 % | SYSTOLIC BLOOD PRESSURE: 129 MMHG | DIASTOLIC BLOOD PRESSURE: 87 MMHG | TEMPERATURE: 98.6 F | RESPIRATION RATE: 20 BRPM | HEART RATE: 89 BPM

## 2017-12-07 DIAGNOSIS — M17.11 PRIMARY OSTEOARTHRITIS OF RIGHT KNEE: ICD-10-CM

## 2017-12-07 LAB
ANION GAP SERPL CALCULATED.3IONS-SCNC: 12 MMOL/L
BILIRUB UR QL STRIP: NEGATIVE
BUN BLD-MCNC: 11 MG/DL (ref 8–23)
BUN/CREAT SERPL: 10.4 (ref 7–25)
CALCIUM SPEC-SCNC: 9.2 MG/DL (ref 8.6–10.5)
CHLORIDE SERPL-SCNC: 105 MMOL/L (ref 98–107)
CLARITY UR: CLEAR
CO2 SERPL-SCNC: 25 MMOL/L (ref 22–29)
COLOR UR: YELLOW
CREAT BLD-MCNC: 1.06 MG/DL (ref 0.57–1)
DEPRECATED RDW RBC AUTO: 56.6 FL (ref 37–54)
ERYTHROCYTE [DISTWIDTH] IN BLOOD BY AUTOMATED COUNT: 17.1 % (ref 11.7–13)
GFR SERPL CREATININE-BSD FRML MDRD: 53 ML/MIN/1.73
GLUCOSE BLD-MCNC: 81 MG/DL (ref 65–99)
GLUCOSE UR STRIP-MCNC: NEGATIVE MG/DL
HCT VFR BLD AUTO: 34 % (ref 35.6–45.5)
HGB BLD-MCNC: 10.7 G/DL (ref 11.9–15.5)
HGB UR QL STRIP.AUTO: NEGATIVE
KETONES UR QL STRIP: NEGATIVE
LEUKOCYTE ESTERASE UR QL STRIP.AUTO: NEGATIVE
MCH RBC QN AUTO: 28.6 PG (ref 26.9–32)
MCHC RBC AUTO-ENTMCNC: 31.5 G/DL (ref 32.4–36.3)
MCV RBC AUTO: 90.9 FL (ref 80.5–98.2)
NITRITE UR QL STRIP: NEGATIVE
PH UR STRIP.AUTO: 7 [PH] (ref 5–8)
PLATELET # BLD AUTO: 362 10*3/MM3 (ref 140–500)
PMV BLD AUTO: 10.5 FL (ref 6–12)
POTASSIUM BLD-SCNC: 4.4 MMOL/L (ref 3.5–5.2)
PROT UR QL STRIP: NEGATIVE
RBC # BLD AUTO: 3.74 10*6/MM3 (ref 3.9–5.2)
SODIUM BLD-SCNC: 142 MMOL/L (ref 136–145)
SP GR UR STRIP: 1.01 (ref 1–1.03)
UROBILINOGEN UR QL STRIP: NORMAL
WBC NRBC COR # BLD: 7.42 10*3/MM3 (ref 4.5–10.7)

## 2017-12-07 PROCEDURE — 93010 ELECTROCARDIOGRAM REPORT: CPT | Performed by: INTERNAL MEDICINE

## 2017-12-07 PROCEDURE — 80048 BASIC METABOLIC PNL TOTAL CA: CPT | Performed by: ORTHOPAEDIC SURGERY

## 2017-12-07 PROCEDURE — 85027 COMPLETE CBC AUTOMATED: CPT | Performed by: ORTHOPAEDIC SURGERY

## 2017-12-07 PROCEDURE — 81003 URINALYSIS AUTO W/O SCOPE: CPT | Performed by: ORTHOPAEDIC SURGERY

## 2017-12-07 PROCEDURE — 93005 ELECTROCARDIOGRAM TRACING: CPT

## 2017-12-07 PROCEDURE — 36415 COLL VENOUS BLD VENIPUNCTURE: CPT

## 2017-12-07 RX ORDER — CHLORHEXIDINE GLUCONATE 500 MG/1
1 CLOTH TOPICAL TAKE AS DIRECTED
COMMUNITY
End: 2017-12-15 | Stop reason: HOSPADM

## 2017-12-07 RX ORDER — PREDNISONE 1 MG/1
15 TABLET ORAL 2 TIMES DAILY
COMMUNITY
End: 2018-03-08 | Stop reason: ALTCHOICE

## 2017-12-07 RX ORDER — BUPROPION HYDROCHLORIDE 150 MG/1
150 TABLET ORAL EVERY MORNING
COMMUNITY
End: 2019-09-23

## 2017-12-07 NOTE — DISCHARGE INSTRUCTIONS
Take the following medications the morning of surgery with a small sip of water:    prilosec    General Instructions:  • Do not eat solid food after midnight the night before surgery.  • You may drink clear liquids day of surgery but must stop at least one hour before your hospital arrival time.  • It is beneficial for you to have a clear drink that contains carbohydrates the day of surgery.  We suggest a 12 to 20 ounce bottle of Gatorade or Powerade for non-diabetic patients or a 12 to 20 ounce bottle of G2 or Powerade Zero for diabetic patients. (Pediatric patients, are not advised to drink a 12 to 20 ounce carbohydrate drink)    Clear liquids are liquids you can see through.  Nothing red in color.     Plain water                               Sports drinks  Sodas                                   Gelatin (Jell-O)  Fruit juices without pulp such as white grape juice and apple juice  Popsicles that contain no fruit or yogurt  Tea or coffee (no cream or milk added)  Gatorade / Powerade  G2 / Powerade Zero    • Infants may have breast milk up to four hours before surgery.  • Infants drinking formula may drink formula up to six hours before surgery.   • Patients who avoid smoking, chewing tobacco and alcohol for 4 weeks prior to surgery have a reduced risk of post-operative complications.  Quit smoking as many days before surgery as you can.  • Do not smoke, use chewing tobacco or drink alcohol the day of surgery.   • If applicable bring your C-PAP/ BI-PAP machine.  • Bring any papers given to you in the doctor’s office.  • Wear clean comfortable clothes and socks.  • Do not wear contact lenses or make-up.  Bring a case for your glasses.   • Bring crutches or walker if applicable.  • Remove all piercings.  Leave jewelry and any other valuables at home.  • The Pre-Admission Testing nurse will instruct you to bring medications if unable to obtain an accurate list in Pre-Admission Testing.        If you were given a  blood bank ID arm band remember to bring it with you the day of surgery.    Preventing a Surgical Site Infection:  • For 2 to 3 days before surgery, avoid shaving with a razor because the razor can irritate skin and make it easier to develop an infection.  • The night prior to surgery sleep in a clean bed with clean clothing.  Do not allow pets to sleep with you.  • Shower on the morning of surgery using a fresh bar of anti-bacterial soap (such as Dial) and clean washcloth.  Dry with a clean towel and dress in clean clothing.  • Ask your surgeon if you will be receiving antibiotics prior to surgery.  • Make sure you, your family, and all healthcare providers clean their hands with soap and water or an alcohol based hand  before caring for you or your wound.    Day of surgery:12/14/17   1030  Upon arrival, a Pre-op nurse and Anesthesiologist will review your health history, obtain vital signs, and answer questions you may have.  The only belongings needed at this time will be your home medications and if applicable your C-PAP/BI-PAP machine.  If you are staying overnight your family can leave the rest of your belongings in the car and bring them to your room later.  A Pre-op nurse will start an IV and you may receive medication in preparation for surgery, including something to help you relax.  Your family will be able to see you in the Pre-op area.  While you are in surgery your family should notify the waiting room  if they leave the waiting room area and provide a contact phone number.    Please be aware that surgery does come with discomfort.  We want to make every effort to control your discomfort so please discuss any uncontrolled symptoms with your nurse.   Your doctor will most likely have prescribed pain medications.      If you are going home after surgery you will receive individualized written care instructions before being discharged.  A responsible adult must drive you to and from  the hospital on the day of your surgery and stay with you for 24 hours.    If you are staying overnight following surgery, you will be transported to your hospital room following the recovery period.  [facility] has all private rooms.    If you have any questions please call Pre-Admission Testing at [phone].  Deductibles and co-payments are collected on the day of service. Please be prepared to pay the required co-pay, deductible or deposit on the day of service as defined by your plan.    2% CHLORAHEXIDINE GLUCONATE* CLOTH  Preparing or “prepping” skin before surgery can reduce the risk of infection at the surgical site. To make the process easier, [facility] has chosen disposable cloths moistened with a rinse-free, 2% Chlorhexidine Gluconate (CHG) antiseptic solution. The steps below outline the prepping process and should be carefully followed.        Use the prep cloth on the area that is circled in the diagram             Directions Night before Surgery  1) Shower using a fresh bar of anti-bacterial soap (such as Dial) and clean washcloth.  Use a clean towel to completely dry your skin.  2) Do not use any lotions, oils or creams on your skin.  3) Open the package and remove 1 cloth, wipe your skin for 30 seconds in a circular motion.  Allow to dry for 3 minutes.  4) Repeat #3 with second cloth.  5) Do not touch your eyes, ears, or mouth with the prep cloth.  6) Allow the wet prep solution to air dry.  7) Discard the prep cloth and wash your hands with soap and water.   8) Dress in clean bed clothes and sleep on fresh clean bed sheets.   9) You may experience some temporary itching after the prep.    Directions Day of Surgery  1) Repeat steps 1,2,3,4,5,6,7, and 9.   2) Dress in clean clothes before coming to the hospital.    BACTROBAN NASAL OINTMENT  There are many germs normally in your nose. Bactroban is an ointment that will help reduce these germs. Please follow these  instructions for Bactroban use:    ____Two days before surgery in the evening Date________    __1__The day before surgery in the morning  Date__12/13/17______    __2__The day before surgery in the evening              Date__12/13/17______    __3__The day of surgery in the morning    Date__12/14/17______    **Squirt ½ package of Bactroban Ointment onto a cotton applicator and apply to inside of 1st nostril.  Squirt the remaining Bactroban and apply to the inside of the other nostril.    PERIDEX- ORAL:  Use only if your surgeon has ordered  Use the night before and morning of surgery - Swish, gargle, and spit - do not swallow.

## 2017-12-11 ENCOUNTER — OFFICE VISIT (OUTPATIENT)
Dept: ORTHOPEDIC SURGERY | Facility: CLINIC | Age: 62
End: 2017-12-11

## 2017-12-11 VITALS — BODY MASS INDEX: 32.14 KG/M2 | WEIGHT: 200 LBS | TEMPERATURE: 98.8 F | HEIGHT: 66 IN

## 2017-12-11 DIAGNOSIS — Z01.818 PRE-OP EVALUATION: ICD-10-CM

## 2017-12-11 DIAGNOSIS — M17.11 PRIMARY OSTEOARTHRITIS OF RIGHT KNEE: Primary | ICD-10-CM

## 2017-12-11 PROCEDURE — S0260 H&P FOR SURGERY: HCPCS | Performed by: ORTHOPAEDIC SURGERY

## 2017-12-11 PROCEDURE — 77077 JOINT SURVEY SINGLE VIEW: CPT | Performed by: ORTHOPAEDIC SURGERY

## 2017-12-11 RX ORDER — DULOXETIN HYDROCHLORIDE 30 MG/1
CAPSULE, DELAYED RELEASE ORAL
Refills: 0 | Status: ON HOLD | COMMUNITY
Start: 2017-11-15 | End: 2017-12-14

## 2017-12-11 RX ORDER — CYANOCOBALAMIN 1000 UG/ML
INJECTION, SOLUTION INTRAMUSCULAR; SUBCUTANEOUS
Qty: 1 ML | Refills: 2 | Status: SHIPPED | OUTPATIENT
Start: 2017-12-11 | End: 2018-03-09 | Stop reason: SDUPTHER

## 2017-12-12 ENCOUNTER — INFUSION (OUTPATIENT)
Dept: ONCOLOGY | Facility: HOSPITAL | Age: 62
End: 2017-12-12

## 2017-12-12 VITALS
SYSTOLIC BLOOD PRESSURE: 162 MMHG | TEMPERATURE: 97.6 F | BODY MASS INDEX: 33.16 KG/M2 | HEART RATE: 91 BPM | WEIGHT: 202.4 LBS | DIASTOLIC BLOOD PRESSURE: 92 MMHG

## 2017-12-12 DIAGNOSIS — D83.9 COMMON VARIABLE IMMUNODEFICIENCY (HCC): Primary | ICD-10-CM

## 2017-12-12 PROCEDURE — 96365 THER/PROPH/DIAG IV INF INIT: CPT | Performed by: NURSE PRACTITIONER

## 2017-12-12 PROCEDURE — 25010000002 IMMUNE GLOBULIN (HUMAN) 20 GM/200ML SOLUTION: Performed by: ALLERGY & IMMUNOLOGY

## 2017-12-12 PROCEDURE — 96366 THER/PROPH/DIAG IV INF ADDON: CPT | Performed by: NURSE PRACTITIONER

## 2017-12-12 RX ORDER — ACETAMINOPHEN 500 MG
500 TABLET ORAL EVERY 4 HOURS PRN
Status: CANCELLED
Start: 2017-12-12

## 2017-12-12 RX ORDER — ACETAMINOPHEN 325 MG/1
650 TABLET ORAL ONCE
Status: CANCELLED
Start: 2017-12-12 | End: 2017-12-12

## 2017-12-12 RX ADMIN — IMMUNE GLOBULIN INFUSION (HUMAN) 20 G: 100 INJECTION, SOLUTION INTRAVENOUS; SUBCUTANEOUS at 09:40

## 2017-12-12 RX ADMIN — IMMUNE GLOBULIN INFUSION (HUMAN) 20 G: 100 INJECTION, SOLUTION INTRAVENOUS; SUBCUTANEOUS at 12:00

## 2017-12-14 ENCOUNTER — ANESTHESIA EVENT (OUTPATIENT)
Dept: PERIOP | Facility: HOSPITAL | Age: 62
End: 2017-12-14

## 2017-12-14 ENCOUNTER — APPOINTMENT (OUTPATIENT)
Dept: GENERAL RADIOLOGY | Facility: HOSPITAL | Age: 62
End: 2017-12-14

## 2017-12-14 ENCOUNTER — HOSPITAL ENCOUNTER (INPATIENT)
Facility: HOSPITAL | Age: 62
LOS: 1 days | Discharge: HOME HEALTH | End: 2017-12-15
Attending: ORTHOPAEDIC SURGERY | Admitting: ORTHOPAEDIC SURGERY

## 2017-12-14 ENCOUNTER — ANESTHESIA (OUTPATIENT)
Dept: PERIOP | Facility: HOSPITAL | Age: 62
End: 2017-12-14

## 2017-12-14 DIAGNOSIS — M17.11 PRIMARY OSTEOARTHRITIS OF RIGHT KNEE: ICD-10-CM

## 2017-12-14 PROBLEM — M17.9 OSTEOARTHRITIS OF KNEE, UNSPECIFIED: Status: ACTIVE | Noted: 2017-12-14

## 2017-12-14 PROCEDURE — 25010000003 CEFAZOLIN IN DEXTROSE 2-4 GM/100ML-% SOLUTION: Performed by: ORTHOPAEDIC SURGERY

## 2017-12-14 PROCEDURE — 25010000002 NEOSTIGMINE PER 0.5 MG: Performed by: NURSE ANESTHETIST, CERTIFIED REGISTERED

## 2017-12-14 PROCEDURE — 25010000002 ONDANSETRON PER 1 MG: Performed by: ORTHOPAEDIC SURGERY

## 2017-12-14 PROCEDURE — 25010000002 FENTANYL CITRATE (PF) 100 MCG/2ML SOLUTION: Performed by: NURSE ANESTHETIST, CERTIFIED REGISTERED

## 2017-12-14 PROCEDURE — 25010000002 HYDROCORTISONE SODIUM SUCCINATE 100 MG RECONSTITUTED SOLUTION: Performed by: ANESTHESIOLOGY

## 2017-12-14 PROCEDURE — 27447 TOTAL KNEE ARTHROPLASTY: CPT | Performed by: ORTHOPAEDIC SURGERY

## 2017-12-14 PROCEDURE — 63710000001 PREDNISONE PER 5 MG: Performed by: ORTHOPAEDIC SURGERY

## 2017-12-14 PROCEDURE — 25010000002 MIDAZOLAM PER 1 MG: Performed by: ANESTHESIOLOGY

## 2017-12-14 PROCEDURE — 25010000002 VANCOMYCIN PER 500 MG: Performed by: NURSE ANESTHETIST, CERTIFIED REGISTERED

## 2017-12-14 PROCEDURE — 25010000002 HYDROMORPHONE PER 4 MG: Performed by: NURSE ANESTHETIST, CERTIFIED REGISTERED

## 2017-12-14 PROCEDURE — 25010000002 FENTANYL CITRATE (PF) 100 MCG/2ML SOLUTION: Performed by: ANESTHESIOLOGY

## 2017-12-14 PROCEDURE — 25010000002 DEXAMETHASONE PER 1 MG: Performed by: NURSE ANESTHETIST, CERTIFIED REGISTERED

## 2017-12-14 PROCEDURE — 25010000002 PROPOFOL 10 MG/ML EMULSION: Performed by: NURSE ANESTHETIST, CERTIFIED REGISTERED

## 2017-12-14 PROCEDURE — 63710000001 AZATHIOPRINE PER 50 MG: Performed by: ORTHOPAEDIC SURGERY

## 2017-12-14 PROCEDURE — 0SRC0J9 REPLACEMENT OF RIGHT KNEE JOINT WITH SYNTHETIC SUBSTITUTE, CEMENTED, OPEN APPROACH: ICD-10-PCS | Performed by: ORTHOPAEDIC SURGERY

## 2017-12-14 PROCEDURE — 73560 X-RAY EXAM OF KNEE 1 OR 2: CPT

## 2017-12-14 PROCEDURE — C1776 JOINT DEVICE (IMPLANTABLE): HCPCS | Performed by: ORTHOPAEDIC SURGERY

## 2017-12-14 PROCEDURE — C1713 ANCHOR/SCREW BN/BN,TIS/BN: HCPCS | Performed by: ORTHOPAEDIC SURGERY

## 2017-12-14 PROCEDURE — 25010000002 ONDANSETRON PER 1 MG: Performed by: NURSE ANESTHETIST, CERTIFIED REGISTERED

## 2017-12-14 DEVICE — GENESIS II BICONVEX PATELLA 23MM
Type: IMPLANTABLE DEVICE | Site: KNEE | Status: FUNCTIONAL
Brand: GENESIS II

## 2017-12-14 DEVICE — IMPLANTABLE DEVICE: Type: IMPLANTABLE DEVICE | Site: KNEE | Status: FUNCTIONAL

## 2017-12-14 DEVICE — LEGION POSTERIOR STABILIZED                                    OXINIUM FEMORAL SIZE 4 RIGHT
Type: IMPLANTABLE DEVICE | Site: KNEE | Status: FUNCTIONAL
Brand: LEGION

## 2017-12-14 DEVICE — GENESIS II NON-POROUS TIBIAL                                    BASEPLATE SIZE 3 RIGHT
Type: IMPLANTABLE DEVICE | Site: KNEE | Status: FUNCTIONAL
Brand: GENESIS II

## 2017-12-14 DEVICE — CMT BONE SIMPLEX/P TMYCIN FDOS SGL: Type: IMPLANTABLE DEVICE | Site: KNEE | Status: FUNCTIONAL

## 2017-12-14 DEVICE — GII PS HIGH FLEXION INSERT SZ 3-4 13MM
Type: IMPLANTABLE DEVICE | Site: KNEE | Status: FUNCTIONAL
Brand: GENESIS II

## 2017-12-14 RX ORDER — OXYCODONE AND ACETAMINOPHEN 7.5; 325 MG/1; MG/1
2 TABLET ORAL EVERY 4 HOURS PRN
Status: DISCONTINUED | OUTPATIENT
Start: 2017-12-14 | End: 2017-12-15 | Stop reason: HOSPADM

## 2017-12-14 RX ORDER — DIPHENHYDRAMINE HYDROCHLORIDE 50 MG/ML
12.5 INJECTION INTRAMUSCULAR; INTRAVENOUS
Status: DISCONTINUED | OUTPATIENT
Start: 2017-12-14 | End: 2017-12-14 | Stop reason: HOSPADM

## 2017-12-14 RX ORDER — ROCURONIUM BROMIDE 10 MG/ML
INJECTION, SOLUTION INTRAVENOUS AS NEEDED
Status: DISCONTINUED | OUTPATIENT
Start: 2017-12-14 | End: 2017-12-14 | Stop reason: SURG

## 2017-12-14 RX ORDER — BUPROPION HYDROCHLORIDE 150 MG/1
150 TABLET ORAL DAILY
Status: DISCONTINUED | OUTPATIENT
Start: 2017-12-14 | End: 2017-12-15 | Stop reason: HOSPADM

## 2017-12-14 RX ORDER — DOCUSATE SODIUM 100 MG/1
100 CAPSULE, LIQUID FILLED ORAL 2 TIMES DAILY PRN
Status: DISCONTINUED | OUTPATIENT
Start: 2017-12-14 | End: 2017-12-15 | Stop reason: HOSPADM

## 2017-12-14 RX ORDER — AZATHIOPRINE 50 MG/1
50 TABLET ORAL 3 TIMES DAILY
Status: DISCONTINUED | OUTPATIENT
Start: 2017-12-14 | End: 2017-12-15 | Stop reason: HOSPADM

## 2017-12-14 RX ORDER — MAGNESIUM HYDROXIDE 1200 MG/15ML
LIQUID ORAL AS NEEDED
Status: DISCONTINUED | OUTPATIENT
Start: 2017-12-14 | End: 2017-12-14 | Stop reason: HOSPADM

## 2017-12-14 RX ORDER — ONDANSETRON 2 MG/ML
4 INJECTION INTRAMUSCULAR; INTRAVENOUS EVERY 6 HOURS PRN
Status: DISCONTINUED | OUTPATIENT
Start: 2017-12-14 | End: 2017-12-15 | Stop reason: HOSPADM

## 2017-12-14 RX ORDER — FENTANYL CITRATE 50 UG/ML
50 INJECTION, SOLUTION INTRAMUSCULAR; INTRAVENOUS
Status: COMPLETED | OUTPATIENT
Start: 2017-12-14 | End: 2017-12-14

## 2017-12-14 RX ORDER — ONDANSETRON 2 MG/ML
4 INJECTION INTRAMUSCULAR; INTRAVENOUS EVERY 4 HOURS PRN
Status: DISCONTINUED | OUTPATIENT
Start: 2017-12-14 | End: 2017-12-15 | Stop reason: HOSPADM

## 2017-12-14 RX ORDER — HYDROCODONE BITARTRATE AND ACETAMINOPHEN 7.5; 325 MG/1; MG/1
1 TABLET ORAL ONCE AS NEEDED
Status: DISCONTINUED | OUTPATIENT
Start: 2017-12-14 | End: 2017-12-14 | Stop reason: HOSPADM

## 2017-12-14 RX ORDER — PROMETHAZINE HYDROCHLORIDE 25 MG/1
25 SUPPOSITORY RECTAL ONCE AS NEEDED
Status: DISCONTINUED | OUTPATIENT
Start: 2017-12-14 | End: 2017-12-14 | Stop reason: HOSPADM

## 2017-12-14 RX ORDER — NALOXONE HCL 0.4 MG/ML
0.1 VIAL (ML) INJECTION
Status: DISCONTINUED | OUTPATIENT
Start: 2017-12-14 | End: 2017-12-15 | Stop reason: HOSPADM

## 2017-12-14 RX ORDER — HYDRALAZINE HYDROCHLORIDE 20 MG/ML
5 INJECTION INTRAMUSCULAR; INTRAVENOUS
Status: DISCONTINUED | OUTPATIENT
Start: 2017-12-14 | End: 2017-12-14 | Stop reason: HOSPADM

## 2017-12-14 RX ORDER — FAMOTIDINE 10 MG/ML
20 INJECTION, SOLUTION INTRAVENOUS ONCE
Status: COMPLETED | OUTPATIENT
Start: 2017-12-14 | End: 2017-12-14

## 2017-12-14 RX ORDER — PROMETHAZINE HYDROCHLORIDE 25 MG/1
12.5 TABLET ORAL ONCE AS NEEDED
Status: DISCONTINUED | OUTPATIENT
Start: 2017-12-14 | End: 2017-12-14 | Stop reason: HOSPADM

## 2017-12-14 RX ORDER — HYDROMORPHONE HYDROCHLORIDE 1 MG/ML
0.5 INJECTION, SOLUTION INTRAMUSCULAR; INTRAVENOUS; SUBCUTANEOUS
Status: DISCONTINUED | OUTPATIENT
Start: 2017-12-14 | End: 2017-12-14 | Stop reason: HOSPADM

## 2017-12-14 RX ORDER — MIDAZOLAM HYDROCHLORIDE 1 MG/ML
2 INJECTION INTRAMUSCULAR; INTRAVENOUS
Status: COMPLETED | OUTPATIENT
Start: 2017-12-14 | End: 2017-12-14

## 2017-12-14 RX ORDER — OXYCODONE AND ACETAMINOPHEN 7.5; 325 MG/1; MG/1
1 TABLET ORAL ONCE AS NEEDED
Status: DISCONTINUED | OUTPATIENT
Start: 2017-12-14 | End: 2017-12-14 | Stop reason: HOSPADM

## 2017-12-14 RX ORDER — SODIUM CHLORIDE 0.9 % (FLUSH) 0.9 %
1-10 SYRINGE (ML) INJECTION AS NEEDED
Status: DISCONTINUED | OUTPATIENT
Start: 2017-12-14 | End: 2017-12-14 | Stop reason: HOSPADM

## 2017-12-14 RX ORDER — FLUMAZENIL 0.1 MG/ML
0.2 INJECTION INTRAVENOUS AS NEEDED
Status: DISCONTINUED | OUTPATIENT
Start: 2017-12-14 | End: 2017-12-14 | Stop reason: HOSPADM

## 2017-12-14 RX ORDER — LEVOTHYROXINE SODIUM 112 UG/1
112 TABLET ORAL
Status: DISCONTINUED | OUTPATIENT
Start: 2017-12-15 | End: 2017-12-15 | Stop reason: HOSPADM

## 2017-12-14 RX ORDER — PANTOPRAZOLE SODIUM 40 MG/1
40 TABLET, DELAYED RELEASE ORAL
Status: DISCONTINUED | OUTPATIENT
Start: 2017-12-15 | End: 2017-12-15 | Stop reason: HOSPADM

## 2017-12-14 RX ORDER — FENTANYL CITRATE 50 UG/ML
INJECTION, SOLUTION INTRAMUSCULAR; INTRAVENOUS AS NEEDED
Status: DISCONTINUED | OUTPATIENT
Start: 2017-12-14 | End: 2017-12-14 | Stop reason: SURG

## 2017-12-14 RX ORDER — ACETAMINOPHEN 325 MG/1
325 TABLET ORAL EVERY 4 HOURS PRN
Status: DISCONTINUED | OUTPATIENT
Start: 2017-12-14 | End: 2017-12-15 | Stop reason: HOSPADM

## 2017-12-14 RX ORDER — BISACODYL 10 MG
10 SUPPOSITORY, RECTAL RECTAL DAILY PRN
Status: DISCONTINUED | OUTPATIENT
Start: 2017-12-14 | End: 2017-12-15 | Stop reason: HOSPADM

## 2017-12-14 RX ORDER — PROMETHAZINE HYDROCHLORIDE 25 MG/1
25 TABLET ORAL ONCE AS NEEDED
Status: DISCONTINUED | OUTPATIENT
Start: 2017-12-14 | End: 2017-12-14 | Stop reason: HOSPADM

## 2017-12-14 RX ORDER — ASPIRIN 325 MG
325 TABLET ORAL DAILY
Status: DISCONTINUED | OUTPATIENT
Start: 2017-12-14 | End: 2017-12-15 | Stop reason: HOSPADM

## 2017-12-14 RX ORDER — SODIUM CHLORIDE 9 MG/ML
100 INJECTION, SOLUTION INTRAVENOUS CONTINUOUS
Status: DISCONTINUED | OUTPATIENT
Start: 2017-12-14 | End: 2017-12-15 | Stop reason: HOSPADM

## 2017-12-14 RX ORDER — LABETALOL HYDROCHLORIDE 5 MG/ML
5 INJECTION, SOLUTION INTRAVENOUS
Status: DISCONTINUED | OUTPATIENT
Start: 2017-12-14 | End: 2017-12-14 | Stop reason: HOSPADM

## 2017-12-14 RX ORDER — ONDANSETRON 2 MG/ML
4 INJECTION INTRAMUSCULAR; INTRAVENOUS ONCE AS NEEDED
Status: DISCONTINUED | OUTPATIENT
Start: 2017-12-14 | End: 2017-12-14 | Stop reason: HOSPADM

## 2017-12-14 RX ORDER — DULOXETIN HYDROCHLORIDE 60 MG/1
60 CAPSULE, DELAYED RELEASE ORAL DAILY
Status: DISCONTINUED | OUTPATIENT
Start: 2017-12-14 | End: 2017-12-15 | Stop reason: HOSPADM

## 2017-12-14 RX ORDER — SODIUM CHLORIDE, SODIUM LACTATE, POTASSIUM CHLORIDE, CALCIUM CHLORIDE 600; 310; 30; 20 MG/100ML; MG/100ML; MG/100ML; MG/100ML
9 INJECTION, SOLUTION INTRAVENOUS CONTINUOUS
Status: DISCONTINUED | OUTPATIENT
Start: 2017-12-14 | End: 2017-12-14 | Stop reason: HOSPADM

## 2017-12-14 RX ORDER — TRANEXAMIC ACID 100 MG/ML
INJECTION, SOLUTION INTRAVENOUS AS NEEDED
Status: DISCONTINUED | OUTPATIENT
Start: 2017-12-14 | End: 2017-12-14 | Stop reason: SURG

## 2017-12-14 RX ORDER — NALOXONE HCL 0.4 MG/ML
0.2 VIAL (ML) INJECTION AS NEEDED
Status: DISCONTINUED | OUTPATIENT
Start: 2017-12-14 | End: 2017-12-14 | Stop reason: HOSPADM

## 2017-12-14 RX ORDER — EPHEDRINE SULFATE 50 MG/ML
5 INJECTION, SOLUTION INTRAVENOUS ONCE AS NEEDED
Status: DISCONTINUED | OUTPATIENT
Start: 2017-12-14 | End: 2017-12-14 | Stop reason: HOSPADM

## 2017-12-14 RX ORDER — LIDOCAINE HYDROCHLORIDE 20 MG/ML
INJECTION, SOLUTION INFILTRATION; PERINEURAL AS NEEDED
Status: DISCONTINUED | OUTPATIENT
Start: 2017-12-14 | End: 2017-12-14 | Stop reason: SURG

## 2017-12-14 RX ORDER — FENTANYL CITRATE 50 UG/ML
50 INJECTION, SOLUTION INTRAMUSCULAR; INTRAVENOUS
Status: DISCONTINUED | OUTPATIENT
Start: 2017-12-14 | End: 2017-12-14 | Stop reason: HOSPADM

## 2017-12-14 RX ORDER — PROMETHAZINE HYDROCHLORIDE 25 MG/ML
12.5 INJECTION, SOLUTION INTRAMUSCULAR; INTRAVENOUS ONCE AS NEEDED
Status: DISCONTINUED | OUTPATIENT
Start: 2017-12-14 | End: 2017-12-14 | Stop reason: HOSPADM

## 2017-12-14 RX ORDER — CEFAZOLIN SODIUM 2 G/100ML
2 INJECTION, SOLUTION INTRAVENOUS ONCE
Status: DISCONTINUED | OUTPATIENT
Start: 2017-12-14 | End: 2017-12-14 | Stop reason: HOSPADM

## 2017-12-14 RX ORDER — DEXAMETHASONE SODIUM PHOSPHATE 10 MG/ML
INJECTION INTRAMUSCULAR; INTRAVENOUS AS NEEDED
Status: DISCONTINUED | OUTPATIENT
Start: 2017-12-14 | End: 2017-12-14 | Stop reason: SURG

## 2017-12-14 RX ORDER — CEFAZOLIN SODIUM 2 G/100ML
2 INJECTION, SOLUTION INTRAVENOUS EVERY 8 HOURS
Status: COMPLETED | OUTPATIENT
Start: 2017-12-14 | End: 2017-12-15

## 2017-12-14 RX ORDER — ONDANSETRON 2 MG/ML
INJECTION INTRAMUSCULAR; INTRAVENOUS AS NEEDED
Status: DISCONTINUED | OUTPATIENT
Start: 2017-12-14 | End: 2017-12-14 | Stop reason: SURG

## 2017-12-14 RX ORDER — PROPOFOL 10 MG/ML
VIAL (ML) INTRAVENOUS AS NEEDED
Status: DISCONTINUED | OUTPATIENT
Start: 2017-12-14 | End: 2017-12-14 | Stop reason: SURG

## 2017-12-14 RX ORDER — GLYCOPYRROLATE 0.2 MG/ML
INJECTION INTRAMUSCULAR; INTRAVENOUS AS NEEDED
Status: DISCONTINUED | OUTPATIENT
Start: 2017-12-14 | End: 2017-12-14 | Stop reason: SURG

## 2017-12-14 RX ORDER — ACETAMINOPHEN 325 MG/1
650 TABLET ORAL EVERY 4 HOURS PRN
Status: DISCONTINUED | OUTPATIENT
Start: 2017-12-14 | End: 2017-12-15 | Stop reason: HOSPADM

## 2017-12-14 RX ORDER — CYCLOBENZAPRINE HCL 10 MG
10 TABLET ORAL 3 TIMES DAILY PRN
Status: DISCONTINUED | OUTPATIENT
Start: 2017-12-14 | End: 2017-12-15 | Stop reason: HOSPADM

## 2017-12-14 RX ADMIN — AZATHIOPRINE 50 MG: 50 TABLET ORAL at 21:34

## 2017-12-14 RX ADMIN — FENTANYL CITRATE 25 MCG: 50 INJECTION, SOLUTION INTRAMUSCULAR; INTRAVENOUS at 14:54

## 2017-12-14 RX ADMIN — HYDROCORTISONE SODIUM SUCCINATE 100 MG: 100 INJECTION, POWDER, FOR SOLUTION INTRAMUSCULAR; INTRAVENOUS at 11:29

## 2017-12-14 RX ADMIN — FENTANYL CITRATE 50 MCG: 50 INJECTION INTRAMUSCULAR; INTRAVENOUS at 15:48

## 2017-12-14 RX ADMIN — SODIUM CHLORIDE, POTASSIUM CHLORIDE, SODIUM LACTATE AND CALCIUM CHLORIDE 9 ML/HR: 600; 310; 30; 20 INJECTION, SOLUTION INTRAVENOUS at 11:33

## 2017-12-14 RX ADMIN — FENTANYL CITRATE 50 MCG: 50 INJECTION INTRAMUSCULAR; INTRAVENOUS at 15:41

## 2017-12-14 RX ADMIN — VANCOMYCIN HYDROCHLORIDE 1.5 G: 1 INJECTION, POWDER, LYOPHILIZED, FOR SOLUTION INTRAVENOUS at 13:30

## 2017-12-14 RX ADMIN — TRANEXAMIC ACID 1000 MG: 100 INJECTION, SOLUTION INTRAVENOUS at 14:03

## 2017-12-14 RX ADMIN — ONDANSETRON 4 MG: 2 INJECTION INTRAMUSCULAR; INTRAVENOUS at 23:15

## 2017-12-14 RX ADMIN — LIDOCAINE HYDROCHLORIDE 60 MG: 20 INJECTION, SOLUTION INFILTRATION; PERINEURAL at 13:00

## 2017-12-14 RX ADMIN — BUPROPION HYDROCHLORIDE 150 MG: 150 TABLET, EXTENDED RELEASE ORAL at 18:51

## 2017-12-14 RX ADMIN — MUPIROCIN: 20 OINTMENT TOPICAL at 18:51

## 2017-12-14 RX ADMIN — CEFAZOLIN SODIUM 2 G: 2 INJECTION, SOLUTION INTRAVENOUS at 21:34

## 2017-12-14 RX ADMIN — FAMOTIDINE 20 MG: 10 INJECTION, SOLUTION INTRAVENOUS at 11:29

## 2017-12-14 RX ADMIN — FENTANYL CITRATE 25 MCG: 50 INJECTION, SOLUTION INTRAMUSCULAR; INTRAVENOUS at 14:35

## 2017-12-14 RX ADMIN — Medication 2 MG: at 11:56

## 2017-12-14 RX ADMIN — SODIUM CHLORIDE 100 ML/HR: 9 INJECTION, SOLUTION INTRAVENOUS at 18:51

## 2017-12-14 RX ADMIN — GLYCOPYRROLATE 0.4 MG: 0.2 INJECTION INTRAMUSCULAR; INTRAVENOUS at 14:15

## 2017-12-14 RX ADMIN — PROPOFOL 150 MG: 10 INJECTION, EMULSION INTRAVENOUS at 13:00

## 2017-12-14 RX ADMIN — FENTANYL CITRATE 100 MCG: 50 INJECTION, SOLUTION INTRAMUSCULAR; INTRAVENOUS at 13:28

## 2017-12-14 RX ADMIN — Medication 1 MG: at 11:30

## 2017-12-14 RX ADMIN — FENTANYL CITRATE 100 MCG: 50 INJECTION, SOLUTION INTRAMUSCULAR; INTRAVENOUS at 13:00

## 2017-12-14 RX ADMIN — OXYCODONE HYDROCHLORIDE AND ACETAMINOPHEN 2 TABLET: 7.5; 325 TABLET ORAL at 18:54

## 2017-12-14 RX ADMIN — PROPOFOL 50 MG: 10 INJECTION, EMULSION INTRAVENOUS at 13:28

## 2017-12-14 RX ADMIN — SODIUM CHLORIDE, POTASSIUM CHLORIDE, SODIUM LACTATE AND CALCIUM CHLORIDE: 600; 310; 30; 20 INJECTION, SOLUTION INTRAVENOUS at 14:00

## 2017-12-14 RX ADMIN — FENTANYL CITRATE 50 MCG: 50 INJECTION INTRAMUSCULAR; INTRAVENOUS at 11:30

## 2017-12-14 RX ADMIN — PREDNISONE 15 MG: 5 TABLET ORAL at 18:51

## 2017-12-14 RX ADMIN — ONDANSETRON 4 MG: 2 INJECTION INTRAMUSCULAR; INTRAVENOUS at 14:15

## 2017-12-14 RX ADMIN — NEOSTIGMINE METHYLSULFATE 2 MG: 1 INJECTION INTRAMUSCULAR; INTRAVENOUS; SUBCUTANEOUS at 14:15

## 2017-12-14 RX ADMIN — DEXAMETHASONE SODIUM PHOSPHATE 6 MG: 10 INJECTION INTRAMUSCULAR; INTRAVENOUS at 13:15

## 2017-12-14 RX ADMIN — ROCURONIUM BROMIDE 50 MG: 10 INJECTION INTRAVENOUS at 13:00

## 2017-12-14 RX ADMIN — FENTANYL CITRATE 50 MCG: 50 INJECTION INTRAMUSCULAR; INTRAVENOUS at 11:56

## 2017-12-14 RX ADMIN — HYDROMORPHONE HYDROCHLORIDE 0.5 MG: 1 INJECTION, SOLUTION INTRAMUSCULAR; INTRAVENOUS; SUBCUTANEOUS at 15:53

## 2017-12-14 RX ADMIN — DULOXETINE HYDROCHLORIDE 60 MG: 60 CAPSULE, DELAYED RELEASE ORAL at 18:51

## 2017-12-14 RX ADMIN — ASPIRIN 325 MG: 325 TABLET ORAL at 21:34

## 2017-12-14 ASSESSMENT — KOOS JR
KOOS JR SCORE: 20
KOOS JR SCORE: 36.931

## 2017-12-14 NOTE — ANESTHESIA PROCEDURE NOTES
Peripheral Block    Patient location during procedure: pre-op  Start time: 12/14/2017 11:55 AM  Stop time: 12/14/2017 12:05 PM  Reason for block: at surgeon's request and post-op pain management  Performed by  Anesthesiologist: BRIANNE PARRY  Preanesthetic Checklist  Completed: patient identified, site marked, surgical consent, pre-op evaluation, timeout performed, IV checked, risks and benefits discussed and monitors and equipment checked  Prep:  Prep: ChloraPrep  Patient monitoring: continuous pulse oximetry  Procedure  Sedation:yes  Performed under: PNB  Guidance:ultrasound guided  ULTRASOUND INTERPRETATION.  Using ultrasound guidance a 20 G gauge needle was placed in close proximity to the femoral nerve, at which point, under ultrasound guidance anesthetic was injected in the area of the nerve and spread of the anesthesia was seen on ultrasound in close proximity thereto.  There were no abnormalities seen on ultrasound; a digital image was taken; and the patient tolerated the procedure with no complications. Images:still images obtained    Laterality:right  Block Type:adductor canal block  Injection Technique:single-shot  Needle Type:echogenic  Needle Gauge:20 G    Medications  Depomedrol:40 mg  Local Injected:ropivacaine 0.5% Local Amount Injected:30mL  Post Assessment  Injection Assessment: negative aspiration for heme, no paresthesia on injection and incremental injection  Patient Tolerance:comfortable throughout block  Complications:no

## 2017-12-14 NOTE — PLAN OF CARE
Problem: Patient Care Overview (Adult)  Goal: Plan of Care Review  Outcome: Ongoing (interventions implemented as appropriate)    12/14/17 9084   Coping/Psychosocial Response Interventions   Plan Of Care Reviewed With patient   Patient Care Overview   Progress improving   Outcome Evaluation   Outcome Summary/Follow up Plan pain under control with pain mediaction. patient up to chair today with assist. patient educated about ra and ra medications       Goal: Adult Individualization and Mutuality  Outcome: Ongoing (interventions implemented as appropriate)  Goal: Discharge Needs Assessment  Outcome: Ongoing (interventions implemented as appropriate)    Problem: Perioperative Period (Adult)  Goal: Signs and Symptoms of Listed Potential Problems Will be Absent or Manageable (Perioperative Period)  Outcome: Ongoing (interventions implemented as appropriate)    Problem: Fall Risk (Adult)  Goal: Identify Related Risk Factors and Signs and Symptoms  Outcome: Outcome(s) achieved Date Met:  12/14/17  Goal: Absence of Falls  Outcome: Ongoing (interventions implemented as appropriate)    Problem: Knee Replacement, Total (Adult)  Goal: Signs and Symptoms of Listed Potential Problems Will be Absent or Manageable (Knee Replacement, Total)  Outcome: Ongoing (interventions implemented as appropriate)

## 2017-12-14 NOTE — BRIEF OP NOTE
TOTAL KNEE ARTHROPLASTY  Progress Note    Dara Medina  12/14/2017    Pre-op Diagnosis:   Primary osteoarthritis of right knee [M17.11]       Post-Op Diagnosis Codes:     * Primary osteoarthritis of right knee [M17.11]    Procedure/CPT® Codes:      Procedure(s):  TOTAL KNEE ARTHROPLASTY    Surgeon(s):  Zion Guo MD    Anesthesia: General with Block    Staff:   Circulator: Bridgette Henson RN  Scrub Person: Enrique Sims; Sera Carrera  Vendor Representative: Petr Love  Assistant: Adan Pate CSA  [place]ee: Mahi Baron RN    Estimated Blood Loss: minimal    Urine Voided: * No values recorded between 12/14/2017 12:54 PM and 12/14/2017  2:39 PM *    Specimens:                None      Drains:   Drain/Device Site 12/14/17 1422 Right upper knee collapsible closed device (Active)           Findings: see dictation    Complications: none      Zion Guo MD     Date: 12/14/2017  Time: 2:39 PM

## 2017-12-14 NOTE — PROGRESS NOTES
History & Physical       Patient: Dara Medina    YOB: 1955    Medical Record Number: [id number]    Chief Complaints: Right knee endstage osteoarthritis    History of Present Illness: 62 y.o. female presents with right knee osteoarthritis.  Patient has a long history of worsening symptoms.  Describes the pain as severe, constant, and typically dull and achy.  Patient has tried and failed prolonged conservative treatment.  Patient is struggling with routine daily activities and this has become a significant issue for overall quality of life.  Patient cannot walk any prolonged distances without having to rest.  Patient presents today in anticipation of a total knee arthroplasty     Allergies:   Allergies   Allergen Reactions   • Amoxicillin-Pot Clavulanate Diarrhea     itching   • Codeine Nausea And Vomiting       Medications:   Home Medications:    Current Outpatient Prescriptions:   •  azaTHIOprine (IMURAN) 50 MG tablet, Take 50 mg by mouth 3 (Three) Times a Day., Disp: , Rfl: 5  •  buPROPion XL (WELLBUTRIN XL) 150 MG 24 hr tablet, Take 150 mg by mouth Every Morning., Disp: , Rfl:   •  DULoxetine (CYMBALTA) 30 MG capsule, TAKE ONE CAPSULE BY MOUTH WITH 60 MG CAPSULE DAILY (TOTAL 90 MG), Disp: , Rfl: 0  •  DULoxetine (CYMBALTA) 60 MG capsule, Take 60 mg by mouth daily., Disp: , Rfl:   •  folic acid (FOLVITE) 1 MG tablet, Take 1 mg by mouth daily., Disp: , Rfl:   •  omeprazole (priLOSEC) 40 MG capsule, Take 1 capsule by mouth Daily., Disp: 90 capsule, Rfl: 3  •  predniSONE (DELTASONE) 5 MG tablet, Take 15 mg by mouth 2 (Two) Times a Day. Pt  has permission to increase if has flare up per Dr Contreras., Disp: , Rfl:   •  SYNTHROID 112 MCG tablet, Take 1 tablet by mouth Daily., Disp: 90 tablet, Rfl: 3  •  XELJANZ XR 11 MG tablet sustained-release 24 hour, Take 11 mg by mouth Every Morning. Pt to talk with Dr Avila 12/11/17 about whether to stop or not before surgery, Disp: , Rfl:   •   Aspirin-Acetaminophen-Caffeine (EXCEDRIN MIGRAINE PO), Take 2 tablets by mouth As Needed., Disp: , Rfl:   •  Chlorhexidine Gluconate Cloth 2 % pads, Apply 1 application topically. USE AS DIRECTED PREOP, Disp: , Rfl:   •  Cholecalciferol (VITAMIN D3) 5000 UNITS capsule capsule, Take 5,000 Units by mouth 2 (Two) Times a Day., Disp: , Rfl:   •  cyanocobalamin 1000 MCG/ML injection, INJECT 1,000 MCG AS DIRECTED EVERY 30 (THIRTY) DAYS., Disp: 1 mL, Rfl: 2  •  cyclobenzaprine (FLEXERIL) 10 MG tablet, Take 10 mg by mouth 3 (Three) Times a Day As Needed., Disp: , Rfl:   •  mupirocin (BACTROBAN) 2 % nasal ointment, 1 application into each nostril. USE AS DIRECTED PREOP, Disp: , Rfl:     Past Medical History:   Diagnosis Date   • Acute colitis 1/18/2017   • Allergic Codeine, some antibiotics   • Anemia    • Cataract Removed    2012   • Chronic depression    • Colon polyp 1 removed    2016   • Fracture of rib    • GERD (gastroesophageal reflux disease)    • Headache    • History of bronchitis    • History of pneumonia    • History of transfusion    • Hyperlipidemia    • Hypothyroidism    • Hypothyroidism    • Inflammatory bowel disease    • Iron deficiency    • Irritable bowel syndrome    • Obesity    • Osteopenia    • Osteoporosis    • Osteoporosis    • Osteoporosis    • Pneumonia June 2016   • RA (rheumatoid arthritis)    • Sensory neuropathy    • Sepsis, unspecified organism 1/18/2017   • Vitamin D deficiency           Past Surgical History:   Procedure Laterality Date   • ADENOIDECTOMY     • BREAST AUGMENTATION     • BREAST IMPLANT REMOVAL Bilateral    • COLONOSCOPY     • EYE SURGERY Bilateral    • HYSTERECTOMY     • LASIK Bilateral    • ORIF WRIST FRACTURE Right    • SINUS SURGERY      x2   • TONSILLECTOMY            Social History     Occupational History   • Not on file.     Social History Main Topics   • Smoking status: Former Smoker     Packs/day: 0.50     Years: 5.00     Types: Cigarettes     Start date: 4/1/1974      "Quit date: 4/1/1978   • Smokeless tobacco: Never Used      Comment: QUIT AGE 23   • Alcohol use No      Comment: STOPPED 1997   • Drug use: No   • Sexual activity: Not on file      Social History     Social History Narrative          Family History   Problem Relation Age of Onset   • Hyperlipidemia Mother    • Hypertension Mother    • Colon cancer Father    • Diabetes Father    • Cancer Father    • Hyperlipidemia Brother    • Malig Hyperthermia Neg Hx        Review of Systems:  A 14 point review of systems is reviewed with the patient.  Pertinent positives are listed above.  All others are negative.    Physical Exam: 62 y.o. female    Vitals:    12/11/17 0910   Temp: 98.8 °F (37.1 °C)   TempSrc: Temporal Artery    Weight: 90.7 kg (200 lb)   Height: 166.4 cm (65.51\")       General:  Patient is awake and alert.  Appears in no acute distress or discomfort.    Psych:  Affect and demeanor are appropriate.    Eyes:  Conjunctiva and sclera appear grossly normal.  Eyes track well and EOM seem to be intact.    Ears:  No gross abnormalities.  Hearing adequate for the exam.    Cardiovascular:  Regular rate and rhythm.    Lungs:  Good chest expansion.  Breathing unlabored.    Lymph:  No palpable adenopathy about neck or axilla.    Right lower extremity:  Skin benign and intact without evidence for swelling, masses or atrophy.  No palpable masses. Focal tenderness noted over medial joint line.  .  ROM is from near full extension to 110 of flexion.  Knee is stable on exam.  Good strength throughout the lower leg and foot.  Intact sensation throughout.  Palpable pedal pulses with brisk cap refill.    Diagnostic Tests:  Lab Results   Component Value Date    GLUCOSE 81 12/07/2017    CALCIUM 9.2 12/07/2017     12/07/2017    K 4.4 12/07/2017    CO2 25.0 12/07/2017     12/07/2017    BUN 11 12/07/2017    CREATININE 1.06 (H) 12/07/2017    EGFRIFAFRI 58 (L) 10/17/2017    EGFRIFNONA 53 (L) 12/07/2017    BCR 10.4 12/07/2017    " ANIONGAP 12.0 12/07/2017     Lab Results   Component Value Date    WBC 7.42 12/07/2017    HGB 10.7 (L) 12/07/2017    HCT 34.0 (L) 12/07/2017    MCV 90.9 12/07/2017     12/07/2017     No results found for: INR, PROTIME    Imaging:  Previous x-rays of the knee are reviewed.  The studies show moderate medial compartment osteoarthritis.  A full length alignment film is ordered and reviewed for presurgical planning.   Alignment is varus    Assessment:  Right knee endstage osteoarthritis    Plan: We will plan on proceeding with a right total knee arthroplasty at the patient's request..  I reviewed details of procedure with patient today and discussed all the risks, benefits, alternatives, and limitations of the procedure in laymen's terms with the risks including but not limited to:  neurovascular damage resulting in permanent dysfunction or footdrop and potential need for further surgery, bleeding, infection, hematoma, chronic pain, worsening of pain, persistent symptoms potentially necessitating revision, prosthesis related problems including loosening or allergy, swelling, loss of motion and arthrofibrosis, weakness, stiffness, instability, DVT, pulmonary embolus, death, stroke, complex regional pain syndrome, and need for additional procedures.  Patient verbalized understanding, and was given the opportunity to ask and have all questions answered today.  No guarantees were given regarding results of surgery.      Date: 12/14/2017    Zion Guo MD

## 2017-12-14 NOTE — ANESTHESIA POSTPROCEDURE EVALUATION
Patient: Dara Medina    Procedure Summary     Date Anesthesia Start Anesthesia Stop Room / Location    12/14/17 1263 [id number]  [facility] /  [facility]       Procedure Diagnosis Surgeon Provider    TOTAL KNEE ARTHROPLASTY (Right Knee) Primary osteoarthritis of right knee  (Primary osteoarthritis of right knee [M17.11]) MD Farhad Crow MD          Anesthesia Type: general  Last vitals  BP   164/79 (12/14/17 1515)   Temp   37.1 °C (98.8 °F) (12/14/17 1459)   Pulse   109 (12/14/17 1515)   Resp   16 (12/14/17 1515)     SpO2   94 % (12/14/17 1515)     Post Anesthesia Care and Evaluation    Patient location during evaluation: PACU  Patient participation: complete - patient participated  Level of consciousness: awake and alert  Pain management: adequate  Airway patency: patent  Anesthetic complications: No anesthetic complications    Cardiovascular status: acceptable  Respiratory status: acceptable  Hydration status: acceptable    Comments: --------------------            12/14/17               1515     --------------------   BP:       164/79     Pulse:     109       Resp:       16       Temp:                SpO2:      94%      --------------------

## 2017-12-14 NOTE — ANESTHESIA PROCEDURE NOTES
Airway  Urgency: elective    Date/Time: 12/14/2017 1:02 PM  Airway not difficult    General Information and Staff    Patient location during procedure: OR  Anesthesiologist: LILLIAM SINHA  CRNA: TISHA CODY    Indications and Patient Condition  Indications for airway management: airway protection    Preoxygenated: yes  MILS maintained throughout  Mask difficulty assessment: 1 - vent by mask    Final Airway Details  Final airway type: endotracheal airway      Successful airway: ETT  Cuffed: yes   Successful intubation technique: direct laryngoscopy  Endotracheal tube insertion site: oral  Blade: Misti  Blade size: #3  ETT size: 7.0 mm  Cormack-Lehane Classification: grade IIa - partial view of glottis  Placement verified by: chest auscultation and capnometry   Measured from: teeth  ETT to teeth (cm): 22  Number of attempts at approach: 1

## 2017-12-14 NOTE — ANESTHESIA PREPROCEDURE EVALUATION
Anesthesia Evaluation     Patient summary reviewed and Nursing notes reviewed   no history of anesthetic complications:  NPO Solid Status: > 8 hours  NPO Liquid Status: > 8 hours     Airway   Mallampati: II  Dental - normal exam     Pulmonary - normal exam   Cardiovascular - normal exam    (+) hyperlipidemia      Neuro/Psych  (+) numbness, psychiatric history Depression,    GI/Hepatic/Renal/Endo    (+) obesity,  GERD,     Musculoskeletal     Abdominal    Substance History      OB/GYN          Other   (+) arthritis                                     Anesthesia Plan    ASA 3     general     intravenous induction   Anesthetic plan and risks discussed with patient.

## 2017-12-14 NOTE — PLAN OF CARE
Problem: Patient Care Overview (Adult)  Goal: Plan of Care Review  Outcome: Ongoing (interventions implemented as appropriate)    12/14/17 1037   Coping/Psychosocial Response Interventions   Plan Of Care Reviewed With patient   Patient Care Overview   Progress no change       Goal: Adult Individualization and Mutuality  Outcome: Ongoing (interventions implemented as appropriate)    12/14/17 1037   Individualization   Patient Specific Preferences none       Goal: Discharge Needs Assessment  Outcome: Ongoing (interventions implemented as appropriate)    12/14/17 1037   Discharge Needs Assessment   Concerns To Be Addressed no discharge needs identified         Problem: Perioperative Period (Adult)  Goal: Signs and Symptoms of Listed Potential Problems Will be Absent or Manageable (Perioperative Period)  Outcome: Ongoing (interventions implemented as appropriate)    12/14/17 1037   Perioperative Period   Problems Assessed (Perioperative Period) all   Problems Present (Perioperative Period) pain;physiologic stress response

## 2017-12-14 NOTE — ADDENDUM NOTE
Addendum  created 12/14/17 1619 by Farhad Baum MD    Anesthesia Event edited, Anesthesia Staff edited

## 2017-12-14 NOTE — OP NOTE
Orthopedic Operative Note      Facility: [facility]      Patient: Dara Medina     Medical Record Number: [id number]     YOB: 1955     Dictating Surgeon: Zion Guo M.D.     Primary Care Physician: Randi Yang MD     Date of Operation: 12/14/2017     ________________________________________________________________________      PREOPERATIVE DIAGNOSIS: Right knee endstage osteoarthritis      POSTOPERATIVE DIAGNOSIS:  Right knee endstage osteoarthritis      PROCEDURE PERFORMED: AL TOTAL KNEE ARTHROPLASTY [14875] (TOTAL KNEE ARTHROPLASTY)      SURGEON: Zion Guo MD       ASSISTANT: Adan Pate      ANESTHESIA:    1. Regional followed general.    2. Local administration of Exparel solution      COMPLICATION: None.       ESTIMATED BLOOD LOSS: Less than 100 mL.       Specimens: * No orders in the log *      TOURNIQUET TIME: 70 minutes.       IMPLANTS:    1. Smith and Nephew size 4 PS Legion Oxinium femoral   component.    2. [place] [employer] Nephew size 3 Batool II nonporous tibial baseplate.    3. Size 13 mm  polyethylene insert.    3. Size 23 mm biconvex patellar component.       INDICATIONS: The patient is a 62 y.o. female with a long history of   worsening knee osteoarthritis.  Patient had tried and failed prolonged conservative treatment. Following a thorough discussion of all options including both conservative and operative options, the patient elected to proceed with a total knee arthroplasty.      INFORMED CONSENT: Risks, benefits, and alternatives to total knee arthroplasty   were discussed with patient in detail. I explained that surgical risks include infection, hematoma, hardware related complications including failure of fixation, loosening, fracture, persistent pain and/or loss of motion, post-operative arthrofibrosis, iatrogenic nerve and/or blood vessel injury resulting in permanent weakness, numbness or dysfunction, DVT, PE, positioning related neuropraxia, and  anesthesia related complications resulting in death.    Patient did acknowledge understanding the information and consented to proceed.         DESCRIPTION OF PROCEDURE: The patient and operative site were identified in the   preoperative holding area. The surgical site was marked. Following this,   adequate regional anesthesia of the operative extremity was administered. The   patient was then taken to the operating room and placed in the supine position.   Adequate general anesthesia was administered and then patient was repositioned on   the operating table in the supine position. Timeout was taken and preoperative   antibiotics administered. The    extremity was cleaned and prepped in the standard sterile fashion. I cleaned   the extremity with an alcohol solution. A Hibiclens scrub was performed and   then the extremity was prepped with ChloraPrep x2. I allowed this to dry for 3   minutes before the draping procedure was carried out. The extremity was   exsanguinated with an Esmarch bandage and the tourniquet insufflated to 250   mmHg. Again, a timeout had been taken and preoperative antibiotics administered   prior to surgical incision.     An approximately 10 cm incision was fashioned over   the anterior aspect of the knee. This was carried down through the skin and   subcutaneous tissues. Full-thickness medial and lateral skin flaps were   developed. The underlying extensor mechanism was exposed. A standard medial   parapatellar arthrotomy was performed and the joint was entered. The   infrapatellar fat pad was removed. Anterior horns of the medial and lateral   menisci were released along with the ACL. The joint was examined. The joint demonstrated   extensive arthrosis involving all 3 compartments. The medial compartment   demonstrated extensive wear and this matched up with the preoperative imaging and the deformity noted on exam.      An opening was created in the distal femur and the intramedullary    alignment bianka for the distal femoral cutting guide was inserted. The 5 degree valgus   guide was pinned into position and then the distal femoral cut carried out in   typical fashion. The cut portion of the bone was removed followed by the cutting   guide. The posterior referencing guide was then inserted and I measured the   distal femur. It measured a 4. The 4 cutting block was pinned into position and   then the anterior posterior and chamfer cuts were carried out in typical   fashion. The cut portions of the bone were removed. The cuts seemed to match up   very nicely with the cortex and some of the peripheral osteophytes were removed   at this point. I then directed my attention to the tibia. The extramedullary   cutting guide for the tibia was inserted. This was centered at the knee and   ankle. I placed the alignment bianka parallel to the anterior face of the tibia. I   made sure to measure roughly 9 mm off the high or lateral side and 2 mm off   the low or medial side. I checked the cuts several times to make sure the   alignment was appropriate and then pinned the guide into position, I again   checked the cuts and positioned retractors appropriately to allow for   protection of the neurovascular structures. An oscillating saw was used to carry   out the tibial cut in typical fashion, taking care not to penetrate posteriorly so   as to prevent iatrogenic injury to the neurovascular structures. Cut portion of   the bone was removed and then I examined the cut. I checked the flexion and   extension gaps. They seemed to be well-balanced. The medial and lateral menisci   were removed. The posterior capsule was carefully infiltrated with some of the   Exparel solution, taking care to aspirate before injecting. I trialed with a 4   femur, 3 tibia, and it seemed to fit well, restored good motion and stability   with an 13 trial polyethylene. When the tibial and femoral preparations were   completed, I had my  assistant mix 1 batch of Simplex bone cement using current   generation cement mixing technique. While my assistant was mixing the cement, I   examined the patella. It did demonstrate significant arthrosis and I felt that   that resurfacing of that was necessary.       The patella was reamed and prepared in typical fashion. I trialed   with the 23 mm biconvex trial component. The patella seemed to track well. I   did not feel that a lateral release was necessary in this case. Once the cement   was prepared, the components were impacted into position. The excess extruded   cement was carefully removed with a Cleveland elevator. The knee was left in full   extension with the 13 trial polyethylene in position. I also kept the patella   clamped until the cement had fully cured. Once the cement had cured, I again   trialed the knee, and again, it demonstrated excellent motion and stability. At   this point, the trial polyethylene was removed and the final component impacted   into position. I took care to make sure the medial and lateral dovetails were   fully interdigitated and that the polyethylene was well-seated before again   carrying the knee through a range of motion. Again, the knee demonstrated   excellent motion and stability. Again, the patella was noted to track well, the   knee demonstrated full extension and flexion past 130 degrees, and flexion and   extension gaps seemed to be well-balanced. The knee was irrigated out with 500   mL of a Betadine-containing saline solution followed by 3L of sterile saline   mixed with bacitracin via pulsatile lavage. I infiltrated the periarticular   soft tissues with the remaining Exparel solution. The leg was placed in about   60' of flexion and then the extensor mechanism repaired using multiple 0 Vicryl   sutures, interrupted Vicryl was used to repair the subcutaneous, and then a   running subcuticular Monocryl stitch was used to close the skin followed by   Dermabond.      Sterile dressings were applied to the wound and the drapes were   withdrawn. The patient tolerated the procedure well. There were no   complications. The patient was taken to the recovery room in good condition.         Zion Guo M.D.      Date: 12/14/2017     cc: Randi Yang MD

## 2017-12-14 NOTE — H&P (VIEW-ONLY)
History & Physical       Patient: Dara Medina    YOB: 1955    Medical Record Number: [id number]    Chief Complaints: Right knee endstage osteoarthritis    History of Present Illness: 62 y.o. female presents with right knee osteoarthritis.  Patient has a long history of worsening symptoms.  Describes the pain as severe, constant, and typically dull and achy.  Patient has tried and failed prolonged conservative treatment.  Patient is struggling with routine daily activities and this has become a significant issue for overall quality of life.  Patient cannot walk any prolonged distances without having to rest.  Patient presents today in anticipation of a total knee arthroplasty     Allergies:   Allergies   Allergen Reactions   • Amoxicillin-Pot Clavulanate Diarrhea     itching   • Codeine Nausea And Vomiting       Medications:   Home Medications:    Current Outpatient Prescriptions:   •  azaTHIOprine (IMURAN) 50 MG tablet, Take 50 mg by mouth 3 (Three) Times a Day., Disp: , Rfl: 5  •  buPROPion XL (WELLBUTRIN XL) 150 MG 24 hr tablet, Take 150 mg by mouth Every Morning., Disp: , Rfl:   •  DULoxetine (CYMBALTA) 30 MG capsule, TAKE ONE CAPSULE BY MOUTH WITH 60 MG CAPSULE DAILY (TOTAL 90 MG), Disp: , Rfl: 0  •  DULoxetine (CYMBALTA) 60 MG capsule, Take 60 mg by mouth daily., Disp: , Rfl:   •  folic acid (FOLVITE) 1 MG tablet, Take 1 mg by mouth daily., Disp: , Rfl:   •  omeprazole (priLOSEC) 40 MG capsule, Take 1 capsule by mouth Daily., Disp: 90 capsule, Rfl: 3  •  predniSONE (DELTASONE) 5 MG tablet, Take 15 mg by mouth 2 (Two) Times a Day. Pt  has permission to increase if has flare up per Dr Contreras., Disp: , Rfl:   •  SYNTHROID 112 MCG tablet, Take 1 tablet by mouth Daily., Disp: 90 tablet, Rfl: 3  •  XELJANZ XR 11 MG tablet sustained-release 24 hour, Take 11 mg by mouth Every Morning. Pt to talk with Dr Avila 12/11/17 about whether to stop or not before surgery, Disp: , Rfl:   •   Aspirin-Acetaminophen-Caffeine (EXCEDRIN MIGRAINE PO), Take 2 tablets by mouth As Needed., Disp: , Rfl:   •  Chlorhexidine Gluconate Cloth 2 % pads, Apply 1 application topically. USE AS DIRECTED PREOP, Disp: , Rfl:   •  Cholecalciferol (VITAMIN D3) 5000 UNITS capsule capsule, Take 5,000 Units by mouth 2 (Two) Times a Day., Disp: , Rfl:   •  cyanocobalamin 1000 MCG/ML injection, INJECT 1,000 MCG AS DIRECTED EVERY 30 (THIRTY) DAYS., Disp: 1 mL, Rfl: 2  •  cyclobenzaprine (FLEXERIL) 10 MG tablet, Take 10 mg by mouth 3 (Three) Times a Day As Needed., Disp: , Rfl:   •  mupirocin (BACTROBAN) 2 % nasal ointment, 1 application into each nostril. USE AS DIRECTED PREOP, Disp: , Rfl:     Past Medical History:   Diagnosis Date   • Acute colitis 1/18/2017   • Allergic Codeine, some antibiotics   • Anemia    • Cataract Removed    2012   • Chronic depression    • Colon polyp 1 removed    2016   • Fracture of rib    • GERD (gastroesophageal reflux disease)    • Headache    • History of bronchitis    • History of pneumonia    • History of transfusion    • Hyperlipidemia    • Hypothyroidism    • Hypothyroidism    • Inflammatory bowel disease    • Iron deficiency    • Irritable bowel syndrome    • Obesity    • Osteopenia    • Osteoporosis    • Osteoporosis    • Osteoporosis    • Pneumonia June 2016   • RA (rheumatoid arthritis)    • Sensory neuropathy    • Sepsis, unspecified organism 1/18/2017   • Vitamin D deficiency           Past Surgical History:   Procedure Laterality Date   • ADENOIDECTOMY     • BREAST AUGMENTATION     • BREAST IMPLANT REMOVAL Bilateral    • COLONOSCOPY     • EYE SURGERY Bilateral    • HYSTERECTOMY     • LASIK Bilateral    • ORIF WRIST FRACTURE Right    • SINUS SURGERY      x2   • TONSILLECTOMY            Social History     Occupational History   • Not on file.     Social History Main Topics   • Smoking status: Former Smoker     Packs/day: 0.50     Years: 5.00     Types: Cigarettes     Start date: 4/1/1974      "Quit date: 4/1/1978   • Smokeless tobacco: Never Used      Comment: QUIT AGE 23   • Alcohol use No      Comment: STOPPED 1997   • Drug use: No   • Sexual activity: Not on file      Social History     Social History Narrative          Family History   Problem Relation Age of Onset   • Hyperlipidemia Mother    • Hypertension Mother    • Colon cancer Father    • Diabetes Father    • Cancer Father    • Hyperlipidemia Brother    • Malig Hyperthermia Neg Hx        Review of Systems:  A 14 point review of systems is reviewed with the patient.  Pertinent positives are listed above.  All others are negative.    Physical Exam: 62 y.o. female    Vitals:    12/11/17 0910   Temp: 98.8 °F (37.1 °C)   TempSrc: Temporal Artery    Weight: 90.7 kg (200 lb)   Height: 166.4 cm (65.51\")       General:  Patient is awake and alert.  Appears in no acute distress or discomfort.    Psych:  Affect and demeanor are appropriate.    Eyes:  Conjunctiva and sclera appear grossly normal.  Eyes track well and EOM seem to be intact.    Ears:  No gross abnormalities.  Hearing adequate for the exam.    Cardiovascular:  Regular rate and rhythm.    Lungs:  Good chest expansion.  Breathing unlabored.    Lymph:  No palpable adenopathy about neck or axilla.    Right lower extremity:  Skin benign and intact without evidence for swelling, masses or atrophy.  No palpable masses. Focal tenderness noted over medial joint line.  .  ROM is from near full extension to 110 of flexion.  Knee is stable on exam.  Good strength throughout the lower leg and foot.  Intact sensation throughout.  Palpable pedal pulses with brisk cap refill.    Diagnostic Tests:  Lab Results   Component Value Date    GLUCOSE 81 12/07/2017    CALCIUM 9.2 12/07/2017     12/07/2017    K 4.4 12/07/2017    CO2 25.0 12/07/2017     12/07/2017    BUN 11 12/07/2017    CREATININE 1.06 (H) 12/07/2017    EGFRIFAFRI 58 (L) 10/17/2017    EGFRIFNONA 53 (L) 12/07/2017    BCR 10.4 12/07/2017    " ANIONGAP 12.0 12/07/2017     Lab Results   Component Value Date    WBC 7.42 12/07/2017    HGB 10.7 (L) 12/07/2017    HCT 34.0 (L) 12/07/2017    MCV 90.9 12/07/2017     12/07/2017     No results found for: INR, PROTIME    Imaging:  Previous x-rays of the knee are reviewed.  The studies show moderate medial compartment osteoarthritis.  A full length alignment film is ordered and reviewed for presurgical planning.   Alignment is varus    Assessment:  Right knee endstage osteoarthritis    Plan: We will plan on proceeding with a right total knee arthroplasty at the patient's request..  I reviewed details of procedure with patient today and discussed all the risks, benefits, alternatives, and limitations of the procedure in laymen's terms with the risks including but not limited to:  neurovascular damage resulting in permanent dysfunction or footdrop and potential need for further surgery, bleeding, infection, hematoma, chronic pain, worsening of pain, persistent symptoms potentially necessitating revision, prosthesis related problems including loosening or allergy, swelling, loss of motion and arthrofibrosis, weakness, stiffness, instability, DVT, pulmonary embolus, death, stroke, complex regional pain syndrome, and need for additional procedures.  Patient verbalized understanding, and was given the opportunity to ask and have all questions answered today.  No guarantees were given regarding results of surgery.      Date: 12/14/2017    Zion Guo MD

## 2017-12-15 ENCOUNTER — TELEPHONE (OUTPATIENT)
Dept: ORTHOPEDIC SURGERY | Facility: CLINIC | Age: 62
End: 2017-12-15

## 2017-12-15 VITALS
DIASTOLIC BLOOD PRESSURE: 59 MMHG | BODY MASS INDEX: 33.69 KG/M2 | HEIGHT: 65 IN | RESPIRATION RATE: 18 BRPM | HEART RATE: 92 BPM | SYSTOLIC BLOOD PRESSURE: 115 MMHG | WEIGHT: 202.2 LBS | TEMPERATURE: 98.4 F | OXYGEN SATURATION: 95 %

## 2017-12-15 LAB
HCT VFR BLD AUTO: 29.1 % (ref 35.6–45.5)
HGB BLD-MCNC: 8.9 G/DL (ref 11.9–15.5)

## 2017-12-15 PROCEDURE — 97110 THERAPEUTIC EXERCISES: CPT

## 2017-12-15 PROCEDURE — 25010000002 VANCOMYCIN 10 G RECONSTITUTED SOLUTION: Performed by: ORTHOPAEDIC SURGERY

## 2017-12-15 PROCEDURE — 25010000002 ONDANSETRON PER 1 MG: Performed by: ORTHOPAEDIC SURGERY

## 2017-12-15 PROCEDURE — 63710000001 PREDNISONE PER 5 MG: Performed by: ORTHOPAEDIC SURGERY

## 2017-12-15 PROCEDURE — 25010000003 CEFAZOLIN IN DEXTROSE 2-4 GM/100ML-% SOLUTION: Performed by: ORTHOPAEDIC SURGERY

## 2017-12-15 PROCEDURE — 63710000001 AZATHIOPRINE PER 50 MG: Performed by: ORTHOPAEDIC SURGERY

## 2017-12-15 PROCEDURE — 85014 HEMATOCRIT: CPT | Performed by: ORTHOPAEDIC SURGERY

## 2017-12-15 PROCEDURE — 85018 HEMOGLOBIN: CPT | Performed by: ORTHOPAEDIC SURGERY

## 2017-12-15 PROCEDURE — 97161 PT EVAL LOW COMPLEX 20 MIN: CPT

## 2017-12-15 RX ORDER — ASPIRIN 325 MG
325 TABLET ORAL DAILY
Start: 2017-12-16 | End: 2018-03-08 | Stop reason: ALTCHOICE

## 2017-12-15 RX ORDER — OXYCODONE AND ACETAMINOPHEN 7.5; 325 MG/1; MG/1
TABLET ORAL
Qty: 60 TABLET | Refills: 0
Start: 2017-12-15 | End: 2018-01-23

## 2017-12-15 RX ORDER — PSEUDOEPHEDRINE HCL 30 MG
100 TABLET ORAL 2 TIMES DAILY
Start: 2017-12-15 | End: 2018-03-08 | Stop reason: ALTCHOICE

## 2017-12-15 RX ADMIN — LEVOTHYROXINE SODIUM 112 MCG: 112 TABLET ORAL at 05:09

## 2017-12-15 RX ADMIN — CEFAZOLIN SODIUM 2 G: 2 INJECTION, SOLUTION INTRAVENOUS at 05:09

## 2017-12-15 RX ADMIN — ASPIRIN 325 MG: 325 TABLET ORAL at 08:48

## 2017-12-15 RX ADMIN — OXYCODONE HYDROCHLORIDE AND ACETAMINOPHEN 2 TABLET: 7.5; 325 TABLET ORAL at 00:54

## 2017-12-15 RX ADMIN — OXYCODONE HYDROCHLORIDE AND ACETAMINOPHEN 2 TABLET: 7.5; 325 TABLET ORAL at 05:09

## 2017-12-15 RX ADMIN — PREDNISONE 15 MG: 5 TABLET ORAL at 08:48

## 2017-12-15 RX ADMIN — OXYCODONE HYDROCHLORIDE AND ACETAMINOPHEN 2 TABLET: 7.5; 325 TABLET ORAL at 13:13

## 2017-12-15 RX ADMIN — OXYCODONE HYDROCHLORIDE AND ACETAMINOPHEN 2 TABLET: 7.5; 325 TABLET ORAL at 08:48

## 2017-12-15 RX ADMIN — VANCOMYCIN HYDROCHLORIDE 1500 MG: 100 INJECTION, POWDER, LYOPHILIZED, FOR SOLUTION INTRAVENOUS at 01:32

## 2017-12-15 RX ADMIN — AZATHIOPRINE 50 MG: 50 TABLET ORAL at 08:48

## 2017-12-15 RX ADMIN — ONDANSETRON 4 MG: 2 INJECTION INTRAMUSCULAR; INTRAVENOUS at 07:38

## 2017-12-15 RX ADMIN — CYCLOBENZAPRINE HYDROCHLORIDE 10 MG: 10 TABLET, FILM COATED ORAL at 11:10

## 2017-12-15 RX ADMIN — MUPIROCIN: 20 OINTMENT TOPICAL at 08:48

## 2017-12-15 RX ADMIN — PANTOPRAZOLE SODIUM 40 MG: 40 TABLET, DELAYED RELEASE ORAL at 05:09

## 2017-12-15 RX ADMIN — BUPROPION HYDROCHLORIDE 150 MG: 150 TABLET, EXTENDED RELEASE ORAL at 08:48

## 2017-12-15 RX ADMIN — DULOXETINE HYDROCHLORIDE 60 MG: 60 CAPSULE, DELAYED RELEASE ORAL at 08:48

## 2017-12-15 NOTE — PLAN OF CARE
Problem: Patient Care Overview (Adult)  Goal: Plan of Care Review    12/15/17 0912   Coping/Psychosocial Response Interventions   Plan Of Care Reviewed With patient   Outcome Evaluation   Outcome Summary/Follow up Plan Pt is s/p R TKA 12/14. Pt reports independence prior to admission, no use of AD. Pt ambulating 150' w/ stand by assist, RW and step through pattern, cues for wt shift/heel strike. Pt safely able to climb 1 step required to enter home. RW and BSC ordered 12/15. DC PT - no further inpatient PT needs. Pt, dtr, and RN aware.

## 2017-12-15 NOTE — NURSING NOTE
"Non-Hospital problem list has hx of diabetes. Patient stated \" I do not know where they got that information. I have never been diagnosed with diabetes \".  "

## 2017-12-15 NOTE — THERAPY DISCHARGE NOTE
Acute Care - Physical Therapy Initial Eval/Discharge  [facility]     Patient Name: Dara Medina  : 1955  MRN: [id number]  Today's Date: 12/15/2017   Onset of Illness/Injury or Date of Surgery Date: 17            Admit Date: 2017    Visit Dx:    ICD-10-CM ICD-9-CM   1. Primary osteoarthritis of right knee M17.11 715.16     Patient Active Problem List   Diagnosis   • Hypothyroidism   • Sensory neuropathy   • Osteoporosis   • Vitamin D deficiency   • Common variable immunodeficiency   • Pulmonary fibrosis   • Iron deficiency anemia   • Pernicious anemia   • Rheumatoid arthritis   • IFG (impaired fasting glucose)   • Tear of medial meniscus of right knee, current   • Chronic pain of right knee   • Mixed hyperlipidemia   • Osteoarthrosis, localized, primary, knee   • Chronic depression   • Personal history of diabetes mellitus   • Primary osteoarthritis of right knee   • Osteoarthritis of knee, unspecified     Past Medical History:   Diagnosis Date   • Acute colitis 2017   • Allergic Codeine, some antibiotics   • Anemia    • Cataract Removed       • Chronic depression    • Colon polyp 1 removed       • Fracture of rib    • GERD (gastroesophageal reflux disease)    • Headache    • History of bronchitis    • History of pneumonia    • History of transfusion    • Hyperlipidemia    • Hypothyroidism    • Hypothyroidism    • Inflammatory bowel disease    • Iron deficiency    • Irritable bowel syndrome    • Obesity    • Osteopenia    • Osteoporosis    • Osteoporosis    • Osteoporosis    • Pneumonia 2016   • RA (rheumatoid arthritis)    • Sensory neuropathy    • Sepsis, unspecified organism 2017   • Vitamin D deficiency      Past Surgical History:   Procedure Laterality Date   • ADENOIDECTOMY     • BREAST AUGMENTATION     • BREAST IMPLANT REMOVAL Bilateral    • COLONOSCOPY     • EYE SURGERY Bilateral    • HYSTERECTOMY     • LASIK Bilateral    • ORIF WRIST FRACTURE Right    • SINUS  SURGERY      x2   • TONSILLECTOMY            PT ASSESSMENT (last 72 hours)      PT Evaluation       12/15/17 0844 12/14/17 1706    Rehab Evaluation    Document Type evaluation;discharge summary  -AR     Subjective Information agree to therapy  -AR     Patient Effort, Rehab Treatment good  -AR     Symptoms Noted During/After Treatment none  -AR     General Information    Patient Profile Review other (see comments)  -AR     Onset of Illness/Injury or Date of Surgery Date 12/14/17  -AR     Precautions/Limitations fall precautions  -AR     Prior Level of Function independent:  -AR     Barriers to Rehab none identified  -AR     Living Environment    Lives With parent(s)  -AR parent(s)  -AH    Living Arrangements house  -AR house  -AH    Home Accessibility stairs to enter home  -AR no concerns;bed and bath on same level;stairs to enter home  -AH    Number of Stairs to Enter Home 1  -AR 1  -AH    Stair Railings at Home  outside, present on right side  -AH    Type of Financial/Environmental Concern  none  -AH    Transportation Available  family or friend will provide;car  -    Living Environment Comment  na  -AH    Clinical Impression    Patient/Family Goals Statement DC home today  -AR     Criteria for Skilled Therapeutic Interventions Met no  -AR     Pain Assessment    Pain Assessment 0-10  -AR     Pain Score 6  -AR     Pain Type Surgical pain  -AR     Pain Location Knee  -AR     Pain Orientation Right  -AR     Pain Intervention(s) Repositioned;Cold applied;Medication (See MAR)  -AR     Cognitive Assessment/Intervention    Current Cognitive/Communication Assessment functional  -AR     Orientation Status oriented x 4  -AR     ROM (Range of Motion)    General ROM --   WFL except R knee  -AR     MMT (Manual Muscle Testing)    General MMT Assessment --   WFL except RLE  -AR     Bed Mobility, Assessment/Treatment    Bed Mobility, Assistive Device head of bed elevated  -AR     Bed Mob, Supine to Sit, [place] conditional  independence  -AR     Bed Mob, Sit to Supine, [place] not tested  -AR     Transfer Assessment/Treatment    Transfers, Sit-Stand [place] contact guard assist  -AR     Transfers, Stand-Sit [place] stand by assist  -AR     Transfers, Sit-Stand-Sit, Assist Device rolling walker  -AR     Gait Assessment/Treatment    Gait, [place] Level stand by assist  -AR     Gait, Assistive Device rolling walker  -AR     Gait, Distance (Feet) 150  -AR     Gait, Gait Pattern Analysis swing-through gait  -AR     Gait, Gait Deviations michele decreased;decreased heel strike  -AR     Gait, Safety Issues step length decreased  -AR     Gait, Impairments strength decreased;ROM decreased  -AR     Stairs Assessment/Treatment    Number of Stairs 1  -AR     Stairs, Handrail Location none  -AR     Stairs, Comment RW backwards  -AR     Therapy Exercises    Exercise Protocols total knee  -AR     Total Knee Exercises right:;10 reps;completed protocol  -AR     Positioning and Restraints    Pre-Treatment Position in bed  -AR     Post Treatment Position chair  -AR     In Chair reclined;sitting;call light within reach;encouraged to call for assist;exit alarm on;with family/caregiver;notified nsg  -AR       12/14/17 1703 12/14/17 1052    General Information    Equipment Currently Used at Home none  -AH none  -MS    Living Environment    Lives With  parent(s)  -MS    Living Arrangements  house  -MS    Home Accessibility  no concerns;bed and bath on same level  -MS    Transportation Available  car;family or friend will provide  -MS      User Key  (r) = Recorded By, (t) = Taken By, (c) = Cosigned By    Initials Name Provider Type     Laurence Roa, RN Registered Nurse    MS Marisabel Bernard, RN Registered Nurse    VICTORINO Salazar PT Physical Therapist              PT Recommendation and Plan  Anticipated Equipment Needs At Discharge:  (RW and BSC)  Anticipated Discharge Disposition: home with assist, home with home  health  PT Frequency: evaluation only  Plan of Care Review  Plan Of Care Reviewed With: patient  Outcome Summary/Follow up Plan: Pt is s/p R TKA 12/14.  Pt reports independence prior to admission, no use of AD.  Pt ambulating 150' w/ stand by assist, RW and step through pattern, cues for wt shift/heel strike.  Pt safely able to climb 1 step required to enter home.  RW and BSC ordered 12/15.  DC PT - no further inpatient PT needs. Pt, dtr, and RN aware.               Outcome Measures       12/15/17 0900          How much help from another person do you currently need...    Turning from your back to your side while in flat bed without using bedrails? 4  -AR      Moving from lying on back to sitting on the side of a flat bed without bedrails? 4  -AR      Moving to and from a bed to a chair (including a wheelchair)? 4  -AR      Standing up from a chair using your arms (e.g., wheelchair, bedside chair)? 4  -AR      Climbing 3-5 steps with a railing? 3  -AR      To walk in hospital room? 3  -AR      AM-PAC 6 Clicks Score 22  -AR      Functional Assessment    Outcome Measure Options AM-PAC 6 Clicks Basic Mobility (PT)  -AR        User Key  (r) = Recorded By, (t) = Taken By, (c) = Cosigned By    Initials Name Provider Type    AR Mehnaz Salazar PT Physical Therapist           Time Calculation:         PT Charges       12/15/17 0913          Time Calculation    Start Time 0846  -AR      Stop Time 0914  -AR      Time Calculation (min) 28 min  -AR      PT Received On 12/15/17  -AR        User Key  (r) = Recorded By, (t) = Taken By, (c) = Cosigned By    Initials Name Provider Type    VICTORINO Salazar PT Physical Therapist          Therapy Charges for Today     Code Description Service Date Service Provider Modifiers Qty    69831181072 HC PT EVAL LOW COMPLEXITY 2 12/15/2017 Mehnaz Salazar PT GP 1    34327458961 HC PT THER PROC EA 15 MIN 12/15/2017 Mehnaz Salazar PT GP 1    15159789859 HC PT THER SUPP EA 15 MIN 12/15/2017  Mehnaz Salazar, PT GP 1          PT G-Codes  Outcome Measure Options: AM-PAC 6 Clicks Basic Mobility (PT)    PT Discharge Summary  Anticipated Discharge Disposition: home with assist, home with home health    Mehnaz Salazar, PT  12/15/2017

## 2017-12-15 NOTE — PAYOR COMM NOTE
"Dara Medina (62 y.o. Female)     Date of Birth Social Security Number Address [place] Phone MRN    1955  [address] [place] [address] [phone] [id number]    Buddhist Marital Status          Rastafari        Admission Date Admission Type Admitting Provider Attending Provider Department, Room/Bed    12/14/17 Elective Zion Guo MD  [address], P786/1    Discharge Date Discharge Disposition Discharge Destination        12/15/2017 Home or Self Care Home            Attending Provider: (none)    Allergies:  Amoxicillin-pot Clavulanate, Codeine    Isolation:  None   Infection:  None   Code Status:  FULL    Ht:  165.1 cm (65\")   Wt:  91.7 kg (202 lb 3.2 oz)    Admission Cmt:  None   Principal Problem:  Primary osteoarthritis of right knee [M17.11] More...                 Active Insurance as of 12/14/2017     Primary Coverage     Payor Plan Insurance Group Employer/Plan Group    [employer] [facility] [id number]     Payor Plan Address Payor Plan Phone Number Effective From Effective To    [address] [phone] 1/1/2016     [place] [address]       Subscriber Name Subscriber Birth Date Member ID       DARA MEDINA 1955 [id number]                 Emergency Contacts      (Rel.) Home Phone Work Phone Mobile Phone    Mildred Hendricks (Daughter) [phone] -- --    Valeriano Quintana (Son) -- -- [phone]    Kera Quintana (Dtr In Law) (Daughter) [phone] -- --                 "

## 2017-12-15 NOTE — DISCHARGE SUMMARY
Date of Admission:  12/14/2017    Date of Discharge:  12/15/2017    Discharge Diagnosis: s/p Right total knee arthroplasty    Admitting Physician: Zion Guo    Consults: none    DETAILS OF HOSPITAL STAY:  Patient is a 62 y.o. female was admitted to the floor following a total knee arthroplasty.  Post-operatively the patient was transferred to the post-operative floor where the patient underwent physical therapy that included active as well as passive ROM exercises. Opioids were titrated to achieve appropriate pain management to allow for participation in mobilization exercises. Vital signs are now stable. The incision is benign without signs or symptoms of infection. Operative extremity neurovascular status remains intact. Appropriate education re: incision care, activity levels, medications, and follow up visits was completed and all questions were answered. The patient is now deemed stable for discharge to home.    Condition on Discharge:  Stable    Discharge Medications   Dara Medina   Home Medication Instructions ANAY:[id number]    Printed on:12/15/17 8288   Medication Information                      aspirin 325 MG tablet  Take 1 tablet by mouth Daily.             azaTHIOprine (IMURAN) 50 MG tablet  Take 50 mg by mouth 3 (Three) Times a Day.             buPROPion XL (WELLBUTRIN XL) 150 MG 24 hr tablet  Take 150 mg by mouth Every Morning.             Cholecalciferol (VITAMIN D3) 5000 UNITS capsule capsule  Take 5,000 Units by mouth 2 (Two) Times a Day.             cyanocobalamin 1000 MCG/ML injection  INJECT 1,000 MCG AS DIRECTED EVERY 30 (THIRTY) DAYS.             cyclobenzaprine (FLEXERIL) 10 MG tablet  Take 10 mg by mouth 3 (Three) Times a Day As Needed.             docusate sodium 100 MG capsule  Take 100 mg by mouth 2 (Two) Times a Day.             DULoxetine (CYMBALTA) 60 MG capsule  Take 60 mg by mouth daily.             folic acid (FOLVITE) 1 MG tablet  Take 1 mg by mouth daily.             immune  globulin, human, 5 GM/50ML solution  Infuse 40 mg into a venous catheter Every 30 (Thirty) Days.             omeprazole (priLOSEC) 40 MG capsule  Take 1 capsule by mouth Daily.             oxyCODONE-acetaminophen (PERCOCET) 7.5-325 MG per tablet  Take 1-2 tabs po q 4 hours prn pain             predniSONE (DELTASONE) 5 MG tablet  Take 15 mg by mouth 2 (Two) Times a Day. Pt  has permission to increase if has flare up per Dr Contreras.             SYNTHROID 112 MCG tablet  Take 1 tablet by mouth Daily.                 Discharge Diet: regular    Activity at Discharge: as tolerated    Discharge Instructions:   1)  Patient is to continue with physical therapy exercises daily and continue working with the physical therapist as ordered.  2)  Continue to follow precautions as instructed.   3)  Patient may weight bear as tolerated.   4)  Continue JUANA hose daily and ice regularly. Patient instructed on frequent calf pumping exercises.  Patient also instructed on incentive spirometer during hospitalization and encouraged to continue to use at home regularly.   5)  Patient is instructed to continue DVT prophylaxis.  6)  The dressing should be left in place. If waterproof dressing is intact the patient may shower immediately following discharge. If the dressing becomes disloged or saturated it should be changed and patient must wait until POD #5 to shower. If dressing is changed, apply dry sterile dressing after showering.  7)  Follow up appointment in 2 weeks - patient to call the office at [phone] to schedule.     Follow-up Appointments  2 weeks with Dr Sari Gomez, RN  12/15/17  12:04 PM

## 2017-12-15 NOTE — PLAN OF CARE
Problem: Patient Care Overview (Adult)  Goal: Plan of Care Review  Outcome: Ongoing (interventions implemented as appropriate)    12/15/17 0316   Coping/Psychosocial Response Interventions   Plan Of Care Reviewed With patient   Patient Care Overview   Progress improving   Outcome Evaluation   Outcome Summary/Follow up Plan will monitor labs. pt with Hx of Anemia. pain well controlled. pt amb well with assist.          Problem: Fall Risk (Adult)  Goal: Absence of Falls  Outcome: Ongoing (interventions implemented as appropriate)    12/15/17 0316   Fall Risk (Adult)   Absence of Falls making progress toward outcome         Problem: Knee Replacement, Total (Adult)  Goal: Signs and Symptoms of Listed Potential Problems Will be Absent or Manageable (Knee Replacement, Total)  Outcome: Ongoing (interventions implemented as appropriate)    12/15/17 0316   Knee Replacement, Total   Problems Assessed (Total Knee Replacement) all   Problems Present (Total Knee Replacement) decreased range of motion;functional decline/self care deficit

## 2017-12-15 NOTE — TELEPHONE ENCOUNTER
Dr. Kera Collier is asking for [facility] to call her about the patient. Call [phone] or after office cell phone [phone].

## 2017-12-27 ENCOUNTER — OFFICE VISIT (OUTPATIENT)
Dept: ORTHOPEDIC SURGERY | Facility: CLINIC | Age: 62
End: 2017-12-27

## 2017-12-27 VITALS — TEMPERATURE: 99.1 F | BODY MASS INDEX: 31.65 KG/M2 | WEIGHT: 190 LBS | HEIGHT: 65 IN

## 2017-12-27 DIAGNOSIS — Z96.651 STATUS POST TOTAL RIGHT KNEE REPLACEMENT: Primary | ICD-10-CM

## 2017-12-27 PROCEDURE — 99024 POSTOP FOLLOW-UP VISIT: CPT | Performed by: ORTHOPAEDIC SURGERY

## 2017-12-27 PROCEDURE — 77077 JOINT SURVEY SINGLE VIEW: CPT | Performed by: ORTHOPAEDIC SURGERY

## 2017-12-27 PROCEDURE — 73560 X-RAY EXAM OF KNEE 1 OR 2: CPT | Performed by: ORTHOPAEDIC SURGERY

## 2017-12-27 NOTE — PROGRESS NOTES
Dara Medina     : 1955     MRN: [id number]     DATE: 2017    DIAGNOSIS:  2 week follow up right TKA    SUBJECTIVE:  Patient returns today for 2 week follow up of right total knee replacement. Patient reports doing well with no unusual complaints.     OBJECTIVE:     Exam: The incision is healing appropriately.  No sign of infection.  Range of motion is progressing as expected.  The calf is soft and nontender with a negative Homans sign.    DIAGNOSTIC STUDIES     Xrays: 2 views of the right knee (AP full length and lateral) were ordered and reviewed for evaluation of recent knee replacement. They demonstrate a well positioned, well aligned knee replacement without complicating factors noted. In comparison with previous films there has been interval implant placement.    ASSESSMENT: 2 week status post right knee replacement.    PLAN:   1) Order given for PT   2) Discontinue JUANA hose   3) Continue ice PRN   4) Continue DVT prophylaxis.   5) Follow up in 4 weeks with repeat 3 view x-rays    Zion Guo MD  2017

## 2018-01-09 DIAGNOSIS — D83.9 COMMON VARIABLE IMMUNODEFICIENCY (HCC): Primary | ICD-10-CM

## 2018-01-10 ENCOUNTER — INFUSION (OUTPATIENT)
Dept: ONCOLOGY | Facility: HOSPITAL | Age: 63
End: 2018-01-10

## 2018-01-10 VITALS
HEART RATE: 99 BPM | DIASTOLIC BLOOD PRESSURE: 75 MMHG | WEIGHT: 192.8 LBS | SYSTOLIC BLOOD PRESSURE: 123 MMHG | OXYGEN SATURATION: 97 % | TEMPERATURE: 98.2 F | BODY MASS INDEX: 32.08 KG/M2

## 2018-01-10 DIAGNOSIS — D83.9 COMMON VARIABLE IMMUNODEFICIENCY (HCC): Primary | ICD-10-CM

## 2018-01-10 LAB — IGG1 SER-MCNC: 1225 MG/DL (ref 700–1600)

## 2018-01-10 PROCEDURE — 82784 ASSAY IGA/IGD/IGG/IGM EACH: CPT | Performed by: ALLERGY & IMMUNOLOGY

## 2018-01-10 PROCEDURE — 96366 THER/PROPH/DIAG IV INF ADDON: CPT | Performed by: INTERNAL MEDICINE

## 2018-01-10 PROCEDURE — 96365 THER/PROPH/DIAG IV INF INIT: CPT | Performed by: INTERNAL MEDICINE

## 2018-01-10 PROCEDURE — 25010000002 IMMUNE GLOBULIN (HUMAN) 20 GM/200ML SOLUTION: Performed by: ALLERGY & IMMUNOLOGY

## 2018-01-10 RX ORDER — ACETAMINOPHEN 500 MG
500 TABLET ORAL EVERY 4 HOURS PRN
Status: CANCELLED
Start: 2018-01-10

## 2018-01-10 RX ORDER — ACETAMINOPHEN 325 MG/1
650 TABLET ORAL ONCE
Status: CANCELLED
Start: 2018-01-10 | End: 2018-01-10

## 2018-01-10 RX ADMIN — IMMUNE GLOBULIN INFUSION (HUMAN) 20 G: 100 INJECTION, SOLUTION INTRAVENOUS; SUBCUTANEOUS at 11:29

## 2018-01-10 RX ADMIN — IMMUNE GLOBULIN INFUSION (HUMAN) 20 G: 100 INJECTION, SOLUTION INTRAVENOUS; SUBCUTANEOUS at 09:37

## 2018-01-23 ENCOUNTER — OFFICE VISIT (OUTPATIENT)
Dept: INTERNAL MEDICINE | Facility: CLINIC | Age: 63
End: 2018-01-23

## 2018-01-23 VITALS
WEIGHT: 192 LBS | SYSTOLIC BLOOD PRESSURE: 112 MMHG | DIASTOLIC BLOOD PRESSURE: 72 MMHG | HEART RATE: 96 BPM | HEIGHT: 65 IN | BODY MASS INDEX: 31.99 KG/M2 | OXYGEN SATURATION: 100 %

## 2018-01-23 DIAGNOSIS — R51.9 NONINTRACTABLE EPISODIC HEADACHE, UNSPECIFIED HEADACHE TYPE: ICD-10-CM

## 2018-01-23 DIAGNOSIS — R51.9 PAIN IN FACE: ICD-10-CM

## 2018-01-23 DIAGNOSIS — E03.9 ACQUIRED HYPOTHYROIDISM: ICD-10-CM

## 2018-01-23 DIAGNOSIS — E78.2 MIXED HYPERLIPIDEMIA: Primary | ICD-10-CM

## 2018-01-23 DIAGNOSIS — M81.0 OSTEOPOROSIS, UNSPECIFIED OSTEOPOROSIS TYPE, UNSPECIFIED PATHOLOGICAL FRACTURE PRESENCE: ICD-10-CM

## 2018-01-23 PROBLEM — Z86.39 PERSONAL HISTORY OF DIABETES MELLITUS: Status: RESOLVED | Noted: 2017-10-23 | Resolved: 2018-01-23

## 2018-01-23 PROBLEM — F32.A CHRONIC DEPRESSION: Status: RESOLVED | Noted: 2017-07-18 | Resolved: 2018-01-23

## 2018-01-23 LAB
ALBUMIN SERPL-MCNC: 4.2 G/DL (ref 3.5–5.2)
ALBUMIN/GLOB SERPL: 1.1 G/DL
ALP SERPL-CCNC: 143 U/L (ref 39–117)
ALT SERPL-CCNC: 16 U/L (ref 1–33)
AST SERPL-CCNC: 18 U/L (ref 1–32)
BASOPHILS # BLD AUTO: 0.04 10*3/MM3 (ref 0–0.2)
BASOPHILS NFR BLD AUTO: 0.4 % (ref 0–1.5)
BILIRUB SERPL-MCNC: 0.3 MG/DL (ref 0.1–1.2)
BUN SERPL-MCNC: 10 MG/DL (ref 8–23)
BUN/CREAT SERPL: 9.4 (ref 7–25)
CALCIUM SERPL-MCNC: 9.6 MG/DL (ref 8.6–10.5)
CHLORIDE SERPL-SCNC: 102 MMOL/L (ref 98–107)
CO2 SERPL-SCNC: 23.8 MMOL/L (ref 22–29)
CREAT SERPL-MCNC: 1.06 MG/DL (ref 0.57–1)
EOSINOPHIL # BLD AUTO: 0.14 10*3/MM3 (ref 0–0.7)
EOSINOPHIL NFR BLD AUTO: 1.4 % (ref 0.3–6.2)
ERYTHROCYTE [DISTWIDTH] IN BLOOD BY AUTOMATED COUNT: 17.7 % (ref 11.7–13)
GLOBULIN SER CALC-MCNC: 3.9 GM/DL
GLUCOSE SERPL-MCNC: 100 MG/DL (ref 65–99)
HCT VFR BLD AUTO: 32.9 % (ref 35.6–45.5)
HGB BLD-MCNC: 9.9 G/DL (ref 11.9–15.5)
IMM GRANULOCYTES # BLD: 0.02 10*3/MM3 (ref 0–0.03)
IMM GRANULOCYTES NFR BLD: 0.2 % (ref 0–0.5)
LYMPHOCYTES # BLD AUTO: 1.8 10*3/MM3 (ref 0.9–4.8)
LYMPHOCYTES NFR BLD AUTO: 17.5 % (ref 19.6–45.3)
MCH RBC QN AUTO: 28 PG (ref 26.9–32)
MCHC RBC AUTO-ENTMCNC: 30.1 G/DL (ref 32.4–36.3)
MCV RBC AUTO: 93.2 FL (ref 80.5–98.2)
MONOCYTES # BLD AUTO: 0.73 10*3/MM3 (ref 0.2–1.2)
MONOCYTES NFR BLD AUTO: 7.1 % (ref 5–12)
NEUTROPHILS # BLD AUTO: 7.56 10*3/MM3 (ref 1.9–8.1)
NEUTROPHILS NFR BLD AUTO: 73.4 % (ref 42.7–76)
PLATELET # BLD AUTO: 526 10*3/MM3 (ref 140–500)
POTASSIUM SERPL-SCNC: 5.5 MMOL/L (ref 3.5–5.2)
PROT SERPL-MCNC: 8.1 G/DL (ref 6–8.5)
RBC # BLD AUTO: 3.53 10*6/MM3 (ref 3.9–5.2)
SODIUM SERPL-SCNC: 140 MMOL/L (ref 136–145)
TSH SERPL DL<=0.005 MIU/L-ACNC: 0.79 MIU/ML (ref 0.27–4.2)
WBC # BLD AUTO: 10.29 10*3/MM3 (ref 4.5–10.7)

## 2018-01-23 PROCEDURE — 99214 OFFICE O/P EST MOD 30 MIN: CPT | Performed by: INTERNAL MEDICINE

## 2018-01-23 RX ORDER — ATORVASTATIN CALCIUM 20 MG/1
20 TABLET, FILM COATED ORAL DAILY
Qty: 90 TABLET | Refills: 3 | Status: SHIPPED | OUTPATIENT
Start: 2018-01-23 | End: 2019-01-27 | Stop reason: SDUPTHER

## 2018-01-23 NOTE — PROGRESS NOTES
Subjective     Dara Medina is a 62 y.o. female who presents with   Chief Complaint   Patient presents with   • Hyperlipidemia   • Hypothyroidism   • Osteoporosis       History of Present Illness     HLD.  She did not get started on atorvastatin yet.   Hypothyroidism.  Due check of labs on Synthroid.   Osteoporosis.  She is not yet on Forteo.      Just had knee surgery.  She needs check on post op anemia.      Pain left side of face/head that comes and goes.  She is in PT for this for the past one year..  Starts in scapula and moves up to face.  Eye numbness is associated.  No blurry vision.  HAs are associated.  Pain is dull.  Flexeril is helpful.  Going on for years.  Always the left side of face/head.  Seen neuro in the past (Dr. Aponte who is not in practice any longer)    Review of Systems   Constitutional: Positive for fatigue.   Respiratory: Negative.    Cardiovascular: Negative.    Neurological: Positive for headaches. Negative for dizziness and weakness.       The following portions of the patient's history were reviewed and updated as appropriate: allergies, current medications and problem list.    Patient Active Problem List    Diagnosis Date Noted   • Osteoarthritis of knee, unspecified 12/14/2017   • Primary osteoarthritis of right knee 11/22/2017     Note Last Updated: 11/22/2017     Added automatically from request for surgery [id number]     • Osteoarthrosis, localized, primary, knee 07/17/2017   • Mixed hyperlipidemia 05/05/2017   • Tear of medial meniscus of right knee, current 05/04/2017   • Chronic pain of right knee 05/04/2017   • IFG (impaired fasting glucose) 04/12/2017   • Rheumatoid arthritis 07/07/2016   • Pulmonary fibrosis 07/06/2016     Note Last Updated: 10/23/2017     Secondary to methotrexate.       • Iron deficiency anemia 07/06/2016   • Pernicious anemia 07/06/2016   • Common variable immunodeficiency 07/01/2016   • Hypothyroidism 05/23/2016   • Sensory neuropathy 05/23/2016   • Osteoporosis  "05/23/2016     Note Last Updated: 10/23/2017     H/o one year Forteo.  Not on bisphosphonates because of dental issues.  Fosamax in past caused stomach problems.       • Vitamin D deficiency 05/23/2016       Current Outpatient Prescriptions on File Prior to Visit   Medication Sig Dispense Refill   • aspirin 325 MG tablet Take 1 tablet by mouth Daily.     • azaTHIOprine (IMURAN) 50 MG tablet Take 50 mg by mouth 3 (Three) Times a Day.  5   • buPROPion XL (WELLBUTRIN XL) 150 MG 24 hr tablet Take 150 mg by mouth Every Morning.     • Cholecalciferol (VITAMIN D3) 5000 UNITS capsule capsule Take 5,000 Units by mouth 2 (Two) Times a Day.     • cyanocobalamin 1000 MCG/ML injection INJECT 1,000 MCG AS DIRECTED EVERY 30 (THIRTY) DAYS. 1 mL 2   • cyclobenzaprine (FLEXERIL) 10 MG tablet Take 10 mg by mouth 3 (Three) Times a Day As Needed.     • docusate sodium 100 MG capsule Take 100 mg by mouth 2 (Two) Times a Day.     • DULoxetine (CYMBALTA) 60 MG capsule Take 60 mg by mouth daily.     • folic acid (FOLVITE) 1 MG tablet Take 1 mg by mouth daily.     • immune globulin, human, 5 GM/50ML solution Infuse 40 mg into a venous catheter Every 30 (Thirty) Days.     • omeprazole (priLOSEC) 40 MG capsule Take 1 capsule by mouth Daily. 90 capsule 3   • predniSONE (DELTASONE) 5 MG tablet Take 15 mg by mouth 2 (Two) Times a Day. Pt  has permission to increase if has flare up per Dr Contreras.     • SYNTHROID 112 MCG tablet Take 1 tablet by mouth Daily. 90 tablet 3   • [DISCONTINUED] oxyCODONE-acetaminophen (PERCOCET) 7.5-325 MG per tablet Take 1-2 tabs po q 4 hours prn pain 60 tablet 0     No current facility-administered medications on file prior to visit.        Objective     /72  Pulse 96  Ht 165.1 cm (65\")  Wt 87.1 kg (192 lb)  SpO2 100%  BMI 31.95 kg/m2    Physical Exam   Constitutional: She is oriented to person, place, and time. She appears well-developed and well-nourished.   HENT:   Head: Normocephalic and atraumatic. "   Eyes: Conjunctivae and EOM are normal. Pupils are equal, round, and reactive to light.   Cardiovascular: Normal rate, regular rhythm and normal heart sounds.    Pulmonary/Chest: Effort normal. She has rales.   Neurological: She is alert and oriented to person, place, and time. No cranial nerve deficit or sensory deficit.   Skin: Skin is warm and dry.   Psychiatric: She has a normal mood and affect. Her behavior is normal.       Assessment/Plan   Dara was seen today for hyperlipidemia, hypothyroidism and osteoporosis.    Diagnoses and all orders for this visit:    Mixed hyperlipidemia  -     CBC & Differential  -     Comprehensive Metabolic Panel  -     TSH Rfx On Abnormal To Free T4    Acquired hypothyroidism  -     CBC & Differential  -     Comprehensive Metabolic Panel  -     TSH Rfx On Abnormal To Free T4    Osteoporosis, unspecified osteoporosis type, unspecified pathological fracture presence  -     CBC & Differential  -     Comprehensive Metabolic Panel  -     TSH Rfx On Abnormal To Free T4    Pain in face  -     MRI Brain With Contrast; Future  -     Ambulatory Referral to Neurology    Nonintractable episodic headache, unspecified headache type  -     MRI Brain With Contrast; Future  -     Ambulatory Referral to Neurology    Other orders  -     atorvastatin (LIPITOR) 20 MG tablet; Take 1 tablet by mouth Daily.  -     teriparatide (FORTEO) 600 MCG/2.4ML injection; Inject 0.08 mL under the skin Daily.        Discussion  HLD.  Add atorvastatin to her regimen.   Hypothyroidism.  Check labs today.   OP.  Add Forteo since bisphosphonates and Prolia not option with dental issues.   Head/face pain for years.  Check imaging.  Refer to neuro.    Post-op anemia.  Check labs.     Current outpatient and discharge medications have been reconciled for the patient.  Randi Yang MD       Future Appointments  Date Time Provider Department Center   1/29/2018 9:00 AM MD ANJEL [facility]   2/7/2018 9:00 AM  NON CBC CHAIR 12 [facility]   3/7/2018 9:00 AM NON CBC BED 1 [facility]   4/4/2018 9:00 AM NON CBC BED 1 [facility]   4/23/2018 8:40 AM [facility] None   4/27/2018 8:45 AM Randi Yang MD [facility] None   5/2/2018 9:00 AM NON CBC CHAIR 12 [facility]   5/30/2018 9:00 AM NON CBC CHAIR 12 [facility]

## 2018-01-29 ENCOUNTER — OFFICE VISIT (OUTPATIENT)
Dept: ORTHOPEDIC SURGERY | Facility: CLINIC | Age: 63
End: 2018-01-29

## 2018-01-29 VITALS — WEIGHT: 192 LBS | BODY MASS INDEX: 31.99 KG/M2 | HEIGHT: 65 IN

## 2018-01-29 DIAGNOSIS — Z96.651 STATUS POST TOTAL RIGHT KNEE REPLACEMENT: Primary | ICD-10-CM

## 2018-01-29 PROCEDURE — 73562 X-RAY EXAM OF KNEE 3: CPT | Performed by: ORTHOPAEDIC SURGERY

## 2018-01-29 PROCEDURE — 99024 POSTOP FOLLOW-UP VISIT: CPT | Performed by: ORTHOPAEDIC SURGERY

## 2018-01-29 NOTE — PROGRESS NOTES
Dara Medina : 1955 MRN: [id number] DATE: 2018    DIAGNOSIS: 6 week follow up right TKA      SUBJECTIVE:  Patient returns today for 6 week follow up of right total knee replacement. Patient reports doing well with no unusual complaints.     There were no vitals filed for this visit.    OBJECTIVE:     Exam:  The incision is well healed. No sign of infection. Range of motion is measured at full extension to 120 of flexion. The calf is soft and nontender with a negative Homans sign.  Gait is reciprocal heel-to-toe and only mildly antalgic.    DIAGNOSTIC STUDIES    Xrays: 3 views of the right knee (AP, lateral, and sunrise) were ordered and reviewed for evaluation of recent knee replacement. They demonstrate a well positioned, well aligned knee replacement without complicating factors noted. In comparison with previous films there has been no change.    ASSESSMENT:  6 week status post right knee replacement.    PLAN:   1) Continue with PT exercises as prescribed     2) Follow up in 6 months    Zion Guo MD  2018

## 2018-02-01 ENCOUNTER — TELEPHONE (OUTPATIENT)
Dept: INTERNAL MEDICINE | Facility: CLINIC | Age: 63
End: 2018-02-01

## 2018-02-01 DIAGNOSIS — G89.29 CHRONIC INTRACTABLE HEADACHE, UNSPECIFIED HEADACHE TYPE: ICD-10-CM

## 2018-02-01 DIAGNOSIS — R51.9 CHRONIC INTRACTABLE HEADACHE, UNSPECIFIED HEADACHE TYPE: ICD-10-CM

## 2018-02-01 DIAGNOSIS — R51.9 PAIN IN FACE: Primary | ICD-10-CM

## 2018-02-01 NOTE — TELEPHONE ENCOUNTER
Patient scheduled to have MRI tomorrow. Order is for MRI with contrast.     Order needs to be with/without contrast,please.    Thanks!

## 2018-02-02 ENCOUNTER — HOSPITAL ENCOUNTER (OUTPATIENT)
Dept: MRI IMAGING | Facility: HOSPITAL | Age: 63
Discharge: HOME | End: 2018-02-02
Admitting: INTERNAL MEDICINE

## 2018-02-02 DIAGNOSIS — R51.9 NONINTRACTABLE EPISODIC HEADACHE, UNSPECIFIED HEADACHE TYPE: ICD-10-CM

## 2018-02-02 LAB — CREAT BLDA-MCNC: 1 MG/DL (ref 0.6–1.3)

## 2018-02-02 PROCEDURE — A9577 INJ MULTIHANCE: HCPCS | Performed by: INTERNAL MEDICINE

## 2018-02-02 PROCEDURE — 0 GADOBENATE DIMEGLUMINE 529 MG/ML SOLUTION: Performed by: INTERNAL MEDICINE

## 2018-02-02 PROCEDURE — 70553 MRI BRAIN STEM W/O & W/DYE: CPT

## 2018-02-02 PROCEDURE — 82565 ASSAY OF CREATININE: CPT

## 2018-02-02 RX ADMIN — GADOBENATE DIMEGLUMINE 18 ML: 529 INJECTION, SOLUTION INTRAVENOUS at 09:20

## 2018-02-06 PROBLEM — R90.89 ABNORMAL FINDING ON MRI OF BRAIN: Status: ACTIVE | Noted: 2018-02-06

## 2018-02-07 ENCOUNTER — INFUSION (OUTPATIENT)
Dept: ONCOLOGY | Facility: HOSPITAL | Age: 63
End: 2018-02-07

## 2018-02-07 VITALS
SYSTOLIC BLOOD PRESSURE: 132 MMHG | BODY MASS INDEX: 32.65 KG/M2 | HEART RATE: 84 BPM | OXYGEN SATURATION: 95 % | TEMPERATURE: 98.1 F | DIASTOLIC BLOOD PRESSURE: 77 MMHG | WEIGHT: 196.2 LBS

## 2018-02-07 DIAGNOSIS — D83.9 COMMON VARIABLE IMMUNODEFICIENCY (HCC): Primary | ICD-10-CM

## 2018-02-07 PROCEDURE — 25010000002 IMMUNE GLOBULIN (HUMAN) 20 GM/200ML SOLUTION: Performed by: ALLERGY & IMMUNOLOGY

## 2018-02-07 PROCEDURE — 96366 THER/PROPH/DIAG IV INF ADDON: CPT | Performed by: NURSE PRACTITIONER

## 2018-02-07 PROCEDURE — 96365 THER/PROPH/DIAG IV INF INIT: CPT | Performed by: NURSE PRACTITIONER

## 2018-02-07 RX ORDER — ACETAMINOPHEN 500 MG
500 TABLET ORAL EVERY 4 HOURS PRN
Status: CANCELLED
Start: 2018-02-07

## 2018-02-07 RX ORDER — ACETAMINOPHEN 325 MG/1
650 TABLET ORAL ONCE
Status: CANCELLED
Start: 2018-02-07 | End: 2018-02-07

## 2018-02-07 RX ADMIN — IMMUNE GLOBULIN INFUSION (HUMAN) 20 G: 100 INJECTION, SOLUTION INTRAVENOUS; SUBCUTANEOUS at 11:14

## 2018-02-07 RX ADMIN — IMMUNE GLOBULIN INFUSION (HUMAN) 20 G: 100 INJECTION, SOLUTION INTRAVENOUS; SUBCUTANEOUS at 09:24

## 2018-03-06 RX ORDER — DIPHENHYDRAMINE HCL 25 MG
25 CAPSULE ORAL ONCE
Status: CANCELLED
Start: 2018-03-06 | End: 2018-03-06

## 2018-03-07 ENCOUNTER — INFUSION (OUTPATIENT)
Dept: ONCOLOGY | Facility: HOSPITAL | Age: 63
End: 2018-03-07

## 2018-03-07 VITALS
BODY MASS INDEX: 32.35 KG/M2 | WEIGHT: 194.4 LBS | DIASTOLIC BLOOD PRESSURE: 77 MMHG | OXYGEN SATURATION: 96 % | TEMPERATURE: 98 F | SYSTOLIC BLOOD PRESSURE: 128 MMHG | HEART RATE: 73 BPM

## 2018-03-07 DIAGNOSIS — D83.9 COMMON VARIABLE IMMUNODEFICIENCY (HCC): Primary | ICD-10-CM

## 2018-03-07 PROCEDURE — 96365 THER/PROPH/DIAG IV INF INIT: CPT | Performed by: NURSE PRACTITIONER

## 2018-03-07 PROCEDURE — 96366 THER/PROPH/DIAG IV INF ADDON: CPT | Performed by: NURSE PRACTITIONER

## 2018-03-07 PROCEDURE — 25010000002 IMMUNE GLOBULIN (HUMAN) 20 GM/200ML SOLUTION: Performed by: ALLERGY & IMMUNOLOGY

## 2018-03-07 RX ORDER — ACETAMINOPHEN 325 MG/1
650 TABLET ORAL ONCE
Status: CANCELLED
Start: 2018-03-07 | End: 2018-03-07

## 2018-03-07 RX ORDER — ACETAMINOPHEN 500 MG
500 TABLET ORAL EVERY 4 HOURS PRN
Status: CANCELLED
Start: 2018-03-07

## 2018-03-07 RX ORDER — DIPHENHYDRAMINE HCL 25 MG
25 CAPSULE ORAL ONCE
Status: CANCELLED
Start: 2018-03-07 | End: 2018-03-07

## 2018-03-07 RX ADMIN — IMMUNE GLOBULIN INFUSION (HUMAN) 20 G: 100 INJECTION, SOLUTION INTRAVENOUS; SUBCUTANEOUS at 11:12

## 2018-03-07 RX ADMIN — IMMUNE GLOBULIN INFUSION (HUMAN) 20 G: 100 INJECTION, SOLUTION INTRAVENOUS; SUBCUTANEOUS at 09:24

## 2018-03-08 ENCOUNTER — OFFICE VISIT (OUTPATIENT)
Dept: NEUROLOGY | Facility: CLINIC | Age: 63
End: 2018-03-08

## 2018-03-08 VITALS
BODY MASS INDEX: 32.99 KG/M2 | SYSTOLIC BLOOD PRESSURE: 140 MMHG | DIASTOLIC BLOOD PRESSURE: 65 MMHG | WEIGHT: 198 LBS | HEIGHT: 65 IN

## 2018-03-08 DIAGNOSIS — R90.89 ABNORMAL FINDING ON MRI OF BRAIN: Primary | ICD-10-CM

## 2018-03-08 PROBLEM — R20.0 LEFT FACIAL NUMBNESS: Status: ACTIVE | Noted: 2018-03-08

## 2018-03-08 PROCEDURE — 99244 OFF/OP CNSLTJ NEW/EST MOD 40: CPT | Performed by: PSYCHIATRY & NEUROLOGY

## 2018-03-08 RX ORDER — TOFACITINIB 11 MG/1
TABLET, FILM COATED, EXTENDED RELEASE ORAL
COMMUNITY
Start: 2018-03-07 | End: 2018-04-27

## 2018-03-08 RX ORDER — TOPIRAMATE 25 MG/1
25 TABLET ORAL NIGHTLY
Qty: 30 TABLET | Refills: 11 | Status: SHIPPED | OUTPATIENT
Start: 2018-03-08 | End: 2018-04-05 | Stop reason: SDUPTHER

## 2018-03-08 NOTE — PROGRESS NOTES
Subjective:     Patient ID: Dara Medina is a 62 y.o. female.    History of Present Illness   The patient is a 62-year-old right-handed woman who was seen for further evaluation of an abnormal MRI the brain and symptoms on the left side of her face. The patient was seen today in consultation per the request of Dr. Yang.  The patient has had problems for years with the left side of her eye twitching and having some pain and tightness sometimes from her shoulder up to her eye.  This occurs most days.  She may go a few days without at times she takes Flexeril 3 times a day when necessary which helps.  She was on the Topamax several years ago which made it go away.  She had an MRI the brain done which showed a flare in T2 signal rounded in the right centrum semiovale.  He had an MRI the brain done through [facility] in 2012 which showed a similar area in the right frontal region.  Is felt that this was probably small vessel disease however MS and tumor were mentioned as possibilities and a follow-up scan in 3-6 months was recommended by the neuroradiologist.    The following portions of the patient's history were reviewed and updated as appropriate: allergies, current medications, past family history, past medical history, past social history, past surgical history and problem list.    Family History   Problem Relation Age of Onset   • Hyperlipidemia Mother    • Hypertension Mother    • Migraines Mother    • Colon cancer Father    • Diabetes Father    • Cancer Father    • Hyperlipidemia Brother    • Migraines Brother    • Migraines Sister    • Malig Hyperthermia Neg Hx      Active Ambulatory Problems     Diagnosis Date Noted   • Hypothyroidism 05/23/2016   • Sensory neuropathy 05/23/2016   • Osteoporosis 05/23/2016   • Vitamin D deficiency 05/23/2016   • Common variable immunodeficiency 07/01/2016   • Pulmonary fibrosis 07/06/2016   • Iron deficiency anemia 07/06/2016   • Pernicious anemia 07/06/2016   • Rheumatoid  arthritis 07/07/2016   • IFG (impaired fasting glucose) 04/12/2017   • Tear of medial meniscus of right knee, current 05/04/2017   • Chronic pain of right knee 05/04/2017   • Mixed hyperlipidemia 05/05/2017   • Osteoarthrosis, localized, primary, knee 07/17/2017   • Primary osteoarthritis of right knee 11/22/2017   • Osteoarthritis of knee, unspecified 12/14/2017   • Abnormal finding on MRI of brain 02/06/2018   • Left facial numbness 03/08/2018     Resolved Ambulatory Problems     Diagnosis Date Noted   • Menopause present 05/23/2016   • Adiposity 05/23/2016   • Uncontrolled type 2 diabetes mellitus 05/23/2016   • Diabetes type 2, controlled 05/23/2016   • Pneumonia of both lungs due to infectious organism 06/06/2016   • Osteopenia 07/06/2016   • Acute colitis 01/18/2017   • Sepsis, unspecified organism 01/18/2017   • Immunosuppression 01/18/2017   • Stress fracture of right tibia 05/22/2017   • Bone marrow edema 05/22/2017   • Chronic depression 07/18/2017   • Personal history of diabetes mellitus 10/23/2017     Past Medical History:   Diagnosis Date   • Acute colitis 1/18/2017   • Allergic Codeine, some antibiotics   • Anemia    • Cataract Removed   • Chronic depression    • Colon polyp 1 removed   • Fracture of rib    • GERD (gastroesophageal reflux disease)    • Headache    • History of bronchitis    • History of pneumonia    • History of transfusion    • Hyperlipidemia    • Hypothyroidism    • Hypothyroidism    • Inflammatory bowel disease    • Iron deficiency    • Irritable bowel syndrome    • Obesity    • Osteopenia    • Osteoporosis    • Osteoporosis    • Osteoporosis    • Pneumonia June 2016   • RA (rheumatoid arthritis)    • Sensory neuropathy    • Sepsis, unspecified organism 1/18/2017   • Vitamin D deficiency      Social History     Social History   • Marital status:      Spouse name: N/A   • Number of children: N/A   • Years of education: N/A     Occupational History   • Not on file.     Social  History Main Topics   • Smoking status: Former Smoker     Packs/day: 0.50     Years: 5.00     Types: Cigarettes     Start date: 4/1/1974     Quit date: 4/1/1978   • Smokeless tobacco: Never Used      Comment: QUIT AGE 23   • Alcohol use No      Comment: STOPPED 1997   • Drug use: No   • Sexual activity: Not on file     Other Topics Concern   • Not on file     Social History Narrative       Current Outpatient Prescriptions:   •  atorvastatin (LIPITOR) 20 MG tablet, Take 1 tablet by mouth Daily., Disp: 90 tablet, Rfl: 3  •  azaTHIOprine (IMURAN) 50 MG tablet, Take 50 mg by mouth 3 (Three) Times a Day., Disp: , Rfl: 5  •  buPROPion XL (WELLBUTRIN XL) 150 MG 24 hr tablet, Take 150 mg by mouth Every Morning., Disp: , Rfl:   •  Cholecalciferol (VITAMIN D3) 5000 UNITS capsule capsule, Take 5,000 Units by mouth 2 (Two) Times a Day., Disp: , Rfl:   •  cyanocobalamin 1000 MCG/ML injection, INJECT 1,000 MCG AS DIRECTED EVERY 30 (THIRTY) DAYS., Disp: 1 mL, Rfl: 2  •  cyclobenzaprine (FLEXERIL) 10 MG tablet, Take 10 mg by mouth 3 (Three) Times a Day As Needed., Disp: , Rfl:   •  DULoxetine (CYMBALTA) 60 MG capsule, Take 60 mg by mouth daily., Disp: , Rfl:   •  folic acid (FOLVITE) 1 MG tablet, Take 1 mg by mouth daily., Disp: , Rfl:   •  omeprazole (priLOSEC) 40 MG capsule, Take 1 capsule by mouth Daily., Disp: 90 capsule, Rfl: 3  •  SYNTHROID 112 MCG tablet, Take 1 tablet by mouth Daily., Disp: 90 tablet, Rfl: 3  •  teriparatide (FORTEO) 600 MCG/2.4ML injection, Inject 0.08 mL under the skin Daily., Disp: 2.4 mL, Rfl: 11  •  XELJANZ XR 11 MG tablet sustained-release 24 hour, , Disp: , Rfl:   •  topiramate (TOPAMAX) 25 MG tablet, Take 1 tablet by mouth Every Night., Disp: 30 tablet, Rfl: 11    Review of Systems   Constitutional: Positive for fatigue. Negative for activity change, appetite change, chills, diaphoresis, fever and unexpected weight change.   HENT: Positive for dental problem and voice change. Negative for  congestion, drooling, ear discharge, ear pain, facial swelling, hearing loss, mouth sores, nosebleeds, postnasal drip, rhinorrhea, sinus pain, sinus pressure, sneezing, sore throat, tinnitus and trouble swallowing.    Eyes: Negative.    Respiratory: Negative.    Cardiovascular: Negative.    Gastrointestinal: Positive for abdominal distention. Negative for abdominal pain, anal bleeding, blood in stool, constipation, diarrhea, nausea, rectal pain and vomiting.   Endocrine: Negative.    Musculoskeletal: Positive for arthralgias, back pain, joint swelling, myalgias, neck pain and neck stiffness. Negative for gait problem.   Skin: Negative.    Allergic/Immunologic: Positive for immunocompromised state. Negative for environmental allergies and food allergies.   Neurological: Positive for numbness and headaches. Negative for dizziness, tremors, seizures, syncope, facial asymmetry, speech difficulty, weakness and light-headedness.   Hematological: Negative.    Psychiatric/Behavioral: Positive for decreased concentration and sleep disturbance. Negative for agitation, behavioral problems, confusion, dysphoric mood, hallucinations, self-injury and suicidal ideas. The patient is not nervous/anxious and is not hyperactive.         Objective:    Neurologic Exam  Mental status examination showed normal orientation, memory, and speech.  Attention span and concentration were normal.  Fund of knowledge was normal.  Funduscopic showed no abnormality.  Visual fields were full.  Pupillary reflexes were 5 mm, symmetric, and equally reactive to light.  Eye movements were full and conjugate.  Gag reflex was normal.  Hearing was normal.  Muscles of mastication were normal.  No facial weakness was noted.  Shoulder shrug strength was normal bilaterally.  Tongue protrudes midline.  There is no drift of outstretched arms.  Deep tendon reflexes are 2+ and symmetric.  No focal weakness or atrophy was noted.  Tone was normal in all extremities.   No cerebellar signs were noted.  No abnormal movements were noted.  The patient's gait was normal.  No other pathologic reflexes such as Babinski's sign were noted.  No sensory abnormalities were noted.  Physical Exam    Assessment/Plan:     Dara was seen today for headache.    Diagnoses and all orders for this visit:    Abnormal finding on MRI of brain    Other orders  -     topiramate (TOPAMAX) 25 MG tablet; Take 1 tablet by mouth Every Night.         It is most likely that the finding on MRI of the brain is not significant.  However I would plan to repeat the MRI in 3-6 months.  The cause of her left-sided symptoms are unclear.  I have started her on topiramate 25 mg at night and she will try to wean Flexeril.  I asked for phone follow-up in one month.  Follow-up in the office in 3 months. Thank you for allowing me to share in the care of this patient.  Cj Null M.D.

## 2018-03-09 RX ORDER — CYANOCOBALAMIN 1000 UG/ML
INJECTION, SOLUTION INTRAMUSCULAR; SUBCUTANEOUS
Qty: 1 ML | Refills: 2 | Status: SHIPPED | OUTPATIENT
Start: 2018-03-09 | End: 2018-06-11 | Stop reason: SDUPTHER

## 2018-03-15 RX ORDER — BUPROPION HYDROCHLORIDE 150 MG/1
TABLET ORAL
Qty: 30 TABLET | Refills: 3 | Status: SHIPPED | OUTPATIENT
Start: 2018-03-15 | End: 2018-04-27 | Stop reason: SDUPTHER

## 2018-03-27 ENCOUNTER — TELEPHONE (OUTPATIENT)
Dept: ORTHOPEDIC SURGERY | Facility: CLINIC | Age: 63
End: 2018-03-27

## 2018-03-27 DIAGNOSIS — IMO0002 PROPHYLACTIC ANTIBIOTIC FOR DENTAL PROCEDURE INDICATED DUE TO PRIOR JOINT REPLACEMENT: Primary | ICD-10-CM

## 2018-03-27 RX ORDER — CLINDAMYCIN HYDROCHLORIDE 300 MG/1
CAPSULE ORAL
Qty: 2 CAPSULE | Refills: 2 | Status: SHIPPED | OUTPATIENT
Start: 2018-03-27 | End: 2018-04-27

## 2018-03-27 NOTE — TELEPHONE ENCOUNTER
Have e-prescribed a new RX to [facility] at [phone] for Clindamycin 300mg, #2, take both caps 1 hour prior to procedure.  Refill X2, per [facility]

## 2018-04-04 ENCOUNTER — INFUSION (OUTPATIENT)
Dept: ONCOLOGY | Facility: HOSPITAL | Age: 63
End: 2018-04-04

## 2018-04-04 ENCOUNTER — TELEPHONE (OUTPATIENT)
Dept: NEUROLOGY | Facility: CLINIC | Age: 63
End: 2018-04-04

## 2018-04-04 VITALS
SYSTOLIC BLOOD PRESSURE: 119 MMHG | DIASTOLIC BLOOD PRESSURE: 77 MMHG | BODY MASS INDEX: 32.62 KG/M2 | OXYGEN SATURATION: 100 % | HEART RATE: 90 BPM | WEIGHT: 196 LBS | TEMPERATURE: 97.8 F

## 2018-04-04 DIAGNOSIS — D83.9 COMMON VARIABLE IMMUNODEFICIENCY (HCC): Primary | ICD-10-CM

## 2018-04-04 PROCEDURE — 96365 THER/PROPH/DIAG IV INF INIT: CPT | Performed by: NURSE PRACTITIONER

## 2018-04-04 PROCEDURE — 96366 THER/PROPH/DIAG IV INF ADDON: CPT | Performed by: NURSE PRACTITIONER

## 2018-04-04 PROCEDURE — 25010000002 IMMUNE GLOBULIN (HUMAN) 20 GM/200ML SOLUTION: Performed by: ALLERGY & IMMUNOLOGY

## 2018-04-04 RX ORDER — ACETAMINOPHEN 325 MG/1
650 TABLET ORAL ONCE
Status: CANCELLED
Start: 2018-04-04 | End: 2018-04-04

## 2018-04-04 RX ORDER — ACETAMINOPHEN 500 MG
500 TABLET ORAL EVERY 4 HOURS PRN
Status: CANCELLED
Start: 2018-04-04

## 2018-04-04 RX ORDER — DIPHENHYDRAMINE HCL 25 MG
25 CAPSULE ORAL ONCE
Status: CANCELLED
Start: 2018-04-04 | End: 2018-04-04

## 2018-04-04 RX ADMIN — IMMUNE GLOBULIN INFUSION (HUMAN) 20 G: 100 INJECTION, SOLUTION INTRAVENOUS; SUBCUTANEOUS at 10:58

## 2018-04-04 RX ADMIN — IMMUNE GLOBULIN INFUSION (HUMAN) 20 G: 100 INJECTION, SOLUTION INTRAVENOUS; SUBCUTANEOUS at 09:12

## 2018-04-04 NOTE — PLAN OF CARE
Problem: Patient Care Overview (Adult)  Goal: Plan of Care Review    01/22/17 1808   Patient Care Overview   Progress no change   Outcome Evaluation   Outcome Summary/Follow up Plan ptns pain is better than yesterday but still is needing significant pain medicine q3 hours, md ordered a suppository as she still has not had a bowel movement, still think acute colitis and are treating with abx, continue to monitor labs and vital signs.    Coping/Psychosocial Response Interventions   Plan Of Care Reviewed With patient       Goal: Adult Individualization and Mutuality  Outcome: Ongoing (interventions implemented as appropriate)  Goal: Discharge Needs Assessment  Outcome: Ongoing (interventions implemented as appropriate)    Problem: Pain, Acute (Adult)  Goal: Acceptable Pain Control/Comfort Level  Outcome: Ongoing (interventions implemented as appropriate)    Problem: Fluid Volume Deficit (Adult)  Goal: Fluid/Electrolyte Balance  Outcome: Ongoing (interventions implemented as appropriate)  Goal: Comfort/Well Being  Outcome: Ongoing (interventions implemented as appropriate)       04-Apr-2018

## 2018-04-04 NOTE — TELEPHONE ENCOUNTER
Pt is taking topamax 25 mg 1 every night and she said it's not working that well and need to be increased

## 2018-04-05 RX ORDER — TOPIRAMATE 50 MG/1
50 TABLET, FILM COATED ORAL NIGHTLY
Qty: 30 TABLET | Refills: 10 | Status: SHIPPED | OUTPATIENT
Start: 2018-04-05 | End: 2018-06-26 | Stop reason: SDUPTHER

## 2018-04-11 RX ORDER — LEVOTHYROXINE SODIUM 112 MCG
112 TABLET ORAL DAILY
Qty: 90 TABLET | Refills: 3 | Status: SHIPPED | OUTPATIENT
Start: 2018-04-11 | End: 2019-03-30 | Stop reason: SDUPTHER

## 2018-04-20 DIAGNOSIS — E03.9 HYPOTHYROIDISM, UNSPECIFIED TYPE: ICD-10-CM

## 2018-04-20 DIAGNOSIS — E78.5 HYPERLIPIDEMIA, UNSPECIFIED HYPERLIPIDEMIA TYPE: Primary | ICD-10-CM

## 2018-04-23 LAB
ALBUMIN SERPL-MCNC: 3.9 G/DL (ref 3.5–5.2)
ALBUMIN/GLOB SERPL: 1.2 G/DL
ALP SERPL-CCNC: 118 U/L (ref 39–117)
ALT SERPL-CCNC: 14 U/L (ref 1–33)
AST SERPL-CCNC: 15 U/L (ref 1–32)
BILIRUB SERPL-MCNC: 0.4 MG/DL (ref 0.1–1.2)
BUN SERPL-MCNC: 16 MG/DL (ref 8–23)
BUN/CREAT SERPL: 15.7 (ref 7–25)
CALCIUM SERPL-MCNC: 8.3 MG/DL (ref 8.6–10.5)
CHLORIDE SERPL-SCNC: 105 MMOL/L (ref 98–107)
CHOLEST SERPL-MCNC: 165 MG/DL (ref 0–200)
CO2 SERPL-SCNC: 23.3 MMOL/L (ref 22–29)
CREAT SERPL-MCNC: 1.02 MG/DL (ref 0.57–1)
GFR SERPLBLD CREATININE-BSD FMLA CKD-EPI: 55 ML/MIN/1.73
GFR SERPLBLD CREATININE-BSD FMLA CKD-EPI: 66 ML/MIN/1.73
GLOBULIN SER CALC-MCNC: 3.3 GM/DL
GLUCOSE SERPL-MCNC: 190 MG/DL (ref 65–99)
HDLC SERPL-MCNC: 56 MG/DL (ref 40–60)
LDLC SERPL CALC-MCNC: 82 MG/DL (ref 0–100)
POTASSIUM SERPL-SCNC: 4.1 MMOL/L (ref 3.5–5.2)
PROT SERPL-MCNC: 7.2 G/DL (ref 6–8.5)
SODIUM SERPL-SCNC: 143 MMOL/L (ref 136–145)
TRIGL SERPL-MCNC: 134 MG/DL (ref 0–150)
TSH SERPL DL<=0.005 MIU/L-ACNC: 1.57 MIU/ML (ref 0.27–4.2)
VLDLC SERPL CALC-MCNC: 26.8 MG/DL (ref 5–40)

## 2018-04-27 ENCOUNTER — OFFICE VISIT (OUTPATIENT)
Dept: INTERNAL MEDICINE | Facility: CLINIC | Age: 63
End: 2018-04-27

## 2018-04-27 VITALS
HEART RATE: 98 BPM | BODY MASS INDEX: 32.95 KG/M2 | SYSTOLIC BLOOD PRESSURE: 118 MMHG | DIASTOLIC BLOOD PRESSURE: 78 MMHG | OXYGEN SATURATION: 98 % | WEIGHT: 198 LBS

## 2018-04-27 DIAGNOSIS — R73.01 IFG (IMPAIRED FASTING GLUCOSE): ICD-10-CM

## 2018-04-27 DIAGNOSIS — Z00.00 HEALTH CARE MAINTENANCE: ICD-10-CM

## 2018-04-27 DIAGNOSIS — E03.9 ACQUIRED HYPOTHYROIDISM: ICD-10-CM

## 2018-04-27 DIAGNOSIS — D64.9 ANEMIA, UNSPECIFIED TYPE: ICD-10-CM

## 2018-04-27 DIAGNOSIS — M81.0 OSTEOPOROSIS, UNSPECIFIED OSTEOPOROSIS TYPE, UNSPECIFIED PATHOLOGICAL FRACTURE PRESENCE: ICD-10-CM

## 2018-04-27 DIAGNOSIS — E78.2 MIXED HYPERLIPIDEMIA: Primary | ICD-10-CM

## 2018-04-27 LAB
BASOPHILS # BLD AUTO: 0 10*3/MM3 (ref 0–0.2)
BASOPHILS NFR BLD AUTO: 0 % (ref 0–1.5)
DIFFERENTIAL COMMENT: NORMAL
EOSINOPHIL # BLD AUTO: 0.02 10*3/MM3 (ref 0–0.7)
EOSINOPHIL NFR BLD AUTO: 0.2 % (ref 0.3–6.2)
ERYTHROCYTE [DISTWIDTH] IN BLOOD BY AUTOMATED COUNT: 18.7 % (ref 11.7–13)
FERRITIN SERPL-MCNC: 8.28 NG/ML (ref 13–150)
FOLATE SERPL-MCNC: >20 NG/ML (ref 4.78–24.2)
HBA1C MFR BLD: 5.75 % (ref 4.8–5.6)
HCT VFR BLD AUTO: 36.5 % (ref 35.6–45.5)
HGB BLD-MCNC: 10.7 G/DL (ref 11.9–15.5)
IMM GRANULOCYTES # BLD: 0.03 10*3/MM3 (ref 0–0.03)
IMM GRANULOCYTES NFR BLD: 0.3 % (ref 0–0.5)
IRON SERPL-MCNC: 32 MCG/DL (ref 37–145)
LYMPHOCYTES # BLD AUTO: 0.78 10*3/MM3 (ref 0.9–4.8)
LYMPHOCYTES NFR BLD AUTO: 7.1 % (ref 19.6–45.3)
MCH RBC QN AUTO: 27 PG (ref 26.9–32)
MCHC RBC AUTO-ENTMCNC: 29.3 G/DL (ref 32.4–36.3)
MCV RBC AUTO: 92.2 FL (ref 80.5–98.2)
MONOCYTES # BLD AUTO: 0.41 10*3/MM3 (ref 0.2–1.2)
MONOCYTES NFR BLD AUTO: 3.7 % (ref 5–12)
NEUTROPHILS # BLD AUTO: 9.75 10*3/MM3 (ref 1.9–8.1)
NEUTROPHILS NFR BLD AUTO: 88.7 % (ref 42.7–76)
PLATELET # BLD AUTO: 506 10*3/MM3 (ref 140–500)
PLATELET BLD QL SMEAR: NORMAL
RBC # BLD AUTO: 3.96 10*6/MM3 (ref 3.9–5.2)
RBC MORPH BLD: NORMAL
RETICS/RBC NFR AUTO: 2.78 % (ref 0.5–1.5)
VIT B12 SERPL-MCNC: 346 PG/ML (ref 211–946)
WBC # BLD AUTO: 10.99 10*3/MM3 (ref 4.5–10.7)

## 2018-04-27 PROCEDURE — 90632 HEPA VACCINE ADULT IM: CPT | Performed by: INTERNAL MEDICINE

## 2018-04-27 PROCEDURE — 90471 IMMUNIZATION ADMIN: CPT | Performed by: INTERNAL MEDICINE

## 2018-04-27 PROCEDURE — 99214 OFFICE O/P EST MOD 30 MIN: CPT | Performed by: INTERNAL MEDICINE

## 2018-04-27 RX ORDER — PREDNISONE 1 MG/1
TABLET ORAL
Refills: 2 | COMMUNITY
Start: 2018-04-12 | End: 2019-05-13 | Stop reason: SDUPTHER

## 2018-04-27 RX ORDER — DULOXETIN HYDROCHLORIDE 30 MG/1
CAPSULE, DELAYED RELEASE ORAL
Refills: 2 | COMMUNITY
Start: 2018-04-18 | End: 2019-03-25 | Stop reason: SDUPTHER

## 2018-04-27 RX ORDER — ALPRAZOLAM 0.25 MG/1
0.25 TABLET ORAL 2 TIMES DAILY
Refills: 0 | COMMUNITY
Start: 2018-04-12 | End: 2018-06-26 | Stop reason: ALTCHOICE

## 2018-04-27 NOTE — PROGRESS NOTES
Subjective     Dara Medina is a 63 y.o. female who presents with   Chief Complaint   Patient presents with   • Hyperlipidemia   • Hypothyroidism   • Hyperglycemia       History of Present Illness     HLD.  Good with atorvastatin.  OP.  She started Forteo.  Hypothyroidism.  Good control.   IFG.  She is on steroids.  Spot nonfasting BS is 190.    Anemic recently at Dr. Contreras's office.    Review of Systems   Respiratory: Negative.    Cardiovascular: Negative.        The following portions of the patient's history were reviewed and updated as appropriate: allergies, current medications and problem list.    Patient Active Problem List    Diagnosis Date Noted   • Left facial numbness 03/08/2018   • Abnormal finding on MRI of brain 02/06/2018     Note Last Updated: 2/6/2018 2/2018.  Plan recheck three months.       • Osteoarthritis of knee, unspecified 12/14/2017   • Primary osteoarthritis of right knee 11/22/2017     Note Last Updated: 11/22/2017     Added automatically from request for surgery [id number]     • Osteoarthrosis, localized, primary, knee 07/17/2017   • Mixed hyperlipidemia 05/05/2017   • Tear of medial meniscus of right knee, current 05/04/2017   • Chronic pain of right knee 05/04/2017   • IFG (impaired fasting glucose) 04/12/2017   • Rheumatoid arthritis 07/07/2016   • Pulmonary fibrosis 07/06/2016     Note Last Updated: 10/23/2017     Secondary to methotrexate.       • Iron deficiency anemia 07/06/2016   • Pernicious anemia 07/06/2016   • Common variable immunodeficiency 07/01/2016   • Hypothyroidism 05/23/2016   • Sensory neuropathy 05/23/2016   • Osteoporosis 05/23/2016     Note Last Updated: 10/23/2017     H/o one year Forteo.  Not on bisphosphonates because of dental issues.  Fosamax in past caused stomach problems.       • Vitamin D deficiency 05/23/2016       Current Outpatient Prescriptions on File Prior to Visit   Medication Sig Dispense Refill   • atorvastatin (LIPITOR) 20 MG tablet Take 1 tablet  by mouth Daily. 90 tablet 3   • azaTHIOprine (IMURAN) 50 MG tablet Take 50 mg by mouth 3 (Three) Times a Day.  5   • buPROPion XL (WELLBUTRIN XL) 150 MG 24 hr tablet Take 150 mg by mouth Every Morning.     • Cholecalciferol (VITAMIN D3) 5000 UNITS capsule capsule Take 5,000 Units by mouth 2 (Two) Times a Day.     • cyanocobalamin 1000 MCG/ML injection INJECT 1,000 MCG AS DIRECTED EVERY 30 (THIRTY) DAYS. 1 mL 2   • cyclobenzaprine (FLEXERIL) 10 MG tablet Take 10 mg by mouth 3 (Three) Times a Day As Needed.     • DULoxetine (CYMBALTA) 60 MG capsule Take 60 mg by mouth daily.     • folic acid (FOLVITE) 1 MG tablet Take 1 mg by mouth daily.     • omeprazole (priLOSEC) 40 MG capsule Take 1 capsule by mouth Daily. 90 capsule 3   • SYNTHROID 112 MCG tablet TAKE 1 TABLET BY MOUTH DAILY. 90 tablet 3   • teriparatide (FORTEO) 600 MCG/2.4ML injection Inject 0.08 mL under the skin Daily. 2.4 mL 11   • topiramate (TOPAMAX) 50 MG tablet Take 1 tablet by mouth Every Night. 30 tablet 10   • [DISCONTINUED] buPROPion XL (WELLBUTRIN XL) 150 MG 24 hr tablet TAKE 1 TABLET BY MOUTH EVERY MORNING. 30 tablet 3   • [DISCONTINUED] clindamycin (CLEOCIN) 300 MG capsule Take both caps 1 hour prior to procedure 2 capsule 2   • [DISCONTINUED] XELJANZ XR 11 MG tablet sustained-release 24 hour        No current facility-administered medications on file prior to visit.        Objective     /78   Pulse 98   Wt 89.8 kg (198 lb)   SpO2 98%   BMI 32.95 kg/m²     Physical Exam   Constitutional: She is oriented to person, place, and time. She appears well-developed and well-nourished.   HENT:   Head: Normocephalic and atraumatic.   Cardiovascular: Normal rate, regular rhythm and normal heart sounds.    Pulmonary/Chest: Effort normal and breath sounds normal.   Neurological: She is alert and oriented to person, place, and time.   Skin: Skin is warm and dry.   Psychiatric: She has a normal mood and affect. Her behavior is normal.        Assessment/Plan   Dara was seen today for hyperlipidemia, hypothyroidism and hyperglycemia.    Diagnoses and all orders for this visit:    Mixed hyperlipidemia    Acquired hypothyroidism    Anemia, unspecified type  -     Iron  -     Vitamin B12 and Folate  -     Ferritin  -     CBC and Differential  -     Reticulocytes    IFG (impaired fasting glucose)  -     Hemoglobin A1c    Osteoporosis, unspecified osteoporosis type, unspecified pathological fracture presence    Health care maintenance  -     Hepatitis A Vaccine Adult IM        Discussion  HLD.  Continue current.  Hypothyroidism.  Continue levothyroxine.  IFG.  Check A1c today.  OP. Continue Forteo to total two years.    Anemia with h/o b12 def, iron def and ACD.  Check additional labs today.             Future Appointments  Date Time Provider Department Center   5/2/2018 9:00 AM NON CBC CHAIR 12  [facility]   5/30/2018 9:00 AM NON CBC CHAIR 12  [facility]   6/11/2018 1:00 PM Cj Null Jr., MD [facility] None   7/30/2018 10:00 AM Zion Guo MD [facility]

## 2018-05-02 ENCOUNTER — INFUSION (OUTPATIENT)
Dept: ONCOLOGY | Facility: HOSPITAL | Age: 63
End: 2018-05-02

## 2018-05-02 VITALS
TEMPERATURE: 98.5 F | WEIGHT: 199.4 LBS | SYSTOLIC BLOOD PRESSURE: 149 MMHG | BODY MASS INDEX: 33.18 KG/M2 | OXYGEN SATURATION: 96 % | DIASTOLIC BLOOD PRESSURE: 81 MMHG | HEART RATE: 92 BPM

## 2018-05-02 DIAGNOSIS — D83.9 COMMON VARIABLE IMMUNODEFICIENCY (HCC): Primary | ICD-10-CM

## 2018-05-02 PROCEDURE — 96366 THER/PROPH/DIAG IV INF ADDON: CPT | Performed by: NURSE PRACTITIONER

## 2018-05-02 PROCEDURE — 25010000002 IMMUNE GLOBULIN (HUMAN) 20 GM/200ML SOLUTION: Performed by: ALLERGY & IMMUNOLOGY

## 2018-05-02 PROCEDURE — 96365 THER/PROPH/DIAG IV INF INIT: CPT | Performed by: NURSE PRACTITIONER

## 2018-05-02 RX ORDER — DIPHENHYDRAMINE HCL 25 MG
25 CAPSULE ORAL ONCE
Status: CANCELLED
Start: 2018-05-02 | End: 2018-05-02

## 2018-05-02 RX ORDER — ACETAMINOPHEN 325 MG/1
650 TABLET ORAL ONCE
Status: CANCELLED
Start: 2018-05-02 | End: 2018-05-02

## 2018-05-02 RX ORDER — ACETAMINOPHEN 500 MG
500 TABLET ORAL EVERY 4 HOURS PRN
Status: CANCELLED
Start: 2018-05-02

## 2018-05-02 RX ADMIN — IMMUNE GLOBULIN INFUSION (HUMAN) 20 G: 100 INJECTION, SOLUTION INTRAVENOUS; SUBCUTANEOUS at 11:09

## 2018-05-02 RX ADMIN — IMMUNE GLOBULIN INFUSION (HUMAN) 20 G: 100 INJECTION, SOLUTION INTRAVENOUS; SUBCUTANEOUS at 09:13

## 2018-05-30 ENCOUNTER — INFUSION (OUTPATIENT)
Dept: ONCOLOGY | Facility: HOSPITAL | Age: 63
End: 2018-05-30

## 2018-05-30 VITALS
HEART RATE: 97 BPM | TEMPERATURE: 97.7 F | SYSTOLIC BLOOD PRESSURE: 131 MMHG | OXYGEN SATURATION: 95 % | DIASTOLIC BLOOD PRESSURE: 73 MMHG | BODY MASS INDEX: 33.12 KG/M2 | WEIGHT: 199 LBS

## 2018-05-30 DIAGNOSIS — D83.9 COMMON VARIABLE IMMUNODEFICIENCY (HCC): Primary | ICD-10-CM

## 2018-05-30 PROCEDURE — 96366 THER/PROPH/DIAG IV INF ADDON: CPT | Performed by: NURSE PRACTITIONER

## 2018-05-30 PROCEDURE — 96365 THER/PROPH/DIAG IV INF INIT: CPT | Performed by: NURSE PRACTITIONER

## 2018-05-30 PROCEDURE — 25010000002 IMMUNE GLOBULIN (HUMAN) 10 GM/100ML SOLUTION: Performed by: ALLERGY & IMMUNOLOGY

## 2018-05-30 RX ORDER — DIPHENHYDRAMINE HCL 25 MG
25 CAPSULE ORAL ONCE
Status: CANCELLED
Start: 2018-05-30 | End: 2018-05-30

## 2018-05-30 RX ORDER — ACETAMINOPHEN 500 MG
500 TABLET ORAL EVERY 4 HOURS PRN
Status: CANCELLED
Start: 2018-05-30

## 2018-05-30 RX ORDER — ACETAMINOPHEN 325 MG/1
650 TABLET ORAL ONCE
Status: CANCELLED
Start: 2018-05-30 | End: 2018-05-30

## 2018-05-30 RX ADMIN — IMMUNE GLOBULIN INFUSION (HUMAN) 20 G: 100 INJECTION, SOLUTION INTRAVENOUS; SUBCUTANEOUS at 08:50

## 2018-05-30 RX ADMIN — IMMUNE GLOBULIN INFUSION (HUMAN) 20 G: 100 INJECTION, SOLUTION INTRAVENOUS; SUBCUTANEOUS at 10:43

## 2018-06-11 RX ORDER — CYANOCOBALAMIN 1000 UG/ML
INJECTION, SOLUTION INTRAMUSCULAR; SUBCUTANEOUS
Qty: 1 ML | Refills: 2 | Status: SHIPPED | OUTPATIENT
Start: 2018-06-11 | End: 2018-09-10 | Stop reason: SDUPTHER

## 2018-06-19 ENCOUNTER — TELEPHONE (OUTPATIENT)
Dept: INTERNAL MEDICINE | Facility: CLINIC | Age: 63
End: 2018-06-19

## 2018-06-19 NOTE — TELEPHONE ENCOUNTER
Her Rheumatologist wants her to have a stress test. Does she need to see you first? CLC    Yes.  SLW    Patient advised. CLC

## 2018-06-26 ENCOUNTER — OFFICE VISIT (OUTPATIENT)
Dept: NEUROLOGY | Facility: CLINIC | Age: 63
End: 2018-06-26

## 2018-06-26 VITALS — SYSTOLIC BLOOD PRESSURE: 120 MMHG | DIASTOLIC BLOOD PRESSURE: 60 MMHG | HEIGHT: 65 IN

## 2018-06-26 DIAGNOSIS — R20.0 LEFT FACIAL NUMBNESS: ICD-10-CM

## 2018-06-26 DIAGNOSIS — R90.89 ABNORMAL FINDING ON MRI OF BRAIN: Primary | ICD-10-CM

## 2018-06-26 PROCEDURE — 99213 OFFICE O/P EST LOW 20 MIN: CPT | Performed by: PSYCHIATRY & NEUROLOGY

## 2018-06-26 RX ORDER — TOPIRAMATE 100 MG/1
100 TABLET, FILM COATED ORAL NIGHTLY
Qty: 30 TABLET | Refills: 11 | Status: SHIPPED | OUTPATIENT
Start: 2018-06-26 | End: 2018-09-27 | Stop reason: SDUPTHER

## 2018-06-26 NOTE — PROGRESS NOTES
Subjective:     Patient ID: Dara Medina is a 63 y.o. female.    History of Present Illness  The following portions of the patient's history were reviewed and updated as appropriate: allergies, current medications, past family history, past medical history, past social history, past surgical history and problem list.  The patient's left facial symptoms have improved somewhat.  She is currently on topiramate 75 mg at night without side effects.  MRI the brain was done which showed a decrease T2 FLAIR image in the right centrum semiovale on 2/2/18.  Recommended follow-up study with in 6 months.    Current Outpatient Prescriptions:   •  atorvastatin (LIPITOR) 20 MG tablet, Take 1 tablet by mouth Daily., Disp: 90 tablet, Rfl: 3  •  azaTHIOprine (IMURAN) 50 MG tablet, Take 50 mg by mouth 3 (Three) Times a Day., Disp: , Rfl: 5  •  buPROPion XL (WELLBUTRIN XL) 150 MG 24 hr tablet, Take 150 mg by mouth Every Morning., Disp: , Rfl:   •  Cholecalciferol (VITAMIN D3) 5000 UNITS capsule capsule, Take 5,000 Units by mouth 2 (Two) Times a Day., Disp: , Rfl:   •  cyanocobalamin 1000 MCG/ML injection, INJECT 1,000 MCG AS DIRECTED EVERY 30 (THIRTY) DAYS., Disp: 1 mL, Rfl: 2  •  cyclobenzaprine (FLEXERIL) 10 MG tablet, Take 10 mg by mouth 3 (Three) Times a Day As Needed., Disp: , Rfl:   •  DULoxetine (CYMBALTA) 30 MG capsule, TAKE ONE CAPSULE BY MOUTH EVERY DAY (**IN ADDITION TO 1 60MG CAPSULE DAILY), Disp: , Rfl: 2  •  DULoxetine (CYMBALTA) 60 MG capsule, Take 60 mg by mouth daily., Disp: , Rfl:   •  folic acid (FOLVITE) 1 MG tablet, Take 1 mg by mouth daily., Disp: , Rfl:   •  omeprazole (priLOSEC) 40 MG capsule, Take 1 capsule by mouth Daily., Disp: 90 capsule, Rfl: 3  •  predniSONE (DELTASONE) 5 MG tablet, TAKE 2 TABLET BY MOUTH TWICE DAILY, Disp: , Rfl: 2  •  SYNTHROID 112 MCG tablet, TAKE 1 TABLET BY MOUTH DAILY., Disp: 90 tablet, Rfl: 3  •  teriparatide (FORTEO) 600 MCG/2.4ML injection, Inject 0.08 mL under the skin Daily.,  Disp: 2.4 mL, Rfl: 11  •  topiramate (TOPAMAX) 100 MG tablet, Take 1 tablet by mouth Every Night., Disp: 30 tablet, Rfl: 11    Review of Systems   Constitutional: Negative.    Neurological: Negative.    Psychiatric/Behavioral: Negative.         Objective:    Neurologic Exam  Mental status examination was appropriate.  Funduscopy, visual fields, eye movements and pupillary reflexes were normal.  No facial weakness was noted.  Gait was normal.  No pattern of focal weakness was noted.  Physical Exam    Assessment/Plan:     Dara was seen today for headache.    Diagnoses and all orders for this visit:    Abnormal finding on MRI of brain    Left facial numbness    Other orders  -     topiramate (TOPAMAX) 100 MG tablet; Take 1 tablet by mouth Every Night.       Increased Topamax to 100 mg at night.  I asked patient to call in August and we'll repeat MRI the brain.  Plan to see her back in 3 months.  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 Thank you for allowing me to share in the care of this patient.  Cj Null M.D.

## 2018-06-27 ENCOUNTER — APPOINTMENT (OUTPATIENT)
Dept: ONCOLOGY | Facility: HOSPITAL | Age: 63
End: 2018-06-27

## 2018-06-28 ENCOUNTER — INFUSION (OUTPATIENT)
Dept: ONCOLOGY | Facility: HOSPITAL | Age: 63
End: 2018-06-28

## 2018-06-28 VITALS
TEMPERATURE: 98.5 F | DIASTOLIC BLOOD PRESSURE: 77 MMHG | SYSTOLIC BLOOD PRESSURE: 133 MMHG | HEART RATE: 90 BPM | BODY MASS INDEX: 33.32 KG/M2 | OXYGEN SATURATION: 96 % | WEIGHT: 200.2 LBS

## 2018-06-28 DIAGNOSIS — D83.9 COMMON VARIABLE IMMUNODEFICIENCY (HCC): Primary | ICD-10-CM

## 2018-06-28 PROCEDURE — 96366 THER/PROPH/DIAG IV INF ADDON: CPT | Performed by: NURSE PRACTITIONER

## 2018-06-28 PROCEDURE — 96365 THER/PROPH/DIAG IV INF INIT: CPT | Performed by: NURSE PRACTITIONER

## 2018-06-28 PROCEDURE — 25010000002 IMMUNE GLOBULIN (HUMAN) 30 GM/300ML SOLUTION: Performed by: ALLERGY & IMMUNOLOGY

## 2018-06-28 PROCEDURE — 25010000002 IMMUNE GLOBULIN (HUMAN) 10 GM/100ML SOLUTION: Performed by: ALLERGY & IMMUNOLOGY

## 2018-06-28 RX ORDER — ACETAMINOPHEN 500 MG
500 TABLET ORAL EVERY 4 HOURS PRN
Status: CANCELLED
Start: 2018-06-28

## 2018-06-28 RX ORDER — ACETAMINOPHEN 325 MG/1
650 TABLET ORAL ONCE
Status: CANCELLED
Start: 2018-06-28 | End: 2018-06-28

## 2018-06-28 RX ORDER — DIPHENHYDRAMINE HCL 25 MG
25 CAPSULE ORAL ONCE
Status: CANCELLED
Start: 2018-06-28 | End: 2018-06-28

## 2018-06-28 RX ADMIN — IMMUNE GLOBULIN INFUSION (HUMAN) 30 G: 100 INJECTION, SOLUTION INTRAVENOUS; SUBCUTANEOUS at 08:53

## 2018-06-28 RX ADMIN — IMMUNE GLOBULIN INFUSION (HUMAN) 10 G: 100 INJECTION, SOLUTION INTRAVENOUS; SUBCUTANEOUS at 11:14

## 2018-06-28 NOTE — PROGRESS NOTES
Pt arrived ambulatory to receive IVIG infusion.  Med rec reviewed.  Vital signs within normal limits.  Treatment was completed without complications and pt was educated on medication and side effects reviewed.  Pt instructed to call MD with concerns.  Pt left ambulatory at 11:55.

## 2018-07-09 RX ORDER — BUPROPION HYDROCHLORIDE 150 MG/1
TABLET ORAL
Qty: 30 TABLET | Refills: 3 | Status: SHIPPED | OUTPATIENT
Start: 2018-07-09 | End: 2018-07-25

## 2018-07-17 DIAGNOSIS — E78.5 HYPERLIPIDEMIA, UNSPECIFIED HYPERLIPIDEMIA TYPE: ICD-10-CM

## 2018-07-17 DIAGNOSIS — R73.01 IFG (IMPAIRED FASTING GLUCOSE): ICD-10-CM

## 2018-07-17 DIAGNOSIS — D51.0 PERNICIOUS ANEMIA: Primary | ICD-10-CM

## 2018-07-17 DIAGNOSIS — E03.9 HYPOTHYROIDISM, UNSPECIFIED TYPE: ICD-10-CM

## 2018-07-18 LAB
ALBUMIN SERPL-MCNC: 4 G/DL (ref 3.5–5.2)
ALBUMIN/GLOB SERPL: 1.6 G/DL
ALP SERPL-CCNC: 89 U/L (ref 39–117)
ALT SERPL-CCNC: 19 U/L (ref 1–33)
AST SERPL-CCNC: 11 U/L (ref 1–32)
BASOPHILS # BLD AUTO: 0.04 10*3/MM3 (ref 0–0.2)
BASOPHILS NFR BLD AUTO: 0.5 % (ref 0–1.5)
BILIRUB SERPL-MCNC: 0.3 MG/DL (ref 0.1–1.2)
BUN SERPL-MCNC: 18 MG/DL (ref 8–23)
BUN/CREAT SERPL: 16.1 (ref 7–25)
CALCIUM SERPL-MCNC: 8.9 MG/DL (ref 8.6–10.5)
CHLORIDE SERPL-SCNC: 111 MMOL/L (ref 98–107)
CHOLEST SERPL-MCNC: 185 MG/DL (ref 0–200)
CO2 SERPL-SCNC: 22.7 MMOL/L (ref 22–29)
CREAT SERPL-MCNC: 1.12 MG/DL (ref 0.57–1)
EOSINOPHIL # BLD AUTO: 0.25 10*3/MM3 (ref 0–0.7)
EOSINOPHIL NFR BLD AUTO: 2.9 % (ref 0.3–6.2)
ERYTHROCYTE [DISTWIDTH] IN BLOOD BY AUTOMATED COUNT: 18.8 % (ref 11.7–13)
GLOBULIN SER CALC-MCNC: 2.5 GM/DL
GLUCOSE SERPL-MCNC: 82 MG/DL (ref 65–99)
HBA1C MFR BLD: 6.2 % (ref 4.8–5.6)
HCT VFR BLD AUTO: 33.8 % (ref 35.6–45.5)
HDLC SERPL-MCNC: 53 MG/DL (ref 40–60)
HGB BLD-MCNC: 9.9 G/DL (ref 11.9–15.5)
IMM GRANULOCYTES # BLD: 0.02 10*3/MM3 (ref 0–0.03)
IMM GRANULOCYTES NFR BLD: 0.2 % (ref 0–0.5)
LDLC SERPL CALC-MCNC: 98 MG/DL (ref 0–100)
LYMPHOCYTES # BLD AUTO: 0.78 10*3/MM3 (ref 0.9–4.8)
LYMPHOCYTES NFR BLD AUTO: 8.9 % (ref 19.6–45.3)
MCH RBC QN AUTO: 26.6 PG (ref 26.9–32)
MCHC RBC AUTO-ENTMCNC: 29.3 G/DL (ref 32.4–36.3)
MCV RBC AUTO: 90.9 FL (ref 80.5–98.2)
MONOCYTES # BLD AUTO: 0.67 10*3/MM3 (ref 0.2–1.2)
MONOCYTES NFR BLD AUTO: 7.7 % (ref 5–12)
NEUTROPHILS # BLD AUTO: 7.01 10*3/MM3 (ref 1.9–8.1)
NEUTROPHILS NFR BLD AUTO: 80 % (ref 42.7–76)
PLATELET # BLD AUTO: 413 10*3/MM3 (ref 140–500)
POTASSIUM SERPL-SCNC: 4.5 MMOL/L (ref 3.5–5.2)
PROT SERPL-MCNC: 6.5 G/DL (ref 6–8.5)
RBC # BLD AUTO: 3.72 10*6/MM3 (ref 3.9–5.2)
SODIUM SERPL-SCNC: 146 MMOL/L (ref 136–145)
TRIGL SERPL-MCNC: 172 MG/DL (ref 0–150)
TSH SERPL DL<=0.005 MIU/L-ACNC: 2.21 MIU/ML (ref 0.27–4.2)
VLDLC SERPL CALC-MCNC: 34.4 MG/DL (ref 5–40)
WBC # BLD AUTO: 8.75 10*3/MM3 (ref 4.5–10.7)

## 2018-07-25 ENCOUNTER — OFFICE VISIT (OUTPATIENT)
Dept: INTERNAL MEDICINE | Facility: CLINIC | Age: 63
End: 2018-07-25

## 2018-07-25 VITALS
SYSTOLIC BLOOD PRESSURE: 126 MMHG | HEART RATE: 101 BPM | DIASTOLIC BLOOD PRESSURE: 80 MMHG | OXYGEN SATURATION: 100 % | WEIGHT: 203 LBS | BODY MASS INDEX: 33.78 KG/M2

## 2018-07-25 DIAGNOSIS — E03.9 ACQUIRED HYPOTHYROIDISM: ICD-10-CM

## 2018-07-25 DIAGNOSIS — M81.0 OSTEOPOROSIS, UNSPECIFIED OSTEOPOROSIS TYPE, UNSPECIFIED PATHOLOGICAL FRACTURE PRESENCE: ICD-10-CM

## 2018-07-25 DIAGNOSIS — E78.2 MIXED HYPERLIPIDEMIA: Primary | ICD-10-CM

## 2018-07-25 DIAGNOSIS — D64.9 CHRONIC ANEMIA: ICD-10-CM

## 2018-07-25 PROBLEM — M17.9 OSTEOARTHRITIS OF KNEE, UNSPECIFIED: Status: RESOLVED | Noted: 2017-12-14 | Resolved: 2018-07-25

## 2018-07-25 PROBLEM — M25.561 CHRONIC PAIN OF RIGHT KNEE: Status: RESOLVED | Noted: 2017-05-04 | Resolved: 2018-07-25

## 2018-07-25 PROBLEM — R73.03 PREDIABETES: Status: ACTIVE | Noted: 2017-04-12

## 2018-07-25 PROBLEM — G89.29 CHRONIC PAIN OF RIGHT KNEE: Status: RESOLVED | Noted: 2017-05-04 | Resolved: 2018-07-25

## 2018-07-25 PROBLEM — M17.10 OSTEOARTHROSIS, LOCALIZED, PRIMARY, KNEE: Status: RESOLVED | Noted: 2017-07-17 | Resolved: 2018-07-25

## 2018-07-25 PROCEDURE — 99214 OFFICE O/P EST MOD 30 MIN: CPT | Performed by: INTERNAL MEDICINE

## 2018-07-25 NOTE — PATIENT INSTRUCTIONS
We discussed the following classes of obesity medications.      Phentermine:  It is a stimulant.  It can increase blood pressure and cause palpitations.  Rare side effects include cardiac ischemia (lack of blood flow to heart), pulmonary HTN, psychosis, abuse and dependency and withdrawal.  It is a controlled substance in the [facility].      Belviq:  Works on the serotonin receptor to promote satiety.  Caution advised when using antidepressants.   It is a controlled substance in the [facility].      Contrave (naltrexone/buproprion).  Combination drug.  Bupropion is an antidepressant.  Naltrexone is an opoid blocker.      Qsymia (phentermine/topiramate).  One component phentermine see above.  Topiramate is an antiseizure medication.  Common side effects of topiramate include kidney stones, paresthesias and difficulty with word finding.      Saxenda (liraglutide).  Active ingredient is also in diabetes medication.  It is a daily injection.  It activate the GLP-1 receptor in the brain, regulating appetite and caloric intake.  Contraindicated if you have family history of thyroid cancer.  Common reaction is nausea.   It is a controlled substance in the [facility].

## 2018-07-25 NOTE — PROGRESS NOTES
Subjective     Dara Medina is a 63 y.o. female who presents with   Chief Complaint   Patient presents with   • Hyperlipidemia   • Hypothyroidism       History of Present Illness     HLD.  Good with atorvastatin.  OP.  She is on year two of Forteo.    Hypothyroidism.  Good control.   IFG.  She is on steroids.  A1c is 6.2.  Anemia.  Combined iron def, ACD and pernicious anemia.  She has had previous GI work-up.  She is unable to tolerate iron from constipation.      Review of Systems   Respiratory: Negative.    Cardiovascular: Negative.        The following portions of the patient's history were reviewed and updated as appropriate: allergies, current medications and problem list.    Patient Active Problem List    Diagnosis Date Noted   • Left facial numbness 03/08/2018   • Abnormal finding on MRI of brain 02/06/2018     Note Last Updated: 2/6/2018 2/2018.  Plan recheck three months.       • Primary osteoarthritis of right knee 11/22/2017     Note Last Updated: 11/22/2017     Added automatically from request for surgery [id number]     • Mixed hyperlipidemia 05/05/2017   • Tear of medial meniscus of right knee, current 05/04/2017   • Prediabetes 04/12/2017   • Rheumatoid arthritis (CMS/[facility]) 07/07/2016   • Pulmonary fibrosis (CMS/[facility]) 07/06/2016     Note Last Updated: 10/23/2017     Secondary to methotrexate.       • Iron deficiency anemia 07/06/2016   • Pernicious anemia 07/06/2016   • Common variable immunodeficiency (CMS/[facility]) 07/01/2016   • Hypothyroidism 05/23/2016   • Sensory neuropathy 05/23/2016   • Osteoporosis 05/23/2016     Note Last Updated: 10/23/2017     H/o one year Forteo.  Not on bisphosphonates because of dental issues.  Fosamax in past caused stomach problems.       • Vitamin D deficiency 05/23/2016       Current Outpatient Prescriptions on File Prior to Visit   Medication Sig Dispense Refill   • atorvastatin (LIPITOR) 20 MG tablet Take 1 tablet by mouth Daily. 90 tablet 3   • azaTHIOprine (IMURAN) 50 MG  tablet Take 50 mg by mouth 3 (Three) Times a Day.  5   • buPROPion XL (WELLBUTRIN XL) 150 MG 24 hr tablet Take 150 mg by mouth Every Morning.     • Cholecalciferol (VITAMIN D3) 5000 UNITS capsule capsule Take 5,000 Units by mouth 2 (Two) Times a Day.     • cyanocobalamin 1000 MCG/ML injection INJECT 1,000 MCG AS DIRECTED EVERY 30 (THIRTY) DAYS. 1 mL 2   • cyclobenzaprine (FLEXERIL) 10 MG tablet Take 10 mg by mouth 3 (Three) Times a Day As Needed.     • DULoxetine (CYMBALTA) 30 MG capsule TAKE ONE CAPSULE BY MOUTH EVERY DAY (**IN ADDITION TO 1 60MG CAPSULE DAILY)  2   • DULoxetine (CYMBALTA) 60 MG capsule Take 60 mg by mouth daily.     • folic acid (FOLVITE) 1 MG tablet Take 1 mg by mouth daily.     • omeprazole (priLOSEC) 40 MG capsule Take 1 capsule by mouth Daily. 90 capsule 3   • predniSONE (DELTASONE) 5 MG tablet TAKE 2 TABLET BY MOUTH TWICE DAILY  2   • SYNTHROID 112 MCG tablet TAKE 1 TABLET BY MOUTH DAILY. 90 tablet 3   • teriparatide (FORTEO) 600 MCG/2.4ML injection Inject 0.08 mL under the skin Daily. 2.4 mL 11   • topiramate (TOPAMAX) 100 MG tablet Take 1 tablet by mouth Every Night. 30 tablet 11   • [DISCONTINUED] buPROPion XL (WELLBUTRIN XL) 150 MG 24 hr tablet TAKE 1 TABLET BY MOUTH EVERY MORNING. 30 tablet 3     No current facility-administered medications on file prior to visit.        Objective     /80   Pulse 101   Wt 92.1 kg (203 lb)   SpO2 100%   BMI 33.78 kg/m²     Physical Exam   Constitutional: She is oriented to person, place, and time. She appears well-developed and well-nourished.   HENT:   Head: Normocephalic and atraumatic.   Cardiovascular: Normal rate, regular rhythm and normal heart sounds.    Pulmonary/Chest: Effort normal. She has rales.   Neurological: She is alert and oriented to person, place, and time.   Skin: Skin is warm and dry.   Psychiatric: She has a normal mood and affect. Her behavior is normal.       Assessment/Plan   Dara was seen today for hyperlipidemia and  hypothyroidism.    Diagnoses and all orders for this visit:    Mixed hyperlipidemia    Acquired hypothyroidism    Osteoporosis, unspecified osteoporosis type, unspecified pathological fracture presence    Chronic anemia  -     Ambulatory Referral to Hematology        Discussion   HLD.  Continue current.  Hypothyroidism.  Continue levothyroxine.  IFG.  We discussed diet.  I recommend a diet high in fruits, vegetables, whole grains, lean meats, nuts and beans.  I recommend limiting red meat, full fat dairy, eggs and processed white carbohydrates.  I recommend aerobic exercise at least 3 days per week.   OP. Continue Forteo to total two years.    Anemia with h/o b12 def, iron def and ACD.  Unable to tolerate iron orally.  Refer to hematology to consider iron infusions.          Patient has been erroneously marked as diabetic. Based on the available clinical information, she does not have diabetes and should therefore be excluded from diabetic health maintenance and quality measures for the remainder of the reporting period.         Future Appointments  Date Time Provider Department Center   7/26/2018 8:00 AM NON CBC CHAIR 12  INFUS EP LAG   8/6/2018 9:30 AM Zion Guo MD [facility] None   8/22/2018 9:00 AM NON CBC BED 1  [facility]   8/29/2018 3:20 PM LAB CHAIR 6 CBC [facility]  LAB [facility]RES LAG   8/29/2018 4:00 PM Ivory Noel MD PhD [facility] CBC KRES  CBC [facility]   9/19/2018 9:00 AM NON CBC BED 1  [facility]   9/27/2018 11:30 AM Cj Null Jr., MD [facility] N [facility] None   10/17/2018 9:00 AM NON CBC BED 1  [facility]   10/29/2018 8:10 AM [facility] None   11/5/2018 7:45 AM Randi Yang MD [facility] PC PAV[facility] None   11/14/2018 9:00 AM NON CBC CHAIR 12  [facility] EP [facility]AG   12/12/2018 9:00 AM NON CBC CHAIR 12  INF[facility] EP [facility]

## 2018-07-26 ENCOUNTER — INFUSION (OUTPATIENT)
Dept: ONCOLOGY | Facility: HOSPITAL | Age: 63
End: 2018-07-26

## 2018-07-26 ENCOUNTER — TELEPHONE (OUTPATIENT)
Dept: INTERNAL MEDICINE | Facility: CLINIC | Age: 63
End: 2018-07-26

## 2018-07-26 VITALS
WEIGHT: 203 LBS | TEMPERATURE: 98.1 F | BODY MASS INDEX: 33.78 KG/M2 | HEART RATE: 83 BPM | SYSTOLIC BLOOD PRESSURE: 131 MMHG | DIASTOLIC BLOOD PRESSURE: 77 MMHG

## 2018-07-26 DIAGNOSIS — D83.9 COMMON VARIABLE IMMUNODEFICIENCY (HCC): Primary | ICD-10-CM

## 2018-07-26 PROCEDURE — 96365 THER/PROPH/DIAG IV INF INIT: CPT | Performed by: NURSE PRACTITIONER

## 2018-07-26 PROCEDURE — 25010000002 IMMUNE GLOBULIN (HUMAN) 20 GM/200ML SOLUTION: Performed by: ALLERGY & IMMUNOLOGY

## 2018-07-26 PROCEDURE — 25010000002 IMMUNE GLOBULIN (HUMAN) 20 GM/200ML SOLUTION: Performed by: NURSE PRACTITIONER

## 2018-07-26 PROCEDURE — 96366 THER/PROPH/DIAG IV INF ADDON: CPT | Performed by: NURSE PRACTITIONER

## 2018-07-26 RX ORDER — ACETAMINOPHEN 500 MG
500 TABLET ORAL EVERY 4 HOURS PRN
Status: CANCELLED
Start: 2018-07-26

## 2018-07-26 RX ORDER — ACETAMINOPHEN 325 MG/1
650 TABLET ORAL ONCE
Status: CANCELLED
Start: 2018-07-26 | End: 2018-07-26

## 2018-07-26 RX ORDER — DIPHENHYDRAMINE HCL 25 MG
25 CAPSULE ORAL ONCE
Status: CANCELLED
Start: 2018-07-26 | End: 2018-07-26

## 2018-07-26 RX ADMIN — IMMUNE GLOBULIN INFUSION (HUMAN) 20 G: 100 INJECTION, SOLUTION INTRAVENOUS; SUBCUTANEOUS at 08:39

## 2018-07-26 RX ADMIN — IMMUNE GLOBULIN INFUSION (HUMAN) 20 G: 100 INJECTION, SOLUTION INTRAVENOUS; SUBCUTANEOUS at 10:26

## 2018-07-26 NOTE — TELEPHONE ENCOUNTER
Pt called to say you all discussed saxenda  She would like to get a rx for that .  KS    Advise patient I called in.  I would like to see her two weeks after starting.  Look on line for copay card.  It will likely get rejected by insurance.  KHURRAMW

## 2018-07-27 ENCOUNTER — TELEPHONE (OUTPATIENT)
Dept: INTERNAL MEDICINE | Facility: CLINIC | Age: 63
End: 2018-07-27

## 2018-07-27 NOTE — TELEPHONE ENCOUNTER
The two of you had discussed weight loss management , pt has agree to try the medication you recommend. Pt was not sure the name of the medication . Please advise     [phone]     Advise patient I called in [facility].  I would like to see her two weeks after starting.  Look on line for copay card.  It will likely get rejected by insurance.  SUZANNE

## 2018-08-06 ENCOUNTER — OFFICE VISIT (OUTPATIENT)
Dept: ORTHOPEDIC SURGERY | Facility: CLINIC | Age: 63
End: 2018-08-06

## 2018-08-06 VITALS — WEIGHT: 200 LBS | BODY MASS INDEX: 33.32 KG/M2 | HEIGHT: 65 IN

## 2018-08-06 DIAGNOSIS — Z96.651 STATUS POST TOTAL RIGHT KNEE REPLACEMENT: Primary | ICD-10-CM

## 2018-08-06 PROCEDURE — 99212 OFFICE O/P EST SF 10 MIN: CPT | Performed by: ORTHOPAEDIC SURGERY

## 2018-08-06 PROCEDURE — 73562 X-RAY EXAM OF KNEE 3: CPT | Performed by: ORTHOPAEDIC SURGERY

## 2018-08-07 NOTE — PROGRESS NOTES
Chief Complaint:  Follow-up status post right total knee arthroplasty    HPI:  Mrs. Medina comes in today for both knees.  She states that the right knee had been doing well until recently.  Both knees and all of her joints are now flared up.  She states that she has been having a severe RA flare up recently.  Her rheumatologist is having significant difficulty getting this under control.  She is now back on steroids.  She tells me that they have tried virtually every medicine and that she has had very little response.  It sounds like they are really struggling.  She denies any new injury with respect to either knee.  No fevers, redness, chills, sweats or other constitutional type symptoms.    Exam:  The right knee is examined.  Her incision is healed.  She has full extension and flexion to 120°.  No instability.  No significant tenderness.      Left knee is mildly tender over the medial joint line but not exquisitely so.  There is no effusion.  She has good motion.  No instability.  Gait appears normal.     Imaging:  AP, merchants and lateral views of the right knee are ordered and reviewed.  These are compared to previous x-rays.  The right total knee arthroplasty appears well fixed.  No concerning findings or complicating process noted.    Assessment:  1.  Six-month status post right total knee arthroplasty  2.  Rheumatoid arthritis    Plan:  I think her recent symptoms are related to her rheumatoid arthritis flareup.  I do not see anything concerning with respect to her knee.  I will see her back in 6 months.    Zion Guo MD  08/06/2018

## 2018-08-22 ENCOUNTER — INFUSION (OUTPATIENT)
Dept: ONCOLOGY | Facility: HOSPITAL | Age: 63
End: 2018-08-22

## 2018-08-22 ENCOUNTER — TELEPHONE (OUTPATIENT)
Dept: NEUROLOGY | Facility: CLINIC | Age: 63
End: 2018-08-22

## 2018-08-22 VITALS
DIASTOLIC BLOOD PRESSURE: 76 MMHG | HEART RATE: 57 BPM | OXYGEN SATURATION: 96 % | BODY MASS INDEX: 34.28 KG/M2 | TEMPERATURE: 98 F | WEIGHT: 206 LBS | SYSTOLIC BLOOD PRESSURE: 120 MMHG

## 2018-08-22 DIAGNOSIS — D83.9 COMMON VARIABLE IMMUNODEFICIENCY (HCC): Primary | ICD-10-CM

## 2018-08-22 PROCEDURE — 96366 THER/PROPH/DIAG IV INF ADDON: CPT | Performed by: NURSE PRACTITIONER

## 2018-08-22 PROCEDURE — 96365 THER/PROPH/DIAG IV INF INIT: CPT | Performed by: NURSE PRACTITIONER

## 2018-08-22 PROCEDURE — 25010000002 IMMUNE GLOBULIN (HUMAN) 20 GM/200ML SOLUTION: Performed by: ALLERGY & IMMUNOLOGY

## 2018-08-22 RX ORDER — ACETAMINOPHEN 500 MG
500 TABLET ORAL EVERY 4 HOURS PRN
Status: CANCELLED
Start: 2018-08-22

## 2018-08-22 RX ORDER — DIPHENHYDRAMINE HCL 25 MG
25 CAPSULE ORAL ONCE
Status: CANCELLED
Start: 2018-08-22 | End: 2018-08-22

## 2018-08-22 RX ORDER — ACETAMINOPHEN 325 MG/1
650 TABLET ORAL ONCE
Status: CANCELLED
Start: 2018-08-22 | End: 2018-08-22

## 2018-08-22 RX ADMIN — IMMUNE GLOBULIN INFUSION (HUMAN) 20 G: 100 INJECTION, SOLUTION INTRAVENOUS; SUBCUTANEOUS at 09:15

## 2018-08-22 RX ADMIN — IMMUNE GLOBULIN INFUSION (HUMAN) 20 G: 100 INJECTION, SOLUTION INTRAVENOUS; SUBCUTANEOUS at 11:03

## 2018-08-23 DIAGNOSIS — R93.0 ABNORMAL MRI OF THE HEAD: Primary | ICD-10-CM

## 2018-08-29 ENCOUNTER — CONSULT (OUTPATIENT)
Dept: ONCOLOGY | Facility: CLINIC | Age: 63
End: 2018-08-29

## 2018-08-29 ENCOUNTER — LAB (OUTPATIENT)
Dept: LAB | Facility: HOSPITAL | Age: 63
End: 2018-08-29

## 2018-08-29 VITALS
BODY MASS INDEX: 35.17 KG/M2 | RESPIRATION RATE: 20 BRPM | OXYGEN SATURATION: 98 % | SYSTOLIC BLOOD PRESSURE: 142 MMHG | TEMPERATURE: 98.1 F | HEIGHT: 64 IN | WEIGHT: 206 LBS | DIASTOLIC BLOOD PRESSURE: 78 MMHG | HEART RATE: 97 BPM

## 2018-08-29 DIAGNOSIS — IMO0001 ADVERSE EFFECT OF IRON OR ITS COMPOUND, SEQUELA: ICD-10-CM

## 2018-08-29 DIAGNOSIS — E53.8 VITAMIN B12 DEFICIENCY: ICD-10-CM

## 2018-08-29 DIAGNOSIS — D64.9 CHRONIC ANEMIA: Primary | ICD-10-CM

## 2018-08-29 DIAGNOSIS — D50.9 IRON DEFICIENCY ANEMIA, UNSPECIFIED IRON DEFICIENCY ANEMIA TYPE: Primary | ICD-10-CM

## 2018-08-29 DIAGNOSIS — M06.9 RHEUMATOID ARTHRITIS INVOLVING MULTIPLE SITES, UNSPECIFIED RHEUMATOID FACTOR PRESENCE: ICD-10-CM

## 2018-08-29 DIAGNOSIS — D72.823 LEUKEMOID REACTION: ICD-10-CM

## 2018-08-29 LAB
BASOPHILS # BLD AUTO: 0.03 10*3/MM3 (ref 0–0.1)
BASOPHILS NFR BLD AUTO: 0.3 % (ref 0–1.1)
DEPRECATED RDW RBC AUTO: 60.7 FL (ref 37–49)
EOSINOPHIL # BLD AUTO: 0.07 10*3/MM3 (ref 0–0.36)
EOSINOPHIL NFR BLD AUTO: 0.6 % (ref 1–5)
ERYTHROCYTE [DISTWIDTH] IN BLOOD BY AUTOMATED COUNT: 18.4 % (ref 11.7–14.5)
FERRITIN SERPL-MCNC: <5 NG/ML
HCT VFR BLD AUTO: 33 % (ref 34–45)
HGB BLD-MCNC: 10.3 G/DL (ref 11.5–14.9)
IMM GRANULOCYTES # BLD: 0.07 10*3/MM3 (ref 0–0.03)
IMM GRANULOCYTES NFR BLD: 0.6 % (ref 0–0.5)
IRON 24H UR-MRATE: 32 MCG/DL (ref 37–145)
IRON SATN MFR SERPL: 7 % (ref 14–48)
LYMPHOCYTES # BLD AUTO: 0.75 10*3/MM3 (ref 1–3.5)
LYMPHOCYTES NFR BLD AUTO: 6.6 % (ref 20–49)
MCH RBC QN AUTO: 28.1 PG (ref 27–33)
MCHC RBC AUTO-ENTMCNC: 31.2 G/DL (ref 32–35)
MCV RBC AUTO: 89.9 FL (ref 83–97)
MONOCYTES # BLD AUTO: 0.7 10*3/MM3 (ref 0.25–0.8)
MONOCYTES NFR BLD AUTO: 6.1 % (ref 4–12)
NEUTROPHILS # BLD AUTO: 9.82 10*3/MM3 (ref 1.5–7)
NEUTROPHILS NFR BLD AUTO: 85.8 % (ref 39–75)
NRBC BLD MANUAL-RTO: 0 /100 WBC (ref 0–0)
PLATELET # BLD AUTO: 412 10*3/MM3 (ref 150–375)
PMV BLD AUTO: 11 FL (ref 8.9–12.1)
RBC # BLD AUTO: 3.67 10*6/MM3 (ref 3.9–5)
TIBC SERPL-MCNC: 454 MCG/DL (ref 249–505)
TRANSFERRIN SERPL-MCNC: 324 MG/DL (ref 200–360)
WBC NRBC COR # BLD: 11.44 10*3/MM3 (ref 4–10)

## 2018-08-29 PROCEDURE — 84466 ASSAY OF TRANSFERRIN: CPT | Performed by: INTERNAL MEDICINE

## 2018-08-29 PROCEDURE — 83540 ASSAY OF IRON: CPT | Performed by: INTERNAL MEDICINE

## 2018-08-29 PROCEDURE — 82728 ASSAY OF FERRITIN: CPT | Performed by: INTERNAL MEDICINE

## 2018-08-29 PROCEDURE — 99244 OFF/OP CNSLTJ NEW/EST MOD 40: CPT | Performed by: INTERNAL MEDICINE

## 2018-08-29 PROCEDURE — 85025 COMPLETE CBC W/AUTO DIFF WBC: CPT | Performed by: INTERNAL MEDICINE

## 2018-08-29 PROCEDURE — 36415 COLL VENOUS BLD VENIPUNCTURE: CPT | Performed by: INTERNAL MEDICINE

## 2018-08-29 NOTE — PROGRESS NOTES
.     REASON FOR CONSULTATION:     Provide an opinion on any further workup or treatment of anemia.                               REQUESTING PHYSICIAN: Randi Yang MD     RECORDS OBTAINED:  Records of the patients history including those obtained from the referring provider were reviewed and summarized in detail.    HISTORY OF PRESENT ILLNESS:  The patient is a 63 y.o. year old femalewho is here for evaluation and management of her anemia. This patient has chronic disease including rheumatoid arthritis. She was on methotrexate for about 11 years and currently on Imuran and also history for pernicious anemia/vitamin B12 deficiency undergoing monthly intramuscular injection, history of hyperlipidemia, hypothyroidism, gastroesophageal reflux disease and rheumatoid arthritis. The patient reports she was not able to tolerate oral iron treatment because of significant constipation. The last time she was taking oral iron was about 2-3 years ago at which time she had significant anemia and was given 2 units PRBC transfusion. She was also given intravenous iron therapy at that time. Patient reports she had GI evaluation by Dr. Celeste with both EGD and a colonoscopic examination a couple of years ago. I searched electronic medical records at [facility] and according to Dr. Celeste’s clinical note in 08/2016 and 11/2016, she had both EGD and colonoscopic examination supposedly in August; however, I could not find the procedure note itself. Nevertheless, patient reports she was told not having malignancy. I did find the pathology evaluation on 08/24/2016, which reported mild to focally moderate chronic active gastritis with intestinal metaplasia; negative for H. pylori. The small intestinal biopsy was benign. No signs for abnormal histology. The distal esophagus shows moderate chronic active inflammation and no intestinal metaplasia or dysplasia. The descending colon polyp was tubular adenoma with  low-grade dysplasia. Patient reports no melena, no hematochezia. She does have pica for ice chips.     Patient reports she was diagnosed of rheumatoid arthritis in 2003. She was on methotrexate from 2003 to 2014. Subsequently treatment changed to Imuran. Patient reports she recently has been on large dose prednisone 40 mg daily for about 1 year and is in the process of tapering down of her prednisone. She also previously was given Rituxan treatment about 10 years ago. Most recently about 4 months ago she was restarted back on Rituxan.     Patient also reports about 2 years ago, she had recurrent pneumonia and hospitalization and since that time she has been given IVIG regularly for the past 2 years and since that time she has been doing very well. No recurrent pneumonia.     Patient complains of significant fatigue, pica for ice chips. She also complains of exertional dyspnea. No cough or hemoptysis. She has also soreness on her tongue. She has vague intermittent abdominal pain and constipation. Denies melena or hematochezia. No nausea. No vomiting. She has some weight gain secondary to long-term oral steroid use. She also reports heat intolerance. Patient continues to have joint swelling and pain from her rheumatoid arthritis. She also has chronic low back pain. She reports intermittent headaches and also numbness involving her extremities mostly on hands but denies fainting or syncope. She has no easy bleeding or bruising.      I reviewed her laboratory results within the [facility]. She had most recent iron study on 04/27/2018, which reported iron deficiency with ferritin 8.28 ng/mL, serum free iron 32 mcg/dL with no iron saturation. She had supratherapeutic folic acid level more than 20 ng/mL. However, had low normal vitamin B12 level 346 pg/mL. She had mild anemia; hemoglobin 10.7, MCV 92.2, MCHC 29.3. Her platelets were 506,000 and WBC 10,990 including neutrophils 47442, lymphocytes 780  and monocytes 410. Her chemistry lab updated earlier on 04/23/2018 reported creatinine 1.02. Normal electrolytes. Glucose 190. Marginally elevated alkaline phosphatase 118 and otherwise normal liver function panel. Total serum protein 7.2 and albumin 3.9. Had a normal TSH of 1.57.     On 01/22/2017, she had serum free iron 31, TIBC 463 and iron saturation 7% without ferritin level. Folic acid was more than 20 ng/mL and vitamin B12 was 1022 pg/mL. She had hemoglobin 11.4, MCV 85.1, MCHC 29.8. Her platelets were 487,000 and WBC was 23,700. Apparently, patient was hospitalized at that time at [facility] for about a week because of sepsis and acute colitis. Laboratory study on 06/06/2016 reported serum ferritin 9.0 ng/mL, free iron 22, TIBC 416 and iron saturation 5%. Her vitamin B12 level was 287 pg/mL and RBC folic acid was 1575 ng/mL. Her hemoglobin was 8.6, MCV 76.3, MCHC 29.4. Platelets were 617,000 and WBC 12,000. This patient had worsening hemoglobin on 06/07/2016 with hemoglobin 7.6 and she was given 2 units PRBC transfusion. Post transfusion hemoglobin was 10.4 on 06/08/2016. It was at that time the patient had hospitalization for pneumonia.        Past Medical History:   Diagnosis Date   • Acute colitis 1/18/2017   • Allergic Codeine, some antibiotics   • Anemia    • Cataract Removed    2012   • Chronic depression    • Colon polyp 1 removed    2016   • Diabetes mellitus (CMS/[facility])    • Fracture of rib    • GERD (gastroesophageal reflux disease)    • H/O Wrist fracture, right    • Headache    • History of bronchitis    • History of pneumonia    • History of transfusion    • Hyperlipidemia    • Hypothyroidism    • Inflammatory bowel disease    • Iron deficiency    • Irritable bowel syndrome    • Migraine    • Obesity    • Osteopenia    • Osteoporosis    • Pneumonia June 2016   • RA (rheumatoid arthritis) (CMS/[facility])    • Sensory neuropathy    • Sepsis, unspecified organism (CMS/[facility]) 1/18/2017   •  Vitamin D deficiency      Past Surgical History:   Procedure Laterality Date   • ADENOIDECTOMY     • BREAST AUGMENTATION     • BREAST IMPLANT REMOVAL Bilateral    • CARPAL TUNNEL RELEASE Left 2015   • COLONOSCOPY     • EYE SURGERY Bilateral    • HYSTERECTOMY     • LASIK Bilateral    • ORIF WRIST FRACTURE Right    • ND TOTAL KNEE ARTHROPLASTY Right 12/14/2017    Procedure: TOTAL KNEE ARTHROPLASTY;  Surgeon: Zion Guo MD;  Location: [facility];  Service: Orthopedics   • SINUS SURGERY      x2   • TONSILLECTOMY         HEMATOLOGIC/ONCOLOGIC HISTORY:  (History from previous dates can be found in the separate document.)  See history of present illness    MEDICATIONS    Current Outpatient Prescriptions:   •  atorvastatin (LIPITOR) 20 MG tablet, Take 1 tablet by mouth Daily., Disp: 90 tablet, Rfl: 3  •  azaTHIOprine (IMURAN) 50 MG tablet, Take 50 mg by mouth 3 (Three) Times a Day., Disp: , Rfl: 5  •  buPROPion XL (WELLBUTRIN XL) 150 MG 24 hr tablet, Take 150 mg by mouth Every Morning., Disp: , Rfl:   •  Cholecalciferol (VITAMIN D3) 5000 UNITS capsule capsule, Take 5,000 Units by mouth 2 (Two) Times a Day., Disp: , Rfl:   •  cyanocobalamin 1000 MCG/ML injection, INJECT 1,000 MCG AS DIRECTED EVERY 30 (THIRTY) DAYS., Disp: 1 mL, Rfl: 2  •  cyclobenzaprine (FLEXERIL) 10 MG tablet, Take 10 mg by mouth 3 (Three) Times a Day As Needed., Disp: , Rfl:   •  DULoxetine (CYMBALTA) 30 MG capsule, TAKE ONE CAPSULE BY MOUTH EVERY DAY (**IN ADDITION TO 1 60MG CAPSULE DAILY), Disp: , Rfl: 2  •  DULoxetine (CYMBALTA) 60 MG capsule, Take 60 mg by mouth daily., Disp: , Rfl:   •  exenatide er (BYDUREON BCISE) 2 MG/0.85ML auto-injector injection, Inject 0.85 mL under the skin into the appropriate area as directed 1 (One) Time Per Week., Disp: 4 pen, Rfl: 11  •  folic acid (FOLVITE) 1 MG tablet, Take 1 mg by mouth daily., Disp: , Rfl:   •  omeprazole (priLOSEC) 40 MG capsule, Take 1 capsule by mouth Daily., Disp: 90 capsule, Rfl: 3  •   predniSONE (DELTASONE) 5 MG tablet, TAKE 2 TABLET BY MOUTH TWICE DAILY, Disp: , Rfl: 2  •  SYNTHROID 112 MCG tablet, TAKE 1 TABLET BY MOUTH DAILY., Disp: 90 tablet, Rfl: 3  •  teriparatide (FORTEO) 600 MCG/2.4ML injection, Inject 0.08 mL under the skin Daily., Disp: 2.4 mL, Rfl: 11  •  topiramate (TOPAMAX) 100 MG tablet, Take 1 tablet by mouth Every Night., Disp: 30 tablet, Rfl: 11    ALLERGIES:     Allergies   Allergen Reactions   • Amoxicillin-Pot Clavulanate Diarrhea     itching   • Codeine Nausea And Vomiting       SOCIAL HISTORY:       Social History     Social History   • Marital status:      Spouse name: N/A   • Number of children: 2   • Years of education: 2 years college education.       Occupational History   • Homemaker       Social History Main Topics   • Smoking status: Former Smoker     Packs/day: 0.50     Years: 6 years      Types: Cigarettes     Start date: 1974     Quit date: 1978   • Smokeless tobacco: Never Used      Comment: QUIT AGE 23   • Alcohol use No      Comment: STOPPED    • Drug use: No   • Sexual activity: Not on file         FAMILY HISTORY:   Father diagnosed of colon cancer at age 64,  of colon cancer age 65.  Mother is in excellent health condition in her early 90s.  Patient has a brother and a sister both living excellent condition.  Patient has 2 adult children both in excellent health condition.    Family History   Problem Relation Age of Onset   • Hyperlipidemia Mother    • Hypertension Mother    • Migraines Mother    • Colon cancer Father    • Diabetes Father    • Cancer Father    • Hyperlipidemia Brother    • Migraines Brother    • Migraines Sister    • Malig Hyperthermia Neg Hx        REVIEW OF SYSTEMS:  Review of Systems   Constitutional: Positive for fatigue and unexpected weight change (Weight gains). Negative for appetite change and fever.   HENT: Positive for mouth sores (Soreness on tongue). Negative for congestion, rhinorrhea, sore throat and  "trouble swallowing.    Eyes: Negative for pain and visual disturbance.   Respiratory: Negative for cough and shortness of breath.    Cardiovascular: Negative for chest pain, palpitations and leg swelling.   Gastrointestinal: Positive for abdominal pain and constipation. Negative for blood in stool, nausea and vomiting.   Endocrine: Positive for heat intolerance. Negative for polyphagia.   Genitourinary: Negative for dysuria and hematuria.              Vitals:    08/29/18 1546   BP: 142/78   Pulse: 97   Resp: 20   Temp: 98.1 °F (36.7 °C)   TempSrc: Oral   SpO2: 98%   Weight: 93.4 kg (206 lb)  Comment: Patient stated weight.Refused to weigh.   Height: 163.5 cm (64.37\")   PainSc:   4   PainLoc: Chest     Current Status 8/29/2018   ECOG score 0      PHYSICAL EXAM:      CONSTITUTIONAL: Well-developed Well-nourished female.  No distress, looks comfortable.  EYES:  Conjunctiva and lids unremarkable.  PERRLA  EARS,NOSE,MOUTH,THROAT:  Ears and nose appear unremarkable.  Lips appear unremarkable.  Oral mucosa moist.  RESPIRATORY:  Normal respiratory effort.  Lungs clear to auscultation bilaterally.  No wheezing no crackles.  CARDIOVASCULAR:  Regular rhythm and rate.  Normal S1, S2.  No murmurs rubs or gallops.  No significant lower extremity edema.  GASTROINTESTINAL: Abdomen appears unremarkable.  Nontender.  No hepatomegaly.  No splenomegaly.  Bowel sounds normal.  LYMPHATIC:  No cervical, supraclavicular, axillary lymphadenopathy.  MUSCULOSKELETAL:  Unremarkable gait and station.  Unremarkable digits/nails.  No cyanosis or clubbing.  SKIN:  Warm.  No rashes.  PSYCHIATRIC:  Normal judgment and insight.  Normal mood and affect.      RECENT LABS:  Lab Results   Component Value Date    WBC 11.44 (H) 08/29/2018    HGB 10.3 (L) 08/29/2018    HCT 33.0 (L) 08/29/2018    MCV 89.9 08/29/2018     (H) 08/29/2018     Lab Results   Component Value Date    NEUTROABS 9.82 (H) 08/29/2018     Lab Results   Component Value Date    " [id number] 346 04/27/2018     Lab Results   Component Value Date    FOLATE >20.00 04/27/2018     Lab Results   Component Value Date    IRON 31 (L) 01/22/2017    TIBC 463 01/22/2017    FERRITIN 8.28 (L) 04/27/2018     Lab Results   Component Value Date    GLUCOSE 81 12/07/2017    BUN 18 07/18/2018    CREATININE 1.12 (H) 07/18/2018    EGFRIFNONA 49 (L) 07/18/2018    EGFRIFAFRI 60 (L) 07/18/2018    BCR 16.1 07/18/2018    K 4.5 07/18/2018    CO2 22.7 07/18/2018    CALCIUM 8.9 07/18/2018    PROTENTOTREF 6.5 07/18/2018    ALBUMIN 4.00 07/18/2018    LABIL2 1.6 07/18/2018    AST 11 07/18/2018    ALT 19 07/18/2018     Sodium   Date Value Ref Range Status   07/18/2018 146 (H) 136 - 145 mmol/L Final   12/07/2017 142 136 - 145 mmol/L Final     Potassium   Date Value Ref Range Status   07/18/2018 4.5 3.5 - 5.2 mmol/L Final   12/07/2017 4.4 3.5 - 5.2 mmol/L Final     Total Bilirubin   Date Value Ref Range Status   07/18/2018 0.3 0.1 - 1.2 mg/dL Final   04/03/2017 0.3 0.1 - 1.2 mg/dL Final     Alkaline Phosphatase   Date Value Ref Range Status   07/18/2018 89 39 - 117 U/L Final   04/03/2017 160 (H) 39 - 117 U/L Final   ]    Peripheral blood smear reviewed today, there are hypochromia.  No abnormal morphology is of RBC, WBC call platelets.      Assessment/Plan      1. This patient has chronic iron deficiency for the past several years. She was not able to tolerate oral iron treatment because of severe constipation. She previously in 2016 received 2 units PRBC transfusion and intravenous iron therapy. She had workup with both EGD and colonoscopy in 08/2016 shortly after her discharge from hospital at that time. Procedure report was not able to be found, however, I reviewed pathology report from that procedure. There was chronic gastritis and esophagitis but no report of ulceration. She also had benign colon polyp. This patient is symptomatic with profound fatigue and pica for ice chips. I discussed with the patient, recommend stat  iron study and arrange for her intravenous iron therapy with Injectafer 750 mg once a week for 2 doses. We will repeat her iron study in 4 weeks to document post treatment level. Down the line, I recommended to monitor her iron study about every 5-6 months to see when she will need repeat iron treatment.   2. Pernicious anemia/vitamin B12 deficiency. This patient has monthly vitamin B12 injection. Most recent B12 labs on 04/27/2018 reported low normal B12 level at 346 pg/mL. I advised the patient to start taking oral vitamin B12 at 1000 mcg daily. She was never treated with oral B12. We will repeat her B12 level in 6 months for reevaluation to assess a response to oral treatment. If she has no significant improvement this patient may need to have vitamin B12 injection twice a month.     Mild leukocytosis with increased neutrophils 9800. This is probably related to her oral prednisone use. We will continue to monitor.     PLAN:   1. Stat iron, TIBC and ferritin level.   2. Arrange weekly Injectafer for 2 doses starting next week. We will obtain insurance approval.   3. Repeat iron study in 4 weeks to document post iron infusion level.   4. Patient will return in 6 months for MD reevaluation. We will repeat CBC, ferritin, iron saturation and free iron.        ADDENDUM:     Lab Results   Component Value Date    IRON 32 (L) 08/29/2018    TIBC 454 08/29/2018    FERRITIN <5.00 08/29/2018     Iron saturation7.8%     Lab Results   Component Value Date    FOLATE >20.00 04/27/2018     Lab Results   Component Value Date    [id number] 346 04/27/2018       ALEJANDRO METCALF M.D., Ph.D.    8/29/2018    CC:  Randi Yang MD       Dictated using Dragon Dictation.

## 2018-08-31 RX ORDER — SODIUM CHLORIDE 9 MG/ML
250 INJECTION, SOLUTION INTRAVENOUS ONCE
Status: CANCELLED | OUTPATIENT
Start: 2018-09-04

## 2018-08-31 RX ORDER — SODIUM CHLORIDE 9 MG/ML
250 INJECTION, SOLUTION INTRAVENOUS ONCE
Status: CANCELLED | OUTPATIENT
Start: 2018-09-12

## 2018-09-04 ENCOUNTER — INFUSION (OUTPATIENT)
Dept: ONCOLOGY | Facility: HOSPITAL | Age: 63
End: 2018-09-04

## 2018-09-04 VITALS
HEART RATE: 93 BPM | WEIGHT: 206 LBS | DIASTOLIC BLOOD PRESSURE: 69 MMHG | OXYGEN SATURATION: 98 % | SYSTOLIC BLOOD PRESSURE: 111 MMHG | TEMPERATURE: 98.1 F | BODY MASS INDEX: 34.95 KG/M2

## 2018-09-04 DIAGNOSIS — IMO0001 ADVERSE EFFECT OF IRON OR ITS COMPOUND, SEQUELA: Primary | ICD-10-CM

## 2018-09-04 DIAGNOSIS — D50.9 IRON DEFICIENCY ANEMIA, UNSPECIFIED IRON DEFICIENCY ANEMIA TYPE: ICD-10-CM

## 2018-09-04 PROCEDURE — 25010000002 FERRIC CARBOXYMALTOSE 750 MG/15ML SOLUTION 15 ML VIAL: Performed by: INTERNAL MEDICINE

## 2018-09-04 PROCEDURE — 96374 THER/PROPH/DIAG INJ IV PUSH: CPT | Performed by: INTERNAL MEDICINE

## 2018-09-04 RX ORDER — SODIUM CHLORIDE 9 MG/ML
250 INJECTION, SOLUTION INTRAVENOUS ONCE
Status: COMPLETED | OUTPATIENT
Start: 2018-09-04 | End: 2018-09-04

## 2018-09-04 RX ADMIN — SODIUM CHLORIDE 250 ML: 900 INJECTION, SOLUTION INTRAVENOUS at 11:19

## 2018-09-04 RX ADMIN — FERRIC CARBOXYMALTOSE INJECTION 750 MG: 50 INJECTION, SOLUTION INTRAVENOUS at 11:19

## 2018-09-05 ENCOUNTER — APPOINTMENT (OUTPATIENT)
Dept: MRI IMAGING | Facility: HOSPITAL | Age: 63
End: 2018-09-05
Attending: PSYCHIATRY & NEUROLOGY

## 2018-09-10 RX ORDER — CYANOCOBALAMIN 1000 UG/ML
INJECTION, SOLUTION INTRAMUSCULAR; SUBCUTANEOUS
Qty: 1 ML | Refills: 2 | Status: SHIPPED | OUTPATIENT
Start: 2018-09-10 | End: 2018-12-03 | Stop reason: SDUPTHER

## 2018-09-12 ENCOUNTER — INFUSION (OUTPATIENT)
Dept: ONCOLOGY | Facility: HOSPITAL | Age: 63
End: 2018-09-12

## 2018-09-12 VITALS
WEIGHT: 206.6 LBS | BODY MASS INDEX: 35.06 KG/M2 | TEMPERATURE: 98.4 F | SYSTOLIC BLOOD PRESSURE: 131 MMHG | DIASTOLIC BLOOD PRESSURE: 70 MMHG | HEART RATE: 114 BPM

## 2018-09-12 DIAGNOSIS — IMO0001 ADVERSE EFFECT OF IRON OR ITS COMPOUND, SEQUELA: Primary | ICD-10-CM

## 2018-09-12 DIAGNOSIS — D50.9 IRON DEFICIENCY ANEMIA, UNSPECIFIED IRON DEFICIENCY ANEMIA TYPE: ICD-10-CM

## 2018-09-12 PROCEDURE — 25010000002 FERRIC CARBOXYMALTOSE 750 MG/15ML SOLUTION 15 ML VIAL: Performed by: INTERNAL MEDICINE

## 2018-09-12 PROCEDURE — 96374 THER/PROPH/DIAG INJ IV PUSH: CPT | Performed by: INTERNAL MEDICINE

## 2018-09-12 RX ORDER — SODIUM CHLORIDE 9 MG/ML
250 INJECTION, SOLUTION INTRAVENOUS ONCE
Status: COMPLETED | OUTPATIENT
Start: 2018-09-12 | End: 2018-09-12

## 2018-09-12 RX ADMIN — SODIUM CHLORIDE 250 ML: 9 INJECTION, SOLUTION INTRAVENOUS at 09:51

## 2018-09-12 RX ADMIN — FERRIC CARBOXYMALTOSE INJECTION 750 MG: 50 INJECTION, SOLUTION INTRAVENOUS at 09:50

## 2018-09-19 ENCOUNTER — INFUSION (OUTPATIENT)
Dept: ONCOLOGY | Facility: HOSPITAL | Age: 63
End: 2018-09-19

## 2018-09-19 VITALS
TEMPERATURE: 98.5 F | WEIGHT: 202 LBS | DIASTOLIC BLOOD PRESSURE: 76 MMHG | OXYGEN SATURATION: 99 % | SYSTOLIC BLOOD PRESSURE: 121 MMHG | HEART RATE: 96 BPM | BODY MASS INDEX: 34.28 KG/M2

## 2018-09-19 DIAGNOSIS — D83.9 COMMON VARIABLE IMMUNODEFICIENCY (HCC): Primary | ICD-10-CM

## 2018-09-19 PROCEDURE — 96366 THER/PROPH/DIAG IV INF ADDON: CPT | Performed by: NURSE PRACTITIONER

## 2018-09-19 PROCEDURE — 25010000002 IMMUNE GLOBULIN (HUMAN) 20 GM/200ML SOLUTION: Performed by: ALLERGY & IMMUNOLOGY

## 2018-09-19 PROCEDURE — 96365 THER/PROPH/DIAG IV INF INIT: CPT | Performed by: NURSE PRACTITIONER

## 2018-09-19 RX ORDER — ACETAMINOPHEN 500 MG
500 TABLET ORAL EVERY 4 HOURS PRN
Status: CANCELLED
Start: 2018-09-19

## 2018-09-19 RX ORDER — DIPHENHYDRAMINE HCL 25 MG
25 CAPSULE ORAL ONCE
Status: CANCELLED
Start: 2018-09-19 | End: 2018-09-19

## 2018-09-19 RX ORDER — ACETAMINOPHEN 325 MG/1
650 TABLET ORAL ONCE
Status: CANCELLED
Start: 2018-09-19 | End: 2018-09-19

## 2018-09-19 RX ADMIN — IMMUNE GLOBULIN INFUSION (HUMAN) 20 G: 100 INJECTION, SOLUTION INTRAVENOUS; SUBCUTANEOUS at 09:22

## 2018-09-19 RX ADMIN — IMMUNE GLOBULIN INFUSION (HUMAN) 20 G: 100 INJECTION, SOLUTION INTRAVENOUS; SUBCUTANEOUS at 11:08

## 2018-09-27 ENCOUNTER — OFFICE VISIT (OUTPATIENT)
Dept: NEUROLOGY | Facility: CLINIC | Age: 63
End: 2018-09-27

## 2018-09-27 ENCOUNTER — LAB (OUTPATIENT)
Dept: LAB | Facility: HOSPITAL | Age: 63
End: 2018-09-27

## 2018-09-27 VITALS
BODY MASS INDEX: 34.66 KG/M2 | WEIGHT: 203 LBS | DIASTOLIC BLOOD PRESSURE: 84 MMHG | HEART RATE: 103 BPM | OXYGEN SATURATION: 98 % | HEIGHT: 64 IN | SYSTOLIC BLOOD PRESSURE: 128 MMHG

## 2018-09-27 DIAGNOSIS — D50.9 IRON DEFICIENCY ANEMIA, UNSPECIFIED IRON DEFICIENCY ANEMIA TYPE: ICD-10-CM

## 2018-09-27 DIAGNOSIS — R20.0 LEFT FACIAL NUMBNESS: Primary | ICD-10-CM

## 2018-09-27 DIAGNOSIS — R90.89 ABNORMAL BRAIN MRI: ICD-10-CM

## 2018-09-27 DIAGNOSIS — R90.89 ABNORMAL FINDING ON MRI OF BRAIN: ICD-10-CM

## 2018-09-27 PROBLEM — G44.209 TENSION HEADACHE: Status: ACTIVE | Noted: 2018-09-27

## 2018-09-27 LAB
BASOPHILS # BLD AUTO: 0.04 10*3/MM3 (ref 0–0.1)
BASOPHILS NFR BLD AUTO: 0.5 % (ref 0–1.1)
DEPRECATED RDW RBC AUTO: 74.4 FL (ref 37–49)
EOSINOPHIL # BLD AUTO: 0.18 10*3/MM3 (ref 0–0.36)
EOSINOPHIL NFR BLD AUTO: 2.1 % (ref 1–5)
ERYTHROCYTE [DISTWIDTH] IN BLOOD BY AUTOMATED COUNT: 20.5 % (ref 11.7–14.5)
FERRITIN SERPL-MCNC: 553.8 NG/ML
HCT VFR BLD AUTO: 39.8 % (ref 34–45)
HGB BLD-MCNC: 12.4 G/DL (ref 11.5–14.9)
IMM GRANULOCYTES # BLD: 0.04 10*3/MM3 (ref 0–0.03)
IMM GRANULOCYTES NFR BLD: 0.5 % (ref 0–0.5)
IRON 24H UR-MRATE: 82 MCG/DL (ref 37–145)
IRON SATN MFR SERPL: 29 % (ref 14–48)
LYMPHOCYTES # BLD AUTO: 0.46 10*3/MM3 (ref 1–3.5)
LYMPHOCYTES NFR BLD AUTO: 5.4 % (ref 20–49)
MCH RBC QN AUTO: 30.7 PG (ref 27–33)
MCHC RBC AUTO-ENTMCNC: 31.2 G/DL (ref 32–35)
MCV RBC AUTO: 98.5 FL (ref 83–97)
MONOCYTES # BLD AUTO: 0.61 10*3/MM3 (ref 0.25–0.8)
MONOCYTES NFR BLD AUTO: 7.1 % (ref 4–12)
NEUTROPHILS # BLD AUTO: 7.24 10*3/MM3 (ref 1.5–7)
NEUTROPHILS NFR BLD AUTO: 84.4 % (ref 39–75)
NRBC BLD MANUAL-RTO: 0 /100 WBC (ref 0–0)
PLATELET # BLD AUTO: 307 10*3/MM3 (ref 150–375)
PMV BLD AUTO: 10.2 FL (ref 8.9–12.1)
RBC # BLD AUTO: 4.04 10*6/MM3 (ref 3.9–5)
TIBC SERPL-MCNC: 287 MCG/DL (ref 249–505)
TRANSFERRIN SERPL-MCNC: 205 MG/DL (ref 200–360)
WBC NRBC COR # BLD: 8.57 10*3/MM3 (ref 4–10)

## 2018-09-27 PROCEDURE — 84466 ASSAY OF TRANSFERRIN: CPT | Performed by: INTERNAL MEDICINE

## 2018-09-27 PROCEDURE — 83540 ASSAY OF IRON: CPT | Performed by: INTERNAL MEDICINE

## 2018-09-27 PROCEDURE — 85025 COMPLETE CBC W/AUTO DIFF WBC: CPT | Performed by: INTERNAL MEDICINE

## 2018-09-27 PROCEDURE — 36415 COLL VENOUS BLD VENIPUNCTURE: CPT | Performed by: INTERNAL MEDICINE

## 2018-09-27 PROCEDURE — 99213 OFFICE O/P EST LOW 20 MIN: CPT | Performed by: PSYCHIATRY & NEUROLOGY

## 2018-09-27 PROCEDURE — 82728 ASSAY OF FERRITIN: CPT | Performed by: INTERNAL MEDICINE

## 2018-09-27 RX ORDER — TOPIRAMATE 100 MG/1
100 TABLET, FILM COATED ORAL NIGHTLY
Qty: 30 TABLET | Refills: 11 | Status: SHIPPED | OUTPATIENT
Start: 2018-09-27 | End: 2019-03-25 | Stop reason: SDUPTHER

## 2018-09-27 NOTE — PROGRESS NOTES
Subjective:     Patient ID: Dara Medina is a 63 y.o. female.    History of Present Illness    The patient continues to have chronic headache.  The patient is on Topamax 100 mg at night without side effects it seems to help somewhat.  He also the patient's continues have left facial numbness.  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  At that time the reported neuroradiologist felt that there was a possibility of a low-grade glioma.  He felt to ensure the patient did not have this is a follow-up study in 3-6 months was necessary.  Attempted to repeat the MRI and this was turned down by since insurance.    The following portions of the patient's history were reviewed and updated as appropriate: allergies, current medications, past family history, past medical history, past social history, past surgical history and problem list.      Current Outpatient Prescriptions:   •  atorvastatin (LIPITOR) 20 MG tablet, Take 1 tablet by mouth Daily., Disp: 90 tablet, Rfl: 3  •  azaTHIOprine (IMURAN) 50 MG tablet, Take 50 mg by mouth 3 (Three) Times a Day., Disp: , Rfl: 5  •  buPROPion XL (WELLBUTRIN XL) 150 MG 24 hr tablet, Take 150 mg by mouth Every Morning., Disp: , Rfl:   •  Cholecalciferol (VITAMIN D3) 5000 UNITS capsule capsule, Take 5,000 Units by mouth 2 (Two) Times a Day., Disp: , Rfl:   •  Cyanocobalamin (VITAMIN B 12 PO), Take 1,000 mg by mouth Daily., Disp: , Rfl:   •  cyanocobalamin 1000 MCG/ML injection, INJECT 1,000 MCG AS DIRECTED EVERY 30 (THIRTY) DAYS., Disp: 1 mL, Rfl: 2  •  cyclobenzaprine (FLEXERIL) 10 MG tablet, Take 10 mg by mouth 3 (Three) Times a Day As Needed., Disp: , Rfl:   •  DULoxetine (CYMBALTA) 30 MG capsule, TAKE ONE CAPSULE BY MOUTH EVERY DAY (**IN ADDITION TO 1 60MG CAPSULE DAILY), Disp: , Rfl: 2  •  DULoxetine (CYMBALTA) 60 MG capsule, Take 60 mg by mouth daily., Disp: , Rfl:   •  exenatide er (BYDUREON BCISE) 2 MG/0.85ML  auto-injector injection, Inject 0.85 mL under the skin into the appropriate area as directed 1 (One) Time Per Week., Disp: 4 pen, Rfl: 11  •  folic acid (FOLVITE) 1 MG tablet, Take 1 mg by mouth daily., Disp: , Rfl:   •  omeprazole (priLOSEC) 40 MG capsule, Take 1 capsule by mouth Daily., Disp: 90 capsule, Rfl: 3  •  predniSONE (DELTASONE) 5 MG tablet, TAKE 2 TABLET BY MOUTH TWICE DAILY, Disp: , Rfl: 2  •  SYNTHROID 112 MCG tablet, TAKE 1 TABLET BY MOUTH DAILY., Disp: 90 tablet, Rfl: 3  •  teriparatide (FORTEO) 600 MCG/2.4ML injection, Inject 0.08 mL under the skin Daily., Disp: 2.4 mL, Rfl: 11  •  topiramate (TOPAMAX) 100 MG tablet, Take 1 tablet by mouth Every Night., Disp: 30 tablet, Rfl: 11    Review of Systems   Constitutional: Positive for fatigue. Negative for activity change, appetite change, chills, diaphoresis, fever and unexpected weight change.   HENT: Negative for drooling, hearing loss, tinnitus, trouble swallowing and voice change.    Eyes: Negative for photophobia, pain and visual disturbance.   Respiratory: Negative for chest tightness and shortness of breath.    Cardiovascular: Negative for chest pain, palpitations and leg swelling.   Gastrointestinal: Negative for blood in stool, nausea and vomiting.   Endocrine: Negative for cold intolerance and heat intolerance.   Genitourinary: Negative for difficulty urinating.   Musculoskeletal: Positive for neck stiffness. Negative for gait problem and neck pain.   Skin: Negative for rash.   Neurological: Positive for numbness (tingling in her toes) and headaches. Negative for dizziness, tremors, seizures, syncope, facial asymmetry, speech difficulty, weakness and light-headedness.   Hematological: Bruises/bleeds easily.   Psychiatric/Behavioral: Negative for agitation, behavioral problems, confusion, hallucinations, sleep disturbance and suicidal ideas. The patient is not nervous/anxious.         Objective:    Neurologic Exam  Mental status examination was  appropriate.  Funduscopy, visual fields, eye movements and pupillary reflexes were normal.  No facial weakness was noted.  Gait was normal.  No pattern of focal weakness was noted.  Facial sensation was normal to pinprick bilaterally.  Physical Exam    Assessment/Plan:     Dara was seen today for abnormal finding on mri brain.    Diagnoses and all orders for this visit:    Left facial numbness  -     MRI Brain With & Without Contrast; Future    Abnormal brain MRI  -     MRI Brain With & Without Contrast; Future    Abnormal finding on MRI of brain    Other orders  -     topiramate (TOPAMAX) 100 MG tablet; Take 1 tablet by mouth Every Night.         A repeat MRI of the brain with and without contrast is indicated and reordered.  This was recommended by the neuroradiologist who read her initial MRIs showing an unusual lesion in the right centrum semiovale.  He felt that this was possibly a low-grade glioma and recommended a follow-up MRI to ensure the patient's health.  Denying the MRI of the brain by the insurance company is clearly not in the best interest of the patient's health and is a poor decision.  Prescription drug management - meds as above  Call after MRI of brain.  Follow-up in the office in 6 months. Thank you for allowing me to share in the care of this patient.  Cj Null M.D.

## 2018-10-10 ENCOUNTER — HOSPITAL ENCOUNTER (OUTPATIENT)
Dept: MRI IMAGING | Facility: HOSPITAL | Age: 63
Discharge: HOME | End: 2018-10-10
Attending: PSYCHIATRY & NEUROLOGY | Admitting: PSYCHIATRY & NEUROLOGY

## 2018-10-10 DIAGNOSIS — R90.89 ABNORMAL BRAIN MRI: ICD-10-CM

## 2018-10-10 DIAGNOSIS — R20.0 LEFT FACIAL NUMBNESS: ICD-10-CM

## 2018-10-10 PROCEDURE — A9577 INJ MULTIHANCE: HCPCS | Performed by: PSYCHIATRY & NEUROLOGY

## 2018-10-10 PROCEDURE — 70553 MRI BRAIN STEM W/O & W/DYE: CPT

## 2018-10-10 PROCEDURE — 82565 ASSAY OF CREATININE: CPT

## 2018-10-10 PROCEDURE — 0 GADOBENATE DIMEGLUMINE 529 MG/ML SOLUTION: Performed by: PSYCHIATRY & NEUROLOGY

## 2018-10-10 RX ADMIN — GADOBENATE DIMEGLUMINE 18 ML: 529 INJECTION, SOLUTION INTRAVENOUS at 12:10

## 2018-10-11 LAB — CREAT BLDA-MCNC: 0.9 MG/DL (ref 0.6–1.3)

## 2018-10-17 ENCOUNTER — INFUSION (OUTPATIENT)
Dept: ONCOLOGY | Facility: HOSPITAL | Age: 63
End: 2018-10-17

## 2018-10-17 VITALS
DIASTOLIC BLOOD PRESSURE: 74 MMHG | OXYGEN SATURATION: 99 % | HEART RATE: 90 BPM | SYSTOLIC BLOOD PRESSURE: 115 MMHG | BODY MASS INDEX: 32.96 KG/M2 | TEMPERATURE: 97.9 F | WEIGHT: 200.2 LBS

## 2018-10-17 DIAGNOSIS — D83.9 COMMON VARIABLE IMMUNODEFICIENCY (HCC): Primary | ICD-10-CM

## 2018-10-17 PROCEDURE — 96366 THER/PROPH/DIAG IV INF ADDON: CPT | Performed by: NURSE PRACTITIONER

## 2018-10-17 PROCEDURE — 25010000002 IMMUNE GLOBULIN (HUMAN) 20 GM/200ML SOLUTION: Performed by: ALLERGY & IMMUNOLOGY

## 2018-10-17 PROCEDURE — 96365 THER/PROPH/DIAG IV INF INIT: CPT | Performed by: NURSE PRACTITIONER

## 2018-10-17 RX ORDER — DIPHENHYDRAMINE HCL 25 MG
25 CAPSULE ORAL ONCE
Status: CANCELLED
Start: 2018-10-17 | End: 2018-10-17

## 2018-10-17 RX ORDER — ACETAMINOPHEN 500 MG
500 TABLET ORAL EVERY 4 HOURS PRN
Status: CANCELLED
Start: 2018-10-17

## 2018-10-17 RX ORDER — ACETAMINOPHEN 325 MG/1
650 TABLET ORAL ONCE
Status: CANCELLED
Start: 2018-10-17 | End: 2018-10-17

## 2018-10-17 RX ADMIN — IMMUNE GLOBULIN INFUSION (HUMAN) 20 G: 100 INJECTION, SOLUTION INTRAVENOUS; SUBCUTANEOUS at 09:57

## 2018-10-17 RX ADMIN — IMMUNE GLOBULIN INFUSION (HUMAN) 20 G: 100 INJECTION, SOLUTION INTRAVENOUS; SUBCUTANEOUS at 08:14

## 2018-10-26 DIAGNOSIS — E03.9 ACQUIRED HYPOTHYROIDISM: ICD-10-CM

## 2018-10-26 DIAGNOSIS — R73.03 PREDIABETES: ICD-10-CM

## 2018-10-26 DIAGNOSIS — E78.2 MIXED HYPERLIPIDEMIA: ICD-10-CM

## 2018-10-26 DIAGNOSIS — Z00.00 ANNUAL PHYSICAL EXAM: Primary | ICD-10-CM

## 2018-10-26 DIAGNOSIS — E53.8 VITAMIN B12 DEFICIENCY: ICD-10-CM

## 2018-10-26 DIAGNOSIS — D51.0 PERNICIOUS ANEMIA: ICD-10-CM

## 2018-10-26 DIAGNOSIS — D50.9 IRON DEFICIENCY ANEMIA, UNSPECIFIED IRON DEFICIENCY ANEMIA TYPE: ICD-10-CM

## 2018-11-01 LAB
ALBUMIN SERPL-MCNC: 4.6 G/DL (ref 3.5–5.2)
ALBUMIN/CREAT UR: 11.5 MG/G CREAT (ref 0–30)
ALBUMIN/GLOB SERPL: 1.3 G/DL
ALP SERPL-CCNC: 115 U/L (ref 39–117)
ALT SERPL-CCNC: 20 U/L (ref 1–33)
APPEARANCE UR: CLEAR
AST SERPL-CCNC: 20 U/L (ref 1–32)
BACTERIA #/AREA URNS HPF: ABNORMAL /HPF
BACTERIA UR CULT: NORMAL
BACTERIA UR CULT: NORMAL
BASOPHILS # BLD AUTO: 0.02 10*3/MM3 (ref 0–0.2)
BASOPHILS NFR BLD AUTO: 0.2 % (ref 0–1.5)
BILIRUB SERPL-MCNC: 0.5 MG/DL (ref 0.1–1.2)
BILIRUB UR QL STRIP: NEGATIVE
BUN SERPL-MCNC: 18 MG/DL (ref 8–23)
BUN/CREAT SERPL: 17.1 (ref 7–25)
CALCIUM SERPL-MCNC: 9.8 MG/DL (ref 8.6–10.5)
CASTS URNS MICRO: ABNORMAL
CASTS URNS QL MICRO: PRESENT /LPF
CHLORIDE SERPL-SCNC: 106 MMOL/L (ref 98–107)
CHOLEST SERPL-MCNC: 155 MG/DL (ref 0–200)
CO2 SERPL-SCNC: 24.7 MMOL/L (ref 22–29)
COLOR UR: YELLOW
CREAT SERPL-MCNC: 1.05 MG/DL (ref 0.57–1)
CREAT UR-MCNC: 137.7 MG/DL
EOSINOPHIL # BLD AUTO: 0.11 10*3/MM3 (ref 0–0.7)
EOSINOPHIL NFR BLD AUTO: 1.2 % (ref 0.3–6.2)
EPI CELLS #/AREA URNS HPF: ABNORMAL /HPF
ERYTHROCYTE [DISTWIDTH] IN BLOOD BY AUTOMATED COUNT: 17.7 % (ref 11.7–13)
GLOBULIN SER CALC-MCNC: 3.5 GM/DL
GLUCOSE SERPL-MCNC: 93 MG/DL (ref 65–99)
GLUCOSE UR QL: NEGATIVE
HBA1C MFR BLD: 5 % (ref 4.8–5.6)
HCT VFR BLD AUTO: 46.3 % (ref 35.6–45.5)
HDLC SERPL-MCNC: 61 MG/DL (ref 40–60)
HGB BLD-MCNC: 14.4 G/DL (ref 11.9–15.5)
HGB UR QL STRIP: NEGATIVE
IMM GRANULOCYTES # BLD: 0.02 10*3/MM3 (ref 0–0.03)
IMM GRANULOCYTES NFR BLD: 0.2 % (ref 0–0.5)
KETONES UR QL STRIP: NEGATIVE
LDLC SERPL CALC-MCNC: 73 MG/DL (ref 0–100)
LEUKOCYTE ESTERASE UR QL STRIP: ABNORMAL
LYMPHOCYTES # BLD AUTO: 0.92 10*3/MM3 (ref 0.9–4.8)
LYMPHOCYTES NFR BLD AUTO: 10.4 % (ref 19.6–45.3)
MCH RBC QN AUTO: 31.2 PG (ref 26.9–32)
MCHC RBC AUTO-ENTMCNC: 31.1 G/DL (ref 32.4–36.3)
MCV RBC AUTO: 100.4 FL (ref 80.5–98.2)
MICRO URNS: ABNORMAL
MICROALBUMIN UR-MCNC: 15.8 UG/ML
MONOCYTES # BLD AUTO: 0.75 10*3/MM3 (ref 0.2–1.2)
MONOCYTES NFR BLD AUTO: 8.4 % (ref 5–12)
MUCOUS THREADS URNS QL MICRO: PRESENT /HPF
NEUTROPHILS # BLD AUTO: 7.06 10*3/MM3 (ref 1.9–8.1)
NEUTROPHILS NFR BLD AUTO: 79.6 % (ref 42.7–76)
NITRITE UR QL STRIP: NEGATIVE
PH UR STRIP: 6 [PH] (ref 5–7.5)
PLATELET # BLD AUTO: 387 10*3/MM3 (ref 140–500)
POTASSIUM SERPL-SCNC: 4 MMOL/L (ref 3.5–5.2)
PROT SERPL-MCNC: 8.1 G/DL (ref 6–8.5)
PROT UR QL STRIP: NEGATIVE
RBC # BLD AUTO: 4.61 10*6/MM3 (ref 3.9–5.2)
RBC #/AREA URNS HPF: ABNORMAL /HPF
SODIUM SERPL-SCNC: 144 MMOL/L (ref 136–145)
SP GR UR: 1.02 (ref 1–1.03)
T4 FREE SERPL-MCNC: 1.22 NG/DL (ref 0.93–1.7)
TRIGL SERPL-MCNC: 103 MG/DL (ref 0–150)
TSH SERPL DL<=0.005 MIU/L-ACNC: 0.15 MIU/ML (ref 0.27–4.2)
URINALYSIS REFLEX: ABNORMAL
UROBILINOGEN UR STRIP-MCNC: 0.2 MG/DL (ref 0.2–1)
VIT B12 SERPL-MCNC: 617 PG/ML (ref 211–946)
VLDLC SERPL CALC-MCNC: 20.6 MG/DL (ref 5–40)
WBC # BLD AUTO: 8.88 10*3/MM3 (ref 4.5–10.7)
WBC #/AREA URNS HPF: ABNORMAL /HPF

## 2018-11-05 ENCOUNTER — OFFICE VISIT (OUTPATIENT)
Dept: INTERNAL MEDICINE | Facility: CLINIC | Age: 63
End: 2018-11-05

## 2018-11-05 VITALS
HEIGHT: 65 IN | SYSTOLIC BLOOD PRESSURE: 108 MMHG | RESPIRATION RATE: 18 BRPM | BODY MASS INDEX: 31.99 KG/M2 | OXYGEN SATURATION: 96 % | DIASTOLIC BLOOD PRESSURE: 65 MMHG | WEIGHT: 192 LBS | HEART RATE: 71 BPM

## 2018-11-05 DIAGNOSIS — E66.9 OBESITY WITHOUT SERIOUS COMORBIDITY, UNSPECIFIED CLASSIFICATION, UNSPECIFIED OBESITY TYPE: ICD-10-CM

## 2018-11-05 DIAGNOSIS — M06.9 RHEUMATOID ARTHRITIS INVOLVING MULTIPLE SITES, UNSPECIFIED RHEUMATOID FACTOR PRESENCE: ICD-10-CM

## 2018-11-05 DIAGNOSIS — J84.10 PULMONARY FIBROSIS (HCC): ICD-10-CM

## 2018-11-05 DIAGNOSIS — M81.0 OSTEOPOROSIS, UNSPECIFIED OSTEOPOROSIS TYPE, UNSPECIFIED PATHOLOGICAL FRACTURE PRESENCE: ICD-10-CM

## 2018-11-05 DIAGNOSIS — Z12.31 ENCOUNTER FOR SCREENING MAMMOGRAM FOR BREAST CANCER: ICD-10-CM

## 2018-11-05 DIAGNOSIS — D83.9 COMMON VARIABLE IMMUNODEFICIENCY (HCC): ICD-10-CM

## 2018-11-05 DIAGNOSIS — E78.2 MIXED HYPERLIPIDEMIA: ICD-10-CM

## 2018-11-05 DIAGNOSIS — Z00.00 WELL ADULT EXAM: Primary | ICD-10-CM

## 2018-11-05 DIAGNOSIS — E03.9 ACQUIRED HYPOTHYROIDISM: ICD-10-CM

## 2018-11-05 PROCEDURE — 93000 ELECTROCARDIOGRAM COMPLETE: CPT | Performed by: INTERNAL MEDICINE

## 2018-11-05 PROCEDURE — 99396 PREV VISIT EST AGE 40-64: CPT | Performed by: INTERNAL MEDICINE

## 2018-11-05 RX ORDER — BUPROPION HYDROCHLORIDE 150 MG/1
TABLET ORAL
Qty: 30 TABLET | Refills: 3 | Status: SHIPPED | OUTPATIENT
Start: 2018-11-05 | End: 2019-03-01 | Stop reason: SDUPTHER

## 2018-11-05 RX ORDER — OMEPRAZOLE 40 MG/1
40 CAPSULE, DELAYED RELEASE ORAL DAILY
Qty: 90 CAPSULE | Refills: 3 | Status: SHIPPED | OUTPATIENT
Start: 2018-11-05 | End: 2019-10-21 | Stop reason: SDUPTHER

## 2018-11-05 NOTE — PROGRESS NOTES
Subjective     Dara Medina is a 63 y.o. female who presents for a complete physical exam.      History of Present Illness     HLD.  Control is good.   Hypothyroidism.  Control is good.   OP.  She is on Forteo.    RA/pulmonary fibrosis/CVIG.  She is followed by rhematology, allergy and pulmonary.    Obesity.  She is losing weight with Bydureon.      Review of Systems   Constitutional: Negative.    HENT: Negative.    Eyes: Negative.    Respiratory: Negative.    Cardiovascular: Negative.    Gastrointestinal: Negative.    Endocrine: Negative.    Genitourinary: Negative.    Musculoskeletal: Positive for arthralgias.   Skin: Negative.    Allergic/Immunologic: Negative.    Neurological: Negative.    Hematological: Negative.    Psychiatric/Behavioral: Negative.        The following portions of the patient's history were reviewed and updated as appropriate: allergies, current medications, past family history, past medical history, past social history, past surgical history and problem list.  Health maintenance tab was reviewed and updated with the patient.       Patient Active Problem List    Diagnosis Date Noted   • Tension headache 09/27/2018   • Adverse effect of iron or its compound, sequela 08/29/2018   • Vitamin B12 deficiency 08/29/2018   • Leukemoid reaction 08/29/2018   • Left facial numbness 03/08/2018   • Abnormal finding on MRI of brain 02/06/2018     Note Last Updated: 2/6/2018 2/2018.  Plan recheck three months.       • Primary osteoarthritis of right knee 11/22/2017     Note Last Updated: 11/22/2017     Added automatically from request for surgery [id number]     • Mixed hyperlipidemia 05/05/2017   • Tear of medial meniscus of right knee, current 05/04/2017   • Prediabetes 04/12/2017   • Rheumatoid arthritis (CMS/HCC) 07/07/2016   • Pulmonary fibrosis (CMS/HCC) 07/06/2016     Note Last Updated: 10/23/2017     Secondary to methotrexate.       • Iron deficiency anemia 07/06/2016   • Pernicious anemia 07/06/2016    • Common variable immunodeficiency (CMS/HCC) 07/01/2016   • Hypothyroidism 05/23/2016   • Sensory neuropathy 05/23/2016   • Osteoporosis 05/23/2016     Note Last Updated: 10/23/2017     H/o one year Forteo.  Not on bisphosphonates because of dental issues.  Fosamax in past caused stomach problems.       • Vitamin D deficiency 05/23/2016       Past Medical History:   Diagnosis Date   • Acute colitis 1/18/2017   • Allergic Codeine, some antibiotics   • Anemia    • Cataract Removed    2012   • Chronic depression    • Colon polyp 1 removed    2016   • Diabetes mellitus (CMS/[facility])    • Fracture of rib    • GERD (gastroesophageal reflux disease)    • H/O Wrist fracture, right    • Headache    • History of bronchitis    • History of pneumonia    • History of transfusion    • Hyperlipidemia    • Hypothyroidism    • Inflammatory bowel disease    • Iron deficiency    • Irritable bowel syndrome    • Migraine    • Obesity    • Osteopenia    • Osteoporosis    • Pneumonia June 2016   • RA (rheumatoid arthritis) (CMS/[facility])    • Sensory neuropathy    • Sepsis, unspecified organism (CMS/[facility]) 1/18/2017   • Vitamin D deficiency        Past Surgical History:   Procedure Laterality Date   • ADENOIDECTOMY     • BREAST AUGMENTATION     • BREAST IMPLANT REMOVAL Bilateral    • CARPAL TUNNEL RELEASE Left 2015   • COLONOSCOPY     • EYE SURGERY Bilateral    • HYSTERECTOMY     • LASIK Bilateral    • ORIF WRIST FRACTURE Right    • AR TOTAL KNEE ARTHROPLASTY Right 12/14/2017    Procedure: TOTAL KNEE ARTHROPLASTY;  Surgeon: Zion Guo MD;  Location: [facility];  Service: Orthopedics   • SINUS SURGERY      x2   • TONSILLECTOMY         Family History   Problem Relation Age of Onset   • Hyperlipidemia Mother    • Hypertension Mother    • Migraines Mother    • Colon cancer Father    • Diabetes Father    • Cancer Father    • Hyperlipidemia Brother    • Migraines Brother    • Migraines Sister    • Malig Hyperthermia Neg Hx        Social  History     Social History   • Marital status:      Spouse name: N/A   • Number of children: 2   • Years of education: College     Occupational History   • Homemaker Retired     Social History Main Topics   • Smoking status: Former Smoker     Packs/day: 0.50     Years: 5.00     Types: Cigarettes     Start date: 4/1/1974     Quit date: 4/1/1978   • Smokeless tobacco: Never Used      Comment: QUIT AGE 23   • Alcohol use No      Comment: STOPPED 1997   • Drug use: No   • Sexual activity: Not on file     Other Topics Concern   • Not on file     Social History Narrative   • No narrative on file       Current Outpatient Prescriptions on File Prior to Visit   Medication Sig Dispense Refill   • atorvastatin (LIPITOR) 20 MG tablet Take 1 tablet by mouth Daily. 90 tablet 3   • azaTHIOprine (IMURAN) 50 MG tablet Take 50 mg by mouth 3 (Three) Times a Day.  5   • buPROPion XL (WELLBUTRIN XL) 150 MG 24 hr tablet Take 150 mg by mouth Every Morning.     • Cholecalciferol (VITAMIN D3) 5000 UNITS capsule capsule Take 5,000 Units by mouth 2 (Two) Times a Day.     • Cyanocobalamin (VITAMIN B 12 PO) Take 1,000 mg by mouth Daily.     • cyanocobalamin 1000 MCG/ML injection INJECT 1,000 MCG AS DIRECTED EVERY 30 (THIRTY) DAYS. 1 mL 2   • cyclobenzaprine (FLEXERIL) 10 MG tablet Take 10 mg by mouth 3 (Three) Times a Day As Needed.     • DULoxetine (CYMBALTA) 30 MG capsule TAKE ONE CAPSULE BY MOUTH EVERY DAY (**IN ADDITION TO 1 60MG CAPSULE DAILY)  2   • DULoxetine (CYMBALTA) 60 MG capsule Take 60 mg by mouth daily.     • exenatide er (BYDUREON BCISE) 2 MG/0.85ML auto-injector injection Inject 0.85 mL under the skin into the appropriate area as directed 1 (One) Time Per Week. 4 pen 11   • folic acid (FOLVITE) 1 MG tablet Take 1 mg by mouth daily.     • predniSONE (DELTASONE) 5 MG tablet TAKE 2 TABLET BY MOUTH TWICE DAILY  2   • SYNTHROID 112 MCG tablet TAKE 1 TABLET BY MOUTH DAILY. 90 tablet 3   • teriparatide (FORTEO) 600 MCG/2.4ML  "injection Inject 0.08 mL under the skin Daily. 2.4 mL 11   • topiramate (TOPAMAX) 100 MG tablet Take 1 tablet by mouth Every Night. 30 tablet 11   • buPROPion XL (WELLBUTRIN XL) 150 MG 24 hr tablet TAKE 1 TABLET BY MOUTH EVERY MORNING. 30 tablet 3   • omeprazole (priLOSEC) 40 MG capsule TAKE 1 CAPSULE BY MOUTH DAILY. 90 capsule 3     No current facility-administered medications on file prior to visit.        Allergies   Allergen Reactions   • Amoxicillin-Pot Clavulanate Diarrhea     itching   • Codeine Nausea And Vomiting       Immunization History   Administered Date(s) Administered   • Flu Vaccine Quad PF >18YRS 10/10/2016, 10/23/2017   • Hepatitis A 04/27/2018   • Influenza, Unspecified 10/08/2018   • Pneumococcal Polysaccharide (PPSV23) 10/01/2014   • Tdap 10/23/2017       Objective     /65 (BP Location: Left arm, Patient Position: Sitting, Cuff Size: Adult)   Pulse 71   Resp 18   Ht 165.1 cm (65\")   Wt 87.1 kg (192 lb)   SpO2 96%   BMI 31.95 kg/m²     Physical Exam   Constitutional: She is oriented to person, place, and time. She appears well-developed and well-nourished.   HENT:   Head: Normocephalic and atraumatic.   Right Ear: Hearing, tympanic membrane and external ear normal.   Left Ear: Hearing, tympanic membrane and external ear normal.   Nose: Nose normal.   Mouth/Throat: Oropharynx is clear and moist.   Neck: Neck supple. No thyromegaly present.   Cardiovascular: Normal rate, regular rhythm and normal heart sounds.    No murmur heard.  Pulmonary/Chest: Effort normal and breath sounds normal. Right breast exhibits no mass. Left breast exhibits no mass.   Abdominal: Soft. She exhibits no distension. There is no hepatosplenomegaly. There is no tenderness.   Genitourinary: No breast tenderness.   Lymphadenopathy:     She has no cervical adenopathy.   Neurological: She is alert and oriented to person, place, and time.   Skin: Skin is warm and dry.   Psychiatric: She has a normal mood and affect. " Her speech is normal and behavior is normal. Judgment and thought content normal. Cognition and memory are normal.       Assessment/Plan   Dara was seen today for annual exam.    Diagnoses and all orders for this visit:    Well adult exam    Encounter for screening mammogram for breast cancer  -     Mammo Screening Digital Tomosynthesis Bilateral With CAD; Future    Mixed hyperlipidemia    Acquired hypothyroidism    Osteoporosis, unspecified osteoporosis type, unspecified pathological fracture presence    Pulmonary fibrosis (CMS/HCC)    Rheumatoid arthritis involving multiple sites, unspecified rheumatoid factor presence (CMS/HCC)    Common variable immunodeficiency (CMS/HCC)    Obesity without serious comorbidity, unspecified classification, unspecified obesity type    Other orders  -     Cancel: Pneumococcal Conjugate Vaccine 13-Valent All      ECG 12 Lead  Date/Time: 11/5/2018 8:41 AM  Performed by: CHARLA RODARTE  Authorized by: CHARLA RODARTE   Comparison: compared with previous ECG from 12/7/2017  Similar to previous ECG  Rhythm: sinus rhythm  Rate: normal  Conduction: conduction normal  ST Segments: ST segments normal  T Waves: T waves normal  QRS axis: normal  Clinical impression: normal ECG              Discussion    Patient presents today for a CPE.      Patient follows a healthy diet.   Patient follows an inadequate exercise regimen.   Mammogram will be scheduled.  Colonoscopy is UTD.    We discussed adding exercise.    HLD.  Continue current.  Hypothyroidism.  Continue levothyroxine.  OP. Continue Forteo to total two years.    RA/CVID/pulmonary fibrosis.  Continue with subspecialties.    Obesity.  Continue Bydureon.  She is losing weight.        Health Maintenance   Topic Date Due   • ZOSTER VACCINE (2 of 2) 08/31/2016   • MAMMOGRAM  08/14/2019   • DXA SCAN  10/17/2019   • LIPID PANEL  10/29/2019   • ANNUAL PHYSICAL  11/06/2019   • COLONOSCOPY  01/01/2025   • TDAP/TD VACCINES (2 - Td) 10/23/2027   •  HEPATITIS C SCREENING  Completed   • INFLUENZA VACCINE  Completed   • PAP SMEAR  Excluded            Future Appointments  Date Time Provider Department Center   11/14/2018 9:00 AM NON CBC CHAIR 12  [facility]   12/12/2018 9:00 AM NON CBC CHAIR 12  [facility]   2/6/2019 10:00 AM Zion Guo MD [facility] None   3/8/2019 10:00 AM LAB CHAIR 3 [facility]  [facility]   3/8/2019 10:40 AM Ivory Noel MD PhD [facility]  [facility]   3/25/2019 10:30 AM Cj Null Jr., MD [facility] None   5/6/2019 8:10 AM [facility] None   5/13/2019 9:45 AM Randi Yang MD [facility] None

## 2018-11-07 RX ORDER — NEEDLES, FILTER 19GX1 1/2"
NEEDLE, DISPOSABLE MISCELLANEOUS
Qty: 12 EACH | Refills: 2 | Status: SHIPPED | OUTPATIENT
Start: 2018-11-07 | End: 2020-06-10

## 2018-11-14 ENCOUNTER — INFUSION (OUTPATIENT)
Dept: ONCOLOGY | Facility: HOSPITAL | Age: 63
End: 2018-11-14

## 2018-11-14 VITALS
DIASTOLIC BLOOD PRESSURE: 75 MMHG | WEIGHT: 194 LBS | BODY MASS INDEX: 32.28 KG/M2 | TEMPERATURE: 98.1 F | SYSTOLIC BLOOD PRESSURE: 120 MMHG | HEART RATE: 78 BPM | OXYGEN SATURATION: 97 %

## 2018-11-14 DIAGNOSIS — D83.9 COMMON VARIABLE IMMUNODEFICIENCY (HCC): Primary | ICD-10-CM

## 2018-11-14 PROCEDURE — 96365 THER/PROPH/DIAG IV INF INIT: CPT | Performed by: NURSE PRACTITIONER

## 2018-11-14 PROCEDURE — 96366 THER/PROPH/DIAG IV INF ADDON: CPT | Performed by: NURSE PRACTITIONER

## 2018-11-14 PROCEDURE — 25010000002 IMMUNE GLOBULIN (HUMAN) 40 GM/400ML SOLUTION: Performed by: ALLERGY & IMMUNOLOGY

## 2018-11-14 RX ORDER — ACETAMINOPHEN 500 MG
500 TABLET ORAL EVERY 4 HOURS PRN
Status: CANCELLED
Start: 2018-11-14

## 2018-11-14 RX ORDER — DIPHENHYDRAMINE HCL 25 MG
25 CAPSULE ORAL ONCE
Status: CANCELLED
Start: 2018-11-14 | End: 2018-11-14

## 2018-11-14 RX ORDER — ACETAMINOPHEN 325 MG/1
650 TABLET ORAL ONCE
Status: CANCELLED
Start: 2018-11-14 | End: 2018-11-14

## 2018-11-14 RX ORDER — DIPHENHYDRAMINE HCL 25 MG
25 CAPSULE ORAL ONCE
Status: DISCONTINUED | OUTPATIENT
Start: 2018-11-14 | End: 2018-11-14 | Stop reason: HOSPADM

## 2018-11-14 RX ORDER — ACETAMINOPHEN 325 MG/1
650 TABLET ORAL ONCE
Status: COMPLETED | OUTPATIENT
Start: 2018-11-14 | End: 2018-11-14

## 2018-11-14 RX ADMIN — IMMUNE GLOBULIN (HUMAN) 40 G: 10 INJECTION INTRAVENOUS; SUBCUTANEOUS at 10:02

## 2018-11-14 RX ADMIN — ACETAMINOPHEN 650 MG: 325 TABLET, FILM COATED ORAL at 09:59

## 2018-11-28 ENCOUNTER — APPOINTMENT (OUTPATIENT)
Dept: MAMMOGRAPHY | Facility: HOSPITAL | Age: 63
End: 2018-11-28

## 2018-12-03 RX ORDER — CYANOCOBALAMIN 1000 UG/ML
INJECTION, SOLUTION INTRAMUSCULAR; SUBCUTANEOUS
Qty: 1 ML | Refills: 2 | Status: SHIPPED | OUTPATIENT
Start: 2018-12-03 | End: 2019-03-25 | Stop reason: SDUPTHER

## 2018-12-12 ENCOUNTER — INFUSION (OUTPATIENT)
Dept: ONCOLOGY | Facility: HOSPITAL | Age: 63
End: 2018-12-12

## 2018-12-12 VITALS
HEART RATE: 86 BPM | SYSTOLIC BLOOD PRESSURE: 100 MMHG | TEMPERATURE: 97.7 F | BODY MASS INDEX: 31.95 KG/M2 | OXYGEN SATURATION: 100 % | WEIGHT: 192 LBS | DIASTOLIC BLOOD PRESSURE: 65 MMHG

## 2018-12-12 DIAGNOSIS — D83.9 COMMON VARIABLE IMMUNODEFICIENCY (HCC): Primary | ICD-10-CM

## 2018-12-12 PROCEDURE — 96366 THER/PROPH/DIAG IV INF ADDON: CPT | Performed by: NURSE PRACTITIONER

## 2018-12-12 PROCEDURE — 25010000002 IMMUNE GLOBULIN (HUMAN) 40 GM/400ML SOLUTION: Performed by: ALLERGY & IMMUNOLOGY

## 2018-12-12 PROCEDURE — 96365 THER/PROPH/DIAG IV INF INIT: CPT | Performed by: NURSE PRACTITIONER

## 2018-12-12 RX ORDER — DIPHENHYDRAMINE HCL 25 MG
25 CAPSULE ORAL ONCE
Status: CANCELLED
Start: 2018-12-12 | End: 2018-12-12

## 2018-12-12 RX ORDER — ACETAMINOPHEN 325 MG/1
650 TABLET ORAL ONCE
Status: CANCELLED
Start: 2018-12-12 | End: 2018-12-12

## 2018-12-12 RX ORDER — ACETAMINOPHEN 500 MG
500 TABLET ORAL EVERY 4 HOURS PRN
Status: CANCELLED
Start: 2018-12-12

## 2018-12-12 RX ADMIN — IMMUNE GLOBULIN (HUMAN) 40 G: 10 INJECTION INTRAVENOUS; SUBCUTANEOUS at 09:37

## 2019-01-02 ENCOUNTER — HOSPITAL ENCOUNTER (OUTPATIENT)
Dept: MAMMOGRAPHY | Facility: HOSPITAL | Age: 64
Discharge: HOME | End: 2019-01-02
Admitting: INTERNAL MEDICINE

## 2019-01-02 DIAGNOSIS — Z12.31 ENCOUNTER FOR SCREENING MAMMOGRAM FOR BREAST CANCER: ICD-10-CM

## 2019-01-02 PROCEDURE — 77063 BREAST TOMOSYNTHESIS BI: CPT

## 2019-01-02 PROCEDURE — 77067 SCR MAMMO BI INCL CAD: CPT

## 2019-01-16 ENCOUNTER — INFUSION (OUTPATIENT)
Dept: ONCOLOGY | Facility: HOSPITAL | Age: 64
End: 2019-01-16

## 2019-01-16 VITALS
TEMPERATURE: 98 F | DIASTOLIC BLOOD PRESSURE: 68 MMHG | OXYGEN SATURATION: 95 % | BODY MASS INDEX: 31.58 KG/M2 | SYSTOLIC BLOOD PRESSURE: 106 MMHG | WEIGHT: 189.8 LBS | HEART RATE: 77 BPM

## 2019-01-16 DIAGNOSIS — D83.9 COMMON VARIABLE IMMUNODEFICIENCY (HCC): Primary | ICD-10-CM

## 2019-01-16 PROCEDURE — 96366 THER/PROPH/DIAG IV INF ADDON: CPT | Performed by: NURSE PRACTITIONER

## 2019-01-16 PROCEDURE — 25010000002 IMMUNE GLOBULIN (HUMAN) 20 GM/200ML SOLUTION: Performed by: ALLERGY & IMMUNOLOGY

## 2019-01-16 PROCEDURE — 96365 THER/PROPH/DIAG IV INF INIT: CPT | Performed by: NURSE PRACTITIONER

## 2019-01-16 RX ORDER — DIPHENHYDRAMINE HCL 25 MG
25 CAPSULE ORAL ONCE
Status: CANCELLED
Start: 2019-01-16 | End: 2019-01-16

## 2019-01-16 RX ORDER — ACETAMINOPHEN 500 MG
500 TABLET ORAL EVERY 4 HOURS PRN
Status: CANCELLED
Start: 2019-01-16

## 2019-01-16 RX ORDER — ACETAMINOPHEN 325 MG/1
650 TABLET ORAL ONCE
Status: CANCELLED
Start: 2019-01-16 | End: 2019-01-16

## 2019-01-16 RX ADMIN — IMMUNE GLOBULIN (HUMAN) 40 G: 10 INJECTION INTRAVENOUS; SUBCUTANEOUS at 09:28

## 2019-01-28 RX ORDER — ATORVASTATIN CALCIUM 20 MG/1
20 TABLET, FILM COATED ORAL DAILY
Qty: 90 TABLET | Refills: 3 | Status: SHIPPED | OUTPATIENT
Start: 2019-01-28 | End: 2020-01-15

## 2019-02-06 ENCOUNTER — OFFICE VISIT (OUTPATIENT)
Dept: ORTHOPEDIC SURGERY | Facility: CLINIC | Age: 64
End: 2019-02-06

## 2019-02-06 VITALS — BODY MASS INDEX: 30.82 KG/M2 | WEIGHT: 185 LBS | HEIGHT: 65 IN

## 2019-02-06 DIAGNOSIS — Z96.651 HISTORY OF TOTAL KNEE ARTHROPLASTY, RIGHT: Primary | ICD-10-CM

## 2019-02-06 DIAGNOSIS — Z91.81 HISTORY OF FALL: ICD-10-CM

## 2019-02-06 PROCEDURE — 99212 OFFICE O/P EST SF 10 MIN: CPT | Performed by: ORTHOPAEDIC SURGERY

## 2019-02-06 PROCEDURE — 73562 X-RAY EXAM OF KNEE 3: CPT | Performed by: ORTHOPAEDIC SURGERY

## 2019-02-06 NOTE — PROGRESS NOTES
"Dara Medina : 1955 MRN: [id number] DATE: 2019    DIAGNOSIS: 6 month follow up right TKA      SUBJECTIVE:  Patient returns today for 6 month follow up of right total knee replacement.  Patient reports doing well with no unusual complaints.  She fell on the knee about 3 weeks ago but the knee seems to be fine.  Her only complaint is that she has some discomfort when kneeling.  She especially notices when chasing her young grandson.    Vitals:    19 1007   Weight: 83.9 kg (185 lb)   Height: 165.1 cm (65\")     OBJECTIVE:     Exam:  The incision is well healed. No sign of infection. Range of motion is measured at full extension to 125° of flexion. The calf is soft and nontender with a negative Homans sign.  Gait is reciprocal heel-to-toe and nonantalgic.    DIAGNOSTIC STUDIES    Xrays: 3 views of the right knee (AP, lateral, and sunrise) were ordered and reviewed for evaluation of recent knee replacement. They demonstrate a well positioned, well aligned knee replacement without complicating factors noted. In comparison with previous films there has been no change.    ASSESSMENT: 6 months status post right knee replacement.    PLAN: 1) Continue with PT exercises as prescribed   2) Follow up in 1 year for annual follow-up.    Zion Guo MD  2019    "

## 2019-02-13 ENCOUNTER — INFUSION (OUTPATIENT)
Dept: ONCOLOGY | Facility: HOSPITAL | Age: 64
End: 2019-02-13

## 2019-02-13 VITALS
SYSTOLIC BLOOD PRESSURE: 102 MMHG | OXYGEN SATURATION: 96 % | TEMPERATURE: 98.5 F | BODY MASS INDEX: 31.05 KG/M2 | WEIGHT: 186.6 LBS | RESPIRATION RATE: 16 BRPM | HEART RATE: 86 BPM | DIASTOLIC BLOOD PRESSURE: 66 MMHG

## 2019-02-13 DIAGNOSIS — D83.9 COMMON VARIABLE IMMUNODEFICIENCY (HCC): Primary | ICD-10-CM

## 2019-02-13 PROCEDURE — 96365 THER/PROPH/DIAG IV INF INIT: CPT | Performed by: NURSE PRACTITIONER

## 2019-02-13 PROCEDURE — 25010000002 IMMUNE GLOBULIN (HUMAN) 20 GM/200ML SOLUTION: Performed by: ALLERGY & IMMUNOLOGY

## 2019-02-13 PROCEDURE — 96366 THER/PROPH/DIAG IV INF ADDON: CPT | Performed by: NURSE PRACTITIONER

## 2019-02-13 RX ORDER — ACETAMINOPHEN 500 MG
500 TABLET ORAL EVERY 4 HOURS PRN
Status: CANCELLED
Start: 2019-02-13

## 2019-02-13 RX ORDER — DIPHENHYDRAMINE HCL 25 MG
25 CAPSULE ORAL ONCE
Status: CANCELLED
Start: 2019-02-13 | End: 2019-02-13

## 2019-02-13 RX ORDER — ACETAMINOPHEN 325 MG/1
650 TABLET ORAL ONCE
Status: CANCELLED
Start: 2019-02-13 | End: 2019-02-13

## 2019-02-13 RX ADMIN — IMMUNE GLOBULIN (HUMAN) 40 G: 10 INJECTION INTRAVENOUS; SUBCUTANEOUS at 09:14

## 2019-03-01 RX ORDER — BUPROPION HYDROCHLORIDE 150 MG/1
TABLET ORAL
Qty: 30 TABLET | Refills: 3 | Status: SHIPPED | OUTPATIENT
Start: 2019-03-01 | End: 2019-03-25 | Stop reason: SDUPTHER

## 2019-03-08 ENCOUNTER — OFFICE VISIT (OUTPATIENT)
Dept: ONCOLOGY | Facility: CLINIC | Age: 64
End: 2019-03-08

## 2019-03-08 ENCOUNTER — LAB (OUTPATIENT)
Dept: LAB | Facility: HOSPITAL | Age: 64
End: 2019-03-08

## 2019-03-08 VITALS
HEART RATE: 98 BPM | RESPIRATION RATE: 16 BRPM | OXYGEN SATURATION: 96 % | HEIGHT: 65 IN | DIASTOLIC BLOOD PRESSURE: 84 MMHG | SYSTOLIC BLOOD PRESSURE: 124 MMHG | TEMPERATURE: 98.3 F | BODY MASS INDEX: 31.09 KG/M2

## 2019-03-08 DIAGNOSIS — D72.810: ICD-10-CM

## 2019-03-08 DIAGNOSIS — E53.8 VITAMIN B12 DEFICIENCY: ICD-10-CM

## 2019-03-08 DIAGNOSIS — D75.89 MACROCYTOSIS WITHOUT ANEMIA: ICD-10-CM

## 2019-03-08 DIAGNOSIS — D50.9 IRON DEFICIENCY ANEMIA, UNSPECIFIED IRON DEFICIENCY ANEMIA TYPE: Primary | ICD-10-CM

## 2019-03-08 DIAGNOSIS — D50.9 IRON DEFICIENCY ANEMIA, UNSPECIFIED IRON DEFICIENCY ANEMIA TYPE: ICD-10-CM

## 2019-03-08 LAB
BASOPHILS # BLD AUTO: 0.02 10*3/MM3 (ref 0–0.2)
BASOPHILS NFR BLD AUTO: 0.2 % (ref 0–1.5)
DEPRECATED RDW RBC AUTO: 55.3 FL (ref 37–54)
EOSINOPHIL # BLD AUTO: 0.01 10*3/MM3 (ref 0–0.4)
EOSINOPHIL NFR BLD AUTO: 0.1 % (ref 0.3–6.2)
ERYTHROCYTE [DISTWIDTH] IN BLOOD BY AUTOMATED COUNT: 13.8 % (ref 12.3–15.4)
FERRITIN SERPL-MCNC: 240.6 NG/ML (ref 13–150)
HCT VFR BLD AUTO: 42.2 % (ref 34–46.6)
HGB BLD-MCNC: 13.9 G/DL (ref 12–15.9)
IMM GRANULOCYTES # BLD AUTO: 0.04 10*3/MM3 (ref 0–0.05)
IMM GRANULOCYTES NFR BLD AUTO: 0.5 % (ref 0–0.5)
IRON 24H UR-MRATE: 91 MCG/DL (ref 37–145)
IRON SATN MFR SERPL: 33 % (ref 14–48)
LYMPHOCYTES # BLD AUTO: 0.16 10*3/MM3 (ref 0.7–3.1)
LYMPHOCYTES NFR BLD AUTO: 2 % (ref 19.6–45.3)
MCH RBC QN AUTO: 35.8 PG (ref 26.6–33)
MCHC RBC AUTO-ENTMCNC: 32.9 G/DL (ref 31.5–35.7)
MCV RBC AUTO: 108.8 FL (ref 79–97)
MONOCYTES # BLD AUTO: 0.13 10*3/MM3 (ref 0.1–0.9)
MONOCYTES NFR BLD AUTO: 1.6 % (ref 5–12)
NEUTROPHILS # BLD AUTO: 7.71 10*3/MM3 (ref 1.4–7)
NEUTROPHILS NFR BLD AUTO: 95.6 % (ref 42.7–76)
NRBC BLD AUTO-RTO: 0 /100 WBC (ref 0–0)
PLATELET # BLD AUTO: 260 10*3/MM3 (ref 140–450)
PMV BLD AUTO: 10.3 FL (ref 6–12)
RBC # BLD AUTO: 3.88 10*6/MM3 (ref 3.77–5.28)
TIBC SERPL-MCNC: 274 MCG/DL (ref 249–505)
TRANSFERRIN SERPL-MCNC: 196 MG/DL (ref 200–360)
VIT B12 BLD-MCNC: 701 PG/ML (ref 211–946)
WBC NRBC COR # BLD: 8.07 10*3/MM3 (ref 3.4–10.8)

## 2019-03-08 PROCEDURE — 82607 VITAMIN B-12: CPT | Performed by: INTERNAL MEDICINE

## 2019-03-08 PROCEDURE — 36415 COLL VENOUS BLD VENIPUNCTURE: CPT | Performed by: INTERNAL MEDICINE

## 2019-03-08 PROCEDURE — 82728 ASSAY OF FERRITIN: CPT | Performed by: INTERNAL MEDICINE

## 2019-03-08 PROCEDURE — 84466 ASSAY OF TRANSFERRIN: CPT | Performed by: INTERNAL MEDICINE

## 2019-03-08 PROCEDURE — 83540 ASSAY OF IRON: CPT | Performed by: INTERNAL MEDICINE

## 2019-03-08 PROCEDURE — 85025 COMPLETE CBC W/AUTO DIFF WBC: CPT | Performed by: INTERNAL MEDICINE

## 2019-03-08 PROCEDURE — 99215 OFFICE O/P EST HI 40 MIN: CPT | Performed by: INTERNAL MEDICINE

## 2019-03-08 NOTE — PROGRESS NOTES
.     REASON FOR CONSULTATION:     1.  Iron deficiency anemia, recurrent, not able to tolerate oral iron treatment due to severe constipation.  Patient was given PRBC transfusion and IV iron therapy in 2016.    2.  Patient has a recurrent severe iron deficiency in late August 2018, and was given Injectafer 2 doses in September 2018.  Had great response with normalization of hemoglobin and iron studies.                                 HISTORY OF PRESENT ILLNESS:  The patient is a 63 y.o. year old femalewho is here for 6-month re evaluation of her iron deficiency anemia.  I saw her in end of August 2018, and treated with injectable for 2 doses in early September 2018.    Patient reports she had resolution of pica for ice chips after IV iron therapy.  We obtained iron study shortly after iron treatment, she had a supratherapeutic ferritin 553 ng/mL and normal iron saturation 29% on 9/27/2018 and normalization of hemoglobin 12.3 that time.    Today patient reports that she feels well, no recurrence of pica for ice chips.  She denies evidence of bleeding.  She continues to have monthly vitamin B12 injection and also takes oral B12 daily.  She also takes folic acid.  She has good performance status.     Patient reports that she has been given Rituxan for her rheumatoid arthritis which has been under good control.  Patient has also been given monthly IVIG treatment.          Past Medical History:   Diagnosis Date   • Acute colitis 1/18/2017   • Allergic Codeine, some antibiotics   • Anemia    • Cataract Removed    2012   • Chronic depression    • Colon polyp 1 removed    2016   • Diabetes mellitus (CMS/HCC)    • Fracture of rib    • GERD (gastroesophageal reflux disease)    • H/O Wrist fracture, right    • Headache    • History of bronchitis    • History of pneumonia    • History of transfusion    • Hyperlipidemia    • Hypothyroidism    • Inflammatory bowel disease    • Iron deficiency    • Irritable bowel syndrome     • Migraine    • Obesity    • Osteopenia    • Osteoporosis    • Pneumonia June 2016   • RA (rheumatoid arthritis) (CMS/[facility])    • Sensory neuropathy    • Sepsis, unspecified organism (CMS/HCC) 1/18/2017   • Vitamin D deficiency      Past Surgical History:   Procedure Laterality Date   • ADENOIDECTOMY     • BREAST AUGMENTATION     • BREAST IMPLANT REMOVAL Bilateral    • CARPAL TUNNEL RELEASE Left 2015   • COLONOSCOPY     • EYE SURGERY Bilateral    • HYSTERECTOMY     • LASIK Bilateral    • ORIF WRIST FRACTURE Right    • MI TOTAL KNEE ARTHROPLASTY Right 12/14/2017    Procedure: TOTAL KNEE ARTHROPLASTY;  Surgeon: Zion Guo MD;  Location: [facility];  Service: Orthopedics   • SINUS SURGERY      x2   • TONSILLECTOMY         HEMATOLOGIC/ONCOLOGIC HISTORY:  The patient is a 63 y.o. year old femalewho is here for evaluation and management of her anemia. This patient has chronic disease including rheumatoid arthritis. She was on methotrexate for about 11 years and currently on Imuran and also history for pernicious anemia/vitamin B12 deficiency undergoing monthly intramuscular injection, history of hyperlipidemia, hypothyroidism, gastroesophageal reflux disease and rheumatoid arthritis. The patient reports she was not able to tolerate oral iron treatment because of significant constipation. The last time she was taking oral iron was about 2-3 years ago at which time she had significant anemia and was given 2 units PRBC transfusion. She was also given intravenous iron therapy at that time. Patient reports she had GI evaluation by Dr. Celeste with both EGD and a colonoscopic examination a couple of years ago. I searched electronic medical records at [facility] and according to Dr. Celeste’s clinical note in 08/2016 and 11/2016, she had both EGD and colonoscopic examination supposedly in August; however, I could not find the procedure note itself. Nevertheless, patient reports she was told not  having malignancy. I did find the pathology evaluation on 08/24/2016, which reported mild to focally moderate chronic active gastritis with intestinal metaplasia; negative for H. pylori. The small intestinal biopsy was benign. No signs for abnormal histology. The distal esophagus shows moderate chronic active inflammation and no intestinal metaplasia or dysplasia. The descending colon polyp was tubular adenoma with low-grade dysplasia. Patient reports no melena, no hematochezia. She does have pica for ice chips.     Patient reports she was diagnosed of rheumatoid arthritis in 2003. She was on methotrexate from 2003 to 2014. Subsequently treatment changed to Imuran. Patient reports she recently has been on large dose prednisone 40 mg daily for about 1 year and is in the process of tapering down of her prednisone. She also previously was given Rituxan treatment about 10 years ago. Most recently about 4 months ago she was restarted back on Rituxan.     Patient also reports about 2 years ago, she had recurrent pneumonia and hospitalization and since that time she has been given IVIG regularly for the past 2 years and since that time she has been doing very well. No recurrent pneumonia.     Patient complains of significant fatigue, pica for ice chips. She also complains of exertional dyspnea. No cough or hemoptysis. She has also soreness on her tongue. She has vague intermittent abdominal pain and constipation. Denies melena or hematochezia. No nausea. No vomiting. She has some weight gain secondary to long-term oral steroid use. She also reports heat intolerance. Patient continues to have joint swelling and pain from her rheumatoid arthritis. She also has chronic low back pain. She reports intermittent headaches and also numbness involving her extremities mostly on hands but denies fainting or syncope. She has no easy bleeding or bruising.      I reviewed her laboratory results within the [facility]  system. She had most recent iron study on 04/27/2018, which reported iron deficiency with ferritin 8.28 ng/mL, serum free iron 32 mcg/dL with no iron saturation. She had supratherapeutic folic acid level more than 20 ng/mL. However, had low normal vitamin B12 level 346 pg/mL. She had mild anemia; hemoglobin 10.7, MCV 92.2, MCHC 29.3. Her platelets were 506,000 and WBC 10,990 including neutrophils 77848, lymphocytes 780 and monocytes 410. Her chemistry lab updated earlier on 04/23/2018 reported creatinine 1.02. Normal electrolytes. Glucose 190. Marginally elevated alkaline phosphatase 118 and otherwise normal liver function panel. Total serum protein 7.2 and albumin 3.9. Had a normal TSH of 1.57.     On 01/22/2017, she had serum free iron 31, TIBC 463 and iron saturation 7% without ferritin level. Folic acid was more than 20 ng/mL and vitamin B12 was 1022 pg/mL. She had hemoglobin 11.4, MCV 85.1, MCHC 29.8. Her platelets were 487,000 and WBC was 23,700. Apparently, patient was hospitalized at that time at [facility] for about a week because of sepsis and acute colitis. Laboratory study on 06/06/2016 reported serum ferritin 9.0 ng/mL, free iron 22, TIBC 416 and iron saturation 5%. Her vitamin B12 level was 287 pg/mL and RBC folic acid was 1575 ng/mL. Her hemoglobin was 8.6, MCV 76.3, MCHC 29.4. Platelets were 617,000 and WBC 12,000. This patient had worsening hemoglobin on 06/07/2016 with hemoglobin 7.6 and she was given 2 units PRBC transfusion. Post transfusion hemoglobin was 10.4 on 06/08/2016. It was at that time the patient had hospitalization for pneumonia.    This patient has chronic iron deficiency for the past several years. She was not able to tolerate oral iron treatment because of severe constipation. She previously in 2016 received 2 units PRBC transfusion and intravenous iron therapy. She had workup with both EGD and colonoscopy in 08/2016 shortly after her discharge from hospital at that  "time. Procedure report was not able to be found, however, I reviewed pathology report from that procedure. There was chronic gastritis and esophagitis but no report of ulceration. She also had benign colon polyp.     Laboratory study on 8/29/2018 reported anemia Hb 10.3, platelets 412,000 and WBC 11,400 including in C 9800.  Iron study reported a ferritin less than 5 ng/mL, free iron 32 TIBC 454 and iron saturation 7%.    This patient is symptomatic with profound fatigue and pica for ice chips. I discussed with the patient, recommend stat iron study and arrange for her intravenous iron therapy with Injectafer 750 mg once a week for 2 doses in September 2018.      Patient had resolution of pica for ice chips after IV iron therapy.  Repeated laboratory study on 9/27/2018 reported normalized iron saturation 29%, and supratherapeutic ferritin 553 ng/mL.  She had a normalization of hemoglobin 12.4 and platelets 307,000.    MEDICATIONS    Current Outpatient Medications:   •  atorvastatin (LIPITOR) 20 MG tablet, TAKE 1 TABLET BY MOUTH DAILY., Disp: 90 tablet, Rfl: 3  •  azaTHIOprine (IMURAN) 50 MG tablet, Take 50 mg by mouth 3 (Three) Times a Day., Disp: , Rfl: 5  •  BD INTEGRA SYRINGE 25G X 1\" 3 ML misc, USE AS DIRECTED WITH B12, Disp: 12 each, Rfl: 2  •  buPROPion XL (WELLBUTRIN XL) 150 MG 24 hr tablet, Take 150 mg by mouth Every Morning., Disp: , Rfl:   •  buPROPion XL (WELLBUTRIN XL) 150 MG 24 hr tablet, TAKE 1 TABLET BY MOUTH EVERY MORNING., Disp: 30 tablet, Rfl: 3  •  Cholecalciferol (VITAMIN D3) 5000 UNITS capsule capsule, Take 5,000 Units by mouth 2 (Two) Times a Day., Disp: , Rfl:   •  Cyanocobalamin (B-12 COMPLIANCE INJECTION) 1000 MCG/ML kit, Inject 1 mL into the appropriate muscle as directed by prescriber., Disp: , Rfl:   •  Cyanocobalamin (VITAMIN B 12 PO), Take 1,000 mg by mouth Daily., Disp: , Rfl:   •  cyanocobalamin 1000 MCG/ML injection, INJECT 1,000 MCG AS DIRECTED EVERY 30 (THIRTY) DAYS., Disp: 1 mL, " Rfl: 2  •  cyclobenzaprine (FLEXERIL) 10 MG tablet, Take 10 mg by mouth 3 (Three) Times a Day As Needed., Disp: , Rfl:   •  DULoxetine (CYMBALTA) 30 MG capsule, TAKE ONE CAPSULE BY MOUTH EVERY DAY (**IN ADDITION TO 1 60MG CAPSULE DAILY), Disp: , Rfl: 2  •  DULoxetine (CYMBALTA) 60 MG capsule, Take 60 mg by mouth daily., Disp: , Rfl:   •  exenatide er (BYDUREON BCISE) 2 MG/0.85ML auto-injector injection, Inject 0.85 mL under the skin into the appropriate area as directed 1 (One) Time Per Week., Disp: 4 pen, Rfl: 11  •  folic acid (FOLVITE) 1 MG tablet, Take 1 mg by mouth daily., Disp: , Rfl:   •  linaclotide (LINZESS) 145 MCG capsule capsule, Take 1 capsule by mouth Every Morning Before Breakfast., Disp: 30 capsule, Rfl: 1  •  omeprazole (priLOSEC) 40 MG capsule, TAKE 1 CAPSULE BY MOUTH DAILY., Disp: 90 capsule, Rfl: 3  •  predniSONE (DELTASONE) 5 MG tablet, TAKE 2 TABLET BY MOUTH TWICE DAILY, Disp: , Rfl: 2  •  riTUXimab (RITUXAN) 100 MG/10ML solution injection, Administer RITUXAN 1000mg IV Q 2WK X2 INFUSIONS Q 6 MONTHS, Disp: , Rfl:   •  SYNTHROID 112 MCG tablet, TAKE 1 TABLET BY MOUTH DAILY., Disp: 90 tablet, Rfl: 3  •  teriparatide (FORTEO) 600 MCG/2.4ML injection, Inject 0.08 mL under the skin Daily., Disp: 2.4 mL, Rfl: 11  •  topiramate (TOPAMAX) 100 MG tablet, Take 1 tablet by mouth Every Night., Disp: 30 tablet, Rfl: 11    ALLERGIES:     Allergies   Allergen Reactions   • Amoxicillin-Pot Clavulanate Diarrhea     itching   • Codeine Nausea And Vomiting       SOCIAL HISTORY:       Social History     Social History   • Marital status:      Spouse name: N/A   • Number of children: 2   • Years of education: 2 years college education.       Occupational History   • Homemaker       Social History Main Topics   • Smoking status: Former Smoker     Packs/day: 0.50     Years: 6 years      Types: Cigarettes     Start date: 4/1/1974     Quit date: 4/1/1978   • Smokeless tobacco: Never Used      Comment: QUIT AGE  "23   • Alcohol use No      Comment: STOPPED    • Drug use: No   • Sexual activity: Not on file         FAMILY HISTORY:   Father diagnosed of colon cancer at age 64,  of colon cancer age 65.  Mother is in excellent health condition in her early 90s.  Patient has a brother and a sister both living excellent condition.  Patient has 2 adult children both in excellent health condition.    Family History   Problem Relation Age of Onset   • Hyperlipidemia Mother    • Hypertension Mother    • Migraines Mother    • Colon cancer Father    • Diabetes Father    • Cancer Father    • Hyperlipidemia Brother    • Migraines Brother    • Migraines Sister    • Malig Hyperthermia Neg Hx        REVIEW OF SYSTEMS:  Review of Systems   Constitutional: Positive for unexpected weight change (Weight gains). Negative for appetite change, fatigue and fever.   HENT: Negative for mouth sores, rhinorrhea and sore throat.    Eyes: Negative for pain and visual disturbance.   Respiratory: Negative for cough and shortness of breath.    Cardiovascular: Negative for chest pain, palpitations and leg swelling.   Gastrointestinal: Positive for constipation. Negative for abdominal pain, blood in stool and nausea.   Endocrine: Positive for heat intolerance. Negative for polyphagia.   Genitourinary: Negative for dysuria and hematuria.   Musculoskeletal: Positive for arthralgias.   Skin: Negative for rash.   Allergic/Immunologic: Positive for immunocompromised state (On Rituxan treatment for her rheumatoid arthritis).   Hematological: Negative for adenopathy. Does not bruise/bleed easily.   Psychiatric/Behavioral: Negative for agitation.              Vitals:    19 1025   BP: 124/84   Pulse: 98   Resp: 16   Temp: 98.3 °F (36.8 °C)   SpO2: 96%  Comment: at rest   Weight: Comment: pt. statest she weighs 185 lb.   Height: 165 cm (64.96\")  Comment: new ht.   PainSc: 0-No pain     Current Status 3/8/2019   ECOG score 0      PHYSICAL EXAM:  "     CONSTITUTIONAL: Well-developed Well-nourished female.  No distress, looks comfortable.  EYES:  Conjunctiva and lids unremarkable.  PERRLA  EARS,NOSE,MOUTH,THROAT:  Nose appear unremarkable.  Lips appear unremarkable.  Oral mucosa moist.  RESPIRATORY:  Normal respiratory effort.  Lungs clear to auscultation bilaterally.    CARDIOVASCULAR:  Regular rhythm and rate.  Normal S1, S2.  No murmurs rubs or gallops.    GASTROINTESTINAL: Abdomen appears unremarkable.  Nontender.  No hepatomegaly.  No splenomegaly.  Bowel sounds normal.  LYMPHATIC:  No cervical, supraclavicular, axillary lymphadenopathy.  MUSCULOSKELETAL:  Unremarkable gait.  Unremarkable digits/nails.  No cyanosis or clubbing. No significant lower extremity edema.    SKIN:  Warm.  No rashes.  PSYCHIATRIC:  Normal judgment and insight.  Normal mood and affect.      RECENT LABS:  Lab Results   Component Value Date    WBC 8.07 03/08/2019    HGB 13.9 03/08/2019    HCT 42.2 03/08/2019    .8 (H) 03/08/2019     03/08/2019     Lab Results   Component Value Date    NEUTROABS 7.71 (H) 03/08/2019     Lab Results   Component Value Date    GXKMWZNR49 701 03/08/2019     Lab Results   Component Value Date    FOLATE >20.00 04/27/2018     Lab Results   Component Value Date    IRON 91 03/08/2019    TIBC 274 03/08/2019    FERRITIN 240.60 (H) 03/08/2019           Assessment/Plan      1. This patient has recurrent iron deficiency for the past several years. She was not able to tolerate oral iron treatment because of severe constipation. She previously in 2016 received 2 units PRBC transfusion and intravenous iron therapy. She had workup with both EGD and colonoscopy in 08/2016 with pathology evaluation reported chronic gastritis and esophagitis but no report of ulceration.     This patient is symptomatic with profound fatigue and pica for ice chips and anemia in August 2018.  We treated her with 2 doses of Injectafer in September 2018.     This patient had  resolution of pica for ice chips and improved energy level, normalization of hemoglobin after IV iron therapy.  Post treatment ferritin was 553 on 9/26/2018.  Today laboratory study reported worsened but is still normal ferritin at 240 mg/mL, and good iron saturation 33%.     I discussed with the patient, we will monitor her iron study every 6 months, and I will see her in 12 months for reevaluation.    2. Pernicious anemia/vitamin B12 deficiency. This patient has monthly vitamin B12 injection.   I advised patient to start taking oral vitamin B12 at 1000 mcg daily.     Today her vitamin B12 level is 701 pg/mL, not as high as expected.    3.  Macrocytosis despite normal vitamin B12 level.  Her macrocytosis is newly developed this time.  Patient reports no history of hepatitis or alcohol use.  The etiology of her macrocytosis is not clear.      She previously had supratherapeutic folic acid level in April 2018.      Macrocytosis needs to be monitored.  Could this caused by medication Imuran use for her rheumatoid arthritis?      I discussed with patient today.  Her previous CT scan examination for abdomen and pelvis on 1/18/2017 reported a mild fatty liver.  Not sure whether this is the cause of her macrocytosis.      Continue to monitor.  In the future will check her serum copper level.      4.  Lymphocytopenia.  This is secondary to Rituxan treatment in October and November 2018 for rheumatoid arthritis.  I reviewed medical records from her dermatologist office.     PLAN:   1.  Monitor iron study together with CBC every 6 months.  Repeat intravenous iron treatment if needed.   2.  Check serum copper level in 6 months.   3.  Continue monthly injection of B12 at Dr. Yang's office.      4.  Continue oral vitamin B12 and folic acid.     5.  Patient will return in 12 months for MD reevaluation. We will repeat CBC, ferritin, iron saturation and free iron and vitamin B12 level.   6.  Patient will continue follow-up with  rheumatologist also.    More than 40 min were used for patient care, including reviewing outside medical records, previous images studies counseling to patient.    ALEJANDRO METCALF M.D., Ph.D.    3/8/2019     CC:  Randi Yang MD       Dictated using Dragon Dictation.

## 2019-03-09 PROBLEM — D72.810 ACQUIRED LYMPHOCYTOPENIA: Status: ACTIVE | Noted: 2019-03-09

## 2019-03-09 PROBLEM — D75.89 MACROCYTOSIS WITHOUT ANEMIA: Status: ACTIVE | Noted: 2019-03-09

## 2019-03-13 ENCOUNTER — INFUSION (OUTPATIENT)
Dept: ONCOLOGY | Facility: HOSPITAL | Age: 64
End: 2019-03-13

## 2019-03-13 VITALS
DIASTOLIC BLOOD PRESSURE: 59 MMHG | OXYGEN SATURATION: 96 % | SYSTOLIC BLOOD PRESSURE: 108 MMHG | TEMPERATURE: 97.4 F | HEART RATE: 84 BPM | WEIGHT: 189.4 LBS | BODY MASS INDEX: 31.56 KG/M2

## 2019-03-13 DIAGNOSIS — D83.9 COMMON VARIABLE IMMUNODEFICIENCY (HCC): Primary | ICD-10-CM

## 2019-03-13 PROCEDURE — 96365 THER/PROPH/DIAG IV INF INIT: CPT | Performed by: NURSE PRACTITIONER

## 2019-03-13 PROCEDURE — 96366 THER/PROPH/DIAG IV INF ADDON: CPT | Performed by: NURSE PRACTITIONER

## 2019-03-13 PROCEDURE — 25010000002 IMMUNE GLOBULIN (HUMAN) 40 GM/400ML SOLUTION: Performed by: NURSE PRACTITIONER

## 2019-03-13 RX ORDER — ACETAMINOPHEN 325 MG/1
650 TABLET ORAL ONCE
Status: CANCELLED
Start: 2019-03-13 | End: 2019-03-13

## 2019-03-13 RX ORDER — DIPHENHYDRAMINE HCL 25 MG
25 CAPSULE ORAL ONCE
Status: CANCELLED
Start: 2019-03-13 | End: 2019-03-13

## 2019-03-13 RX ORDER — ACETAMINOPHEN 500 MG
500 TABLET ORAL EVERY 4 HOURS PRN
Status: CANCELLED
Start: 2019-03-13

## 2019-03-13 RX ADMIN — IMMUNE GLOBULIN (HUMAN) 40 G: 10 INJECTION INTRAVENOUS; SUBCUTANEOUS at 09:49

## 2019-03-25 ENCOUNTER — OFFICE VISIT (OUTPATIENT)
Dept: NEUROLOGY | Facility: CLINIC | Age: 64
End: 2019-03-25

## 2019-03-25 VITALS
OXYGEN SATURATION: 98 % | WEIGHT: 189 LBS | BODY MASS INDEX: 31.49 KG/M2 | DIASTOLIC BLOOD PRESSURE: 70 MMHG | HEIGHT: 65 IN | SYSTOLIC BLOOD PRESSURE: 120 MMHG | HEART RATE: 102 BPM

## 2019-03-25 DIAGNOSIS — G44.209 TENSION HEADACHE: Primary | ICD-10-CM

## 2019-03-25 DIAGNOSIS — R20.0 LEFT FACIAL NUMBNESS: ICD-10-CM

## 2019-03-25 DIAGNOSIS — R90.89 ABNORMAL FINDING ON MRI OF BRAIN: ICD-10-CM

## 2019-03-25 PROCEDURE — 99214 OFFICE O/P EST MOD 30 MIN: CPT | Performed by: PSYCHIATRY & NEUROLOGY

## 2019-03-25 RX ORDER — AZATHIOPRINE 50 MG/1
50 TABLET ORAL 3 TIMES DAILY
COMMUNITY
End: 2021-05-26

## 2019-03-25 RX ORDER — TOPIRAMATE 100 MG/1
100 TABLET, FILM COATED ORAL NIGHTLY
Qty: 30 TABLET | Refills: 11 | Status: SHIPPED | OUTPATIENT
Start: 2019-03-25 | End: 2019-09-23

## 2019-03-25 NOTE — PROGRESS NOTES
"Subjective:     Patient ID: Dara Medina is a 63 y.o. female.    History of Present Illness  The patient was seen back in the office for follow-up of chronic headaches, abnormal MRI of the brain and left facial numbness.  The left facial numbness is a bit worse in the time she gets twitching.  She was seen last in September 2018.  At that time a repeat MRI of her brain was turned by her insurance company.  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  There is also a small lesion in the gabriel.  Continued surveillance was recommended by neuroradiology.  She uses Excedrin and Advil for headaches.  She is on Topamax 100 mg at night which has helped in the past.    The following portions of the patient's history were reviewed and updated as appropriate: allergies, current medications, past family history, past medical history, past social history, past surgical history and problem list.      Current Outpatient Medications:   •  atorvastatin (LIPITOR) 20 MG tablet, TAKE 1 TABLET BY MOUTH DAILY., Disp: 90 tablet, Rfl: 3  •  azaTHIOprine (IMURAN) 50 MG tablet, Take 50 mg by mouth 3 (Three) Times a Day., Disp: , Rfl:   •  BD INTEGRA SYRINGE 25G X 1\" 3 ML misc, USE AS DIRECTED WITH B12, Disp: 12 each, Rfl: 2  •  buPROPion XL (WELLBUTRIN XL) 150 MG 24 hr tablet, Take 150 mg by mouth Every Morning., Disp: , Rfl:   •  Cholecalciferol (VITAMIN D3) 5000 UNITS capsule capsule, Take 5,000 Units by mouth 2 (Two) Times a Day., Disp: , Rfl:   •  Cyanocobalamin (B-12 COMPLIANCE INJECTION) 1000 MCG/ML kit, Inject 1 mL into the appropriate muscle as directed by prescriber., Disp: , Rfl:   •  cyclobenzaprine (FLEXERIL) 10 MG tablet, Take 10 mg by mouth 3 (Three) Times a Day As Needed., Disp: , Rfl:   •  DULoxetine (CYMBALTA) 60 MG capsule, Take 60 mg by mouth daily., Disp: , Rfl:   •  exenatide er (BYDUREON BCISE) 2 MG/0.85ML auto-injector injection, Inject 0.85 mL under " the skin into the appropriate area as directed 1 (One) Time Per Week., Disp: 4 pen, Rfl: 11  •  folic acid (FOLVITE) 1 MG tablet, Take 1 mg by mouth daily., Disp: , Rfl:   •  linaclotide (LINZESS) 145 MCG capsule capsule, Take 1 capsule by mouth Every Morning Before Breakfast., Disp: 30 capsule, Rfl: 1  •  omeprazole (priLOSEC) 40 MG capsule, TAKE 1 CAPSULE BY MOUTH DAILY., Disp: 90 capsule, Rfl: 3  •  predniSONE (DELTASONE) 5 MG tablet, TAKE 2 TABLET BY MOUTH TWICE DAILY, Disp: , Rfl: 2  •  riTUXimab (RITUXAN) 100 MG/10ML solution injection, Administer RITUXAN 1000mg IV Q 2WK X2 INFUSIONS Q 6 MONTHS, Disp: , Rfl:   •  SYNTHROID 112 MCG tablet, TAKE 1 TABLET BY MOUTH DAILY., Disp: 90 tablet, Rfl: 3  •  teriparatide (FORTEO) 600 MCG/2.4ML injection, Inject 0.08 mL under the skin Daily., Disp: 2.4 mL, Rfl: 11  •  topiramate (TOPAMAX) 100 MG tablet, Take 1 tablet by mouth Every Night., Disp: 30 tablet, Rfl: 11    Review of Systems   Constitutional: Negative.    Neurological: Positive for numbness and headaches. Negative for dizziness, tremors, seizures, syncope, facial asymmetry, speech difficulty, weakness and light-headedness.   Psychiatric/Behavioral: Negative.           I have reviewed ROS completed by medical assistant.     Objective:    Neurologic Exam  Mental status examination was appropriate.  Funduscopy, visual fields, eye movements and pupillary reflexes were normal.  No facial weakness was noted.  Gait was normal.  No pattern of focal weakness was noted.  Physical Exam    Assessment/Plan:     Dara was seen today for numbness.    Diagnoses and all orders for this visit:    Tension headache    Left facial numbness    Abnormal finding on MRI of brain    Other orders  -     topiramate (TOPAMAX) 100 MG tablet; Take 1 tablet by mouth Every Night.         Etiology of the corona radiata lesion is still not clear.  Will wait on repeating MRI for now.  Plan to see her back in 6 months.  We will keep medication the  same.  Prescription drug management - meds as above    Follow-up in the office in 6 months. Thank you for allowing me to share in the care of this patient.  Cj Null M.D.

## 2019-04-01 RX ORDER — LEVOTHYROXINE SODIUM 112 MCG
112 TABLET ORAL DAILY
Qty: 90 TABLET | Refills: 3 | Status: SHIPPED | OUTPATIENT
Start: 2019-04-01 | End: 2020-03-16

## 2019-04-10 ENCOUNTER — INFUSION (OUTPATIENT)
Dept: ONCOLOGY | Facility: HOSPITAL | Age: 64
End: 2019-04-10

## 2019-04-10 VITALS
BODY MASS INDEX: 31.78 KG/M2 | TEMPERATURE: 98.1 F | DIASTOLIC BLOOD PRESSURE: 54 MMHG | SYSTOLIC BLOOD PRESSURE: 109 MMHG | HEART RATE: 81 BPM | WEIGHT: 191 LBS | OXYGEN SATURATION: 97 % | RESPIRATION RATE: 18 BRPM

## 2019-04-10 DIAGNOSIS — D83.9 COMMON VARIABLE IMMUNODEFICIENCY (HCC): Primary | ICD-10-CM

## 2019-04-10 PROCEDURE — 96365 THER/PROPH/DIAG IV INF INIT: CPT | Performed by: NURSE PRACTITIONER

## 2019-04-10 PROCEDURE — 25010000002 IMMUNE GLOBULIN (HUMAN) 20 GM/200ML SOLUTION: Performed by: ALLERGY & IMMUNOLOGY

## 2019-04-10 PROCEDURE — 96366 THER/PROPH/DIAG IV INF ADDON: CPT | Performed by: NURSE PRACTITIONER

## 2019-04-10 RX ORDER — ACETAMINOPHEN 325 MG/1
650 TABLET ORAL ONCE
Status: CANCELLED
Start: 2019-04-10 | End: 2019-04-10

## 2019-04-10 RX ORDER — ACETAMINOPHEN 500 MG
500 TABLET ORAL EVERY 4 HOURS PRN
Status: CANCELLED
Start: 2019-04-10

## 2019-04-10 RX ORDER — DIPHENHYDRAMINE HCL 25 MG
25 CAPSULE ORAL ONCE
Status: CANCELLED
Start: 2019-04-10 | End: 2019-04-10

## 2019-04-10 RX ADMIN — IMMUNE GLOBULIN (HUMAN) 40 G: 10 INJECTION INTRAVENOUS; SUBCUTANEOUS at 09:17

## 2019-04-10 NOTE — PROGRESS NOTES
Patient states she took Tylenol 1000 mg PO x 1 and Benadryl 25 mg PO x 1 this morning for her premedications for today's treatment of IVIG.

## 2019-04-29 RX ORDER — CYANOCOBALAMIN 1000 UG/ML
INJECTION, SOLUTION INTRAMUSCULAR; SUBCUTANEOUS
Qty: 1 ML | Refills: 2 | Status: SHIPPED | OUTPATIENT
Start: 2019-04-29 | End: 2019-07-24 | Stop reason: SDUPTHER

## 2019-05-01 ENCOUNTER — OFFICE VISIT (OUTPATIENT)
Dept: GASTROENTEROLOGY | Facility: CLINIC | Age: 64
End: 2019-05-01

## 2019-05-01 VITALS
SYSTOLIC BLOOD PRESSURE: 113 MMHG | DIASTOLIC BLOOD PRESSURE: 77 MMHG | BODY MASS INDEX: 31.81 KG/M2 | HEART RATE: 94 BPM | HEIGHT: 65 IN | WEIGHT: 190.92 LBS

## 2019-05-01 DIAGNOSIS — D50.0 IRON DEFICIENCY ANEMIA DUE TO CHRONIC BLOOD LOSS: ICD-10-CM

## 2019-05-01 DIAGNOSIS — K59.04 CHRONIC IDIOPATHIC CONSTIPATION: Primary | ICD-10-CM

## 2019-05-01 DIAGNOSIS — R10.13 EPIGASTRIC PAIN: ICD-10-CM

## 2019-05-01 PROCEDURE — 99214 OFFICE O/P EST MOD 30 MIN: CPT | Performed by: INTERNAL MEDICINE

## 2019-05-01 RX ORDER — OMEPRAZOLE 40 MG/1
40 CAPSULE, DELAYED RELEASE ORAL
Qty: 60 CAPSULE | Refills: 11 | Status: SHIPPED | OUTPATIENT
Start: 2019-05-01 | End: 2019-05-13

## 2019-05-01 NOTE — PROGRESS NOTES
"    PATIENT INFORMATION  Dara Medina       - 1955    CHIEF COMPLAINT  Chief Complaint   Patient presents with   • Abdominal Pain   • Constipation       HISTORY OF PRESENT ILLNESS  Dropped her iron levels againand has received Venofer and her HGB is up but started with Constipation which is new , Diarrhea in the past and was given 145mcg Linzess. And took a\"purple Cow\"    Has had RA flare and is on Rituxan and went though prednisone isdown form50 - 5mg, Maria Dolores IGG for her CVID    Reviewed her Scopes in 2016 no Celiac and one single Polyp            REVIEW OF SYSTEMS  Review of Systems   Gastrointestinal: Positive for abdominal distention, abdominal pain and constipation.   Musculoskeletal: Positive for arthralgias, joint swelling, neck pain and neck stiffness.   Allergic/Immunologic: Positive for immunocompromised state.   Neurological: Positive for headaches.   All other systems reviewed and are negative.        ACTIVE PROBLEMS  Patient Active Problem List    Diagnosis   • Macrocytosis without anemia [D75.89]   • Acquired lymphocytopenia [D72.810]   • Tension headache [G44.209]   • Adverse effect of iron or its compound, sequela [T45.4X5S]   • Vitamin B12 deficiency [E53.8]   • Leukemoid reaction [D72.823]   • Left facial numbness [R20.0]   • Abnormal finding on MRI of brain [R90.89]   • Primary osteoarthritis of right knee [M17.11]   • Mixed hyperlipidemia [E78.2]   • Tear of medial meniscus of right knee, current [S83.241A]   • Prediabetes [R73.03]   • Rheumatoid arthritis (CMS/HCC) [M06.9]   • Pulmonary fibrosis (CMS/HCC) [J84.10]   • Iron deficiency anemia [D50.9]   • Pernicious anemia [D51.0]   • Common variable immunodeficiency (CMS/HCC) [D83.9]   • Hypothyroidism [E03.9]   • Sensory neuropathy [G62.9]   • Osteoporosis [M81.0]   • Vitamin D deficiency [E55.9]         PAST MEDICAL HISTORY  Past Medical History:   Diagnosis Date   • Acute colitis 2017   • Allergic Codeine, some antibiotics   • Anemia  "   • Cataract Removed       • Chronic depression    • Colon polyp 1 removed       • Diabetes mellitus (CMS/HCC)    • Fracture of rib    • GERD (gastroesophageal reflux disease)    • H/O Wrist fracture, right    • Headache    • History of bronchitis    • History of pneumonia    • History of transfusion    • Hyperlipidemia    • Hypothyroidism    • Inflammatory bowel disease    • Iron deficiency    • Irritable bowel syndrome    • Migraine    • Obesity    • Osteopenia    • Osteoporosis    • Pneumonia 2016   • RA (rheumatoid arthritis) (CMS/[facility])    • Sensory neuropathy    • Sepsis, unspecified organism (CMS/HCC) 2017   • Vitamin D deficiency          SURGICAL HISTORY  Past Surgical History:   Procedure Laterality Date   • ADENOIDECTOMY     • BREAST AUGMENTATION     • BREAST IMPLANT REMOVAL Bilateral    • CARPAL TUNNEL RELEASE Left    • COLONOSCOPY     • EYE SURGERY Bilateral    • HYSTERECTOMY     • LASIK Bilateral    • ORIF WRIST FRACTURE Right    • NC TOTAL KNEE ARTHROPLASTY Right 2017    Procedure: TOTAL KNEE ARTHROPLASTY;  Surgeon: Zion Guo MD;  Location: [facility];  Service: Orthopedics   • SINUS SURGERY      x2   • TONSILLECTOMY           FAMILY HISTORY  Family History   Problem Relation Age of Onset   • Hyperlipidemia Mother    • Hypertension Mother    • Migraines Mother    • Colon cancer Father    • Diabetes Father    • Cancer Father    • Hyperlipidemia Brother    • Migraines Brother    • Migraines Sister    • Malig Hyperthermia Neg Hx          SOCIAL HISTORY  Social History     Occupational History   • Occupation: Homemaker     Employer: RETIRED   Tobacco Use   • Smoking status: Former Smoker     Packs/day: 0.50     Years: 5.00     Pack years: 2.50     Types: Cigarettes     Start date: 1974     Last attempt to quit: 1978     Years since quittin.1   • Smokeless tobacco: Never Used   • Tobacco comment: QUIT AGE 23   Substance and Sexual Activity   • Alcohol use:  "No     Comment: STOPPED 1997   • Drug use: No   • Sexual activity: Not on file       Debilities/Disabilities Identified: None    Emotional Behavior: Appropriate    CURRENT MEDICATIONS    Current Outpatient Medications:   •  atorvastatin (LIPITOR) 20 MG tablet, TAKE 1 TABLET BY MOUTH DAILY., Disp: 90 tablet, Rfl: 3  •  azaTHIOprine (IMURAN) 50 MG tablet, Take 50 mg by mouth 3 (Three) Times a Day., Disp: , Rfl:   •  BD INTEGRA SYRINGE 25G X 1\" 3 ML misc, USE AS DIRECTED WITH B12, Disp: 12 each, Rfl: 2  •  buPROPion XL (WELLBUTRIN XL) 150 MG 24 hr tablet, Take 150 mg by mouth Every Morning., Disp: , Rfl:   •  Cholecalciferol (VITAMIN D3) 5000 UNITS capsule capsule, Take 5,000 Units by mouth 2 (Two) Times a Day., Disp: , Rfl:   •  Cyanocobalamin (B-12 COMPLIANCE INJECTION) 1000 MCG/ML kit, Inject 1 mL into the appropriate muscle as directed by prescriber., Disp: , Rfl:   •  cyanocobalamin 1000 MCG/ML injection, INJECT 1,000 MCG AS DIRECTED EVERY 30 (THIRTY) DAYS., Disp: 1 mL, Rfl: 2  •  cyclobenzaprine (FLEXERIL) 10 MG tablet, Take 10 mg by mouth 3 (Three) Times a Day As Needed., Disp: , Rfl:   •  DULoxetine (CYMBALTA) 60 MG capsule, Take 60 mg by mouth daily., Disp: , Rfl:   •  exenatide er (BYDUREON BCISE) 2 MG/0.85ML auto-injector injection, Inject 0.85 mL under the skin into the appropriate area as directed 1 (One) Time Per Week., Disp: 4 pen, Rfl: 11  •  folic acid (FOLVITE) 1 MG tablet, Take 1 mg by mouth daily., Disp: , Rfl:   •  linaclotide (LINZESS) 145 MCG capsule capsule, Take 1 capsule by mouth Every Morning Before Breakfast., Disp: 30 capsule, Rfl: 1  •  omeprazole (priLOSEC) 40 MG capsule, TAKE 1 CAPSULE BY MOUTH DAILY., Disp: 90 capsule, Rfl: 3  •  predniSONE (DELTASONE) 5 MG tablet, TAKE 2 TABLET BY MOUTH TWICE DAILY, Disp: , Rfl: 2  •  riTUXimab (RITUXAN) 100 MG/10ML solution injection, Administer RITUXAN 1000mg IV Q 2WK X2 INFUSIONS Q 6 MONTHS, Disp: , Rfl:   •  SYNTHROID 112 MCG tablet, TAKE 1 TABLET " "BY MOUTH DAILY., Disp: 90 tablet, Rfl: 3  •  teriparatide (FORTEO) 600 MCG/2.4ML injection, Inject 0.08 mL under the skin Daily., Disp: 2.4 mL, Rfl: 11  •  topiramate (TOPAMAX) 100 MG tablet, Take 1 tablet by mouth Every Night., Disp: 30 tablet, Rfl: 11    ALLERGIES  Amoxicillin-pot clavulanate and Codeine    VITALS  Vitals:    05/01/19 1102   BP: 113/77   Pulse: 94   Weight: 86.6 kg (190 lb 14.7 oz)   Height: 165.1 cm (65\")       LAST RESULTS   Lab on 03/08/2019   Component Date Value Ref Range Status   • Ferritin 03/08/2019 240.60* 13.00 - 150.00 ng/mL Final   • Iron 03/08/2019 91  37 - 145 mcg/dL Final   • Iron Saturation 03/08/2019 33  14 - 48 % Final   • Transferrin 03/08/2019 196* 200 - 360 mg/dL Final   • TIBC 03/08/2019 274  249 - 505 mcg/dL Final   • Vitamin B-12 03/08/2019 701  211 - 946 pg/mL Final   • WBC 03/08/2019 8.07  3.40 - 10.80 10*3/mm3 Final   • RBC 03/08/2019 3.88  3.77 - 5.28 10*6/mm3 Final   • Hemoglobin 03/08/2019 13.9  12.0 - 15.9 g/dL Final   • Hematocrit 03/08/2019 42.2  34.0 - 46.6 % Final   • MCV 03/08/2019 108.8* 79.0 - 97.0 fL Final   • MCH 03/08/2019 35.8* 26.6 - 33.0 pg Final   • MCHC 03/08/2019 32.9  31.5 - 35.7 g/dL Final   • RDW 03/08/2019 13.8  12.3 - 15.4 % Final   • RDW-SD 03/08/2019 55.3* 37.0 - 54.0 fl Final   • MPV 03/08/2019 10.3  6.0 - 12.0 fL Final   • Platelets 03/08/2019 260  140 - 450 10*3/mm3 Final   • Neutrophil % 03/08/2019 95.6* 42.7 - 76.0 % Final   • Lymphocyte % 03/08/2019 2.0* 19.6 - 45.3 % Final   • Monocyte % 03/08/2019 1.6* 5.0 - 12.0 % Final   • Eosinophil % 03/08/2019 0.1* 0.3 - 6.2 % Final   • Basophil % 03/08/2019 0.2  0.0 - 1.5 % Final   • Immature Grans % 03/08/2019 0.5  0.0 - 0.5 % Final   • Neutrophils, Absolute 03/08/2019 7.71* 1.40 - 7.00 10*3/mm3 Final   • Lymphocytes, Absolute 03/08/2019 0.16* 0.70 - 3.10 10*3/mm3 Final   • Monocytes, Absolute 03/08/2019 0.13  0.10 - 0.90 10*3/mm3 Final   • Eosinophils, Absolute 03/08/2019 0.01  0.00 - 0.40 " 10*3/mm3 Final   • Basophils, Absolute 03/08/2019 0.02  0.00 - 0.20 10*3/mm3 Final   • Immature Grans, Absolute 03/08/2019 0.04  0.00 - 0.05 10*3/mm3 Final   • nRBC 03/08/2019 0.0  0.0 - 0.0 /100 WBC Final     No results found.    PHYSICAL EXAM  Physical Exam   Constitutional: She is oriented to person, place, and time. She appears well-developed and well-nourished.   HENT:   Head: Normocephalic and atraumatic.   Eyes: Conjunctivae and EOM are normal. Pupils are equal, round, and reactive to light. No scleral icterus.   Neck: Normal range of motion. Neck supple. No thyromegaly present.   Cardiovascular: Normal rate, regular rhythm, normal heart sounds and intact distal pulses. Exam reveals no gallop.   No murmur heard.  Pulmonary/Chest: Effort normal and breath sounds normal. She has no wheezes. She has no rales.   Abdominal: Soft. Bowel sounds are normal. She exhibits no shifting dullness, no distension, no fluid wave, no abdominal bruit, no ascites and no mass. There is no hepatosplenomegaly. There is tenderness in the right lower quadrant, epigastric area, periumbilical area and left lower quadrant. There is no guarding and negative Carvalho's sign. Hernia confirmed negative in the ventral area.   Musculoskeletal: Normal range of motion. She exhibits no edema.   Lymphadenopathy:     She has no cervical adenopathy.   Neurological: She is alert and oriented to person, place, and time.   Skin: Skin is warm and dry. No rash noted. She is not diaphoretic. No erythema.   Psychiatric: She has a normal mood and affect. Her behavior is normal.       ASSESSMENT  Diagnoses and all orders for this visit:    Chronic idiopathic constipation    Iron deficiency anemia due to chronic blood loss    Epigastric pain          PLAN  Will add Miralx and Up her LInzess dose and increase her PPI to BID    Return in about 6 weeks (around 6/12/2019).

## 2019-05-06 DIAGNOSIS — E78.5 HYPERLIPIDEMIA, UNSPECIFIED HYPERLIPIDEMIA TYPE: Primary | ICD-10-CM

## 2019-05-06 LAB
ALBUMIN SERPL-MCNC: 4.2 G/DL (ref 3.5–5.2)
ALBUMIN/GLOB SERPL: 1.8 G/DL
ALP SERPL-CCNC: 75 U/L (ref 39–117)
ALT SERPL-CCNC: 13 U/L (ref 1–33)
AST SERPL-CCNC: 12 U/L (ref 1–32)
BILIRUB SERPL-MCNC: 0.3 MG/DL (ref 0.2–1.2)
BUN SERPL-MCNC: 10 MG/DL (ref 8–23)
BUN/CREAT SERPL: 11 (ref 7–25)
CALCIUM SERPL-MCNC: 9.6 MG/DL (ref 8.6–10.5)
CHLORIDE SERPL-SCNC: 111 MMOL/L (ref 98–107)
CHOLEST SERPL-MCNC: 163 MG/DL (ref 0–200)
CO2 SERPL-SCNC: 24.7 MMOL/L (ref 22–29)
CREAT SERPL-MCNC: 0.91 MG/DL (ref 0.57–1)
GLOBULIN SER CALC-MCNC: 2.3 GM/DL
GLUCOSE SERPL-MCNC: 94 MG/DL (ref 65–99)
HDLC SERPL-MCNC: 68 MG/DL (ref 40–60)
LDLC SERPL CALC-MCNC: 76 MG/DL (ref 0–100)
POTASSIUM SERPL-SCNC: 4.7 MMOL/L (ref 3.5–5.2)
PROT SERPL-MCNC: 6.5 G/DL (ref 6–8.5)
SODIUM SERPL-SCNC: 146 MMOL/L (ref 136–145)
TRIGL SERPL-MCNC: 93 MG/DL (ref 0–150)
VLDLC SERPL CALC-MCNC: 18.6 MG/DL

## 2019-05-08 ENCOUNTER — INFUSION (OUTPATIENT)
Dept: ONCOLOGY | Facility: HOSPITAL | Age: 64
End: 2019-05-08

## 2019-05-08 VITALS
SYSTOLIC BLOOD PRESSURE: 102 MMHG | OXYGEN SATURATION: 95 % | TEMPERATURE: 98.2 F | WEIGHT: 192.4 LBS | BODY MASS INDEX: 32.02 KG/M2 | HEART RATE: 76 BPM | DIASTOLIC BLOOD PRESSURE: 58 MMHG | RESPIRATION RATE: 16 BRPM

## 2019-05-08 DIAGNOSIS — D83.9 COMMON VARIABLE IMMUNODEFICIENCY (HCC): Primary | ICD-10-CM

## 2019-05-08 PROCEDURE — 96366 THER/PROPH/DIAG IV INF ADDON: CPT | Performed by: NURSE PRACTITIONER

## 2019-05-08 PROCEDURE — 25010000002 IMMUNE GLOBULIN (HUMAN) 20 GM/200ML SOLUTION: Performed by: ALLERGY & IMMUNOLOGY

## 2019-05-08 PROCEDURE — 96365 THER/PROPH/DIAG IV INF INIT: CPT | Performed by: NURSE PRACTITIONER

## 2019-05-08 RX ORDER — ACETAMINOPHEN 325 MG/1
650 TABLET ORAL ONCE
Status: CANCELLED
Start: 2019-05-08 | End: 2019-05-08

## 2019-05-08 RX ORDER — DIPHENHYDRAMINE HCL 25 MG
25 CAPSULE ORAL ONCE
Status: CANCELLED
Start: 2019-05-08 | End: 2019-05-08

## 2019-05-08 RX ORDER — ACETAMINOPHEN 500 MG
500 TABLET ORAL EVERY 4 HOURS PRN
Status: DISCONTINUED | OUTPATIENT
Start: 2019-05-08 | End: 2019-05-08 | Stop reason: HOSPADM

## 2019-05-08 RX ORDER — ACETAMINOPHEN 500 MG
500 TABLET ORAL EVERY 4 HOURS PRN
Status: CANCELLED
Start: 2019-05-08

## 2019-05-08 RX ADMIN — IMMUNE GLOBULIN (HUMAN) 40 G: 10 INJECTION INTRAVENOUS; SUBCUTANEOUS at 09:22

## 2019-05-13 ENCOUNTER — OFFICE VISIT (OUTPATIENT)
Dept: INTERNAL MEDICINE | Facility: CLINIC | Age: 64
End: 2019-05-13

## 2019-05-13 VITALS
DIASTOLIC BLOOD PRESSURE: 70 MMHG | BODY MASS INDEX: 31.49 KG/M2 | HEIGHT: 65 IN | SYSTOLIC BLOOD PRESSURE: 112 MMHG | OXYGEN SATURATION: 98 % | HEART RATE: 84 BPM | WEIGHT: 189 LBS

## 2019-05-13 DIAGNOSIS — E78.5 HYPERLIPIDEMIA: ICD-10-CM

## 2019-05-13 DIAGNOSIS — J84.10 PULMONARY FIBROSIS (HCC): ICD-10-CM

## 2019-05-13 DIAGNOSIS — M06.9 RHEUMATOID ARTHRITIS INVOLVING MULTIPLE SITES, UNSPECIFIED RHEUMATOID FACTOR PRESENCE: ICD-10-CM

## 2019-05-13 DIAGNOSIS — E03.9 ACQUIRED HYPOTHYROIDISM: ICD-10-CM

## 2019-05-13 DIAGNOSIS — E03.9 HYPOTHYROIDISM, UNSPECIFIED TYPE: Primary | ICD-10-CM

## 2019-05-13 DIAGNOSIS — M81.0 OSTEOPOROSIS, UNSPECIFIED OSTEOPOROSIS TYPE, UNSPECIFIED PATHOLOGICAL FRACTURE PRESENCE: ICD-10-CM

## 2019-05-13 DIAGNOSIS — D83.9 COMMON VARIABLE IMMUNODEFICIENCY (HCC): ICD-10-CM

## 2019-05-13 LAB
T4 FREE SERPL-MCNC: 1.15 NG/DL (ref 0.93–1.7)
TSH SERPL DL<=0.005 MIU/L-ACNC: 0.14 MIU/ML (ref 0.27–4.2)

## 2019-05-13 PROCEDURE — 99214 OFFICE O/P EST MOD 30 MIN: CPT | Performed by: INTERNAL MEDICINE

## 2019-05-13 RX ORDER — LINACLOTIDE 145 UG/1
CAPSULE, GELATIN COATED ORAL
Qty: 30 CAPSULE | Refills: 1 | Status: SHIPPED | OUTPATIENT
Start: 2019-05-13 | End: 2019-05-13

## 2019-05-13 RX ORDER — DULOXETIN HYDROCHLORIDE 30 MG/1
60 CAPSULE, DELAYED RELEASE ORAL DAILY
Qty: 30 CAPSULE | Refills: 5 | Status: SHIPPED | OUTPATIENT
Start: 2019-05-13 | End: 2019-05-16 | Stop reason: SDUPTHER

## 2019-05-13 NOTE — PROGRESS NOTES
Subjective     Dara Medina is a 64 y.o. female who presents with   Chief Complaint   Patient presents with   • Hyperlipidemia   • Hypothyroidism   • Osteoporosis       History of Present Illness     HLD.  Control is good.   Hypothyroidism.  She is due for check of labs.    OP.  She is on Forteo.    RA/pulmonary fibrosis/CVIG.  She is followed by rhematology, allergy and pulmonary.    Obesity.  She is losing weight with Bydureon.      Review of Systems   Respiratory: Negative.    Cardiovascular: Negative.        The following portions of the patient's history were reviewed and updated as appropriate: allergies, current medications and problem list.    Patient Active Problem List    Diagnosis Date Noted   • Macrocytosis without anemia 03/09/2019   • Acquired lymphocytopenia 03/09/2019   • Tension headache 09/27/2018   • Adverse effect of iron or its compound, sequela 08/29/2018   • Vitamin B12 deficiency 08/29/2018   • Leukemoid reaction 08/29/2018   • Left facial numbness 03/08/2018   • Abnormal finding on MRI of brain 02/06/2018     Note Last Updated: 2/6/2018 2/2018.  Plan recheck three months.       • Primary osteoarthritis of right knee 11/22/2017     Note Last Updated: 11/22/2017     Added automatically from request for surgery [id number]     • Mixed hyperlipidemia 05/05/2017   • Tear of medial meniscus of right knee, current 05/04/2017   • Prediabetes 04/12/2017   • Rheumatoid arthritis (CMS/HCC) 07/07/2016   • Pulmonary fibrosis (CMS/[facility]) 07/06/2016     Note Last Updated: 10/23/2017     Secondary to methotrexate.       • Iron deficiency anemia 07/06/2016   • Pernicious anemia 07/06/2016   • Common variable immunodeficiency (CMS/HCC) 07/01/2016   • Hypothyroidism 05/23/2016   • Sensory neuropathy 05/23/2016   • Osteoporosis 05/23/2016     Note Last Updated: 10/23/2017     H/o one year Forteo.  Not on bisphosphonates because of dental issues.  Fosamax in past caused stomach problems.       • Vitamin D deficiency  "05/23/2016       Current Outpatient Medications on File Prior to Visit   Medication Sig Dispense Refill   • atorvastatin (LIPITOR) 20 MG tablet TAKE 1 TABLET BY MOUTH DAILY. 90 tablet 3   • azaTHIOprine (IMURAN) 50 MG tablet Take 50 mg by mouth 3 (Three) Times a Day.     • BD INTEGRA SYRINGE 25G X 1\" 3 ML misc USE AS DIRECTED WITH B12 12 each 2   • buPROPion XL (WELLBUTRIN XL) 150 MG 24 hr tablet Take 150 mg by mouth Every Morning.     • Cholecalciferol (VITAMIN D3) 5000 UNITS capsule capsule Take 5,000 Units by mouth 2 (Two) Times a Day.     • cyanocobalamin 1000 MCG/ML injection INJECT 1,000 MCG AS DIRECTED EVERY 30 (THIRTY) DAYS. 1 mL 2   • cyclobenzaprine (FLEXERIL) 10 MG tablet Take 10 mg by mouth 3 (Three) Times a Day As Needed.     • exenatide er (BYDUREON BCISE) 2 MG/0.85ML auto-injector injection Inject 0.85 mL under the skin into the appropriate area as directed 1 (One) Time Per Week. 4 pen 11   • folic acid (FOLVITE) 1 MG tablet Take 1 mg by mouth daily.     • linaclotide (LINZESS) 290 MCG capsule capsule Take 1 capsule by mouth Every Morning Before Breakfast. 30 capsule 11   • omeprazole (priLOSEC) 40 MG capsule TAKE 1 CAPSULE BY MOUTH DAILY. 90 capsule 3   • riTUXimab (RITUXAN) 100 MG/10ML solution injection Administer RITUXAN 1000mg IV Q 2WK X2 INFUSIONS Q 6 MONTHS     • SYNTHROID 112 MCG tablet TAKE 1 TABLET BY MOUTH DAILY. 90 tablet 3   • teriparatide (FORTEO) 600 MCG/2.4ML injection Inject 0.08 mL under the skin Daily. 2.4 mL 11   • topiramate (TOPAMAX) 100 MG tablet Take 1 tablet by mouth Every Night. 30 tablet 11   • [DISCONTINUED] DULoxetine (CYMBALTA) 60 MG capsule Take 60 mg by mouth daily.     • [DISCONTINUED] LINZESS 145 MCG capsule capsule TAKE 1 CAPSULE BY MOUTH EVERY DAY IN THE MORNING BEFORE BREAKFAST (Patient taking differently: TAKE 2 CAPSULE BY MOUTH EVERY DAY IN THE MORNING BEFORE BREAKFAST) 30 capsule 1   • [DISCONTINUED] omeprazole (priLOSEC) 40 MG capsule Take 1 capsule by mouth 2 " "(Two) Times a Day Before Meals. 60 capsule 11   • [DISCONTINUED] Cyanocobalamin (B-12 COMPLIANCE INJECTION) 1000 MCG/ML kit Inject 1 mL into the appropriate muscle as directed by prescriber.     • [DISCONTINUED] predniSONE (DELTASONE) 5 MG tablet TAKE 2 TABLET BY MOUTH TWICE DAILY  2     No current facility-administered medications on file prior to visit.        Objective     /70   Pulse 84   Ht 165.1 cm (65\")   Wt 85.7 kg (189 lb)   SpO2 98%   BMI 31.45 kg/m²     Physical Exam   Constitutional: She is oriented to person, place, and time. She appears well-developed and well-nourished.   HENT:   Head: Normocephalic and atraumatic.   Cardiovascular: Normal rate, regular rhythm and normal heart sounds.   Pulmonary/Chest: Effort normal and breath sounds normal.   Neurological: She is alert and oriented to person, place, and time.   Skin: Skin is warm and dry.   Psychiatric: She has a normal mood and affect. Her behavior is normal.       Assessment/Plan   Dara was seen today for hyperlipidemia, hypothyroidism and osteoporosis.    Diagnoses and all orders for this visit:    Hypothyroidism, unspecified type  -     TSH Rfx On Abnormal To Free T4    Hyperlipidemia  -     DULoxetine (CYMBALTA) 30 MG capsule; Take 2 capsules by mouth Daily.    Acquired hypothyroidism  -     DULoxetine (CYMBALTA) 30 MG capsule; Take 2 capsules by mouth Daily.    Osteoporosis, unspecified osteoporosis type, unspecified pathological fracture presence    Common variable immunodeficiency (CMS/HCC)    Rheumatoid arthritis involving multiple sites, unspecified rheumatoid factor presence (CMS/HCC)    Pulmonary fibrosis (CMS/HCC)        Discussion    HLD.  Continue current.  Hypothyroidism.  Continue levothyroxine.  Check labs today.   OP. Continue Forteo to total two years.    RA/CVID/pulmonary fibrosis.  Continue with subspecialties.    Obesity.  Continue Bydureon.  She is losing weight.         Future Appointments   Date Time Provider " Department Center   6/5/2019  9:00 AM REFERRED INFUSION CHAIR 12 EP  INFUS EP [facility]   6/18/2019 10:15 AM Prasad Celeste MD [facility] None   8/26/2019 10:00 AM LAB CHAIR [facility]  LAB [facility]   9/23/2019 10:30 AM Cj Null Jr., MD [facility] None   11/20/2019  9:20 AM [facility] None   11/20/2019  9:30 AM [facility] None   11/27/2019 10:00 AM Randi Yang MD [facility] None   2/5/2020 10:00 AM Zion Guo MD [facility]

## 2019-05-13 NOTE — PATIENT INSTRUCTIONS
"DASH Eating Plan  DASH stands for \"Dietary Approaches to Stop Hypertension.\" The DASH eating plan is a healthy eating plan that has been shown to reduce high blood pressure (hypertension). It may also reduce your risk for type 2 diabetes, heart disease, and stroke. The DASH eating plan may also help with weight loss.  What are tips for following this plan?  General guidelines  · Avoid eating more than 2,300 mg (milligrams) of salt (sodium) a day. If you have hypertension, you may need to reduce your sodium intake to 1,500 mg a day.  · Limit alcohol intake to no more than 1 drink a day for nonpregnant women and 2 drinks a day for men. One drink equals 12 oz of beer, 5 oz of wine, or 1½ oz of hard liquor.  · Work with your health care provider to maintain a healthy body weight or to lose weight. Ask what an ideal weight is for you.  · Get at least 30 minutes of exercise that causes your heart to beat faster (aerobic exercise) most days of the week. Activities may include walking, swimming, or biking.  · Work with your health care provider or diet and nutrition specialist (dietitian) to adjust your eating plan to your individual calorie needs.  Reading food labels  · Check food labels for the amount of sodium per serving. Choose foods with less than 5 percent of the Daily Value of sodium. Generally, foods with less than 300 mg of sodium per serving fit into this eating plan.  · To find whole grains, look for the word \"whole\" as the first word in the ingredient list.  Shopping  · Buy products labeled as \"low-sodium\" or \"no salt added.\"  · Buy fresh foods. Avoid canned foods and premade or frozen meals.  Cooking  · Avoid adding salt when cooking. Use salt-free seasonings or herbs instead of table salt or sea salt. Check with your health care provider or pharmacist before using salt substitutes.  · Do not cota foods. Cook foods using healthy methods such as baking, boiling, grilling, and broiling instead.  · Cook with " heart-healthy oils, such as olive, canola, soybean, or sunflower oil.  Meal planning    · Eat a balanced diet that includes:  ? 5 or more servings of fruits and vegetables each day. At each meal, try to fill half of your plate with fruits and vegetables.  ? Up to 6-8 servings of whole grains each day.  ? Less than 6 oz of lean meat, poultry, or fish each day. A 3-oz serving of meat is about the same size as a deck of cards. One egg equals 1 oz.  ? 2 servings of low-fat dairy each day.  ? A serving of nuts, seeds, or beans 5 times each week.  ? Heart-healthy fats. Healthy fats called Omega-3 fatty acids are found in foods such as flaxseeds and coldwater fish, like sardines, salmon, and mackerel.  · Limit how much you eat of the following:  ? Canned or prepackaged foods.  ? Food that is high in trans fat, such as fried foods.  ? Food that is high in saturated fat, such as fatty meat.  ? Sweets, desserts, sugary drinks, and other foods with added sugar.  ? Full-fat dairy products.  · Do not salt foods before eating.  · Try to eat at least 2 vegetarian meals each week.  · Eat more home-cooked food and less restaurant, buffet, and fast food.  · When eating at a restaurant, ask that your food be prepared with less salt or no salt, if possible.  What foods are recommended?  The items listed may not be a complete list. Talk with your dietitian about what dietary choices are best for you.  Grains  Whole-grain or whole-wheat bread. Whole-grain or whole-wheat pasta. Brown rice. Oatmeal. Quinoa. Bulgur. Whole-grain and low-sodium cereals. Billie bread. Low-fat, low-sodium crackers. Whole-wheat flour tortillas.  Vegetables  Fresh or frozen vegetables (raw, steamed, roasted, or grilled). Low-sodium or reduced-sodium tomato and vegetable juice. Low-sodium or reduced-sodium tomato sauce and tomato paste. Low-sodium or reduced-sodium canned vegetables.  Fruits  All fresh, dried, or frozen fruit. Canned fruit in natural juice (without  added sugar).  Meat and other protein foods  Skinless chicken or turkey. Ground chicken or turkey. Pork with fat trimmed off. Fish and seafood. Egg whites. Dried beans, peas, or lentils. Unsalted nuts, nut butters, and seeds. Unsalted canned beans. Lean cuts of beef with fat trimmed off. Low-sodium, lean deli meat.  Dairy  Low-fat (1%) or fat-free (skim) milk. Fat-free, low-fat, or reduced-fat cheeses. Nonfat, low-sodium ricotta or cottage cheese. Low-fat or nonfat yogurt. Low-fat, low-sodium cheese.  Fats and oils  Soft margarine without trans fats. Vegetable oil. Low-fat, reduced-fat, or light mayonnaise and salad dressings (reduced-sodium). Canola, safflower, olive, soybean, and sunflower oils. Avocado.  Seasoning and other foods  Herbs. Spices. Seasoning mixes without salt. Unsalted popcorn and pretzels. Fat-free sweets.  What foods are not recommended?  The items listed may not be a complete list. Talk with your dietitian about what dietary choices are best for you.  Grains  Baked goods made with fat, such as croissants, muffins, or some breads. Dry pasta or rice meal packs.  Vegetables  Creamed or fried vegetables. Vegetables in a cheese sauce. Regular canned vegetables (not low-sodium or reduced-sodium). Regular canned tomato sauce and paste (not low-sodium or reduced-sodium). Regular tomato and vegetable juice (not low-sodium or reduced-sodium). Pickles. Olives.  Fruits  Canned fruit in a light or heavy syrup. Fried fruit. Fruit in cream or butter sauce.  Meat and other protein foods  Fatty cuts of meat. Ribs. Fried meat. [place]za. Sausage. Bologna and other processed lunch meats. Salami. Fatback. Hotdogs. Bratwurst. Salted nuts and seeds. Canned beans with added salt. Canned or smoked fish. Whole eggs or egg yolks. Chicken or turkey with skin.  Dairy  Whole or 2% milk, cream, and half-and-half. Whole or full-fat cream cheese. Whole-fat or sweetened yogurt. Full-fat cheese. Nondairy creamers. Whipped toppings.  Processed cheese and cheese spreads.  Fats and oils  Butter. Stick margarine. Lard. Shortening. Ghee. [place]za fat. Tropical oils, such as coconut, palm kernel, or palm oil.  Seasoning and other foods  Salted popcorn and pretzels. Onion salt, garlic salt, seasoned salt, table salt, and sea salt. Worcestershire sauce. Tartar sauce. Barbecue sauce. Teriyaki sauce. Soy sauce, including reduced-sodium. Steak sauce. Canned and packaged gravies. Fish sauce. Oyster sauce. Cocktail sauce. Horseradish that you find on the shelf. Ketchup. Mustard. Meat flavorings and tenderizers. Bouillon cubes. Hot sauce and Tabasco sauce. Premade or packaged marinades. Premade or packaged taco seasonings. Relishes. Regular salad dressings.  Where to find more information:  · National Heart, Lung, and Blood [place]: www.nhlbi.nih.gov  · American Heart Association: www.heart.org  Summary  · The DASH eating plan is a healthy eating plan that has been shown to reduce high blood pressure (hypertension). It may also reduce your risk for type 2 diabetes, heart disease, and stroke.  · With the DASH eating plan, you should limit salt (sodium) intake to 2,300 mg a day. If you have hypertension, you may need to reduce your sodium intake to 1,500 mg a day.  · When on the DASH eating plan, aim to eat more fresh fruits and vegetables, whole grains, lean proteins, low-fat dairy, and heart-healthy fats.  · Work with your health care provider or diet and nutrition specialist (dietitian) to adjust your eating plan to your individual calorie needs.  This information is not intended to replace advice given to you by your health care provider. Make sure you discuss any questions you have with your health care provider.  Document Released: 12/06/2012 Document Revised: 12/11/2017 Document Reviewed: 12/11/2017  [employer] Interactive Patient Education © 2019 [employer]

## 2019-05-16 DIAGNOSIS — E03.9 ACQUIRED HYPOTHYROIDISM: ICD-10-CM

## 2019-05-16 DIAGNOSIS — E78.5 HYPERLIPIDEMIA: ICD-10-CM

## 2019-05-16 RX ORDER — DULOXETIN HYDROCHLORIDE 30 MG/1
60 CAPSULE, DELAYED RELEASE ORAL DAILY
Qty: 30 CAPSULE | Refills: 5 | Status: SHIPPED | OUTPATIENT
Start: 2019-05-16 | End: 2019-07-08 | Stop reason: SDUPTHER

## 2019-06-05 ENCOUNTER — INFUSION (OUTPATIENT)
Dept: ONCOLOGY | Facility: HOSPITAL | Age: 64
End: 2019-06-05

## 2019-06-05 VITALS
BODY MASS INDEX: 32.22 KG/M2 | HEART RATE: 90 BPM | SYSTOLIC BLOOD PRESSURE: 111 MMHG | DIASTOLIC BLOOD PRESSURE: 74 MMHG | WEIGHT: 193.6 LBS | OXYGEN SATURATION: 97 % | TEMPERATURE: 98 F

## 2019-06-05 DIAGNOSIS — D83.9 COMMON VARIABLE IMMUNODEFICIENCY (HCC): Primary | ICD-10-CM

## 2019-06-05 PROCEDURE — 96366 THER/PROPH/DIAG IV INF ADDON: CPT | Performed by: NURSE PRACTITIONER

## 2019-06-05 PROCEDURE — 25010000002 IMMUNE GLOBULIN (HUMAN) 20 GM/200ML SOLUTION: Performed by: ALLERGY & IMMUNOLOGY

## 2019-06-05 PROCEDURE — 96365 THER/PROPH/DIAG IV INF INIT: CPT | Performed by: NURSE PRACTITIONER

## 2019-06-05 RX ORDER — DIPHENHYDRAMINE HCL 25 MG
25 CAPSULE ORAL ONCE
Status: CANCELLED
Start: 2019-06-05 | End: 2019-06-05

## 2019-06-05 RX ORDER — ACETAMINOPHEN 500 MG
500 TABLET ORAL EVERY 4 HOURS PRN
Status: CANCELLED
Start: 2019-06-05

## 2019-06-05 RX ORDER — ACETAMINOPHEN 325 MG/1
650 TABLET ORAL ONCE
Status: CANCELLED
Start: 2019-06-05 | End: 2019-06-05

## 2019-06-05 RX ADMIN — IMMUNE GLOBULIN (HUMAN) 40 G: 10 INJECTION INTRAVENOUS; SUBCUTANEOUS at 09:17

## 2019-06-12 ENCOUNTER — TRANSCRIBE ORDERS (OUTPATIENT)
Dept: PULMONOLOGY | Facility: HOSPITAL | Age: 64
End: 2019-06-12

## 2019-06-12 DIAGNOSIS — R06.00 DYSPNEA, UNSPECIFIED TYPE: Primary | ICD-10-CM

## 2019-06-18 ENCOUNTER — OFFICE VISIT (OUTPATIENT)
Dept: GASTROENTEROLOGY | Facility: CLINIC | Age: 64
End: 2019-06-18

## 2019-06-18 VITALS
BODY MASS INDEX: 32.25 KG/M2 | HEIGHT: 65 IN | DIASTOLIC BLOOD PRESSURE: 72 MMHG | WEIGHT: 193.56 LBS | HEART RATE: 97 BPM | SYSTOLIC BLOOD PRESSURE: 96 MMHG

## 2019-06-18 DIAGNOSIS — K20.90 ESOPHAGITIS: Primary | ICD-10-CM

## 2019-06-18 DIAGNOSIS — K59.04 CHRONIC IDIOPATHIC CONSTIPATION: ICD-10-CM

## 2019-06-18 PROCEDURE — 99212 OFFICE O/P EST SF 10 MIN: CPT | Performed by: INTERNAL MEDICINE

## 2019-06-18 RX ORDER — LUBIPROSTONE 24 UG/1
24 CAPSULE ORAL 2 TIMES DAILY WITH MEALS
Qty: 60 CAPSULE | Refills: 11 | Status: SHIPPED | OUTPATIENT
Start: 2019-06-18 | End: 2020-11-23

## 2019-06-18 NOTE — PROGRESS NOTES
PATIENT INFORMATION  Dara Medina       - 1955    CHIEF COMPLAINT  Chief Complaint   Patient presents with   • Follow-up     6 week follow up on constipation       HISTORY OF PRESENT ILLNESS  Has increased her Linzess to 290 and was taking Miralax and was getting Gas enough to make her stop taking     Her bowels arent any betterr than 2 BMs a week, has tried MOM and Mag citrate before and with her getting gas on Miralax dntfeellactulose will b tolerated and has gone thru abx for 2 weks recently for sinusitis.            REVIEW OF SYSTEMS  Review of Systems   Gastrointestinal: Positive for abdominal distention, abdominal pain and constipation.        Reflux   Endocrine: Positive for heat intolerance and polydipsia.   Musculoskeletal: Positive for arthralgias, back pain, joint swelling, myalgias, neck pain and neck stiffness.   Allergic/Immunologic: Positive for immunocompromised state.   Neurological: Positive for headaches.   All other systems reviewed and are negative.        ACTIVE PROBLEMS  Patient Active Problem List    Diagnosis   • Chronic idiopathic constipation [K59.04]   • Esophagitis [K20.9]   • Macrocytosis without anemia [D75.89]   • Acquired lymphocytopenia [D72.810]   • Tension headache [G44.209]   • Adverse effect of iron or its compound, sequela [T45.4X5S]   • Vitamin B12 deficiency [E53.8]   • Leukemoid reaction [D72.823]   • Left facial numbness [R20.0]   • Abnormal finding on MRI of brain [R90.89]   • Primary osteoarthritis of right knee [M17.11]   • Mixed hyperlipidemia [E78.2]   • Tear of medial meniscus of right knee, current [S83.241A]   • Prediabetes [R73.03]   • Rheumatoid arthritis (CMS/HCC) [M06.9]   • Pulmonary fibrosis (CMS/HCC) [J84.10]   • Iron deficiency anemia [D50.9]   • Pernicious anemia [D51.0]   • Common variable immunodeficiency (CMS/HCC) [D83.9]   • Hypothyroidism [E03.9]   • Sensory neuropathy [G62.9]   • Osteoporosis [M81.0]   • Vitamin D deficiency [E55.9]          PAST MEDICAL HISTORY  Past Medical History:   Diagnosis Date   • Acute colitis 1/18/2017   • Allergic Codeine, some antibiotics   • Anemia    • Cataract Removed    2012   • Chronic depression    • Colon polyp 1 removed    2016   • Diabetes mellitus (CMS/[facility])    • Fracture of rib    • GERD (gastroesophageal reflux disease)    • H/O Wrist fracture, right    • Headache    • History of bronchitis    • History of pneumonia    • History of transfusion    • Hyperlipidemia    • Hypothyroidism    • Inflammatory bowel disease    • Iron deficiency    • Irritable bowel syndrome    • Migraine    • Obesity    • Osteopenia    • Osteoporosis    • Pneumonia June 2016   • RA (rheumatoid arthritis) (CMS/[facility])    • Sensory neuropathy    • Sepsis, unspecified organism (CMS/[facility]) 1/18/2017   • Vitamin D deficiency          SURGICAL HISTORY  Past Surgical History:   Procedure Laterality Date   • ADENOIDECTOMY     • BREAST AUGMENTATION     • BREAST IMPLANT REMOVAL Bilateral    • CARPAL TUNNEL RELEASE Left 2015   • COLONOSCOPY     • EYE SURGERY Bilateral    • HYSTERECTOMY     • LASIK Bilateral    • ORIF WRIST FRACTURE Right    • PA TOTAL KNEE ARTHROPLASTY Right 12/14/2017    Procedure: TOTAL KNEE ARTHROPLASTY;  Surgeon: Zino Guo MD;  Location: [facility];  Service: Orthopedics   • SINUS SURGERY      x2   • TONSILLECTOMY           FAMILY HISTORY  Family History   Problem Relation Age of Onset   • Hyperlipidemia Mother    • Hypertension Mother    • Migraines Mother    • Colon cancer Father    • Diabetes Father    • Cancer Father    • Hyperlipidemia Brother    • Migraines Brother    • Migraines Sister    • Malig Hyperthermia Neg Hx          SOCIAL HISTORY  Social History     Occupational History   • Occupation: Homemaker     Employer: RETIRED   Tobacco Use   • Smoking status: Former Smoker     Packs/day: 0.50     Years: 5.00     Pack years: 2.50     Types: Cigarettes     Start date: 4/1/1974     Last attempt to quit: 4/1/1978  "    Years since quittin.2   • Smokeless tobacco: Never Used   • Tobacco comment: QUIT AGE 23   Substance and Sexual Activity   • Alcohol use: No     Comment: STOPPED    • Drug use: No   • Sexual activity: Not on file       Debilities/Disabilities Identified: None    Emotional Behavior: Appropriate    CURRENT MEDICATIONS    Current Outpatient Medications:   •  atorvastatin (LIPITOR) 20 MG tablet, TAKE 1 TABLET BY MOUTH DAILY., Disp: 90 tablet, Rfl: 3  •  azaTHIOprine (IMURAN) 50 MG tablet, Take 50 mg by mouth 3 (Three) Times a Day., Disp: , Rfl:   •  BD INTEGRA SYRINGE 25G X 1\" 3 ML misc, USE AS DIRECTED WITH B12, Disp: 12 each, Rfl: 2  •  buPROPion XL (WELLBUTRIN XL) 150 MG 24 hr tablet, Take 150 mg by mouth Every Morning., Disp: , Rfl:   •  Cholecalciferol (VITAMIN D3) 5000 UNITS capsule capsule, Take 5,000 Units by mouth 2 (Two) Times a Day., Disp: , Rfl:   •  cyanocobalamin 1000 MCG/ML injection, INJECT 1,000 MCG AS DIRECTED EVERY 30 (THIRTY) DAYS., Disp: 1 mL, Rfl: 2  •  cyclobenzaprine (FLEXERIL) 10 MG tablet, Take 10 mg by mouth 3 (Three) Times a Day As Needed., Disp: , Rfl:   •  DULoxetine (CYMBALTA) 30 MG capsule, Take 2 capsules by mouth Daily., Disp: 30 capsule, Rfl: 5  •  exenatide er (BYDUREON BCISE) 2 MG/0.85ML auto-injector injection, Inject 0.85 mL under the skin into the appropriate area as directed 1 (One) Time Per Week., Disp: 4 pen, Rfl: 11  •  folic acid (FOLVITE) 1 MG tablet, Take 1 mg by mouth daily., Disp: , Rfl:   •  linaclotide (LINZESS) 290 MCG capsule capsule, Take 1 capsule by mouth Every Morning Before Breakfast., Disp: 30 capsule, Rfl: 11  •  lubiprostone (AMITIZA) 24 MCG capsule, Take 1 capsule by mouth 2 (Two) Times a Day With Meals., Disp: 60 capsule, Rfl: 11  •  omeprazole (priLOSEC) 40 MG capsule, TAKE 1 CAPSULE BY MOUTH DAILY., Disp: 90 capsule, Rfl: 3  •  riTUXimab (RITUXAN) 100 MG/10ML solution injection, Administer RITUXAN 1000mg IV Q 2WK X2 INFUSIONS Q 6 MONTHS, Disp: " ", Rfl:   •  SYNTHROID 112 MCG tablet, TAKE 1 TABLET BY MOUTH DAILY., Disp: 90 tablet, Rfl: 3  •  teriparatide (FORTEO) 600 MCG/2.4ML injection, Inject 0.08 mL under the skin Daily., Disp: 2.4 mL, Rfl: 11  •  topiramate (TOPAMAX) 100 MG tablet, Take 1 tablet by mouth Every Night., Disp: 30 tablet, Rfl: 11    ALLERGIES  Amoxicillin-pot clavulanate and Codeine    VITALS  Vitals:    06/18/19 1119   BP: 96/72   Pulse: 97   Weight: 87.8 kg (193 lb 9 oz)   Height: 165.1 cm (65\")       LAST RESULTS   Office Visit on 05/13/2019   Component Date Value Ref Range Status   • TSH 05/13/2019 0.136* 0.270 - 4.200 mIU/mL Final   • Free T4 05/13/2019 1.15  0.93 - 1.70 ng/dL Final     No results found.    PHYSICAL EXAM  Physical Exam   Constitutional: She is oriented to person, place, and time. She appears well-developed and well-nourished.   Eyes: Conjunctivae and EOM are normal. Pupils are equal, round, and reactive to light. No scleral icterus.   Neck: Normal range of motion. Neck supple. No thyromegaly present.   Cardiovascular: Normal rate, regular rhythm, normal heart sounds and intact distal pulses. Exam reveals no gallop.   No murmur heard.  Pulmonary/Chest: Effort normal and breath sounds normal. She has no wheezes. She has no rales.   Abdominal: Soft. Bowel sounds are normal. She exhibits no shifting dullness, no distension, no fluid wave, no abdominal bruit, no ascites and no mass. There is no hepatosplenomegaly. There is no tenderness. There is no guarding and negative Carvalho's sign. Hernia confirmed negative in the ventral area.   Musculoskeletal: Normal range of motion. She exhibits no edema.   Lymphadenopathy:     She has no cervical adenopathy.   Neurological: She is alert and oriented to person, place, and time.   Skin: Skin is warm and dry. No rash noted. She is not diaphoretic. No erythema.       ASSESSMENT  Diagnoses and all orders for this visit:    Esophagitis    Chronic idiopathic constipation    Other orders  - "     lubiprostone (AMITIZA) 24 MCG capsule; Take 1 capsule by mouth 2 (Two) Times a Day With Meals.          PLAN  Linzess Q Am  And  Amitiza 24 at night  Return in about 4 months (around 10/18/2019).

## 2019-06-26 RX ORDER — BUPROPION HYDROCHLORIDE 150 MG/1
TABLET ORAL
Qty: 30 TABLET | Refills: 3 | Status: SHIPPED | OUTPATIENT
Start: 2019-06-26 | End: 2019-10-21 | Stop reason: SDUPTHER

## 2019-07-08 DIAGNOSIS — E03.9 ACQUIRED HYPOTHYROIDISM: ICD-10-CM

## 2019-07-08 DIAGNOSIS — E78.5 HYPERLIPIDEMIA: ICD-10-CM

## 2019-07-08 RX ORDER — DULOXETIN HYDROCHLORIDE 30 MG/1
60 CAPSULE, DELAYED RELEASE ORAL DAILY
Qty: 90 CAPSULE | Refills: 1 | Status: SHIPPED | OUTPATIENT
Start: 2019-07-08 | End: 2019-09-23

## 2019-07-10 ENCOUNTER — APPOINTMENT (OUTPATIENT)
Dept: ONCOLOGY | Facility: HOSPITAL | Age: 64
End: 2019-07-10

## 2019-07-17 ENCOUNTER — INFUSION (OUTPATIENT)
Dept: ONCOLOGY | Facility: HOSPITAL | Age: 64
End: 2019-07-17

## 2019-07-17 VITALS
SYSTOLIC BLOOD PRESSURE: 102 MMHG | OXYGEN SATURATION: 95 % | DIASTOLIC BLOOD PRESSURE: 66 MMHG | WEIGHT: 191.4 LBS | TEMPERATURE: 98.3 F | HEART RATE: 80 BPM | BODY MASS INDEX: 31.85 KG/M2

## 2019-07-17 DIAGNOSIS — D83.9 COMMON VARIABLE IMMUNODEFICIENCY (HCC): Primary | ICD-10-CM

## 2019-07-17 LAB
IGA1 MFR SER: 109 MG/DL (ref 70–400)
IGG1 SER-MCNC: 823 MG/DL (ref 700–1600)
IGM SERPL-MCNC: 22 MG/DL (ref 40–230)

## 2019-07-17 PROCEDURE — 25010000002 IMMUNE GLOBULIN (HUMAN) 20 GM/200ML SOLUTION: Performed by: ALLERGY & IMMUNOLOGY

## 2019-07-17 PROCEDURE — 96365 THER/PROPH/DIAG IV INF INIT: CPT | Performed by: NURSE PRACTITIONER

## 2019-07-17 PROCEDURE — 96366 THER/PROPH/DIAG IV INF ADDON: CPT | Performed by: NURSE PRACTITIONER

## 2019-07-17 PROCEDURE — 82784 ASSAY IGA/IGD/IGG/IGM EACH: CPT | Performed by: ALLERGY & IMMUNOLOGY

## 2019-07-17 RX ORDER — DIPHENHYDRAMINE HCL 25 MG
25 CAPSULE ORAL ONCE
Status: CANCELLED
Start: 2019-07-21 | End: 2019-07-21

## 2019-07-17 RX ORDER — ACETAMINOPHEN 325 MG/1
650 TABLET ORAL ONCE
Status: CANCELLED
Start: 2019-07-21 | End: 2019-07-21

## 2019-07-17 RX ORDER — ACETAMINOPHEN 500 MG
500 TABLET ORAL EVERY 4 HOURS PRN
Status: CANCELLED
Start: 2019-07-21

## 2019-07-17 RX ADMIN — IMMUNE GLOBULIN (HUMAN) 40 G: 10 INJECTION INTRAVENOUS; SUBCUTANEOUS at 09:57

## 2019-07-24 RX ORDER — CYANOCOBALAMIN 1000 UG/ML
INJECTION, SOLUTION INTRAMUSCULAR; SUBCUTANEOUS
Qty: 1 ML | Refills: 2 | Status: SHIPPED | OUTPATIENT
Start: 2019-07-24 | End: 2019-10-16 | Stop reason: SDUPTHER

## 2019-08-13 NOTE — PROGRESS NOTES
Subjective   Patient ID: Dara Medina is a 64 y.o. female is here today as a self referral for neck and arm pain.  She has had bilateral upper and lower extremity EMG/NCS as ordered by Dr Contreras who see's her for RA.  She has had recent dry needling which helps temporarily. She has not had JULIA    Patient is currently having neck and left arm numbness and left facial numbness.  She denies arm pain.  She does have left arm weakness.   She also had left eye numbness with associated visual changes.  No problems with her balance and gait.    She is getting iron infusions at [facility] for iron deficiency anemia    This very nice lady is a patient of Dr. Yang. She is the mother-in-law of Dr. Quintana. She has a history of rheumatoid arthritis and is being followed by Dr. Contreras. She has also been followed by Dr. 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 It is not even clear if that lesion is responsible for her left facial numbness. She has also had a history of chronic neck pain and more recently some left upper chest wall pain as well as some pain underneath her armpit on the left and some upper thoracic pain. There has recently been some numbness but not pain in her left arm, mostly in the upper arm. She does not describe any significant pain in her hands or pain on the left from her elbow down. She went on to have an EMG and nerve conduction study that was suggestive of some ulnar neuropathy on the left and bilateral carpal tunnel syndrome and even chronic cervical radiculopathy. This was done by Dr. Walker. I discussed these findings with her. They certainly are somewhat perplexing to put all together. I do not see a lot of nerve root compression on the MRI of the neck. There is enough degenerative disease to account for neck pain which she has had for years and is not the main problem. I am not sure I have an explanation either for the left facial numbness or the  left arm numbness. The chest wall pain on the left and the upper thoracic pain on the left radiating underneath the armpit could be related to thoracic disk disease. I think we should check for that. She has no motor deficits and she has no signs of myelopathy. I think it best to focus on what we might have a good explanation for and get a thoracic MRI and have her come back to see me. We might end up doing some therapy to see if this helps some of her symptoms. Clearly, she needs to follow up with Dr. Null to be re-imaged again for the corona radiata lesion.      Neck Pain    The current episode started more than 1 month ago. The problem occurs constantly. The pain is at a severity of 5/10. The pain is moderate. Associated symptoms include numbness and weakness.   Extremity Weakness    The pain is present in the left arm. The problem occurs constantly. Associated symptoms include numbness.       The following portions of the patient's history were reviewed and updated as appropriate: allergies, current medications, past family history, past medical history, past social history, past surgical history and problem list.    Review of Systems   Musculoskeletal: Positive for extremity weakness and neck pain. Negative for gait problem.   Neurological: Positive for weakness and numbness. Negative for dizziness, seizures and facial asymmetry.   All other systems reviewed and are negative.      Objective   Physical Exam   Constitutional: She is oriented to person, place, and time. She appears well-developed and well-nourished.   HENT:   Head: Normocephalic and atraumatic.   Eyes: Conjunctivae and EOM are normal. Pupils are equal, round, and reactive to light.   Fundoscopic exam:       The right eye shows no papilledema. The right eye shows venous pulsations.        The left eye shows no papilledema. The left eye shows venous pulsations.   Neck: Carotid bruit is not present.   Neurological: She is oriented to person,  place, and time. She has a normal Finger-Nose-Finger Test and a normal Heel to Shin Test. Gait normal.   Reflex Scores:       Tricep reflexes are 2+ on the right side and 2+ on the left side.       Bicep reflexes are 2+ on the right side and 2+ on the left side.       Brachioradialis reflexes are 2+ on the right side and 2+ on the left side.       Patellar reflexes are 2+ on the right side and 2+ on the left side.       Achilles reflexes are 2+ on the right side and 2+ on the left side.  Psychiatric: Her speech is normal.     Neurologic Exam     Mental Status   Oriented to person, place, and time.   Registration of memory: Good recent and remote memory.   Attention: normal. Concentration: normal.   Speech: speech is normal   Level of consciousness: alert  Knowledge: consistent with education.     Cranial Nerves     CN II   Visual fields full to confrontation.   Visual acuity: normal    CN III, IV, VI   Pupils are equal, round, and reactive to light.  Extraocular motions are normal.     CN V   Facial sensation intact.   Right corneal reflex: normal  Left corneal reflex: normal    CN VII   Facial expression full, symmetric.   Right facial weakness: none  Left facial weakness: none    CN VIII   Hearing: intact    CN IX, X   Palate: symmetric    CN XI   Right sternocleidomastoid strength: normal  Left sternocleidomastoid strength: normal    CN XII   Tongue: not atrophic  Tongue deviation: none    Motor Exam   Muscle bulk: normal  Right arm tone: normal  Left arm tone: normal  Right leg tone: normal  Left leg tone: normal    Strength   Strength 5/5 except as noted.     Sensory Exam   Light touch normal.     Gait, Coordination, and Reflexes     Gait  Gait: normal    Coordination   Finger to nose coordination: normal  Heel to shin coordination: normal    Reflexes   Right brachioradialis: 2+  Left brachioradialis: 2+  Right biceps: 2+  Left biceps: 2+  Right triceps: 2+  Left triceps: 2+  Right patellar: 2+  Left patellar:  2+  Right achilles: 2+  Left achilles: 2+  Right : 2+  Left : 2+      Assessment/Plan   Independent Review of Radiographic Studies:    I reviewed the cervical MRI done 8/14/2019 that does show disc degeneration at multiple levels.  There is a report that there may be some left foraminal narrowing but frankly I have difficulty seeing that.  I think there is some motion artifact in the study as well.  She has 2 brain MRIs that were reviewed the most recent one done on 10/10/2018 which shows a stable appearance of the T2 flair hyperintensity lesion involving the corona radiata on the right it is nonenhancing.  Agree with the report.      Medical Decision Making:    As per the above discussion, we will get a thoracic MRI looking for upper thoracic left-sided nerve root compression.  Would likely heading toward some kind of physical therapy but I think it would be helpful to see if we can identify any spinal lesion that is associated with nerve root compression.  We will discuss it when she returns.      Dara was seen today for neck pain, numbness, eye problem and extremity weakness.    Diagnoses and all orders for this visit:    Pain in thoracic spine  -     MRI Thoracic Spine Without Contrast; Future    Lt facial numbness    Left arm numbness      Return in about 11 days (around 8/26/2019) for After tests.

## 2019-08-14 ENCOUNTER — APPOINTMENT (OUTPATIENT)
Dept: ONCOLOGY | Facility: HOSPITAL | Age: 64
End: 2019-08-14

## 2019-08-15 ENCOUNTER — OFFICE VISIT (OUTPATIENT)
Dept: NEUROSURGERY | Facility: CLINIC | Age: 64
End: 2019-08-15

## 2019-08-15 VITALS
SYSTOLIC BLOOD PRESSURE: 105 MMHG | HEART RATE: 69 BPM | WEIGHT: 191 LBS | HEIGHT: 65 IN | BODY MASS INDEX: 31.82 KG/M2 | DIASTOLIC BLOOD PRESSURE: 70 MMHG

## 2019-08-15 DIAGNOSIS — R20.0 LT FACIAL NUMBNESS: ICD-10-CM

## 2019-08-15 DIAGNOSIS — R20.0 LEFT ARM NUMBNESS: ICD-10-CM

## 2019-08-15 DIAGNOSIS — M54.6 PAIN IN THORACIC SPINE: Primary | ICD-10-CM

## 2019-08-15 PROCEDURE — 99204 OFFICE O/P NEW MOD 45 MIN: CPT | Performed by: NEUROLOGICAL SURGERY

## 2019-08-26 ENCOUNTER — LAB (OUTPATIENT)
Dept: LAB | Facility: HOSPITAL | Age: 64
End: 2019-08-26

## 2019-08-26 DIAGNOSIS — D50.9 IRON DEFICIENCY ANEMIA, UNSPECIFIED IRON DEFICIENCY ANEMIA TYPE: ICD-10-CM

## 2019-08-26 DIAGNOSIS — D75.89 MACROCYTOSIS WITHOUT ANEMIA: ICD-10-CM

## 2019-08-26 LAB
BASOPHILS # BLD AUTO: 0.04 10*3/MM3 (ref 0–0.2)
BASOPHILS NFR BLD AUTO: 0.4 % (ref 0–1.5)
DEPRECATED RDW RBC AUTO: 54.4 FL (ref 37–54)
EOSINOPHIL # BLD AUTO: 0.04 10*3/MM3 (ref 0–0.4)
EOSINOPHIL NFR BLD AUTO: 0.4 % (ref 0.3–6.2)
ERYTHROCYTE [DISTWIDTH] IN BLOOD BY AUTOMATED COUNT: 13.3 % (ref 12.3–15.4)
FERRITIN SERPL-MCNC: 216 NG/ML (ref 13–150)
HCT VFR BLD AUTO: 45 % (ref 34–46.6)
HGB BLD-MCNC: 14.5 G/DL (ref 12–15.9)
IMM GRANULOCYTES # BLD AUTO: 0.06 10*3/MM3 (ref 0–0.05)
IMM GRANULOCYTES NFR BLD AUTO: 0.6 % (ref 0–0.5)
IRON 24H UR-MRATE: 102 MCG/DL (ref 37–145)
IRON SATN MFR SERPL: 35 % (ref 14–48)
LYMPHOCYTES # BLD AUTO: 0.25 10*3/MM3 (ref 0.7–3.1)
LYMPHOCYTES NFR BLD AUTO: 2.3 % (ref 19.6–45.3)
MCH RBC QN AUTO: 35.5 PG (ref 26.6–33)
MCHC RBC AUTO-ENTMCNC: 32.2 G/DL (ref 31.5–35.7)
MCV RBC AUTO: 110 FL (ref 79–97)
MONOCYTES # BLD AUTO: 0.31 10*3/MM3 (ref 0.1–0.9)
MONOCYTES NFR BLD AUTO: 2.9 % (ref 5–12)
NEUTROPHILS # BLD AUTO: 9.95 10*3/MM3 (ref 1.7–7)
NEUTROPHILS NFR BLD AUTO: 93.4 % (ref 42.7–76)
NRBC BLD AUTO-RTO: 0 /100 WBC (ref 0–0.2)
PLATELET # BLD AUTO: 317 10*3/MM3 (ref 140–450)
PMV BLD AUTO: 9.8 FL (ref 6–12)
RBC # BLD AUTO: 4.09 10*6/MM3 (ref 3.77–5.28)
TIBC SERPL-MCNC: 293 MCG/DL (ref 249–505)
TRANSFERRIN SERPL-MCNC: 209 MG/DL (ref 200–360)
VIT B12 BLD-MCNC: 1220 PG/ML (ref 211–946)
WBC NRBC COR # BLD: 10.65 10*3/MM3 (ref 3.4–10.8)

## 2019-08-26 PROCEDURE — 36415 COLL VENOUS BLD VENIPUNCTURE: CPT

## 2019-08-26 PROCEDURE — 82607 VITAMIN B-12: CPT | Performed by: INTERNAL MEDICINE

## 2019-08-26 PROCEDURE — 85025 COMPLETE CBC W/AUTO DIFF WBC: CPT

## 2019-08-26 PROCEDURE — 84466 ASSAY OF TRANSFERRIN: CPT

## 2019-08-26 PROCEDURE — 83540 ASSAY OF IRON: CPT

## 2019-08-26 PROCEDURE — 82728 ASSAY OF FERRITIN: CPT

## 2019-08-27 RX ORDER — EXENATIDE 2 MG/.85ML
INJECTION, SUSPENSION, EXTENDED RELEASE SUBCUTANEOUS
Qty: 3.4 PEN | Refills: 11 | Status: SHIPPED | OUTPATIENT
Start: 2019-08-27 | End: 2020-05-27

## 2019-08-28 LAB — COPPER SERPL-MCNC: 68 UG/DL (ref 72–166)

## 2019-08-30 PROBLEM — R79.0 LOW SERUM COPPER FOR AGE: Status: ACTIVE | Noted: 2019-08-30

## 2019-09-01 ENCOUNTER — HOSPITAL ENCOUNTER (OUTPATIENT)
Dept: MRI IMAGING | Facility: HOSPITAL | Age: 64
Discharge: HOME | End: 2019-09-01
Admitting: NEUROLOGICAL SURGERY

## 2019-09-01 DIAGNOSIS — M54.6 PAIN IN THORACIC SPINE: ICD-10-CM

## 2019-09-01 PROCEDURE — 72146 MRI CHEST SPINE W/O DYE: CPT

## 2019-09-03 ENCOUNTER — APPOINTMENT (OUTPATIENT)
Dept: ONCOLOGY | Facility: CLINIC | Age: 64
End: 2019-09-03

## 2019-09-03 ENCOUNTER — APPOINTMENT (OUTPATIENT)
Dept: LAB | Facility: HOSPITAL | Age: 64
End: 2019-09-03

## 2019-09-03 ENCOUNTER — TELEPHONE (OUTPATIENT)
Dept: ONCOLOGY | Facility: CLINIC | Age: 64
End: 2019-09-03

## 2019-09-03 NOTE — TELEPHONE ENCOUNTER
----- Message from Ivory Noel MD PhD sent at 8/30/2019  6:09 PM EDT -----  Regarding: add appt  Please call patient, to see me Tuesday afternoon, or early Wednesday morning.  No labs needed.  She had labs this past week with abnormal copper level.    Thank you very much!     Dr. MUÑOZ

## 2019-09-03 NOTE — PROGRESS NOTES
REASON FOR FOLLOW UP:     1.  Iron deficiency anemia, recurrent, not able to tolerate oral iron treatment due to severe constipation.  Patient was given PRBC transfusion and IV iron therapy in 2016.    2.  Patient has a recurrent severe iron deficiency in late August 2018, and was given Injectafer 2 doses in September 2018.  Had great response with normalization of hemoglobin and iron studies.  3.  Worsening macrocytosis, most recent laboratory study on 8/26/2019 reported copper deficiency.      HISTORY OF PRESENT ILLNESS:  The patient is a 64 y.o. year old female who is here for 6-month reevaluation of her iron deficiency anemia and macrocytosis.  The patient was previously treated with Injectafer for 2 doses in early September 2018.    Recently laboratory study on 8/26/2019 reported normal Hb 14.5 however worsening macrocytosis .0.  She has normal WBC 10,650, but elevated ANC 9950 and decreased lymphocytes 250.  She has normal platelets 317,000.  Her iron study was normal reporting ferritin 216, iron saturation 35%, slightly supratherapeutic B12 level at 1220 pg/mL, however his copper deficiency 68 mcg/dL.      The patient reports feeling well overall today. She states she is not currently taking oral Vitamin B12 but does continue on monthly Vitamin B12 injections. She feels more energized right after receiving the B-12 injections but notes this is not long lasting. She has a good performance status.      Past Medical History:   Diagnosis Date   • Acute colitis 1/18/2017   • Allergic Codeine, some antibiotics   • Anemia    • Cataract Removed    2012   • Chronic depression    • Colon polyp 1 removed    2016   • Diabetes mellitus (CMS/HCC)    • Fracture of rib    • GERD (gastroesophageal reflux disease)    • H/O Wrist fracture, right    • Headache    • History of bronchitis    • History of pneumonia    • History of transfusion    • Hyperlipidemia    • Hypothyroidism    • Inflammatory bowel disease    •  Iron deficiency    • Irritable bowel syndrome    • Migraine    • Obesity    • Osteopenia    • Osteoporosis    • Pneumonia June 2016   • RA (rheumatoid arthritis) (CMS/HCC)    • Sensory neuropathy    • Sepsis, unspecified organism (CMS/HCC) 1/18/2017   • Vitamin D deficiency      Past Surgical History:   Procedure Laterality Date   • ADENOIDECTOMY     • BREAST AUGMENTATION     • BREAST IMPLANT REMOVAL Bilateral    • CARPAL TUNNEL RELEASE Left 2015   • COLONOSCOPY     • EYE SURGERY Bilateral    • HYSTERECTOMY     • LASIK Bilateral    • ORIF WRIST FRACTURE Right    • AK TOTAL KNEE ARTHROPLASTY Right 12/14/2017    Procedure: TOTAL KNEE ARTHROPLASTY;  Surgeon: Zion Guo MD;  Location: [facility];  Service: Orthopedics   • SINUS SURGERY      x2   • TONSILLECTOMY         HEMATOLOGIC/ONCOLOGIC HISTORY:  The patient is a 63 y.o. year old femalewho is here for evaluation and management of her anemia. This patient has chronic disease including rheumatoid arthritis. She was on methotrexate for about 11 years and currently on Imuran and also history for pernicious anemia/vitamin B12 deficiency undergoing monthly intramuscular injection, history of hyperlipidemia, hypothyroidism, gastroesophageal reflux disease and rheumatoid arthritis. The patient reports she was not able to tolerate oral iron treatment because of significant constipation. The last time she was taking oral iron was about 2-3 years ago at which time she had significant anemia and was given 2 units PRBC transfusion. She was also given intravenous iron therapy at that time. Patient reports she had GI evaluation by Dr. Celeste with both EGD and a colonoscopic examination a couple of years ago. I searched electronic medical records at [facility] and according to Dr. Celeste’s clinical note in 08/2016 and 11/2016, she had both EGD and colonoscopic examination supposedly in August; however, I could not find the procedure note itself.  Nevertheless, patient reports she was told not having malignancy. I did find the pathology evaluation on 08/24/2016, which reported mild to focally moderate chronic active gastritis with intestinal metaplasia; negative for H. pylori. The small intestinal biopsy was benign. No signs for abnormal histology. The distal esophagus shows moderate chronic active inflammation and no intestinal metaplasia or dysplasia. The descending colon polyp was tubular adenoma with low-grade dysplasia. Patient reports no melena, no hematochezia. She does have pica for ice chips.     Patient reports she was diagnosed of rheumatoid arthritis in 2003. She was on methotrexate from 2003 to 2014. Subsequently treatment changed to Imuran. Patient reports she recently has been on large dose prednisone 40 mg daily for about 1 year and is in the process of tapering down of her prednisone. She also previously was given Rituxan treatment about 10 years ago. Most recently about 4 months ago she was restarted back on Rituxan.     Patient also reports about 2 years ago, she had recurrent pneumonia and hospitalization and since that time she has been given IVIG regularly for the past 2 years and since that time she has been doing very well. No recurrent pneumonia.     Patient complains of significant fatigue, pica for ice chips. She also complains of exertional dyspnea. No cough or hemoptysis. She has also soreness on her tongue. She has vague intermittent abdominal pain and constipation. Denies melena or hematochezia. No nausea. No vomiting. She has some weight gain secondary to long-term oral steroid use. She also reports heat intolerance. Patient continues to have joint swelling and pain from her rheumatoid arthritis. She also has chronic low back pain. She reports intermittent headaches and also numbness involving her extremities mostly on hands but denies fainting or syncope. She has no easy bleeding or bruising.    I reviewed her laboratory  results within the [facility] system. She had most recent iron study on 04/27/2018, which reported iron deficiency with ferritin 8.28 ng/mL, serum free iron 32 mcg/dL with no iron saturation. She had supratherapeutic folic acid level more than 20 ng/mL. However, had low normal vitamin B12 level 346 pg/mL. She had mild anemia; hemoglobin 10.7, MCV 92.2, MCHC 29.3. Her platelets were 506,000 and WBC 10,990 including neutrophils 29327, lymphocytes 780 and monocytes 410. Her chemistry lab updated earlier on 04/23/2018 reported creatinine 1.02. Normal electrolytes. Glucose 190. Marginally elevated alkaline phosphatase 118 and otherwise normal liver function panel. Total serum protein 7.2 and albumin 3.9. Had a normal TSH of 1.57.     On 01/22/2017, she had serum free iron 31, TIBC 463 and iron saturation 7% without ferritin level. Folic acid was more than 20 ng/mL and vitamin B12 was 1022 pg/mL. She had hemoglobin 11.4, MCV 85.1, MCHC 29.8. Her platelets were 487,000 and WBC was 23,700. Apparently, patient was hospitalized at that time at [facility] for about a week because of sepsis and acute colitis. Laboratory study on 06/06/2016 reported serum ferritin 9.0 ng/mL, free iron 22, TIBC 416 and iron saturation 5%. Her vitamin B12 level was 287 pg/mL and RBC folic acid was 1575 ng/mL. Her hemoglobin was 8.6, MCV 76.3, MCHC 29.4. Platelets were 617,000 and WBC 12,000. This patient had worsening hemoglobin on 06/07/2016 with hemoglobin 7.6 and she was given 2 units PRBC transfusion. Post transfusion hemoglobin was 10.4 on 06/08/2016. It was at that time the patient had hospitalization for pneumonia.    This patient has chronic iron deficiency for the past several years. She was not able to tolerate oral iron treatment because of severe constipation. She previously in 2016 received 2 units PRBC transfusion and intravenous iron therapy. She had workup with both EGD and colonoscopy in 08/2016  "shortly after her discharge from hospital at that time. Procedure report was not able to be found, however, I reviewed pathology report from that procedure. There was chronic gastritis and esophagitis but no report of ulceration. She also had benign colon polyp.     Laboratory study on 8/29/2018 reported anemia Hb 10.3, platelets 412,000 and WBC 11,400 including in C 9800.  Iron study reported a ferritin less than 5 ng/mL, free iron 32 TIBC 454 and iron saturation 7%.    This patient is symptomatic with profound fatigue and pica for ice chips. I discussed with the patient, recommend stat iron study and arrange for her intravenous iron therapy with Injectafer 750 mg once a week for 2 doses in September 2018.      Patient had resolution of pica for ice chips after IV iron therapy.  Repeated laboratory study on 9/27/2018 reported normalized iron saturation 29%, and supratherapeutic ferritin 553 ng/mL.  She had a normalization of hemoglobin 12.4 and platelets 307,000.    MEDICATIONS    Current Outpatient Medications:   •  atorvastatin (LIPITOR) 20 MG tablet, TAKE 1 TABLET BY MOUTH DAILY., Disp: 90 tablet, Rfl: 3  •  azaTHIOprine (IMURAN) 50 MG tablet, Take 50 mg by mouth 3 (Three) Times a Day., Disp: , Rfl:   •  BD INTEGRA SYRINGE 25G X 1\" 3 ML misc, USE AS DIRECTED WITH B12, Disp: 12 each, Rfl: 2  •  buPROPion XL (WELLBUTRIN XL) 150 MG 24 hr tablet, Take 150 mg by mouth Every Morning., Disp: , Rfl:   •  buPROPion XL (WELLBUTRIN XL) 150 MG 24 hr tablet, TAKE 1 TABLET BY MOUTH EVERY MORNING., Disp: 30 tablet, Rfl: 3  •  BYDUREON BCISE 2 MG/0.85ML auto-injector injection, INJECT 0.85 ML UNDER THE SKIN INTO THE APPROPRIATE AREA 1 TIME PER WEEK, Disp: 3.4 pen, Rfl: 11  •  Cholecalciferol (VITAMIN D3) 5000 UNITS capsule capsule, Take 5,000 Units by mouth 2 (Two) Times a Day., Disp: , Rfl:   •  corticotropin (ACTHAR) 80 UNIT/ML injectable gel, Inject  into the appropriate muscle as directed by prescriber., Disp: , Rfl:   •  " cyanocobalamin 1000 MCG/ML injection, INJECT 1,000 MCG AS DIRECTED EVERY 30 (THIRTY) DAYS., Disp: 1 mL, Rfl: 2  •  cyclobenzaprine (FLEXERIL) 10 MG tablet, Take 10 mg by mouth 3 (Three) Times a Day As Needed., Disp: , Rfl:   •  DULoxetine (CYMBALTA) 30 MG capsule, Take 2 capsules by mouth Daily., Disp: 90 capsule, Rfl: 1  •  DULoxetine (CYMBALTA) 60 MG capsule, Take 60 mg by mouth Daily., Disp: , Rfl: 2  •  folic acid (FOLVITE) 1 MG tablet, Take 1 mg by mouth daily., Disp: , Rfl:   •  linaclotide (LINZESS) 290 MCG capsule capsule, Take 1 capsule by mouth Every Morning Before Breakfast., Disp: 30 capsule, Rfl: 11  •  lubiprostone (AMITIZA) 24 MCG capsule, Take 1 capsule by mouth 2 (Two) Times a Day With Meals., Disp: 60 capsule, Rfl: 11  •  omeprazole (priLOSEC) 40 MG capsule, TAKE 1 CAPSULE BY MOUTH DAILY., Disp: 90 capsule, Rfl: 3  •  riTUXimab (RITUXAN) 100 MG/10ML solution injection, Administer RITUXAN 1000mg IV Q 2WK X2 INFUSIONS Q 6 MONTHS, Disp: , Rfl:   •  SYNTHROID 112 MCG tablet, TAKE 1 TABLET BY MOUTH DAILY., Disp: 90 tablet, Rfl: 3  •  teriparatide (FORTEO) 600 MCG/2.4ML injection, Inject 0.08 mL under the skin Daily., Disp: 2.4 mL, Rfl: 11  •  topiramate (TOPAMAX) 100 MG tablet, Take 1 tablet by mouth Every Night., Disp: 30 tablet, Rfl: 11  •  Copper Gluconate 2 MG capsule, Take 2 mg by mouth Daily for 90 days., Disp: 90 capsule, Rfl: 3    ALLERGIES:     Allergies   Allergen Reactions   • Amoxicillin-Pot Clavulanate Diarrhea     itching   • Codeine Nausea And Vomiting       SOCIAL HISTORY:       Social History     Social History   • Marital status:      Spouse name: N/A   • Number of children: 2   • Years of education: 2 years college education.       Occupational History   • Homemaker       Social History Main Topics   • Smoking status: Former Smoker     Packs/day: 0.50     Years: 6 years      Types: Cigarettes     Start date: 4/1/1974     Quit date: 4/1/1978   • Smokeless tobacco: Never Used     "  Comment: QUIT AGE 23   • Alcohol use No      Comment: STOPPED    • Drug use: No   • Sexual activity: Not on file         FAMILY HISTORY:   Father diagnosed of colon cancer at age 64,  of colon cancer age 65.  Mother is in excellent health condition in her early 90s.  Patient has a brother and a sister both living excellent condition.  Patient has 2 adult children both in excellent health condition.    Family History   Problem Relation Age of Onset   • Hyperlipidemia Mother    • Hypertension Mother    • Migraines Mother    • Colon cancer Father    • Diabetes Father    • Cancer Father    • Hyperlipidemia Brother    • Migraines Brother    • Migraines Sister    • Malig Hyperthermia Neg Hx        REVIEW OF SYSTEMS:  Review of Systems   Constitutional: Positive for unexpected weight change (Weight gains). Negative for appetite change, fatigue and fever.   HENT: Negative for mouth sores, rhinorrhea and sore throat.    Eyes: Negative for pain and visual disturbance.   Respiratory: Negative for cough and shortness of breath.    Cardiovascular: Negative for chest pain, palpitations and leg swelling.   Gastrointestinal: Positive for constipation. Negative for abdominal pain, blood in stool and nausea.   Endocrine: Positive for heat intolerance. Negative for polyphagia.   Genitourinary: Negative for dysuria and hematuria.   Musculoskeletal: Positive for arthralgias.   Skin: Negative for rash.   Allergic/Immunologic: Positive for immunocompromised state (On Rituxan treatment for her rheumatoid arthritis).   Hematological: Negative for adenopathy. Does not bruise/bleed easily.   Psychiatric/Behavioral: Negative for agitation.          Vitals:    19 0743   BP: 123/80   Pulse: 99   Resp: 16   Temp: 98.9 °F (37.2 °C)   TempSrc: Oral   SpO2: 97%   Weight: 86.2 kg (190 lb)  Comment: refused.  patient stated weight   Height: 164.5 cm (64.76\")   PainSc: 0-No pain     Current Status 2019   ECOG score 1      PHYSICAL " EXAM:      GENERAL:  Well-developed, well-nourished  female in no acute distress.   SKIN:  Warm, dry without rashes, purpura or petechiae.  EYES:  Pupils equal, round and reactive to light.  EOMs intact.  Conjunctivae normal.  EARS:  Hearing intact.  NOSE:  No nasal discharge.  MOUTH:  Tongue is well-papillated; no stomatitis or ulcers.  Lips normal.  THROAT:  Oropharynx without lesions or exudates.  NECK:  Supple with good range of motion; no thyromegaly or masses.  LYMPHATICS:  No cervical, supraclavicular, axillary or inguinal adenopathy.  CHEST:  Lungs clear to auscultation. Good airflow.  CARDIAC:  Regular rate and rhythm without murmurs, rubs or gallops. Normal S1,S2.  ABDOMEN:  Soft, nontender with no hepatosplenomegaly or masses. Bowel sounds normal.  EXTREMITIES:  No clubbing, cyanosis or edema.  NEUROLOGICAL:  Cranial Nerves II-XII grossly intact.  No focal neurological deficits.  PSYCHIATRIC:  Normal affect and mood.      RECENT LABS:  Lab Results   Component Value Date    WBC 10.65 08/26/2019    HGB 14.5 08/26/2019    HCT 45.0 08/26/2019    .0 (H) 08/26/2019     08/26/2019     Lab Results   Component Value Date    NEUTROABS 9.95 (H) 08/26/2019     Lab Results   Component Value Date    KAKALERD84 1,220 (H) 08/26/2019     Lab Results   Component Value Date    FOLATE >20.00 04/27/2018     Lab Results   Component Value Date    IRON 102 08/26/2019    TIBC 293 08/26/2019    FERRITIN 216.00 (H) 08/26/2019     Assessment/Plan      1. Recurrent iron deficiency for the past several years. She was not able to tolerate oral iron treatment because of severe constipation. She previously in 2016 received 2 units PRBC transfusion and intravenous iron therapy. She had workup with both EGD and colonoscopy in 08/2016 with pathology evaluation reported chronic gastritis and esophagitis but no report of ulceration.     · This patient is symptomatic with profound fatigue and pica for ice chips and  anemia in August 2018.  We treated her with 2 doses of Injectafer in September 2018.   · This patient had resolution of pica for ice chips and improved energy level, normalization of hemoglobin after IV iron therapy.  Post treatment ferritin was 553 on 9/26/2018.  3/8/2019 laboratory study reported worsened but is still normal ferritin at 240 mg/mL, and good iron saturation 33%.   · On 08/26/2019, laboratory study reported ferritin at 216 and iron saturation of 35%.  · I discussed with the patient, we will continue to monitor her iron study every 6 months.    2. Pernicious anemia/vitamin B12 deficiency. This patient has monthly vitamin B12 injection.   I advised patient to start taking oral vitamin B12 at 1000 mcg daily.     · On 3/8/2019 her vitamin B12 level is 701 pg/mL, not as high as expected.  · On 08/26/2019, Vitamin B12 reported supratheraputicat 1,220, patient is to continue on Vitamin B12 injections. Today, patient informed me she is not taking oral vitamin B12.     3.  Macrocytosis despite normal vitamin B12 level.  Her macrocytosis is newly developed this time.  Patient reports no history of hepatitis or alcohol use.  The etiology of her macrocytosis is not clear.      · She previously had supratherapeutic folic acid level in April 2018.    · Macrocytosis needs to be monitored.  Could this caused by medication Imuran use for her rheumatoid arthritis?    · I discussed with patient 3/8/2019.  Her previous CT scan examination for abdomen and pelvis on 1/18/2017 reported a mild fatty liver.  Not sure whether this is the cause of her macrocytosis.    · Most recent laboratory study on 8/26/2019 reported serum copper level was measured low at 68.   · Today, I explained to the patient that she will need to take copper supplementation once daily.  Patient is agreeable.  · We will repeat serum copper level every 6 months to monitor.     4.  Lymphocytopenia.  This is secondary to Rituxan treatment in October and  November 2018 for rheumatoid arthritis. I reviewed medical records from her dermatologist office.     PLAN:   1. Start oral copper gluconate 2 mg once daily.  I E scribed to her pharmacy.  2. Continue monthly injection of B12 at Dr. Yang's office.      3. Patient to follow up with me in 6 months with labs. We will repeat CBC, CMP, ferritin, iron profile, folate, serum copper level and vitamin B12 level one week prior    I, Ros Cuba, acted as scribe for Ivory Noel MD PhD  in documenting the service or procedure. To the best of my knowledge, I recorded what was dictated by Ivory Noel MD PhD      I reviewed the note scribed by Ms. Bishopshadia Cuba and made appropriate corrections.       Ivory Noel MD PhD  09/04/2019    CC:  Randi Yang MD

## 2019-09-04 ENCOUNTER — OFFICE VISIT (OUTPATIENT)
Dept: NEUROSURGERY | Facility: CLINIC | Age: 64
End: 2019-09-04

## 2019-09-04 ENCOUNTER — APPOINTMENT (OUTPATIENT)
Dept: LAB | Facility: HOSPITAL | Age: 64
End: 2019-09-04

## 2019-09-04 ENCOUNTER — OFFICE VISIT (OUTPATIENT)
Dept: ONCOLOGY | Facility: CLINIC | Age: 64
End: 2019-09-04

## 2019-09-04 VITALS
HEIGHT: 65 IN | DIASTOLIC BLOOD PRESSURE: 75 MMHG | HEART RATE: 82 BPM | BODY MASS INDEX: 31.85 KG/M2 | SYSTOLIC BLOOD PRESSURE: 118 MMHG

## 2019-09-04 VITALS
RESPIRATION RATE: 16 BRPM | BODY MASS INDEX: 31.65 KG/M2 | HEIGHT: 65 IN | SYSTOLIC BLOOD PRESSURE: 123 MMHG | OXYGEN SATURATION: 97 % | TEMPERATURE: 98.9 F | WEIGHT: 190 LBS | DIASTOLIC BLOOD PRESSURE: 80 MMHG | HEART RATE: 99 BPM

## 2019-09-04 DIAGNOSIS — R79.0 LOW SERUM COPPER FOR AGE: ICD-10-CM

## 2019-09-04 DIAGNOSIS — D50.9 IRON DEFICIENCY ANEMIA, UNSPECIFIED IRON DEFICIENCY ANEMIA TYPE: Primary | ICD-10-CM

## 2019-09-04 DIAGNOSIS — D51.0 PERNICIOUS ANEMIA: ICD-10-CM

## 2019-09-04 DIAGNOSIS — M54.6 PAIN IN THORACIC SPINE: Primary | ICD-10-CM

## 2019-09-04 DIAGNOSIS — R20.0 LEFT ARM NUMBNESS: ICD-10-CM

## 2019-09-04 DIAGNOSIS — D75.89 MACROCYTOSIS WITHOUT ANEMIA: ICD-10-CM

## 2019-09-04 DIAGNOSIS — R20.0 LT FACIAL NUMBNESS: ICD-10-CM

## 2019-09-04 PROBLEM — E61.0 CHRONIC COPPER DEFICIENCY: Status: ACTIVE | Noted: 2019-09-04

## 2019-09-04 PROCEDURE — G0463 HOSPITAL OUTPT CLINIC VISIT: HCPCS | Performed by: INTERNAL MEDICINE

## 2019-09-04 PROCEDURE — 99214 OFFICE O/P EST MOD 30 MIN: CPT | Performed by: INTERNAL MEDICINE

## 2019-09-04 PROCEDURE — 99213 OFFICE O/P EST LOW 20 MIN: CPT | Performed by: NEUROLOGICAL SURGERY

## 2019-09-04 RX ORDER — DULOXETIN HYDROCHLORIDE 60 MG/1
60 CAPSULE, DELAYED RELEASE ORAL DAILY
Refills: 2 | COMMUNITY
Start: 2019-08-22 | End: 2020-05-27 | Stop reason: SDUPTHER

## 2019-09-11 ENCOUNTER — APPOINTMENT (OUTPATIENT)
Dept: ONCOLOGY | Facility: HOSPITAL | Age: 64
End: 2019-09-11

## 2019-09-12 ENCOUNTER — HOSPITAL ENCOUNTER (OUTPATIENT)
Dept: CARDIOLOGY | Facility: HOSPITAL | Age: 64
Discharge: HOME | End: 2019-09-12
Admitting: INTERNAL MEDICINE

## 2019-09-12 VITALS
DIASTOLIC BLOOD PRESSURE: 72 MMHG | BODY MASS INDEX: 31.65 KG/M2 | HEART RATE: 86 BPM | OXYGEN SATURATION: 100 % | WEIGHT: 190 LBS | SYSTOLIC BLOOD PRESSURE: 132 MMHG | HEIGHT: 65 IN

## 2019-09-12 DIAGNOSIS — R06.00 DYSPNEA, UNSPECIFIED TYPE: ICD-10-CM

## 2019-09-12 LAB
AORTIC DIMENSIONLESS INDEX: 0.9 (DI)
ASCENDING AORTA: 2.7 CM
BH CV ECHO MEAS - ACS: 1.9 CM
BH CV ECHO MEAS - AO MAX PG (FULL): 1.8 MMHG
BH CV ECHO MEAS - AO MAX PG: 7.8 MMHG
BH CV ECHO MEAS - AO MEAN PG (FULL): 1 MMHG
BH CV ECHO MEAS - AO MEAN PG: 4 MMHG
BH CV ECHO MEAS - AO ROOT AREA (BSA CORRECTED): 1.3
BH CV ECHO MEAS - AO ROOT AREA: 5.3 CM^2
BH CV ECHO MEAS - AO ROOT DIAM: 2.6 CM
BH CV ECHO MEAS - AO V2 MAX: 140 CM/SEC
BH CV ECHO MEAS - AO V2 MEAN: 93.6 CM/SEC
BH CV ECHO MEAS - AO V2 VTI: 28.8 CM
BH CV ECHO MEAS - AVA(I,A): 2.4 CM^2
BH CV ECHO MEAS - AVA(I,D): 2.4 CM^2
BH CV ECHO MEAS - AVA(V,A): 2.5 CM^2
BH CV ECHO MEAS - AVA(V,D): 2.5 CM^2
BH CV ECHO MEAS - BSA(HAYCOCK): 2 M^2
BH CV ECHO MEAS - BSA: 1.9 M^2
BH CV ECHO MEAS - BZI_BMI: 31.6 KILOGRAMS/M^2
BH CV ECHO MEAS - BZI_METRIC_HEIGHT: 165.1 CM
BH CV ECHO MEAS - BZI_METRIC_WEIGHT: 86.2 KG
BH CV ECHO MEAS - EDV(MOD-SP2): 58 ML
BH CV ECHO MEAS - EDV(MOD-SP4): 46 ML
BH CV ECHO MEAS - EDV(TEICH): 67.5 ML
BH CV ECHO MEAS - EF(CUBED): 79.3 %
BH CV ECHO MEAS - EF(MOD-BP): 62 %
BH CV ECHO MEAS - EF(MOD-SP2): 58.6 %
BH CV ECHO MEAS - EF(MOD-SP4): 63 %
BH CV ECHO MEAS - EF(TEICH): 72.3 %
BH CV ECHO MEAS - ESV(MOD-SP2): 24 ML
BH CV ECHO MEAS - ESV(MOD-SP4): 17 ML
BH CV ECHO MEAS - ESV(TEICH): 18.7 ML
BH CV ECHO MEAS - FS: 40.9 %
BH CV ECHO MEAS - IVS/LVPW: 1.2
BH CV ECHO MEAS - IVSD: 0.93 CM
BH CV ECHO MEAS - LAT PEAK E' VEL: 10 CM/SEC
BH CV ECHO MEAS - LV DIASTOLIC VOL/BSA (35-75): 23.8 ML/M^2
BH CV ECHO MEAS - LV MASS(C)D: 100.4 GRAMS
BH CV ECHO MEAS - LV MASS(C)DI: 51.9 GRAMS/M^2
BH CV ECHO MEAS - LV MAX PG: 6.1 MMHG
BH CV ECHO MEAS - LV MEAN PG: 3 MMHG
BH CV ECHO MEAS - LV SYSTOLIC VOL/BSA (12-30): 8.8 ML/M^2
BH CV ECHO MEAS - LV V1 MAX: 123 CM/SEC
BH CV ECHO MEAS - LV V1 MEAN: 76.6 CM/SEC
BH CV ECHO MEAS - LV V1 VTI: 24.8 CM
BH CV ECHO MEAS - LVIDD: 3.9 CM
BH CV ECHO MEAS - LVIDS: 2.3 CM
BH CV ECHO MEAS - LVLD AP2: 7.2 CM
BH CV ECHO MEAS - LVLD AP4: 6.8 CM
BH CV ECHO MEAS - LVLS AP2: 5.9 CM
BH CV ECHO MEAS - LVLS AP4: 6 CM
BH CV ECHO MEAS - LVOT AREA (M): 2.8 CM^2
BH CV ECHO MEAS - LVOT AREA: 2.8 CM^2
BH CV ECHO MEAS - LVOT DIAM: 1.9 CM
BH CV ECHO MEAS - LVPWD: 0.79 CM
BH CV ECHO MEAS - MED PEAK E' VEL: 6 CM/SEC
BH CV ECHO MEAS - MR MAX PG: 61.8 MMHG
BH CV ECHO MEAS - MR MAX VEL: 393 CM/SEC
BH CV ECHO MEAS - MV A DUR: 0.14 SEC
BH CV ECHO MEAS - MV A MAX VEL: 93.6 CM/SEC
BH CV ECHO MEAS - MV DEC SLOPE: 549 CM/SEC^2
BH CV ECHO MEAS - MV DEC TIME: 0.2 SEC
BH CV ECHO MEAS - MV E MAX VEL: 110 CM/SEC
BH CV ECHO MEAS - MV E/A: 1.2
BH CV ECHO MEAS - MV MAX PG: 5.2 MMHG
BH CV ECHO MEAS - MV MEAN PG: 2 MMHG
BH CV ECHO MEAS - MV P1/2T MAX VEL: 110 CM/SEC
BH CV ECHO MEAS - MV P1/2T: 58.7 MSEC
BH CV ECHO MEAS - MV V2 MAX: 114 CM/SEC
BH CV ECHO MEAS - MV V2 MEAN: 70.9 CM/SEC
BH CV ECHO MEAS - MV V2 VTI: 27.9 CM
BH CV ECHO MEAS - MVA P1/2T LCG: 2 CM^2
BH CV ECHO MEAS - MVA(P1/2T): 3.7 CM^2
BH CV ECHO MEAS - MVA(VTI): 2.5 CM^2
BH CV ECHO MEAS - PA ACC TIME: 0.11 SEC
BH CV ECHO MEAS - PA MAX PG (FULL): 1.3 MMHG
BH CV ECHO MEAS - PA MAX PG: 4.5 MMHG
BH CV ECHO MEAS - PA PR(ACCEL): 31.3 MMHG
BH CV ECHO MEAS - PA V2 MAX: 106 CM/SEC
BH CV ECHO MEAS - PULM A REVS DUR: 0.11 SEC
BH CV ECHO MEAS - PULM A REVS VEL: 34.6 CM/SEC
BH CV ECHO MEAS - PULM DIAS VEL: 41.5 CM/SEC
BH CV ECHO MEAS - PULM S/D: 1.5
BH CV ECHO MEAS - PULM SYS VEL: 61.3 CM/SEC
BH CV ECHO MEAS - RAP SYSTOLE: 3 MMHG
BH CV ECHO MEAS - RV MAX PG: 3.2 MMHG
BH CV ECHO MEAS - RV MEAN PG: 2 MMHG
BH CV ECHO MEAS - RV V1 MAX: 89.5 CM/SEC
BH CV ECHO MEAS - RV V1 MEAN: 59.4 CM/SEC
BH CV ECHO MEAS - RV V1 VTI: 18.8 CM
BH CV ECHO MEAS - RVSP: 30.2 MMHG
BH CV ECHO MEAS - SI(AO): 79 ML/M^2
BH CV ECHO MEAS - SI(CUBED): 25.1 ML/M^2
BH CV ECHO MEAS - SI(LVOT): 36.3 ML/M^2
BH CV ECHO MEAS - SI(MOD-SP2): 17.6 ML/M^2
BH CV ECHO MEAS - SI(MOD-SP4): 15 ML/M^2
BH CV ECHO MEAS - SI(TEICH): 25.2 ML/M^2
BH CV ECHO MEAS - SV(AO): 152.9 ML
BH CV ECHO MEAS - SV(CUBED): 48.5 ML
BH CV ECHO MEAS - SV(LVOT): 70.3 ML
BH CV ECHO MEAS - SV(MOD-SP2): 34 ML
BH CV ECHO MEAS - SV(MOD-SP4): 29 ML
BH CV ECHO MEAS - SV(TEICH): 48.8 ML
BH CV ECHO MEAS - TAPSE (>1.6): 2.1 CM2
BH CV ECHO MEAS - TR MAX VEL: 261 CM/SEC
BH CV ECHO MEASUREMENTS AVERAGE E/E' RATIO: 13.75
BH CV XLRA - RV BASE: 2.9 CM
BH CV XLRA - TDI S': 12 CM/SEC
LEFT ATRIUM VOLUME INDEX: 21 ML/M2
SINUS: 2.5 CM
STJ: 2.5 CM

## 2019-09-12 PROCEDURE — 93306 TTE W/DOPPLER COMPLETE: CPT

## 2019-09-12 PROCEDURE — 93306 TTE W/DOPPLER COMPLETE: CPT | Performed by: INTERNAL MEDICINE

## 2019-09-13 DIAGNOSIS — G89.29 CHRONIC INTRACTABLE HEADACHE, UNSPECIFIED HEADACHE TYPE: Primary | ICD-10-CM

## 2019-09-13 DIAGNOSIS — R51.9 CHRONIC INTRACTABLE HEADACHE, UNSPECIFIED HEADACHE TYPE: Primary | ICD-10-CM

## 2019-09-23 ENCOUNTER — LAB (OUTPATIENT)
Dept: LAB | Facility: HOSPITAL | Age: 64
End: 2019-09-23

## 2019-09-23 ENCOUNTER — OFFICE VISIT (OUTPATIENT)
Dept: NEUROLOGY | Facility: CLINIC | Age: 64
End: 2019-09-23

## 2019-09-23 VITALS
HEART RATE: 80 BPM | DIASTOLIC BLOOD PRESSURE: 72 MMHG | OXYGEN SATURATION: 99 % | HEIGHT: 65 IN | BODY MASS INDEX: 31.99 KG/M2 | WEIGHT: 192 LBS | SYSTOLIC BLOOD PRESSURE: 126 MMHG

## 2019-09-23 DIAGNOSIS — G62.9 POLYNEUROPATHY: ICD-10-CM

## 2019-09-23 DIAGNOSIS — G62.9 POLYNEUROPATHY: Primary | ICD-10-CM

## 2019-09-23 DIAGNOSIS — R90.89 ABNORMAL FINDING ON MRI OF BRAIN: ICD-10-CM

## 2019-09-23 DIAGNOSIS — R20.0 LT FACIAL NUMBNESS: ICD-10-CM

## 2019-09-23 DIAGNOSIS — G44.209 TENSION HEADACHE: ICD-10-CM

## 2019-09-23 DIAGNOSIS — G62.9 SENSORY NEUROPATHY: ICD-10-CM

## 2019-09-23 PROCEDURE — 86334 IMMUNOFIX E-PHORESIS SERUM: CPT

## 2019-09-23 PROCEDURE — 36415 COLL VENOUS BLD VENIPUNCTURE: CPT

## 2019-09-23 PROCEDURE — 99214 OFFICE O/P EST MOD 30 MIN: CPT | Performed by: PSYCHIATRY & NEUROLOGY

## 2019-09-23 PROCEDURE — 84155 ASSAY OF PROTEIN SERUM: CPT

## 2019-09-23 PROCEDURE — 82784 ASSAY IGA/IGD/IGG/IGM EACH: CPT

## 2019-09-23 PROCEDURE — 84165 PROTEIN E-PHORESIS SERUM: CPT

## 2019-09-23 RX ORDER — TIZANIDINE 4 MG/1
4 TABLET ORAL DAILY
Qty: 30 TABLET | Refills: 1 | Status: SHIPPED | OUTPATIENT
Start: 2019-09-23 | End: 2019-10-21

## 2019-09-23 NOTE — PROGRESS NOTES
Subjective:     Patient ID: Dara Medina is a 64 y.o. female.    History of Present Illness    The patient was seen back in the office for follow-up of headache, abnormal MRI of the brain and left facial numbness.  She has a new problem of peripheral neuropathy.  Her main problem currently is that of facial numbness on the left.  She is on Topamax 100 mg for this.  She also has muscle spasms in her chest and neck.  She had an MRI of the neck ordered by her rheumatologist.  She was evaluated by Dr. Gay for this problem.  She also was referred by her rheumatologist to Dr. Walker EMG and nerve conduction studies.  She has bilateral foot numbness it seems to be ascending.  Nerve conduction studies done at that time showed mild abnormalities mainly sensory.  I reviewed all her laboratories done recently and the only thing missing for a blood work-up of peripheral neuropathy is immunofixation electrophoresis.  The muscle spasms on the left side of her face and work into her throat at times she was told that she might need Botox and was referred to Dr. Ortiz for this.  Not see him until April.  She also has abnormal MRI of the brain which probably needs follow-up at some point.  The following portions of the patient's history were reviewed and updated as appropriate: allergies, current medications, past family history, past medical history, past social history, past surgical history and problem list.      Current Outpatient Medications:   •  atorvastatin (LIPITOR) 20 MG tablet, TAKE 1 TABLET BY MOUTH DAILY., Disp: 90 tablet, Rfl: 3  •  azaTHIOprine (IMURAN) 50 MG tablet, Take 50 mg by mouth 3 (Three) Times a Day., Disp: , Rfl:   •  buPROPion XL (WELLBUTRIN XL) 150 MG 24 hr tablet, TAKE 1 TABLET BY MOUTH EVERY MORNING., Disp: 30 tablet, Rfl: 3  •  BYDUREON BCISE 2 MG/0.85ML auto-injector injection, INJECT 0.85 ML UNDER THE SKIN INTO THE APPROPRIATE AREA 1 TIME PER WEEK, Disp: 3.4 pen, Rfl: 11  •  Cholecalciferol  "(VITAMIN D3) 5000 UNITS capsule capsule, Take 5,000 Units by mouth 2 (Two) Times a Day., Disp: , Rfl:   •  Copper Gluconate 2 MG capsule, Take 2 mg by mouth Daily for 90 days., Disp: 90 capsule, Rfl: 3  •  corticotropin (ACTHAR) 80 UNIT/ML injectable gel, Inject  into the appropriate muscle as directed by prescriber., Disp: , Rfl:   •  cyanocobalamin 1000 MCG/ML injection, INJECT 1,000 MCG AS DIRECTED EVERY 30 (THIRTY) DAYS., Disp: 1 mL, Rfl: 2  •  DULoxetine (CYMBALTA) 60 MG capsule, Take 60 mg by mouth Daily., Disp: , Rfl: 2  •  folic acid (FOLVITE) 1 MG tablet, Take 1 mg by mouth daily., Disp: , Rfl:   •  linaclotide (LINZESS) 290 MCG capsule capsule, Take 1 capsule by mouth Every Morning Before Breakfast., Disp: 30 capsule, Rfl: 11  •  lubiprostone (AMITIZA) 24 MCG capsule, Take 1 capsule by mouth 2 (Two) Times a Day With Meals., Disp: 60 capsule, Rfl: 11  •  omeprazole (priLOSEC) 40 MG capsule, TAKE 1 CAPSULE BY MOUTH DAILY., Disp: 90 capsule, Rfl: 3  •  riTUXimab (RITUXAN) 100 MG/10ML solution injection, Administer RITUXAN 1000mg IV Q 2WK X2 INFUSIONS Q 6 MONTHS, Disp: , Rfl:   •  SYNTHROID 112 MCG tablet, TAKE 1 TABLET BY MOUTH DAILY., Disp: 90 tablet, Rfl: 3  •  teriparatide (FORTEO) 600 MCG/2.4ML injection, Inject 0.08 mL under the skin Daily., Disp: 2.4 mL, Rfl: 11  •  BD INTEGRA SYRINGE 25G X 1\" 3 ML misc, USE AS DIRECTED WITH B12, Disp: 12 each, Rfl: 2  •  tiZANidine (ZANAFLEX) 4 MG tablet, Take 1 tablet by mouth Daily., Disp: 30 tablet, Rfl: 1    Review of Systems   Constitutional: Negative for chills, fatigue and fever.   HENT: Negative for hearing loss, tinnitus and trouble swallowing.    Eyes: Negative for pain, redness and itching.   Respiratory: Negative for cough, shortness of breath and wheezing.    Cardiovascular: Negative for chest pain, palpitations and leg swelling.   Gastrointestinal: Negative for diarrhea, nausea and vomiting.   Endocrine: Negative for cold intolerance, heat intolerance and " polydipsia.   Genitourinary: Negative for decreased urine volume, difficulty urinating and urgency.   Musculoskeletal: Negative for gait problem, neck pain and neck stiffness.   Skin: Negative for color change, rash and wound.   Allergic/Immunologic: Negative for environmental allergies, food allergies and immunocompromised state.   Neurological: Positive for numbness and headaches. Negative for dizziness, tremors, seizures, syncope, facial asymmetry, speech difficulty, weakness and light-headedness.   Hematological: Negative for adenopathy. Does not bruise/bleed easily.   Psychiatric/Behavioral: Negative for agitation, behavioral problems, confusion, decreased concentration, dysphoric mood, hallucinations, self-injury, sleep disturbance and suicidal ideas. The patient is not nervous/anxious and is not hyperactive.           I have reviewed ROS completed by medical assistant.     Objective:    Neurologic Exam  Mental status was appropriate for age.  Fundoscopy showed no papilledema. Visual fields were full to OKN.  Eye movements were full and conjugate.  Pupillary reflexes were mid-range and symmetric.  No facial weakness was noted.  Tongue was midline.  There was no pattern of focal weakness.  Gait was appropriate for age.  No pathologic reflexes were noted.  No cerebellar signs were noted.  Tone was normal.  Deep tendon reflexes were 2+ and symmetric.  He had decreased facial sensation to pinprick on the left but this also included angle of the jaw.  Physical Exam    Assessment/Plan:     Dara was seen today for headache.    Diagnoses and all orders for this visit:    Polyneuropathy  -     Immunofixation electrophoresis; Future    Abnormal finding on MRI of brain    Sensory neuropathy    Tension headache    Lt facial numbness    Other orders  -     tiZANidine (ZANAFLEX) 4 MG tablet; Take 1 tablet by mouth Daily.         Have stopped Topamax and Flexeril.  Start her on tizanidine 4 mg at night.  Consider  oxcarbazepine for her facial numbness later.  We will see her back in 4 weeks and consider repeat MRI of the brain as well.  She is also had a recent MRI of the thoracic spine that was unremarkable.  Etiology of all of her problems above or not clear.  Some of this seems to be related to rheumatological disorder.  Thank you for allowing me to share in the care of this patient.  Cj Null M.D.

## 2019-09-24 ENCOUNTER — TELEPHONE (OUTPATIENT)
Dept: ONCOLOGY | Facility: HOSPITAL | Age: 64
End: 2019-09-24

## 2019-09-24 LAB
ALBUMIN SERPL-MCNC: 3.3 G/DL (ref 2.9–4.4)
ALBUMIN/GLOB SERPL: 1.2 {RATIO} (ref 0.7–1.7)
ALPHA1 GLOB FLD ELPH-MCNC: 0.2 G/DL (ref 0–0.4)
ALPHA2 GLOB SERPL ELPH-MCNC: 0.9 G/DL (ref 0.4–1)
B-GLOBULIN SERPL ELPH-MCNC: 0.9 G/DL (ref 0.7–1.3)
GAMMA GLOB SERPL ELPH-MCNC: 0.8 G/DL (ref 0.4–1.8)
GLOBULIN SER CALC-MCNC: 2.8 G/DL (ref 2.2–3.9)
IGA SERPL-MCNC: 100 MG/DL (ref 87–352)
IGG SERPL-MCNC: 811 MG/DL (ref 700–1600)
IGM SERPL-MCNC: 20 MG/DL (ref 26–217)
INTERPRETATION SERPL IEP-IMP: ABNORMAL
Lab: ABNORMAL
M-SPIKE: ABNORMAL G/DL
PROT SERPL-MCNC: 6.1 G/DL (ref 6–8.5)

## 2019-09-24 NOTE — TELEPHONE ENCOUNTER
Staff w/ Dr. Hudson Chowdhury's office called informing us pt is scheduled for IVIG infusion tomorrow but they have not yet received approval for this. She is recommending we postpone infusion to later this week at which point they should have approval. Notified patient and she states she is going to [place] later this week and asked if we could reschedule next Wednesday instead. Told patient I would contact scheduling to look into it and contact her w/ new appt. She v/u. Message sent to scheduling.

## 2019-10-09 ENCOUNTER — INFUSION (OUTPATIENT)
Dept: ONCOLOGY | Facility: HOSPITAL | Age: 64
End: 2019-10-09

## 2019-10-09 VITALS
DIASTOLIC BLOOD PRESSURE: 80 MMHG | SYSTOLIC BLOOD PRESSURE: 128 MMHG | BODY MASS INDEX: 32.62 KG/M2 | TEMPERATURE: 97.9 F | RESPIRATION RATE: 16 BRPM | HEART RATE: 81 BPM | WEIGHT: 196 LBS | OXYGEN SATURATION: 95 %

## 2019-10-09 DIAGNOSIS — D83.9 COMMON VARIABLE IMMUNODEFICIENCY (HCC): Primary | ICD-10-CM

## 2019-10-09 PROCEDURE — 96365 THER/PROPH/DIAG IV INF INIT: CPT | Performed by: NURSE PRACTITIONER

## 2019-10-09 PROCEDURE — 25010000002 IMMUNE GLOBULIN (HUMAN) 40 GM/400ML SOLUTION: Performed by: ALLERGY & IMMUNOLOGY

## 2019-10-09 PROCEDURE — 96366 THER/PROPH/DIAG IV INF ADDON: CPT | Performed by: NURSE PRACTITIONER

## 2019-10-09 RX ORDER — DIPHENHYDRAMINE HYDROCHLORIDE 50 MG/ML
50 INJECTION INTRAMUSCULAR; INTRAVENOUS ONCE AS NEEDED
Status: CANCELLED
Start: 2019-10-09

## 2019-10-09 RX ORDER — ACETAMINOPHEN 500 MG
500 TABLET ORAL EVERY 4 HOURS PRN
Status: CANCELLED
Start: 2019-10-09

## 2019-10-09 RX ORDER — ACETAMINOPHEN 325 MG/1
650 TABLET ORAL ONCE
Status: CANCELLED
Start: 2019-10-09 | End: 2019-10-09

## 2019-10-09 RX ORDER — DIPHENHYDRAMINE HCL 25 MG
25 CAPSULE ORAL ONCE
Status: CANCELLED
Start: 2019-10-09 | End: 2019-10-09

## 2019-10-09 RX ORDER — EPINEPHRINE 0.3 MG/.3ML
0.3 INJECTION SUBCUTANEOUS ONCE AS NEEDED
Status: CANCELLED
Start: 2019-10-09

## 2019-10-09 RX ADMIN — IMMUNE GLOBULIN (HUMAN) 40 G: 10 INJECTION INTRAVENOUS; SUBCUTANEOUS at 09:22

## 2019-10-09 NOTE — PROGRESS NOTES
Patient states she took Tylenol 650 mg PO x 1 and Benadryl 50 mg PO x 1 this morning for her premedications to IVIG Gammunex C treatment.

## 2019-10-16 RX ORDER — CYANOCOBALAMIN 1000 UG/ML
INJECTION, SOLUTION INTRAMUSCULAR; SUBCUTANEOUS
Qty: 1 ML | Refills: 2 | Status: SHIPPED | OUTPATIENT
Start: 2019-10-16 | End: 2020-01-09

## 2019-10-21 ENCOUNTER — OFFICE VISIT (OUTPATIENT)
Dept: NEUROLOGY | Facility: CLINIC | Age: 64
End: 2019-10-21

## 2019-10-21 VITALS
BODY MASS INDEX: 31.65 KG/M2 | DIASTOLIC BLOOD PRESSURE: 78 MMHG | HEART RATE: 87 BPM | WEIGHT: 190 LBS | OXYGEN SATURATION: 98 % | SYSTOLIC BLOOD PRESSURE: 138 MMHG | HEIGHT: 65 IN

## 2019-10-21 DIAGNOSIS — R93.0 ABNORMAL MRI OF HEAD: Primary | ICD-10-CM

## 2019-10-21 DIAGNOSIS — G62.9 SENSORY NEUROPATHY: ICD-10-CM

## 2019-10-21 DIAGNOSIS — G50.0 TRIGEMINAL NEURALGIA: ICD-10-CM

## 2019-10-21 DIAGNOSIS — R20.0 LT FACIAL NUMBNESS: ICD-10-CM

## 2019-10-21 DIAGNOSIS — R90.89 ABNORMAL FINDING ON MRI OF BRAIN: ICD-10-CM

## 2019-10-21 DIAGNOSIS — G44.209 TENSION HEADACHE: ICD-10-CM

## 2019-10-21 PROCEDURE — 99213 OFFICE O/P EST LOW 20 MIN: CPT | Performed by: PSYCHIATRY & NEUROLOGY

## 2019-10-21 RX ORDER — OMEPRAZOLE 40 MG/1
40 CAPSULE, DELAYED RELEASE ORAL DAILY
Qty: 30 CAPSULE | Refills: 11 | Status: SHIPPED | OUTPATIENT
Start: 2019-10-21 | End: 2020-10-11

## 2019-10-21 RX ORDER — TOPIRAMATE 25 MG/1
25 TABLET ORAL NIGHTLY
Qty: 90 TABLET | Refills: 3 | Status: SHIPPED | OUTPATIENT
Start: 2019-10-21 | End: 2019-10-21 | Stop reason: SDUPTHER

## 2019-10-21 RX ORDER — TOPIRAMATE 100 MG/1
100 TABLET, FILM COATED ORAL NIGHTLY
Qty: 90 TABLET | Refills: 3 | Status: SHIPPED | OUTPATIENT
Start: 2019-10-21 | End: 2020-02-17 | Stop reason: SDUPTHER

## 2019-10-21 RX ORDER — OXCARBAZEPINE 150 MG/1
150 TABLET, FILM COATED ORAL NIGHTLY
Qty: 30 TABLET | Refills: 11 | Status: SHIPPED | OUTPATIENT
Start: 2019-10-21 | End: 2019-12-03

## 2019-10-21 RX ORDER — CYCLOBENZAPRINE HCL 10 MG
TABLET ORAL
COMMUNITY
Start: 2019-10-18 | End: 2021-08-01 | Stop reason: SDUPTHER

## 2019-10-21 RX ORDER — BUPROPION HYDROCHLORIDE 150 MG/1
TABLET ORAL
Qty: 30 TABLET | Refills: 3 | Status: SHIPPED | OUTPATIENT
Start: 2019-10-21 | End: 2020-02-24

## 2019-10-21 NOTE — PROGRESS NOTES
"Subjective:     Patient ID: Dara Medina is a 64 y.o. female.    History of Present Illness  Patient was seen back in the office for follow-up of abnormal MRI of the brain and left facial weakness.  She also has muscle spasms in her throat neck as well as chest wall.  When I saw her last she was placed on Zanaflex 4 mg for this and this did not help.  She in fact got worse and went back on Topamax and Flexeril.  She is still having numbness and pain in the left facial area sometimes it goes into her vision as well.  She has had an abnormal MRI of the brain in the past and this was last checked a year ago.  It was stable at that time.    The following portions of the patient's history were reviewed and updated as appropriate: allergies, current medications, past family history, past medical history, past social history, past surgical history and problem list.      Current Outpatient Medications:   •  atorvastatin (LIPITOR) 20 MG tablet, TAKE 1 TABLET BY MOUTH DAILY., Disp: 90 tablet, Rfl: 3  •  azaTHIOprine (IMURAN) 50 MG tablet, Take 50 mg by mouth 3 (Three) Times a Day., Disp: , Rfl:   •  BD INTEGRA SYRINGE 25G X 1\" 3 ML misc, USE AS DIRECTED WITH B12, Disp: 12 each, Rfl: 2  •  buPROPion XL (WELLBUTRIN XL) 150 MG 24 hr tablet, TAKE 1 TABLET BY MOUTH EVERY MORNING., Disp: 30 tablet, Rfl: 3  •  BYDUREON BCISE 2 MG/0.85ML auto-injector injection, INJECT 0.85 ML UNDER THE SKIN INTO THE APPROPRIATE AREA 1 TIME PER WEEK, Disp: 3.4 pen, Rfl: 11  •  Cholecalciferol (VITAMIN D3) 5000 UNITS capsule capsule, Take 5,000 Units by mouth 2 (Two) Times a Day., Disp: , Rfl:   •  Copper Gluconate 2 MG capsule, Take 2 mg by mouth Daily for 90 days., Disp: 90 capsule, Rfl: 3  •  corticotropin (ACTHAR) 80 UNIT/ML injectable gel, Inject  into the appropriate muscle as directed by prescriber., Disp: , Rfl:   •  cyanocobalamin 1000 MCG/ML injection, INJECT 1,000 MCG AS DIRECTED EVERY 30 (THIRTY) DAYS., Disp: 1 mL, Rfl: 2  •  cyclobenzaprine " (FLEXERIL) 10 MG tablet, , Disp: , Rfl:   •  DULoxetine (CYMBALTA) 60 MG capsule, Take 60 mg by mouth Daily., Disp: , Rfl: 2  •  folic acid (FOLVITE) 1 MG tablet, Take 1 mg by mouth daily., Disp: , Rfl:   •  linaclotide (LINZESS) 290 MCG capsule capsule, Take 1 capsule by mouth Every Morning Before Breakfast., Disp: 30 capsule, Rfl: 11  •  lubiprostone (AMITIZA) 24 MCG capsule, Take 1 capsule by mouth 2 (Two) Times a Day With Meals., Disp: 60 capsule, Rfl: 11  •  omeprazole (priLOSEC) 40 MG capsule, TAKE 1 CAPSULE BY MOUTH DAILY., Disp: 30 capsule, Rfl: 11  •  riTUXimab (RITUXAN) 100 MG/10ML solution injection, Administer RITUXAN 1000mg IV Q 2WK X2 INFUSIONS Q 6 MONTHS, Disp: , Rfl:   •  SYNTHROID 112 MCG tablet, TAKE 1 TABLET BY MOUTH DAILY., Disp: 90 tablet, Rfl: 3  •  teriparatide (FORTEO) 600 MCG/2.4ML injection, Inject 0.08 mL under the skin Daily., Disp: 2.4 mL, Rfl: 11  •  OXcarbazepine (TRILEPTAL) 150 MG tablet, Take 1 tablet by mouth Every Night., Disp: 30 tablet, Rfl: 11  •  topiramate (TOPAMAX) 100 MG tablet, Take 1 tablet by mouth Every Night., Disp: 90 tablet, Rfl: 3    Review of Systems   Neurological: Positive for numbness and headaches. Negative for dizziness, tremors, seizures, syncope, facial asymmetry, speech difficulty, weakness and light-headedness.          I have reviewed ROS completed by medical assistant.     Objective:    Neurologic Exam  Mental status examination was appropriate.  Funduscopy, visual fields, eye movements and pupillary reflexes were normal.  No facial weakness was noted.  Gait was normal.  No pattern of focal weakness was noted.  Physical Exam    Assessment/Plan:     Dara was seen today for polyneuropathy.    Diagnoses and all orders for this visit:    Abnormal MRI of head  -     MRI Brain With & Without Contrast; Future    Trigeminal neuralgia  -     MRI Brain With & Without Contrast; Future    Abnormal finding on MRI of brain    Tension headache    Lt facial  numbness    Sensory neuropathy    Other orders  -     Discontinue: topiramate (TOPAMAX) 25 MG tablet; Take 1 tablet by mouth Every Night.  -     OXcarbazepine (TRILEPTAL) 150 MG tablet; Take 1 tablet by mouth Every Night.  -     topiramate (TOPAMAX) 100 MG tablet; Take 1 tablet by mouth Every Night.         Because of the abnormal MRI of the brain of set up a follow-up exam.  In addition of started on Trileptal 150 mg at night symptomatically.  Continue Topamax as above phone follow-up in 6 weeks.  Prescription drug management - meds as above    Follow-up in the office in 6 months. Thank you for allowing me to share in the care of this patient.  Cj Null M.D.

## 2019-11-04 ENCOUNTER — TELEPHONE (OUTPATIENT)
Dept: NEUROLOGY | Facility: CLINIC | Age: 64
End: 2019-11-04

## 2019-11-04 NOTE — TELEPHONE ENCOUNTER
If patient calls back, please inform patient that appointment with Dr Null was r/s from 1-21-20 @ 4846 to 1-28-20 @ 9626.

## 2019-11-06 ENCOUNTER — INFUSION (OUTPATIENT)
Dept: ONCOLOGY | Facility: HOSPITAL | Age: 64
End: 2019-11-06

## 2019-11-06 VITALS
TEMPERATURE: 97.7 F | RESPIRATION RATE: 16 BRPM | BODY MASS INDEX: 33.05 KG/M2 | WEIGHT: 198.6 LBS | SYSTOLIC BLOOD PRESSURE: 114 MMHG | DIASTOLIC BLOOD PRESSURE: 73 MMHG | OXYGEN SATURATION: 94 % | HEART RATE: 84 BPM

## 2019-11-06 DIAGNOSIS — D83.9 COMMON VARIABLE IMMUNODEFICIENCY (HCC): Primary | ICD-10-CM

## 2019-11-06 PROCEDURE — 96366 THER/PROPH/DIAG IV INF ADDON: CPT | Performed by: NURSE PRACTITIONER

## 2019-11-06 PROCEDURE — 25010000002 IMMUNE GLOBULIN (HUMAN) 20 GM/200ML SOLUTION: Performed by: ALLERGY & IMMUNOLOGY

## 2019-11-06 PROCEDURE — 96365 THER/PROPH/DIAG IV INF INIT: CPT | Performed by: NURSE PRACTITIONER

## 2019-11-06 RX ORDER — EPINEPHRINE 0.3 MG/.3ML
0.3 INJECTION SUBCUTANEOUS ONCE AS NEEDED
Status: CANCELLED
Start: 2019-11-06

## 2019-11-06 RX ORDER — DIPHENHYDRAMINE HCL 25 MG
25 CAPSULE ORAL ONCE
Status: CANCELLED
Start: 2019-11-06 | End: 2019-11-06

## 2019-11-06 RX ORDER — ACETAMINOPHEN 500 MG
500 TABLET ORAL EVERY 4 HOURS PRN
Status: CANCELLED
Start: 2019-11-06

## 2019-11-06 RX ORDER — DIPHENHYDRAMINE HYDROCHLORIDE 50 MG/ML
50 INJECTION INTRAMUSCULAR; INTRAVENOUS ONCE AS NEEDED
Status: CANCELLED
Start: 2019-11-06

## 2019-11-06 RX ORDER — ACETAMINOPHEN 325 MG/1
650 TABLET ORAL ONCE
Status: CANCELLED
Start: 2019-11-06 | End: 2019-11-06

## 2019-11-06 RX ADMIN — IMMUNE GLOBULIN (HUMAN) 40 G: 10 INJECTION INTRAVENOUS; SUBCUTANEOUS at 09:30

## 2019-11-06 NOTE — NURSING NOTE
Patient states she took 1000 mg Tylenol PO x 1 and Benadryl 50 mg PO x 1 for her own premedications prior to arriving for treatment.

## 2019-11-17 ENCOUNTER — HOSPITAL ENCOUNTER (OUTPATIENT)
Dept: MRI IMAGING | Facility: HOSPITAL | Age: 64
Discharge: HOME | End: 2019-11-17
Admitting: PSYCHIATRY & NEUROLOGY

## 2019-11-17 DIAGNOSIS — G50.0 TRIGEMINAL NEURALGIA: ICD-10-CM

## 2019-11-17 DIAGNOSIS — R93.0 ABNORMAL MRI OF HEAD: ICD-10-CM

## 2019-11-17 PROCEDURE — A9577 INJ MULTIHANCE: HCPCS | Performed by: PSYCHIATRY & NEUROLOGY

## 2019-11-17 PROCEDURE — 70553 MRI BRAIN STEM W/O & W/DYE: CPT

## 2019-11-17 PROCEDURE — 0 GADOBENATE DIMEGLUMINE 529 MG/ML SOLUTION: Performed by: PSYCHIATRY & NEUROLOGY

## 2019-11-17 PROCEDURE — 82565 ASSAY OF CREATININE: CPT

## 2019-11-17 RX ADMIN — GADOBENATE DIMEGLUMINE 18 ML: 529 INJECTION, SOLUTION INTRAVENOUS at 10:28

## 2019-11-18 LAB — CREAT BLDA-MCNC: 1.1 MG/DL (ref 0.6–1.3)

## 2019-11-20 DIAGNOSIS — E78.5 HYPERLIPIDEMIA, UNSPECIFIED HYPERLIPIDEMIA TYPE: Primary | ICD-10-CM

## 2019-11-20 DIAGNOSIS — E55.9 VITAMIN D DEFICIENCY: ICD-10-CM

## 2019-11-20 DIAGNOSIS — E53.8 B12 DEFICIENCY: ICD-10-CM

## 2019-11-20 DIAGNOSIS — E03.9 HYPOTHYROIDISM, UNSPECIFIED TYPE: ICD-10-CM

## 2019-11-21 LAB
25(OH)D3+25(OH)D2 SERPL-MCNC: 43.2 NG/ML (ref 30–100)
ALBUMIN SERPL-MCNC: 4.3 G/DL (ref 3.5–5.2)
ALBUMIN/GLOB SERPL: 1.6 G/DL
ALP SERPL-CCNC: 75 U/L (ref 39–117)
ALT SERPL-CCNC: 14 U/L (ref 1–33)
APPEARANCE UR: CLEAR
AST SERPL-CCNC: 15 U/L (ref 1–32)
BACTERIA #/AREA URNS HPF: NORMAL /HPF
BASOPHILS # BLD AUTO: 0.02 10*3/MM3 (ref 0–0.2)
BASOPHILS NFR BLD AUTO: 0.4 % (ref 0–1.5)
BILIRUB SERPL-MCNC: 0.5 MG/DL (ref 0.2–1.2)
BILIRUB UR QL STRIP: NEGATIVE
BUN SERPL-MCNC: 18 MG/DL (ref 8–23)
BUN/CREAT SERPL: 18.2 (ref 7–25)
CALCIUM SERPL-MCNC: 9.7 MG/DL (ref 8.6–10.5)
CHLORIDE SERPL-SCNC: 110 MMOL/L (ref 98–107)
CHOLEST SERPL-MCNC: 199 MG/DL (ref 0–200)
CO2 SERPL-SCNC: 22 MMOL/L (ref 22–29)
COLOR UR: YELLOW
CREAT SERPL-MCNC: 0.99 MG/DL (ref 0.57–1)
EOSINOPHIL # BLD AUTO: 0.09 10*3/MM3 (ref 0–0.4)
EOSINOPHIL NFR BLD AUTO: 1.6 % (ref 0.3–6.2)
EPI CELLS #/AREA URNS HPF: NORMAL /HPF
ERYTHROCYTE [DISTWIDTH] IN BLOOD BY AUTOMATED COUNT: 12.9 % (ref 12.3–15.4)
GLOBULIN SER CALC-MCNC: 2.7 GM/DL
GLUCOSE SERPL-MCNC: 98 MG/DL (ref 65–99)
GLUCOSE UR QL: NEGATIVE
HCT VFR BLD AUTO: 39.5 % (ref 34–46.6)
HDLC SERPL-MCNC: 64 MG/DL (ref 40–60)
HGB BLD-MCNC: 13.8 G/DL (ref 12–15.9)
HGB UR QL STRIP: NEGATIVE
IMM GRANULOCYTES # BLD AUTO: 0.02 10*3/MM3 (ref 0–0.05)
IMM GRANULOCYTES NFR BLD AUTO: 0.4 % (ref 0–0.5)
KETONES UR QL STRIP: NEGATIVE
LDLC SERPL CALC-MCNC: 110 MG/DL (ref 0–100)
LEUKOCYTE ESTERASE UR QL STRIP: NEGATIVE
LYMPHOCYTES # BLD AUTO: 0.42 10*3/MM3 (ref 0.7–3.1)
LYMPHOCYTES NFR BLD AUTO: 7.6 % (ref 19.6–45.3)
MCH RBC QN AUTO: 34.7 PG (ref 26.6–33)
MCHC RBC AUTO-ENTMCNC: 34.9 G/DL (ref 31.5–35.7)
MCV RBC AUTO: 99.2 FL (ref 79–97)
MICRO URNS: NORMAL
MICRO URNS: NORMAL
MONOCYTES # BLD AUTO: 0.4 10*3/MM3 (ref 0.1–0.9)
MONOCYTES NFR BLD AUTO: 7.2 % (ref 5–12)
MUCOUS THREADS URNS QL MICRO: PRESENT /HPF
NEUTROPHILS # BLD AUTO: 4.59 10*3/MM3 (ref 1.7–7)
NEUTROPHILS NFR BLD AUTO: 82.8 % (ref 42.7–76)
NITRITE UR QL STRIP: NEGATIVE
NRBC BLD AUTO-RTO: 0 /100 WBC (ref 0–0.2)
PH UR STRIP: 6.5 [PH] (ref 5–7.5)
PLATELET # BLD AUTO: 328 10*3/MM3 (ref 140–450)
POTASSIUM SERPL-SCNC: 4.9 MMOL/L (ref 3.5–5.2)
PROT SERPL-MCNC: 7 G/DL (ref 6–8.5)
PROT UR QL STRIP: NEGATIVE
RBC # BLD AUTO: 3.98 10*6/MM3 (ref 3.77–5.28)
RBC #/AREA URNS HPF: NORMAL /HPF
SODIUM SERPL-SCNC: 144 MMOL/L (ref 136–145)
SP GR UR: 1.03 (ref 1–1.03)
T4 FREE SERPL-MCNC: 1.11 NG/DL (ref 0.93–1.7)
TRIGL SERPL-MCNC: 127 MG/DL (ref 0–150)
TSH SERPL DL<=0.005 MIU/L-ACNC: 0.12 UIU/ML (ref 0.27–4.2)
URINALYSIS REFLEX: NORMAL
UROBILINOGEN UR STRIP-MCNC: 1 MG/DL (ref 0.2–1)
VIT B12 SERPL-MCNC: >2000 PG/ML (ref 211–946)
VLDLC SERPL CALC-MCNC: 25.4 MG/DL
WBC # BLD AUTO: 5.54 10*3/MM3 (ref 3.4–10.8)
WBC #/AREA URNS HPF: NORMAL /HPF

## 2019-11-26 RX ORDER — TIZANIDINE 4 MG/1
TABLET ORAL
Qty: 30 TABLET | Refills: 1 | OUTPATIENT
Start: 2019-11-26

## 2019-11-27 ENCOUNTER — OFFICE VISIT (OUTPATIENT)
Dept: INTERNAL MEDICINE | Facility: CLINIC | Age: 64
End: 2019-11-27

## 2019-11-27 VITALS
HEIGHT: 65 IN | OXYGEN SATURATION: 98 % | WEIGHT: 195 LBS | BODY MASS INDEX: 32.49 KG/M2 | DIASTOLIC BLOOD PRESSURE: 80 MMHG | SYSTOLIC BLOOD PRESSURE: 130 MMHG | HEART RATE: 80 BPM

## 2019-11-27 DIAGNOSIS — Z78.0 POSTMENOPAUSAL: ICD-10-CM

## 2019-11-27 DIAGNOSIS — Z00.00 WELL ADULT EXAM: Primary | ICD-10-CM

## 2019-11-27 DIAGNOSIS — D83.9 COMMON VARIABLE IMMUNODEFICIENCY (HCC): ICD-10-CM

## 2019-11-27 DIAGNOSIS — M81.0 OSTEOPOROSIS, UNSPECIFIED OSTEOPOROSIS TYPE, UNSPECIFIED PATHOLOGICAL FRACTURE PRESENCE: ICD-10-CM

## 2019-11-27 DIAGNOSIS — J84.10 PULMONARY FIBROSIS (HCC): ICD-10-CM

## 2019-11-27 DIAGNOSIS — M06.9 RHEUMATOID ARTHRITIS INVOLVING MULTIPLE SITES, UNSPECIFIED RHEUMATOID FACTOR PRESENCE: ICD-10-CM

## 2019-11-27 DIAGNOSIS — E78.2 MIXED HYPERLIPIDEMIA: ICD-10-CM

## 2019-11-27 DIAGNOSIS — E03.9 ACQUIRED HYPOTHYROIDISM: ICD-10-CM

## 2019-11-27 PROBLEM — R73.03 PREDIABETES: Status: RESOLVED | Noted: 2017-04-12 | Resolved: 2019-11-27

## 2019-11-27 PROBLEM — R79.0 LOW SERUM COPPER FOR AGE: Status: RESOLVED | Noted: 2019-08-30 | Resolved: 2019-11-27

## 2019-11-27 PROBLEM — E66.09 OBESITY DUE TO EXCESS CALORIES WITHOUT SERIOUS COMORBIDITY: Status: ACTIVE | Noted: 2019-11-27

## 2019-11-27 PROCEDURE — 99396 PREV VISIT EST AGE 40-64: CPT | Performed by: INTERNAL MEDICINE

## 2019-11-27 PROCEDURE — 77080 DXA BONE DENSITY AXIAL: CPT | Performed by: INTERNAL MEDICINE

## 2019-11-27 NOTE — PROGRESS NOTES
Subjective     Dara Medina is a 64 y.o. female who presents for a complete physical exam.      History of Present Illness     HLD.  Control is good.   Hypothyroidism.  Control is good.   OP.  She just completed Forteo.  BMD is still poor.   RA/pulmonary fibrosis/CVIG.  She is followed by rhematology, allergy and pulmonary.  NO recents infections.  Breathing is good.  RA is controlled.          Review of Systems   Constitutional: Negative.    HENT: Negative.    Eyes: Negative.    Respiratory: Negative.    Cardiovascular: Negative.    Gastrointestinal: Negative.    Endocrine: Negative.    Genitourinary: Negative.    Musculoskeletal: Negative.    Skin: Negative.    Allergic/Immunologic: Negative.    Neurological: Negative.    Hematological: Negative.    Psychiatric/Behavioral: Negative.        The following portions of the patient's history were reviewed and updated as appropriate: allergies, current medications, past family history, past medical history, past social history, past surgical history and problem list.  Health maintenance tab was reviewed and updated with the patient.       Patient Active Problem List    Diagnosis Date Noted   • Chronic copper deficiency 09/04/2019   • Low serum copper for age 08/30/2019   • Pain in thoracic spine 08/15/2019   • Left arm numbness 08/15/2019   • Chronic idiopathic constipation 06/18/2019   • Esophagitis 06/18/2019   • Macrocytosis without anemia 03/09/2019   • Acquired lymphocytopenia 03/09/2019   • Tension headache 09/27/2018   • Adverse effect of iron or its compound, sequela 08/29/2018   • Vitamin B12 deficiency 08/29/2018   • Leukemoid reaction 08/29/2018   • Lt facial numbness 03/08/2018   • Abnormal finding on MRI of brain 02/06/2018     Note Last Updated: 2/6/2018 2/2018.  Plan recheck three months.       • Primary osteoarthritis of right knee 11/22/2017     Note Last Updated: 11/22/2017     Added automatically from request for surgery [id number]     • Chronic  depression 07/18/2017   • Mixed hyperlipidemia 05/05/2017   • Tear of medial meniscus of right knee, current 05/04/2017   • Rheumatoid arthritis (CMS/HCC) 07/07/2016   • Pulmonary fibrosis (CMS/HCC) 07/06/2016     Note Last Updated: 10/23/2017     Secondary to methotrexate.       • Iron deficiency anemia 07/06/2016   • Pernicious anemia 07/06/2016   • Common variable immunodeficiency (CMS/HCC) 07/01/2016   • Hypothyroidism 05/23/2016   • Sensory neuropathy 05/23/2016   • Osteoporosis 05/23/2016     Note Last Updated: 10/23/2017     H/o one year Forteo.  Not on bisphosphonates because of dental issues.  Fosamax in past caused stomach problems.       • Vitamin D deficiency 05/23/2016       Past Medical History:   Diagnosis Date   • Acute colitis 1/18/2017   • Allergic Codeine, some antibiotics   • Anemia    • Cataract Removed    2012   • Chronic depression    • Colon polyp 1 removed    2016   • Diabetes mellitus (CMS/[facility])    • Fracture of rib    • GERD (gastroesophageal reflux disease)    • H/O Wrist fracture, right    • Headache    • History of bronchitis    • History of pneumonia    • History of transfusion    • Hyperlipidemia    • Hypothyroidism    • Inflammatory bowel disease    • Iron deficiency    • Irritable bowel syndrome    • Low serum copper for age 8/30/2019   • Migraine    • Obesity    • Osteopenia    • Osteoporosis    • Pneumonia June 2016   • RA (rheumatoid arthritis) (CMS/[facility])    • Sensory neuropathy    • Sepsis, unspecified organism (CMS/[facility]) 1/18/2017   • Vitamin D deficiency        Past Surgical History:   Procedure Laterality Date   • ADENOIDECTOMY     • BREAST AUGMENTATION     • BREAST IMPLANT REMOVAL Bilateral    • CARPAL TUNNEL RELEASE Left 2015   • COLONOSCOPY     • EYE SURGERY Bilateral    • HYSTERECTOMY     • LASIK Bilateral    • ORIF WRIST FRACTURE Right    • LA TOTAL KNEE ARTHROPLASTY Right 12/14/2017    Procedure: TOTAL KNEE ARTHROPLASTY;  Surgeon: Zion Guo MD;  Location: [facility]  "MAIN OR;  Service: Orthopedics   • SINUS SURGERY      x2   • TONSILLECTOMY         Family History   Problem Relation Age of Onset   • Hyperlipidemia Mother    • Hypertension Mother    • Migraines Mother    • Colon cancer Father    • Diabetes Father    • Cancer Father    • Hyperlipidemia Brother    • Migraines Brother    • Migraines Sister    • Malig Hyperthermia Neg Hx        Social History     Socioeconomic History   • Marital status:      Spouse name: Not on file   • Number of children: 2   • Years of education: College   • Highest education level: Not on file   Occupational History   • Occupation: Homemaker     Employer: RETIRED   Tobacco Use   • Smoking status: Former Smoker     Packs/day: 0.50     Years: 5.00     Pack years: 2.50     Types: Cigarettes     Start date: 1974     Last attempt to quit: 1978     Years since quittin.6   • Smokeless tobacco: Never Used   • Tobacco comment: QUIT AGE 23   Substance and Sexual Activity   • Alcohol use: No     Comment: STOPPED    • Drug use: No       Current Outpatient Medications on File Prior to Visit   Medication Sig Dispense Refill   • atorvastatin (LIPITOR) 20 MG tablet TAKE 1 TABLET BY MOUTH DAILY. 90 tablet 3   • azaTHIOprine (IMURAN) 50 MG tablet Take 50 mg by mouth 3 (Three) Times a Day.     • BD INTEGRA SYRINGE 25G X 1\" 3 ML misc USE AS DIRECTED WITH B12 12 each 2   • buPROPion XL (WELLBUTRIN XL) 150 MG 24 hr tablet TAKE 1 TABLET BY MOUTH EVERY MORNING. 30 tablet 3   • BYDUREON BCISE 2 MG/0.85ML auto-injector injection INJECT 0.85 ML UNDER THE SKIN INTO THE APPROPRIATE AREA 1 TIME PER WEEK 3.4 pen 11   • Cholecalciferol (VITAMIN D3) 5000 UNITS capsule capsule Take 5,000 Units by mouth 2 (Two) Times a Day.     • Copper Gluconate 2 MG capsule Take 2 mg by mouth Daily for 90 days. 90 capsule 3   • corticotropin (ACTHAR) 80 UNIT/ML injectable gel Inject  into the appropriate muscle as directed by prescriber.     • cyanocobalamin 1000 MCG/ML " "injection INJECT 1,000 MCG AS DIRECTED EVERY 30 (THIRTY) DAYS. 1 mL 2   • cyclobenzaprine (FLEXERIL) 10 MG tablet      • DULoxetine (CYMBALTA) 60 MG capsule Take 60 mg by mouth Daily.  2   • folic acid (FOLVITE) 1 MG tablet Take 1 mg by mouth daily.     • linaclotide (LINZESS) 290 MCG capsule capsule Take 1 capsule by mouth Every Morning Before Breakfast. 30 capsule 11   • lubiprostone (AMITIZA) 24 MCG capsule Take 1 capsule by mouth 2 (Two) Times a Day With Meals. 60 capsule 11   • omeprazole (priLOSEC) 40 MG capsule TAKE 1 CAPSULE BY MOUTH DAILY. 30 capsule 11   • OXcarbazepine (TRILEPTAL) 150 MG tablet Take 1 tablet by mouth Every Night. 30 tablet 11   • riTUXimab (RITUXAN) 100 MG/10ML solution injection Administer RITUXAN 1000mg IV Q 2WK X2 INFUSIONS Q 6 MONTHS     • SYNTHROID 112 MCG tablet TAKE 1 TABLET BY MOUTH DAILY. 90 tablet 3   • topiramate (TOPAMAX) 100 MG tablet Take 1 tablet by mouth Every Night. 90 tablet 3   • [DISCONTINUED] teriparatide (FORTEO) 600 MCG/2.4ML injection Inject 0.08 mL under the skin Daily. 2.4 mL 11     No current facility-administered medications on file prior to visit.        Allergies   Allergen Reactions   • Amoxicillin-Pot Clavulanate Diarrhea     itching   • Codeine Nausea And Vomiting       Immunization History   Administered Date(s) Administered   • Flu Vaccine Quad PF >18YRS 10/10/2016, 10/23/2017   • Hepatitis A 04/27/2018   • Influenza, Unspecified 10/08/2018   • Pneumococcal Conjugate 13-Valent (PCV13) 04/02/2019   • Pneumococcal Polysaccharide (PPSV23) 10/01/2014   • Tdap 10/23/2017       Objective     /80   Pulse 80   Ht 165.1 cm (65\")   Wt 88.5 kg (195 lb)   SpO2 98%   BMI 32.45 kg/m²     Physical Exam   Constitutional: She is oriented to person, place, and time. She appears well-developed and well-nourished.   HENT:   Head: Normocephalic and atraumatic.   Right Ear: Hearing, tympanic membrane and external ear normal.   Left Ear: Hearing, tympanic membrane " and external ear normal.   Nose: Nose normal.   Mouth/Throat: Oropharynx is clear and moist.   Neck: Neck supple. No thyromegaly present.   Cardiovascular: Normal rate, regular rhythm and normal heart sounds.   No murmur heard.  Pulmonary/Chest: Effort normal. She has rales. Right breast exhibits no mass. Left breast exhibits no mass.   Abdominal: Soft. She exhibits no distension. There is no hepatosplenomegaly. There is no tenderness.   Genitourinary: No breast tenderness.   Lymphadenopathy:     She has no cervical adenopathy.   Neurological: She is alert and oriented to person, place, and time.   Skin: Skin is warm and dry.   Psychiatric: She has a normal mood and affect. Her speech is normal and behavior is normal. Judgment and thought content normal. Cognition and memory are normal.       Assessment/Plan   Dara was seen today for annual exam.    Diagnoses and all orders for this visit:    Well adult exam    Mixed hyperlipidemia    Acquired hypothyroidism    Osteoporosis, unspecified osteoporosis type, unspecified pathological fracture presence    Pulmonary fibrosis (CMS/HCC)    Rheumatoid arthritis involving multiple sites, unspecified rheumatoid factor presence (CMS/HCC)    Common variable immunodeficiency (CMS/HCC)    Postmenopausal  -     DEXA Bone Density Axial        Discussion    Patient presents today for a CPE.      Patient follows a healthy diet.   Patient follows an adequate exercise regimen. Mammogram is up to date.   Colon cancer screening is up to date.   Pap smear no longer performed secondary to hysterectomy.   DEXA is up to date.    HLD.  Continue current.  Hypothyroidism.  Continue levothyroxine.  OP. Consider Evenity when she is on medicare.    RA/CVID/pulmonary fibrosis.  Continue with subspecialties.        Health Maintenance   Topic Date Due   • ANNUAL PHYSICAL  11/06/2019   • COLONOSCOPY  01/01/2020   • LIPID PANEL  11/20/2020   • MAMMOGRAM  01/02/2021   • DXA SCAN  11/27/2021   • TDAP/TD  VACCINES (2 - Td) 10/23/2027   • HEPATITIS C SCREENING  Completed   • INFLUENZA VACCINE  Completed   • ZOSTER VACCINE  Addressed   • PAP SMEAR  Discontinued            Future Appointments   Date Time Provider Department Center   12/4/2019  9:00 AM REFERRED INFUSION CHAIR 12 EP  [facility]   1/13/2020  9:30 AM Graham Gay MD [facility] None   1/28/2020 11:30 AM Cj Null Jr., MD [facility] None   2/5/2020 10:00 AM Zion Guo MD [facility] None   2/12/2020  9:00 AM LAB CHAIR 2 [facility]  [facility]   2/19/2020 12:00 PM VITALS ONLY [facility]  LAB [facility]   2/19/2020 12:20 PM Ivory Noel MD PhD [facility]  [facility]   5/20/2020 10:20 AM [facility] None   5/27/2020  9:30 AM Randi Yang MD [facility] None

## 2019-12-03 ENCOUNTER — TELEPHONE (OUTPATIENT)
Dept: NEUROLOGY | Facility: CLINIC | Age: 64
End: 2019-12-03

## 2019-12-03 RX ORDER — OXCARBAZEPINE 150 MG/1
300 TABLET, FILM COATED ORAL NIGHTLY
Qty: 60 TABLET | Refills: 11 | Status: SHIPPED | OUTPATIENT
Start: 2019-12-03 | End: 2019-12-16 | Stop reason: SDUPTHER

## 2019-12-03 NOTE — TELEPHONE ENCOUNTER
Regarding: Prescription Question  Contact: [phone]  ----- Message from [employer], Generic sent at 12/2/2019 11:10 AM EST -----    Dr. Null,  I am wondering if we can increase the medication you gave me  at my last appt.  It has helped but still having some  spasms in left cheek.  It was Ox....150mg.  I don’t have it in front of me.  I also am taking an additional 100 mg topomax.    I just think some tweaking will help.    Thanks,    Dara Medina

## 2019-12-03 NOTE — TELEPHONE ENCOUNTER
Patient is okay to increase to either 1 pill twice a day or 2 at night.  Sent in a new prescription for this.  Kept her on the 150 mg pill

## 2019-12-04 ENCOUNTER — INFUSION (OUTPATIENT)
Dept: ONCOLOGY | Facility: HOSPITAL | Age: 64
End: 2019-12-04

## 2019-12-04 VITALS
HEART RATE: 78 BPM | DIASTOLIC BLOOD PRESSURE: 73 MMHG | WEIGHT: 199 LBS | BODY MASS INDEX: 33.12 KG/M2 | SYSTOLIC BLOOD PRESSURE: 112 MMHG

## 2019-12-04 DIAGNOSIS — D83.9 COMMON VARIABLE IMMUNODEFICIENCY (HCC): Primary | ICD-10-CM

## 2019-12-04 PROCEDURE — 96365 THER/PROPH/DIAG IV INF INIT: CPT | Performed by: NURSE PRACTITIONER

## 2019-12-04 PROCEDURE — 96366 THER/PROPH/DIAG IV INF ADDON: CPT | Performed by: NURSE PRACTITIONER

## 2019-12-04 PROCEDURE — 25010000002 IMMUNE GLOBULIN (HUMAN) 40 GM/400ML SOLUTION: Performed by: ALLERGY & IMMUNOLOGY

## 2019-12-04 RX ORDER — ACETAMINOPHEN 325 MG/1
650 TABLET ORAL ONCE
Status: CANCELLED
Start: 2019-12-04 | End: 2019-12-04

## 2019-12-04 RX ORDER — DIPHENHYDRAMINE HYDROCHLORIDE 50 MG/ML
50 INJECTION INTRAMUSCULAR; INTRAVENOUS ONCE AS NEEDED
Status: CANCELLED
Start: 2019-12-04

## 2019-12-04 RX ORDER — ACETAMINOPHEN 325 MG/1
650 TABLET ORAL ONCE
Status: COMPLETED | OUTPATIENT
Start: 2019-12-04 | End: 2019-12-04

## 2019-12-04 RX ORDER — EPINEPHRINE 0.3 MG/.3ML
0.3 INJECTION SUBCUTANEOUS ONCE AS NEEDED
Status: CANCELLED
Start: 2019-12-04

## 2019-12-04 RX ORDER — ACETAMINOPHEN 500 MG
500 TABLET ORAL EVERY 4 HOURS PRN
Status: CANCELLED
Start: 2019-12-04

## 2019-12-04 RX ORDER — DIPHENHYDRAMINE HCL 25 MG
25 CAPSULE ORAL ONCE
Status: CANCELLED
Start: 2019-12-04 | End: 2019-12-04

## 2019-12-04 RX ADMIN — ACETAMINOPHEN 650 MG: 325 TABLET, FILM COATED ORAL at 09:33

## 2019-12-04 RX ADMIN — IMMUNE GLOBULIN (HUMAN) 40 G: 10 INJECTION INTRAVENOUS; SUBCUTANEOUS at 09:39

## 2019-12-16 ENCOUNTER — TELEPHONE (OUTPATIENT)
Dept: NEUROLOGY | Facility: CLINIC | Age: 64
End: 2019-12-16

## 2019-12-16 RX ORDER — OXCARBAZEPINE 150 MG/1
300 TABLET, FILM COATED ORAL NIGHTLY
Qty: 60 TABLET | Refills: 11 | Status: SHIPPED | OUTPATIENT
Start: 2019-12-16 | End: 2020-01-02 | Stop reason: SDUPTHER

## 2019-12-16 NOTE — TELEPHONE ENCOUNTER
----- Message from Mel Erickson sent at 12/16/2019 11:35 AM EST -----  [facility] on [address] is telling Ms Medina that a new RX was not called in for Trileptal 150mg twice a day.    Would you please check on this for the patient

## 2019-12-23 RX ORDER — TIZANIDINE 4 MG/1
TABLET ORAL
Qty: 30 TABLET | Refills: 1 | OUTPATIENT
Start: 2019-12-23

## 2020-01-02 ENCOUNTER — INFUSION (OUTPATIENT)
Dept: ONCOLOGY | Facility: HOSPITAL | Age: 65
End: 2020-01-02

## 2020-01-02 ENCOUNTER — TELEPHONE (OUTPATIENT)
Dept: NEUROLOGY | Facility: CLINIC | Age: 65
End: 2020-01-02

## 2020-01-02 VITALS
WEIGHT: 196 LBS | HEART RATE: 77 BPM | BODY MASS INDEX: 32.62 KG/M2 | TEMPERATURE: 97.8 F | DIASTOLIC BLOOD PRESSURE: 77 MMHG | OXYGEN SATURATION: 94 % | SYSTOLIC BLOOD PRESSURE: 120 MMHG | RESPIRATION RATE: 16 BRPM

## 2020-01-02 DIAGNOSIS — D83.9 COMMON VARIABLE IMMUNODEFICIENCY (HCC): Primary | ICD-10-CM

## 2020-01-02 PROCEDURE — 96365 THER/PROPH/DIAG IV INF INIT: CPT | Performed by: NURSE PRACTITIONER

## 2020-01-02 PROCEDURE — 96366 THER/PROPH/DIAG IV INF ADDON: CPT | Performed by: NURSE PRACTITIONER

## 2020-01-02 PROCEDURE — 25010000002 IMMUNE GLOBULIN (HUMAN) 20 GM/200ML SOLUTION: Performed by: ALLERGY & IMMUNOLOGY

## 2020-01-02 RX ORDER — DIPHENHYDRAMINE HCL 25 MG
25 CAPSULE ORAL ONCE
Status: CANCELLED
Start: 2020-01-09

## 2020-01-02 RX ORDER — DIPHENHYDRAMINE HYDROCHLORIDE 50 MG/ML
50 INJECTION INTRAMUSCULAR; INTRAVENOUS ONCE AS NEEDED
Status: CANCELLED
Start: 2020-01-09

## 2020-01-02 RX ORDER — OXCARBAZEPINE 300 MG/1
300 TABLET, FILM COATED ORAL NIGHTLY
Qty: 30 TABLET | Refills: 9 | Status: SHIPPED | OUTPATIENT
Start: 2020-01-02 | End: 2020-02-17 | Stop reason: SDUPTHER

## 2020-01-02 RX ORDER — EPINEPHRINE 0.3 MG/.3ML
0.3 INJECTION SUBCUTANEOUS ONCE AS NEEDED
Status: CANCELLED
Start: 2020-01-09

## 2020-01-02 RX ORDER — ACETAMINOPHEN 325 MG/1
650 TABLET ORAL ONCE
Status: CANCELLED
Start: 2020-01-09

## 2020-01-02 RX ORDER — ACETAMINOPHEN 500 MG
500 TABLET ORAL EVERY 4 HOURS PRN
Status: CANCELLED
Start: 2020-01-09

## 2020-01-02 RX ADMIN — IMMUNE GLOBULIN (HUMAN) 40 G: 10 INJECTION INTRAVENOUS; SUBCUTANEOUS at 09:42

## 2020-01-03 ENCOUNTER — OFFICE VISIT (OUTPATIENT)
Dept: INTERNAL MEDICINE | Facility: CLINIC | Age: 65
End: 2020-01-03

## 2020-01-03 VITALS
HEIGHT: 65 IN | DIASTOLIC BLOOD PRESSURE: 72 MMHG | TEMPERATURE: 97.9 F | OXYGEN SATURATION: 98 % | SYSTOLIC BLOOD PRESSURE: 120 MMHG | BODY MASS INDEX: 32.32 KG/M2 | HEART RATE: 76 BPM | WEIGHT: 194 LBS

## 2020-01-03 DIAGNOSIS — M06.9 RHEUMATOID ARTHRITIS INVOLVING MULTIPLE SITES, UNSPECIFIED RHEUMATOID FACTOR PRESENCE: ICD-10-CM

## 2020-01-03 DIAGNOSIS — D83.9 COMMON VARIABLE IMMUNODEFICIENCY (HCC): ICD-10-CM

## 2020-01-03 DIAGNOSIS — J84.10 PULMONARY FIBROSIS (HCC): ICD-10-CM

## 2020-01-03 DIAGNOSIS — J20.9 ACUTE BRONCHITIS, UNSPECIFIED ORGANISM: Primary | ICD-10-CM

## 2020-01-03 LAB
HCT VFR BLDA CALC: 36.9 % (ref 38–51)
HGB BLDA-MCNC: 11.5 G/DL (ref 12–17)
LYMPHOCYTES # BLD: 15 %
MCH, POC: 31.2
MCHC, POC: 31.1
MCV, POC: 100.4
MONOCYTES # BLD: 2.4 %
PLATELET # BLD: 317 10*3/MM3
PMV BLD: 7.4 FL
POC NEUTROPHIL: 82.6 %
RBC, POC: 3.68
RDW, POC: 14.4
WBC # BLD: 6.4 10*3/UL

## 2020-01-03 PROCEDURE — 99214 OFFICE O/P EST MOD 30 MIN: CPT | Performed by: INTERNAL MEDICINE

## 2020-01-03 PROCEDURE — 85025 COMPLETE CBC W/AUTO DIFF WBC: CPT | Performed by: INTERNAL MEDICINE

## 2020-01-03 PROCEDURE — 71046 X-RAY EXAM CHEST 2 VIEWS: CPT | Performed by: INTERNAL MEDICINE

## 2020-01-03 RX ORDER — DOXYCYCLINE 100 MG/1
100 CAPSULE ORAL EVERY 12 HOURS SCHEDULED
Qty: 20 CAPSULE | Refills: 0 | Status: SHIPPED | OUTPATIENT
Start: 2020-01-03 | End: 2020-02-20

## 2020-01-03 RX ORDER — ALBUTEROL SULFATE 90 UG/1
2 AEROSOL, METERED RESPIRATORY (INHALATION) EVERY 4 HOURS PRN
Qty: 1 INHALER | Refills: 0 | Status: SHIPPED | OUTPATIENT
Start: 2020-01-03 | End: 2020-05-27

## 2020-01-03 RX ORDER — BENZONATATE 200 MG/1
200 CAPSULE ORAL 3 TIMES DAILY PRN
Qty: 30 CAPSULE | Refills: 0 | Status: SHIPPED | OUTPATIENT
Start: 2020-01-03 | End: 2020-02-20

## 2020-01-03 NOTE — PROGRESS NOTES
Subjective     Dara Medina is a 64 y.o. female who presents with   Chief Complaint   Patient presents with   • URI   • Cough       History of Present Illness     One week of symptoms.  Sinus pain and pressure.  Head and nasal congestion.  Ear pain and sore throat.  Now with cough with chest pressure.  Cough is dry.  Subjective fever.  Wheezing and SOA is associated.  Mucinex Dm marginal help.  Symptoms are constant and of moderate severity.  Of concern she has CVID, RA and PF.      Review of Systems   Constitutional: Positive for fatigue and fever.   HENT: Positive for sinus pressure and sore throat.    Respiratory: Positive for cough, chest tightness, shortness of breath and wheezing.    Cardiovascular: Positive for chest pain. Negative for palpitations and leg swelling.       The following portions of the patient's history were reviewed and updated as appropriate: allergies, current medications and problem list.    Patient Active Problem List    Diagnosis Date Noted   • Obesity due to excess calories without serious comorbidity 11/27/2019   • Chronic copper deficiency 09/04/2019   • Low serum copper for age 08/30/2019   • Pain in thoracic spine 08/15/2019   • Left arm numbness 08/15/2019   • Chronic idiopathic constipation 06/18/2019   • Esophagitis 06/18/2019   • Macrocytosis without anemia 03/09/2019   • Acquired lymphocytopenia 03/09/2019   • Tension headache 09/27/2018   • Adverse effect of iron or its compound, sequela 08/29/2018   • Vitamin B12 deficiency 08/29/2018   • Leukemoid reaction 08/29/2018   • Lt facial numbness 03/08/2018   • Abnormal finding on MRI of brain 02/06/2018     Note Last Updated: 2/6/2018 2/2018.  Plan recheck three months.       • Primary osteoarthritis of right knee 11/22/2017     Note Last Updated: 11/22/2017     Added automatically from request for surgery [id number]     • Chronic depression 07/18/2017   • Mixed hyperlipidemia 05/05/2017   • Tear of medial meniscus of right knee,  "current 05/04/2017   • Rheumatoid arthritis (CMS/[facility]) 07/07/2016   • Pulmonary fibrosis (CMS/[facility]) 07/06/2016     Note Last Updated: 10/23/2017     Secondary to methotrexate.       • Iron deficiency anemia 07/06/2016   • Pernicious anemia 07/06/2016   • Common variable immunodeficiency (CMS/[facility]) 07/01/2016   • Hypothyroidism 05/23/2016   • Sensory neuropathy 05/23/2016   • Osteoporosis 05/23/2016     Note Last Updated: 10/23/2017     H/o one year Forteo.  Not on bisphosphonates because of dental issues.  Fosamax in past caused stomach problems.       • Vitamin D deficiency 05/23/2016       Current Outpatient Medications on File Prior to Visit   Medication Sig Dispense Refill   • atorvastatin (LIPITOR) 20 MG tablet TAKE 1 TABLET BY MOUTH DAILY. 90 tablet 3   • azaTHIOprine (IMURAN) 50 MG tablet Take 50 mg by mouth 3 (Three) Times a Day.     • BD INTEGRA SYRINGE 25G X 1\" 3 ML misc USE AS DIRECTED WITH B12 12 each 2   • buPROPion XL (WELLBUTRIN XL) 150 MG 24 hr tablet TAKE 1 TABLET BY MOUTH EVERY MORNING. 30 tablet 3   • BYDUREON BCISE 2 MG/0.85ML auto-injector injection INJECT 0.85 ML UNDER THE SKIN INTO THE APPROPRIATE AREA 1 TIME PER WEEK 3.4 pen 11   • Cholecalciferol (VITAMIN D3) 5000 UNITS capsule capsule Take 5,000 Units by mouth 2 (Two) Times a Day.     • corticotropin (ACTHAR) 80 UNIT/ML injectable gel Inject  into the appropriate muscle as directed by prescriber.     • cyanocobalamin 1000 MCG/ML injection INJECT 1,000 MCG AS DIRECTED EVERY 30 (THIRTY) DAYS. 1 mL 2   • cyclobenzaprine (FLEXERIL) 10 MG tablet      • DULoxetine (CYMBALTA) 60 MG capsule Take 60 mg by mouth Daily.  2   • folic acid (FOLVITE) 1 MG tablet Take 1 mg by mouth daily.     • linaclotide (LINZESS) 290 MCG capsule capsule Take 1 capsule by mouth Every Morning Before Breakfast. 30 capsule 11   • lubiprostone (AMITIZA) 24 MCG capsule Take 1 capsule by mouth 2 (Two) Times a Day With Meals. 60 capsule 11   • omeprazole (priLOSEC) 40 MG capsule " "TAKE 1 CAPSULE BY MOUTH DAILY. 30 capsule 11   • OXcarbazepine (TRILEPTAL) 300 MG tablet Take 1 tablet by mouth Every Night. 30 tablet 9   • riTUXimab (RITUXAN) 100 MG/10ML solution injection Administer RITUXAN 1000mg IV Q 2WK X2 INFUSIONS Q 6 MONTHS     • SYNTHROID 112 MCG tablet TAKE 1 TABLET BY MOUTH DAILY. 90 tablet 3   • topiramate (TOPAMAX) 100 MG tablet Take 1 tablet by mouth Every Night. 90 tablet 3     No current facility-administered medications on file prior to visit.        Objective     /72   Pulse 76   Temp 97.9 °F (36.6 °C)   Ht 165.1 cm (65\")   Wt 88 kg (194 lb)   SpO2 98%   BMI 32.28 kg/m²     Physical Exam   Constitutional: She is oriented to person, place, and time. She appears well-developed and well-nourished.   HENT:   Head: Normocephalic and atraumatic.   Right Ear: Hearing and tympanic membrane normal.   Left Ear: Hearing and tympanic membrane normal.   Mouth/Throat: No oropharyngeal exudate or posterior oropharyngeal erythema.   Cardiovascular: Normal rate, regular rhythm and normal heart sounds.   Pulmonary/Chest: Effort normal. She has rales.   Chronic rales in bases unchanged   Neurological: She is alert and oriented to person, place, and time.   Skin: Skin is warm and dry.   Psychiatric: She has a normal mood and affect. Her behavior is normal.     X-rays of the chest  performed today for following indication:   cough.  X-ray reveal chronic interstitial changes.  There is no available x-ray for comparison.  X-ray sent to radiology for official interpretation and findings.        Assessment/Plan   Dara was seen today for uri and cough.    Diagnoses and all orders for this visit:    Acute bronchitis, unspecified organism  -     XR Chest PA & Lateral  -     POC CBC With / Auto Diff    Common variable immunodeficiency (CMS/HCC)    Rheumatoid arthritis involving multiple sites, unspecified rheumatoid factor presence (CMS/HCC)    Pulmonary fibrosis (CMS/HCC)    Other orders  -     " albuterol sulfate  (90 Base) MCG/ACT inhaler; Inhale 2 puffs Every 4 (Four) Hours As Needed for Wheezing.  -     doxycycline (MONODOX) 100 MG capsule; Take 1 capsule by mouth Every 12 (Twelve) Hours.  -     benzonatate (TESSALON) 200 MG capsule; Take 1 capsule by mouth 3 (Three) Times a Day As Needed for Cough.        Discussion    Patient presents with episode of acute bronchitis.  No obvious infiltrate on CXR.  CBC is WNL.  A prescription for antibiotics is provided today.  The patient is instructed to take along with Mucinex DM, prn inhaler and tessalon perrles.  Let me know they are not feeling better over the next 3 days or if there is any change in symptoms.             Future Appointments   Date Time Provider Department Center   1/13/2020  9:30 AM Graham Gay MD [facility] None   1/28/2020 11:30 AM Cj Null Jr., MD [facility] None   1/30/2020  9:00 AM REFERRED INFUSION CHAIR 12 EP [facility] INFUS [facility]   2/5/2020 10:00 AM Zion Guo MD [facility] None   2/12/2020  9:00 AM LAB CHAIR 2 [facility]   2/19/2020 12:00 PM VITALS ONLY CBC [facility] LAB [facility]   2/19/2020 12:20 PM Ivory Noel MD PhD [facility]   2/27/2020  9:00 AM REFERRED INFUSION CHAIR 12 EP [facility] INFUS EP [facility]   3/26/2020  9:00 AM REFERRED INFUSION CHAIR 12 EP BH INFUS EP [facility]   5/20/2020 10:20 AM [facility] None   5/27/2020  9:30 AM Randi Yang MD [facility] None

## 2020-01-09 RX ORDER — CYANOCOBALAMIN 1000 UG/ML
INJECTION, SOLUTION INTRAMUSCULAR; SUBCUTANEOUS
Qty: 1 ML | Refills: 2 | Status: SHIPPED | OUTPATIENT
Start: 2020-01-09 | End: 2020-03-31

## 2020-01-15 RX ORDER — ATORVASTATIN CALCIUM 20 MG/1
20 TABLET, FILM COATED ORAL DAILY
Qty: 30 TABLET | Refills: 11 | Status: SHIPPED | OUTPATIENT
Start: 2020-01-15 | End: 2021-01-27

## 2020-01-20 RX ORDER — TIZANIDINE 4 MG/1
TABLET ORAL
Qty: 30 TABLET | Refills: 1 | OUTPATIENT
Start: 2020-01-20

## 2020-01-30 ENCOUNTER — INFUSION (OUTPATIENT)
Dept: ONCOLOGY | Facility: HOSPITAL | Age: 65
End: 2020-01-30

## 2020-01-30 VITALS
TEMPERATURE: 97.9 F | RESPIRATION RATE: 16 BRPM | WEIGHT: 198.6 LBS | SYSTOLIC BLOOD PRESSURE: 115 MMHG | HEART RATE: 83 BPM | BODY MASS INDEX: 33.05 KG/M2 | DIASTOLIC BLOOD PRESSURE: 73 MMHG | OXYGEN SATURATION: 93 %

## 2020-01-30 DIAGNOSIS — D83.9 COMMON VARIABLE IMMUNODEFICIENCY (HCC): Primary | ICD-10-CM

## 2020-01-30 PROCEDURE — 96366 THER/PROPH/DIAG IV INF ADDON: CPT | Performed by: NURSE PRACTITIONER

## 2020-01-30 PROCEDURE — 25010000002 IMMUNE GLOBULIN (HUMAN) 20 GM/200ML SOLUTION: Performed by: ALLERGY & IMMUNOLOGY

## 2020-01-30 PROCEDURE — 96365 THER/PROPH/DIAG IV INF INIT: CPT | Performed by: NURSE PRACTITIONER

## 2020-01-30 RX ORDER — DIPHENHYDRAMINE HCL 25 MG
25 CAPSULE ORAL ONCE
Status: CANCELLED
Start: 2020-02-06

## 2020-01-30 RX ORDER — EPINEPHRINE 0.3 MG/.3ML
0.3 INJECTION SUBCUTANEOUS ONCE AS NEEDED
Status: CANCELLED
Start: 2020-02-06

## 2020-01-30 RX ORDER — TOPIRAMATE 25 MG/1
TABLET ORAL
COMMUNITY
Start: 2020-01-11 | End: 2020-02-17 | Stop reason: SDUPTHER

## 2020-01-30 RX ORDER — ACETAMINOPHEN 500 MG
500 TABLET ORAL EVERY 4 HOURS PRN
Status: CANCELLED
Start: 2020-02-06

## 2020-01-30 RX ORDER — CALCIUM CARBONATE/VITAMIN D3 500-10/5ML
LIQUID (ML) ORAL
COMMUNITY
Start: 2020-01-20 | End: 2020-02-19 | Stop reason: SDUPTHER

## 2020-01-30 RX ORDER — ACETAMINOPHEN 325 MG/1
650 TABLET ORAL ONCE
Status: CANCELLED
Start: 2020-02-06

## 2020-01-30 RX ORDER — DIPHENHYDRAMINE HYDROCHLORIDE 50 MG/ML
50 INJECTION INTRAMUSCULAR; INTRAVENOUS ONCE AS NEEDED
Status: CANCELLED
Start: 2020-02-06

## 2020-01-30 RX ADMIN — IMMUNE GLOBULIN (HUMAN) 40 G: 10 INJECTION INTRAVENOUS; SUBCUTANEOUS at 09:13

## 2020-02-10 RX ORDER — TIZANIDINE 4 MG/1
TABLET ORAL
Qty: 30 TABLET | Refills: 1 | OUTPATIENT
Start: 2020-02-10

## 2020-02-12 ENCOUNTER — LAB (OUTPATIENT)
Dept: LAB | Facility: HOSPITAL | Age: 65
End: 2020-02-12

## 2020-02-12 DIAGNOSIS — D75.89 MACROCYTOSIS WITHOUT ANEMIA: ICD-10-CM

## 2020-02-12 DIAGNOSIS — D50.9 IRON DEFICIENCY ANEMIA, UNSPECIFIED IRON DEFICIENCY ANEMIA TYPE: ICD-10-CM

## 2020-02-12 LAB
ALBUMIN SERPL-MCNC: 4.2 G/DL (ref 3.5–5.2)
ALBUMIN/GLOB SERPL: 1.3 G/DL (ref 1.1–2.4)
ALP SERPL-CCNC: 95 U/L (ref 38–116)
ALT SERPL W P-5'-P-CCNC: 20 U/L (ref 0–33)
ANION GAP SERPL CALCULATED.3IONS-SCNC: 14.2 MMOL/L (ref 5–15)
AST SERPL-CCNC: 16 U/L (ref 0–32)
BASOPHILS # BLD AUTO: 0.02 10*3/MM3 (ref 0–0.2)
BASOPHILS NFR BLD AUTO: 0.3 % (ref 0–1.5)
BILIRUB SERPL-MCNC: 0.3 MG/DL (ref 0.2–1.2)
BUN BLD-MCNC: 15 MG/DL (ref 6–20)
BUN/CREAT SERPL: 14 (ref 7.3–30)
CALCIUM SPEC-SCNC: 9.4 MG/DL (ref 8.5–10.2)
CHLORIDE SERPL-SCNC: 106 MMOL/L (ref 98–107)
CO2 SERPL-SCNC: 20.8 MMOL/L (ref 22–29)
CREAT BLD-MCNC: 1.07 MG/DL (ref 0.6–1.1)
DEPRECATED RDW RBC AUTO: 51.7 FL (ref 37–54)
EOSINOPHIL # BLD AUTO: 0.1 10*3/MM3 (ref 0–0.4)
EOSINOPHIL NFR BLD AUTO: 1.6 % (ref 0.3–6.2)
ERYTHROCYTE [DISTWIDTH] IN BLOOD BY AUTOMATED COUNT: 13.6 % (ref 12.3–15.4)
FERRITIN SERPL-MCNC: 236.1 NG/ML (ref 13–150)
GFR SERPL CREATININE-BSD FRML MDRD: 52 ML/MIN/1.73
GLOBULIN UR ELPH-MCNC: 3.3 GM/DL (ref 1.8–3.5)
GLUCOSE BLD-MCNC: 103 MG/DL (ref 74–124)
HCT VFR BLD AUTO: 44.2 % (ref 34–46.6)
HGB BLD-MCNC: 14.6 G/DL (ref 12–15.9)
IMM GRANULOCYTES # BLD AUTO: 0.02 10*3/MM3 (ref 0–0.05)
IMM GRANULOCYTES NFR BLD AUTO: 0.3 % (ref 0–0.5)
IRON 24H UR-MRATE: 73 MCG/DL (ref 37–145)
IRON SATN MFR SERPL: 26 % (ref 14–48)
LYMPHOCYTES # BLD AUTO: 0.43 10*3/MM3 (ref 0.7–3.1)
LYMPHOCYTES NFR BLD AUTO: 6.7 % (ref 19.6–45.3)
MCH RBC QN AUTO: 34 PG (ref 26.6–33)
MCHC RBC AUTO-ENTMCNC: 33 G/DL (ref 31.5–35.7)
MCV RBC AUTO: 103 FL (ref 79–97)
MONOCYTES # BLD AUTO: 0.46 10*3/MM3 (ref 0.1–0.9)
MONOCYTES NFR BLD AUTO: 7.2 % (ref 5–12)
NEUTROPHILS # BLD AUTO: 5.35 10*3/MM3 (ref 1.7–7)
NEUTROPHILS NFR BLD AUTO: 83.9 % (ref 42.7–76)
NRBC BLD AUTO-RTO: 0 /100 WBC (ref 0–0.2)
PLATELET # BLD AUTO: 348 10*3/MM3 (ref 140–450)
PMV BLD AUTO: 9.9 FL (ref 6–12)
POTASSIUM BLD-SCNC: 4.6 MMOL/L (ref 3.5–4.7)
PROT SERPL-MCNC: 7.5 G/DL (ref 6.3–8)
RBC # BLD AUTO: 4.29 10*6/MM3 (ref 3.77–5.28)
SODIUM BLD-SCNC: 141 MMOL/L (ref 134–145)
TIBC SERPL-MCNC: 281 MCG/DL (ref 249–505)
TRANSFERRIN SERPL-MCNC: 201 MG/DL (ref 200–360)
WBC NRBC COR # BLD: 6.38 10*3/MM3 (ref 3.4–10.8)

## 2020-02-12 PROCEDURE — 80053 COMPREHEN METABOLIC PANEL: CPT

## 2020-02-12 PROCEDURE — 84466 ASSAY OF TRANSFERRIN: CPT

## 2020-02-12 PROCEDURE — 85025 COMPLETE CBC W/AUTO DIFF WBC: CPT

## 2020-02-12 PROCEDURE — 36415 COLL VENOUS BLD VENIPUNCTURE: CPT

## 2020-02-12 PROCEDURE — 83540 ASSAY OF IRON: CPT

## 2020-02-12 PROCEDURE — 82728 ASSAY OF FERRITIN: CPT

## 2020-02-13 LAB
FOLATE BLD-MCNC: 485 NG/ML
FOLATE RBC-MCNC: 1174 NG/ML
HCT VFR BLD AUTO: 41.3 % (ref 34–46.6)

## 2020-02-14 LAB — COPPER SERPL-MCNC: 60 UG/DL (ref 72–166)

## 2020-02-17 ENCOUNTER — OFFICE VISIT (OUTPATIENT)
Dept: NEUROLOGY | Facility: CLINIC | Age: 65
End: 2020-02-17

## 2020-02-17 VITALS
HEIGHT: 65 IN | DIASTOLIC BLOOD PRESSURE: 78 MMHG | WEIGHT: 195 LBS | SYSTOLIC BLOOD PRESSURE: 124 MMHG | BODY MASS INDEX: 32.49 KG/M2

## 2020-02-17 DIAGNOSIS — R90.89 ABNORMAL FINDING ON MRI OF BRAIN: Primary | ICD-10-CM

## 2020-02-17 DIAGNOSIS — G62.9 SENSORY NEUROPATHY: ICD-10-CM

## 2020-02-17 DIAGNOSIS — R20.0 LT FACIAL NUMBNESS: ICD-10-CM

## 2020-02-17 PROCEDURE — 99213 OFFICE O/P EST LOW 20 MIN: CPT | Performed by: PSYCHIATRY & NEUROLOGY

## 2020-02-17 RX ORDER — OXCARBAZEPINE 300 MG/1
TABLET, FILM COATED ORAL
Qty: 30 TABLET | Refills: 9 | Status: SHIPPED | OUTPATIENT
Start: 2020-02-17 | End: 2020-05-27

## 2020-02-17 RX ORDER — TOPIRAMATE 100 MG/1
100 TABLET, FILM COATED ORAL NIGHTLY
Qty: 90 TABLET | Refills: 3 | Status: SHIPPED | OUTPATIENT
Start: 2020-02-17 | End: 2020-02-17 | Stop reason: SDUPTHER

## 2020-02-17 RX ORDER — TOPIRAMATE 100 MG/1
100 TABLET, FILM COATED ORAL NIGHTLY
Qty: 30 TABLET | Refills: 11 | Status: SHIPPED | OUTPATIENT
Start: 2020-02-17 | End: 2020-08-12 | Stop reason: SDUPTHER

## 2020-02-17 RX ORDER — TOPIRAMATE 25 MG/1
25 TABLET ORAL NIGHTLY
Qty: 30 TABLET | Refills: 11 | Status: SHIPPED | OUTPATIENT
Start: 2020-02-17 | End: 2020-08-12 | Stop reason: SDUPTHER

## 2020-02-17 RX ORDER — TOPIRAMATE 25 MG/1
25 TABLET ORAL NIGHTLY
Qty: 30 TABLET | Refills: 1 | Status: SHIPPED | OUTPATIENT
Start: 2020-02-17 | End: 2020-02-17 | Stop reason: SDUPTHER

## 2020-02-17 NOTE — PROGRESS NOTES
"Subjective:     Patient ID: Dara Medina is a 64 y.o. female.    History of Present Illness    Patient was seen back in the office for follow-up of left facial numbness including eyelids and also a history of headaches.  Headaches have improved.  She had abnormal MRI of the brain which was repeated recently in October and showed actually some improvement of the right frontal subcortical T2 lesion.  Having more twitching of her left.  Vision is shallowly at times.  Has been seen by the eye doctor.  The following portions of the patient's history were reviewed and updated as appropriate: allergies, current medications, past family history, past medical history, past social history, past surgical history and problem list.      Current Outpatient Medications:   •  albuterol sulfate  (90 Base) MCG/ACT inhaler, Inhale 2 puffs Every 4 (Four) Hours As Needed for Wheezing., Disp: 1 inhaler, Rfl: 0  •  atorvastatin (LIPITOR) 20 MG tablet, TAKE 1 TABLET BY MOUTH DAILY., Disp: 30 tablet, Rfl: 11  •  azaTHIOprine (IMURAN) 50 MG tablet, Take 50 mg by mouth 3 (Three) Times a Day., Disp: , Rfl:   •  BD INTEGRA SYRINGE 25G X 1\" 3 ML misc, USE AS DIRECTED WITH B12, Disp: 12 each, Rfl: 2  •  benzonatate (TESSALON) 200 MG capsule, Take 1 capsule by mouth 3 (Three) Times a Day As Needed for Cough., Disp: 30 capsule, Rfl: 0  •  buPROPion XL (WELLBUTRIN XL) 150 MG 24 hr tablet, TAKE 1 TABLET BY MOUTH EVERY MORNING., Disp: 30 tablet, Rfl: 3  •  BYDUREON BCISE 2 MG/0.85ML auto-injector injection, INJECT 0.85 ML UNDER THE SKIN INTO THE APPROPRIATE AREA 1 TIME PER WEEK, Disp: 3.4 pen, Rfl: 11  •  Cholecalciferol (VITAMIN D3) 5000 UNITS capsule capsule, Take 5,000 Units by mouth 2 (Two) Times a Day., Disp: , Rfl:   •  COPPER CAPS 2 MG capsule, TAKE 1 CAPSULES BY MOUTH DAILY, Disp: , Rfl:   •  corticotropin (ACTHAR) 80 UNIT/ML injectable gel, Inject  into the appropriate muscle as directed by prescriber., Disp: , Rfl:   •  cyanocobalamin " 1000 MCG/ML injection, INJECT 1,000 MCG AS DIRECTED EVERY 30 (THIRTY) DAYS., Disp: 1 mL, Rfl: 2  •  cyclobenzaprine (FLEXERIL) 10 MG tablet, , Disp: , Rfl:   •  doxycycline (MONODOX) 100 MG capsule, Take 1 capsule by mouth Every 12 (Twelve) Hours., Disp: 20 capsule, Rfl: 0  •  DULoxetine (CYMBALTA) 60 MG capsule, Take 60 mg by mouth Daily., Disp: , Rfl: 2  •  folic acid (FOLVITE) 1 MG tablet, Take 1 mg by mouth daily., Disp: , Rfl:   •  linaclotide (LINZESS) 290 MCG capsule capsule, Take 1 capsule by mouth Every Morning Before Breakfast., Disp: 30 capsule, Rfl: 11  •  lubiprostone (AMITIZA) 24 MCG capsule, Take 1 capsule by mouth 2 (Two) Times a Day With Meals., Disp: 60 capsule, Rfl: 11  •  omeprazole (priLOSEC) 40 MG capsule, TAKE 1 CAPSULE BY MOUTH DAILY., Disp: 30 capsule, Rfl: 11  •  OXcarbazepine (TRILEPTAL) 300 MG tablet, 1/2 am, 1 hs, Disp: 30 tablet, Rfl: 9  •  riTUXimab (RITUXAN) 100 MG/10ML solution injection, Administer RITUXAN 1000mg IV Q 2WK X2 INFUSIONS Q 6 MONTHS, Disp: , Rfl:   •  SYNTHROID 112 MCG tablet, TAKE 1 TABLET BY MOUTH DAILY., Disp: 90 tablet, Rfl: 3  •  topiramate (TOPAMAX) 100 MG tablet, Take 1 tablet by mouth Every Night., Disp: 30 tablet, Rfl: 11  •  topiramate (TOPAMAX) 25 MG tablet, Take 1 tablet by mouth Every Night., Disp: 30 tablet, Rfl: 11    Review of Systems   Eyes: Positive for visual disturbance (left eye numb). Negative for pain and redness.   Neurological: Negative for dizziness, tremors, seizures, syncope, facial asymmetry, speech difficulty, weakness, light-headedness, numbness and headaches.          I have reviewed ROS completed by medical assistant.     Objective:    Neurologic Exam  Mental status examination was appropriate.  Funduscopy, visual fields, eye movements and pupillary reflexes were normal.  No facial weakness was noted.  Gait was normal.  No pattern of focal weakness was noted.  Assessment/Plan:     Dara was seen today for polyneuropathy.    Diagnoses and  all orders for this visit:    Abnormal finding on MRI of brain    Sensory neuropathy    Lt facial numbness    Other orders  -     Discontinue: topiramate (TOPAMAX) 25 MG tablet; Take 1 tablet by mouth Every Night.  -     Discontinue: topiramate (TOPAMAX) 100 MG tablet; Take 1 tablet by mouth Every Night.  -     topiramate (TOPAMAX) 25 MG tablet; Take 1 tablet by mouth Every Night.  -     topiramate (TOPAMAX) 100 MG tablet; Take 1 tablet by mouth Every Night.  -     OXcarbazepine (TRILEPTAL) 300 MG tablet; 1/2 am, 1 hs       Topiramate no change-125 mg at night.  Increased Trileptal to 150 mg morning and 300 mg at night.  Prescription drug management - meds as above    Follow-up in the office in 6 months. Thank you for allowing me to share in the care of this patient.  Cj Null M.D.

## 2020-02-19 ENCOUNTER — APPOINTMENT (OUTPATIENT)
Dept: LAB | Facility: HOSPITAL | Age: 65
End: 2020-02-19

## 2020-02-19 ENCOUNTER — OFFICE VISIT (OUTPATIENT)
Dept: ONCOLOGY | Facility: CLINIC | Age: 65
End: 2020-02-19

## 2020-02-19 VITALS
DIASTOLIC BLOOD PRESSURE: 81 MMHG | TEMPERATURE: 98.1 F | OXYGEN SATURATION: 95 % | HEART RATE: 89 BPM | HEIGHT: 65 IN | RESPIRATION RATE: 16 BRPM | BODY MASS INDEX: 32.45 KG/M2 | SYSTOLIC BLOOD PRESSURE: 125 MMHG

## 2020-02-19 DIAGNOSIS — R79.0 LOW SERUM COPPER FOR AGE: ICD-10-CM

## 2020-02-19 DIAGNOSIS — E53.8 VITAMIN B12 DEFICIENCY: ICD-10-CM

## 2020-02-19 DIAGNOSIS — D50.9 IRON DEFICIENCY ANEMIA, UNSPECIFIED IRON DEFICIENCY ANEMIA TYPE: ICD-10-CM

## 2020-02-19 DIAGNOSIS — M06.9 RHEUMATOID ARTHRITIS INVOLVING MULTIPLE SITES, UNSPECIFIED RHEUMATOID FACTOR PRESENCE: ICD-10-CM

## 2020-02-19 DIAGNOSIS — D75.89 MACROCYTOSIS WITHOUT ANEMIA: Primary | ICD-10-CM

## 2020-02-19 DIAGNOSIS — D47.2 IGG MONOCLONAL GAMMOPATHY OF UNCERTAIN SIGNIFICANCE: ICD-10-CM

## 2020-02-19 PROCEDURE — 99214 OFFICE O/P EST MOD 30 MIN: CPT | Performed by: INTERNAL MEDICINE

## 2020-02-19 RX ORDER — CALCIUM CARBONATE/VITAMIN D3 500-10/5ML
2 LIQUID (ML) ORAL 2 TIMES DAILY
Qty: 180 CAPSULE | Refills: 3 | Status: SHIPPED | OUTPATIENT
Start: 2020-02-19 | End: 2020-08-13 | Stop reason: SDUPTHER

## 2020-02-19 NOTE — PROGRESS NOTES
REASON FOR FOLLOW UP:     1. Iron deficiency anemia, recurrent, not able to tolerate oral iron treatment due to severe constipation.  Patient was given PRBC transfusion and IV iron therapy in 2016.    2. Patient has a recurrent severe iron deficiency in late August 2018, and was given Injectafer 2 doses in September 2018.  Had great response with normalization of hemoglobin and iron studies.  3. Persistent worsening macrocytosis.  Patient was found to having copper deficiency.  Patient started on oral copper gluconate 2 mg once daily on 9/4/2019.  Patient is also on Imuran for rheumatoid disease.    4. Monoclonal gammopathy of undetermined significance, IgG lambda, unable to be quantified by SPEP on 9/23/2020.    5. The most recent laboratory study on 2/12/2020 reported persistent copper deficiency.      HISTORY OF PRESENT ILLNESS:  The patient is a 64 y.o. female who is here for 6-month re-evaluation of her iron deficiency anemia, copper deficiency and macrocytosis with laboratory review.  The patient presents by herself today.    The patient reports feeling well overall today.  She notes some neck pain, but denies any adenopathy.  She has not been checking herself regularly for adenopathy.    She continues on monthly Vitamin B12 injections.  She feels more energized right after receiving the B-12 injections but notes this is not long lasting.  She is also taking the oral copper gluconate 2 mg once daily with good tolerance.  She has a good performance status.      The patient is followed by rheumatology for her rheumatoid arthritis.  She has been on Rituxan for 2 years and oral Azathioprine 100 mg two times a day for the past 4-5 years, with good control of her RA.  She notes some joint swelling in her hands and fingers, but she denies any leg swelling.      Recent laboratory studies on 2/12/2020 showed persistent copper deficiency at 60 mcg/dL.  She has, however with normal hemoglobin 14.6.  She also has  normal  her iron level reported ferritin 236, free iron 73 TIBC 281 and iron saturation 26%.  She has 0 and platelets 340,000.  Excellent RBC folate 1174 ng/mL.    Lab study on 11/20/2019 reported excellent vitamin B12 level more than 2000 pg/mL.     On 9/23/2019, patient was found to have a monoclonal gammopathy, IgG lambda subtype however unable to be quantified by SPEP.She had a normal serum IgG 811 mg/dL, IgA 100, IgM 20.  No free light chains were measured.    Past Medical History:   Diagnosis Date   • Acute colitis 1/18/2017   • Allergic Codeine, some antibiotics   • Anemia    • Cataract Removed    2012   • Chronic depression    • Colon polyp 1 removed    2016   • Diabetes mellitus (CMS/HCC)    • Fracture of rib    • GERD (gastroesophageal reflux disease)    • H/O Wrist fracture, right    • Headache    • History of bronchitis    • History of pneumonia    • History of transfusion    • Hyperlipidemia    • Hypothyroidism    • Inflammatory bowel disease    • Iron deficiency    • Irritable bowel syndrome    • Low serum copper for age 8/30/2019   • Migraine    • Obesity    • Osteopenia    • Osteoporosis    • Pneumonia June 2016   • RA (rheumatoid arthritis) (CMS/HCC)    • Sensory neuropathy    • Sepsis, unspecified organism (CMS/HCC) 1/18/2017   • Vitamin D deficiency      Past Surgical History:   Procedure Laterality Date   • ADENOIDECTOMY     • BREAST AUGMENTATION     • BREAST IMPLANT REMOVAL Bilateral    • CARPAL TUNNEL RELEASE Left 2015   • COLONOSCOPY     • EYE SURGERY Bilateral    • HYSTERECTOMY     • LASIK Bilateral    • ORIF WRIST FRACTURE Right    • MI TOTAL KNEE ARTHROPLASTY Right 12/14/2017    Procedure: TOTAL KNEE ARTHROPLASTY;  Surgeon: Zion Guo MD;  Location: [facility];  Service: Orthopedics   • SINUS SURGERY      x2   • TONSILLECTOMY         HEMATOLOGIC/ONCOLOGIC HISTORY:  The patient is a 63 y.o. year old femalewho is here for evaluation and management of her anemia. This patient  has chronic disease including rheumatoid arthritis. She was on methotrexate for about 11 years and currently on Imuran and also history for pernicious anemia/vitamin B12 deficiency undergoing monthly intramuscular injection, history of hyperlipidemia, hypothyroidism, gastroesophageal reflux disease and rheumatoid arthritis. The patient reports she was not able to tolerate oral iron treatment because of significant constipation. The last time she was taking oral iron was about 2-3 years ago at which time she had significant anemia and was given 2 units PRBC transfusion. She was also given intravenous iron therapy at that time. Patient reports she had GI evaluation by Dr. Celeste with both EGD and a colonoscopic examination a couple of years ago. I searched electronic medical records at [facility] and according to Dr. Celeste’s clinical note in 08/2016 and 11/2016, she had both EGD and colonoscopic examination supposedly in August; however, I could not find the procedure note itself. Nevertheless, patient reports she was told not having malignancy. I did find the pathology evaluation on 08/24/2016, which reported mild to focally moderate chronic active gastritis with intestinal metaplasia; negative for H. pylori. The small intestinal biopsy was benign. No signs for abnormal histology. The distal esophagus shows moderate chronic active inflammation and no intestinal metaplasia or dysplasia. The descending colon polyp was tubular adenoma with low-grade dysplasia. Patient reports no melena, no hematochezia. She does have pica for ice chips.     Patient reports she was diagnosed of rheumatoid arthritis in 2003. She was on methotrexate from 2003 to 2014. Subsequently treatment changed to Imuran. Patient reports she recently has been on large dose prednisone 40 mg daily for about 1 year and is in the process of tapering down of her prednisone. She also previously was given Rituxan treatment about 10  years ago. Most recently about 4 months ago she was restarted back on Rituxan.     Patient also reports about 2 years ago, she had recurrent pneumonia and hospitalization and since that time she has been given IVIG regularly for the past 2 years and since that time she has been doing very well. No recurrent pneumonia.     Patient complains of significant fatigue, pica for ice chips. She also complains of exertional dyspnea. No cough or hemoptysis. She has also soreness on her tongue. She has vague intermittent abdominal pain and constipation. Denies melena or hematochezia. No nausea. No vomiting. She has some weight gain secondary to long-term oral steroid use. She also reports heat intolerance. Patient continues to have joint swelling and pain from her rheumatoid arthritis. She also has chronic low back pain. She reports intermittent headaches and also numbness involving her extremities mostly on hands but denies fainting or syncope. She has no easy bleeding or bruising.    I reviewed her laboratory results within the [facility] system. She had most recent iron study on 04/27/2018, which reported iron deficiency with ferritin 8.28 ng/mL, serum free iron 32 mcg/dL with no iron saturation. She had supratherapeutic folic acid level more than 20 ng/mL. However, had low normal vitamin B12 level 346 pg/mL. She had mild anemia; hemoglobin 10.7, MCV 92.2, MCHC 29.3. Her platelets were 506,000 and WBC 10,990 including neutrophils 46713, lymphocytes 780 and monocytes 410. Her chemistry lab updated earlier on 04/23/2018 reported creatinine 1.02. Normal electrolytes. Glucose 190. Marginally elevated alkaline phosphatase 118 and otherwise normal liver function panel. Total serum protein 7.2 and albumin 3.9. Had a normal TSH of 1.57.     On 01/22/2017, she had serum free iron 31, TIBC 463 and iron saturation 7% without ferritin level. Folic acid was more than 20 ng/mL and vitamin B12 was 1022 pg/mL. She had  hemoglobin 11.4, MCV 85.1, MCHC 29.8. Her platelets were 487,000 and WBC was 23,700. Apparently, patient was hospitalized at that time at [facility] for about a week because of sepsis and acute colitis. Laboratory study on 06/06/2016 reported serum ferritin 9.0 ng/mL, free iron 22, TIBC 416 and iron saturation 5%. Her vitamin B12 level was 287 pg/mL and RBC folic acid was 1575 ng/mL. Her hemoglobin was 8.6, MCV 76.3, MCHC 29.4. Platelets were 617,000 and WBC 12,000. This patient had worsening hemoglobin on 06/07/2016 with hemoglobin 7.6 and she was given 2 units PRBC transfusion. Post transfusion hemoglobin was 10.4 on 06/08/2016. It was at that time the patient had hospitalization for pneumonia.    This patient has chronic iron deficiency for the past several years. She was not able to tolerate oral iron treatment because of severe constipation. She previously in 2016 received 2 units PRBC transfusion and intravenous iron therapy. She had workup with both EGD and colonoscopy in 08/2016 shortly after her discharge from hospital at that time. Procedure report was not able to be found, however, I reviewed pathology report from that procedure. There was chronic gastritis and esophagitis but no report of ulceration. She also had benign colon polyp.     Laboratory study on 8/29/2018 reported anemia Hb 10.3, platelets 412,000 and WBC 11,400 including in C 9800.  Iron study reported a ferritin less than 5 ng/mL, free iron 32 TIBC 454 and iron saturation 7%.    This patient is symptomatic with profound fatigue and pica for ice chips. I discussed with the patient, recommend stat iron study and arrange for her intravenous iron therapy with Injectafer 750 mg once a week for 2 doses in September 2018.      Patient had resolution of pica for ice chips after IV iron therapy.  Repeated laboratory study on 9/27/2018 reported normalized iron saturation 29%, and supratherapeutic ferritin 553 ng/mL.  She had a  "normalization of hemoglobin 12.4 and platelets 307,000.    Laboratory study on 8/26/2019 reported normal Hb 14.5 however worsening macrocytosis .0.  She has normal WBC 10,650, but elevated ANC 9950 and decreased lymphocytes 250.  She has normal platelets 317,000.  Her iron study was normal reporting ferritin 216, iron saturation 35%, slightly supratherapeutic B12 level at 1220 pg/mL, however her copper deficiency 68 mcg/dL.     Patient was started on copper gluconate 2 mg daily.      MEDICATIONS    Current Outpatient Medications:   •  albuterol sulfate  (90 Base) MCG/ACT inhaler, Inhale 2 puffs Every 4 (Four) Hours As Needed for Wheezing., Disp: 1 inhaler, Rfl: 0  •  atorvastatin (LIPITOR) 20 MG tablet, TAKE 1 TABLET BY MOUTH DAILY., Disp: 30 tablet, Rfl: 11  •  azaTHIOprine (IMURAN) 50 MG tablet, Take 50 mg by mouth 3 (Three) Times a Day., Disp: , Rfl:   •  BD INTEGRA SYRINGE 25G X 1\" 3 ML misc, USE AS DIRECTED WITH B12, Disp: 12 each, Rfl: 2  •  buPROPion XL (WELLBUTRIN XL) 150 MG 24 hr tablet, TAKE 1 TABLET BY MOUTH EVERY MORNING., Disp: 30 tablet, Rfl: 3  •  BYDUREON BCISE 2 MG/0.85ML auto-injector injection, INJECT 0.85 ML UNDER THE SKIN INTO THE APPROPRIATE AREA 1 TIME PER WEEK, Disp: 3.4 pen, Rfl: 11  •  Cholecalciferol (VITAMIN D3) 5000 UNITS capsule capsule, Take 5,000 Units by mouth 2 (Two) Times a Day., Disp: , Rfl:   •  COPPER CAPS 2 MG capsule, Take 2 mg by mouth 2 (Two) Times a Day., Disp: 180 capsule, Rfl: 3  •  corticotropin (ACTHAR) 80 UNIT/ML injectable gel, Inject  into the appropriate muscle as directed by prescriber., Disp: , Rfl:   •  cyanocobalamin 1000 MCG/ML injection, INJECT 1,000 MCG AS DIRECTED EVERY 30 (THIRTY) DAYS., Disp: 1 mL, Rfl: 2  •  cyclobenzaprine (FLEXERIL) 10 MG tablet, , Disp: , Rfl:   •  DULoxetine (CYMBALTA) 60 MG capsule, Take 60 mg by mouth Daily., Disp: , Rfl: 2  •  folic acid (FOLVITE) 1 MG tablet, Take 1 mg by mouth daily., Disp: , Rfl:   •  linaclotide " (LINZESS) 290 MCG capsule capsule, Take 1 capsule by mouth Every Morning Before Breakfast., Disp: 30 capsule, Rfl: 11  •  lubiprostone (AMITIZA) 24 MCG capsule, Take 1 capsule by mouth 2 (Two) Times a Day With Meals., Disp: 60 capsule, Rfl: 11  •  omeprazole (priLOSEC) 40 MG capsule, TAKE 1 CAPSULE BY MOUTH DAILY., Disp: 30 capsule, Rfl: 11  •  OXcarbazepine (TRILEPTAL) 300 MG tablet, 1/2 am, 1 hs, Disp: 30 tablet, Rfl: 9  •  riTUXimab (RITUXAN) 100 MG/10ML solution injection, Administer RITUXAN 1000mg IV Q 2WK X2 INFUSIONS Q 6 MONTHS, Disp: , Rfl:   •  SYNTHROID 112 MCG tablet, TAKE 1 TABLET BY MOUTH DAILY., Disp: 90 tablet, Rfl: 3  •  topiramate (TOPAMAX) 100 MG tablet, Take 1 tablet by mouth Every Night., Disp: 30 tablet, Rfl: 11  •  topiramate (TOPAMAX) 25 MG tablet, Take 1 tablet by mouth Every Night., Disp: 30 tablet, Rfl: 11  •  benzonatate (TESSALON) 200 MG capsule, Take 1 capsule by mouth 3 (Three) Times a Day As Needed for Cough., Disp: 30 capsule, Rfl: 0    ALLERGIES:     Allergies   Allergen Reactions   • Amoxicillin-Pot Clavulanate Diarrhea     itching   • Clavulanic Acid Provider Review Needed   • Codeine Nausea And Vomiting       SOCIAL HISTORY:       Social History     Socioeconomic History   • Marital status:      Spouse name: Not on file   • Number of children: 2   • Years of education: College   • Highest education level: Not on file   Occupational History   • Occupation: Homemaker     Employer: RETIRED   Tobacco Use   • Smoking status: Former Smoker     Packs/day: 0.50     Years: 5.00     Pack years: 2.50     Types: Cigarettes     Start date: 1974     Last attempt to quit: 1978     Years since quittin.9   • Smokeless tobacco: Never Used   • Tobacco comment: QUIT AGE 23   Substance and Sexual Activity   • Alcohol use: No     Comment: STOPPED    • Drug use: No        FAMILY HISTORY:   Father diagnosed of colon cancer at age 64,  of colon cancer age 65.  Mother is in  "excellent health condition in her early 90s.  Patient has a brother and a sister both living excellent condition.  Patient has 2 adult children both in excellent health condition.    Family History   Problem Relation Age of Onset   • Hyperlipidemia Mother    • Hypertension Mother    • Migraines Mother    • Colon cancer Father    • Diabetes Father    • Cancer Father    • Hyperlipidemia Brother    • Migraines Brother    • Migraines Sister    • Malig Hyperthermia Neg Hx        REVIEW OF SYSTEMS:  Review of Systems   Constitutional: Negative for appetite change, fatigue, fever and unexpected weight change.   HENT: Negative for mouth sores, rhinorrhea and sore throat.    Eyes: Negative for pain and visual disturbance.   Respiratory: Negative for cough and shortness of breath.    Cardiovascular: Negative for chest pain, palpitations and leg swelling.   Gastrointestinal: Positive for constipation. Negative for abdominal pain, blood in stool and nausea.   Endocrine: Positive for heat intolerance. Negative for polyphagia.   Genitourinary: Negative for dysuria and hematuria.   Musculoskeletal: Positive for arthralgias, joint swelling (hands) and neck pain.   Skin: Negative for rash.   Allergic/Immunologic: Positive for immunocompromised state (On Rituxan treatment for her rheumatoid arthritis).   Hematological: Negative for adenopathy. Does not bruise/bleed easily.   Psychiatric/Behavioral: Negative for agitation.          Vitals:    02/19/20 1206   BP: 125/81   Pulse: 89   Resp: 16   Temp: 98.1 °F (36.7 °C)   TempSrc: Oral   SpO2: 95%   Weight: Comment: refused   Height: 165.1 cm (65\")   PainSc: 0-No pain     Current Status 2/19/2020   ECOG score 0      PHYSICAL EXAM:      CONSTITUTIONAL:  Vital signs reviewed.  Well-developed, well-nourished  female, no distress, looks comfortable.  EYES:  Conjunctiva and lids unremarkable.  PERRLA  EARS,NOSE,MOUTH,THROAT: Nose appear unremarkable.  Lips appear " unremarkable.  RESPIRATORY:  Normal respiratory effort.  Lungs clear to auscultation bilaterally.  CARDIOVASCULAR: Regular rhythm and rate.  Normal S1, S2.  No murmurs rubs or gallops.  No significant lower extremity edema.  GASTROINTESTINAL: Abdomen appears unremarkable.  Bowel sounds normal.  Nontender.  No hepatomegaly.  No splenomegaly.   LYMPHATIC:  No cervical, supraclavicular, axillary lymphadenopathy.  MUSCULOSKELETAL:  Unremarkable gait and station.  Unremarkable digits/nails.  No cyanosis or clubbing.  SKIN:  Warm.  No rashes.  PSYCHIATRIC:  Normal judgment and insight.  Normal mood and affect.     RECENT LABS:  Lab Results   Component Value Date    WBC 6.38 02/12/2020    HGB 14.6 02/12/2020    HCT 41.3 02/12/2020    HCT 44.2 02/12/2020    .0 (H) 02/12/2020     02/12/2020     Lab Results   Component Value Date    NEUTROABS 5.35 02/12/2020     Lab Results   Component Value Date    MNJGGAVT91 >2000 (H) 11/20/2019     Lab Results   Component Value Date    FOLATE >20.00 04/27/2018     Lab Results   Component Value Date    IRON 73 02/12/2020    TIBC 281 02/12/2020    FERRITIN 236.10 (H) 02/12/2020   On 2/12/2020 iron saturation 26%     Serum copper 60 mcg/dL 2/12/2020, 68 mcg/mL on 8/26/2019.    Assessment/Plan      1. Recurrent iron deficiency in the past.   · She was not able to tolerate oral iron treatment because of severe constipation. She previously in 2016 received 2 units PRBC transfusion and intravenous iron therapy. She had workup with both EGD and colonoscopy in 08/2016 with pathology evaluation reported chronic gastritis and esophagitis but no report of ulceration.    · This patient is symptomatic with profound fatigue and pica for ice chips and recurrent iron deficiency anemia in August 2018.  We treated her with 2 doses of Injectafer in September 2018.   · This patient had resolution of pica for ice chips and improved energy level, normalization of hemoglobin after IV iron therapy.   Post treatment ferritin was 553 on 9/26/2018.  3/8/2019 laboratory study reported worsened but is still normal ferritin at 240 mg/mL, and good iron saturation 33%.   · On 08/26/2019, laboratory study reported ferritin at 216 and iron saturation of 35%.  · Laboratory studies on 2/12/2020 showed excellent ferritin 236 and iron saturation 26%.     2. Pernicious anemia/vitamin B12 deficiency. This patient has monthly vitamin B12 injection.   I advised patient to start taking oral vitamin B12 at 1000 mcg daily.   · On 3/8/2019 her vitamin B12 level is 701 pg/mL, not as high as expected.  · On 08/26/2019, Vitamin B12 reported supratheraputicat 1,220, patient is to continue on Vitamin B12 injections.  · Patient had excellent supratherapeutic B12 level more than 2000 on 11/20/2019..    3.  Macrocytosis despite normal vitamin B12 level.  Her macrocytosis is newly developed in September 2018.  Patient reports no history of hepatitis or alcohol use.    · She previously had supratherapeutic folic acid level in April 2018.    · Macrocytosis needs to be monitored.  Could this caused by medication Imuran use for her rheumatoid arthritis?    · I discussed with patient 3/8/2019.  Her previous CT scan examination for abdomen and pelvis on 1/18/2017 reported a mild fatty liver.  Not sure whether this is the cause of her macrocytosis.    · Most laboratory study on 8/26/2019 reported serum copper level was low at 68.   · On 9/4/2019, I explained to the patient that she will need to take copper supplementation once daily.  Patient is agreeable.  We started her on copper gluconate 2 mg daily.   · Laboratory study on 2/12/2020 showed a lower serum copper level at 60.    · Patient is compliant with copper gluconate.  We discussed with patient on 2/19/2020, we will increase the dose to 2 mg twice daily.  · We will repeat serum copper level every 6 months to monitor.     4.  Lymphocytopenia.  This is secondary to Rituxan treatment in October  for rheumatoid arthritis.  I reviewed medical records from her dermatologist office.     5.  Newly discovered monoclonal gammopathy IgG lambda on 9/23/2019.  · She is positive for serum protein immunofixation, unable to be quantified by SPEP.  Had a normal serum IgG 811 mg/dL, IgA 100, IgM 20.  No free light chains were measured.    · We will monitor on annual basis.        PLAN:   1. Increase oral copper gluconate from 2 mg daily to 2 mg twice daily.  I E-scribed the prescription to the patient's pharmacy today.   2. I encouraged the patient to regularly check herself for adenopathy as patient is on immunosuppressant azathioprine.  3. Continue monthly injection of B12 at Dr. Yang's office.    4. Continue follow up with rheumatology for rheumatoid arthritis.    5. Patient to follow up with me in 6 months with labs one week prior - CBC, CMP, and serum copper level, and the serum paraprotein studies.    By signing my name here, I Ivory Noel MD PhD, attest that all documentation on 02/20/20 at 7:45 AM has been prepared under the direction and in the presence of Dr. Ivory Noel MD.    I reviewed the note scribed by Geo Weiss and made appropriate corrections.       Ivory Noel MD PhD       CC: MD Howard Caraballo M.D.    Cj Null M.D.

## 2020-02-20 PROBLEM — D47.2 IGG MONOCLONAL GAMMOPATHY OF UNCERTAIN SIGNIFICANCE: Status: ACTIVE | Noted: 2020-02-20

## 2020-02-24 RX ORDER — BUPROPION HYDROCHLORIDE 150 MG/1
TABLET ORAL
Qty: 30 TABLET | Refills: 3 | Status: SHIPPED | OUTPATIENT
Start: 2020-02-24 | End: 2020-07-13

## 2020-02-27 RX ORDER — TIZANIDINE 4 MG/1
TABLET ORAL
Qty: 30 TABLET | Refills: 1 | OUTPATIENT
Start: 2020-02-27

## 2020-03-04 ENCOUNTER — APPOINTMENT (OUTPATIENT)
Dept: ONCOLOGY | Facility: HOSPITAL | Age: 65
End: 2020-03-04

## 2020-03-09 ENCOUNTER — OFFICE VISIT (OUTPATIENT)
Dept: INTERNAL MEDICINE | Facility: CLINIC | Age: 65
End: 2020-03-09

## 2020-03-09 VITALS
DIASTOLIC BLOOD PRESSURE: 92 MMHG | HEART RATE: 92 BPM | HEIGHT: 65 IN | SYSTOLIC BLOOD PRESSURE: 128 MMHG | BODY MASS INDEX: 32.45 KG/M2 | OXYGEN SATURATION: 96 % | TEMPERATURE: 97.8 F

## 2020-03-09 DIAGNOSIS — D83.9 COMMON VARIABLE IMMUNODEFICIENCY (HCC): ICD-10-CM

## 2020-03-09 DIAGNOSIS — J84.10 PULMONARY FIBROSIS (HCC): ICD-10-CM

## 2020-03-09 DIAGNOSIS — J06.9 UPPER RESPIRATORY TRACT INFECTION, UNSPECIFIED TYPE: ICD-10-CM

## 2020-03-09 DIAGNOSIS — J20.9 ACUTE BRONCHITIS, UNSPECIFIED ORGANISM: Primary | ICD-10-CM

## 2020-03-09 DIAGNOSIS — M06.9 RHEUMATOID ARTHRITIS INVOLVING MULTIPLE SITES, UNSPECIFIED RHEUMATOID FACTOR PRESENCE: ICD-10-CM

## 2020-03-09 LAB
EXPIRATION DATE: NORMAL
FLUAV AG NPH QL: NEGATIVE
FLUBV AG NPH QL: NEGATIVE
HCT VFR BLDA CALC: 36.9 % (ref 35–51)
HGB BLDA-MCNC: 12 G/DL (ref 12–17)
INTERNAL CONTROL: NORMAL
Lab: NORMAL
MCH, POC: 32.9
MCHC, POC: 32.7
MCV, POC: 100.8
PLATELET # BLD: 358 10*3/MM3
PMV BLD: 8.3 FL
RBC, POC: 3.66
RDW, POC: 14.7
WBC # BLD: 6.2 10*3/UL

## 2020-03-09 PROCEDURE — 87804 INFLUENZA ASSAY W/OPTIC: CPT | Performed by: INTERNAL MEDICINE

## 2020-03-09 PROCEDURE — 71046 X-RAY EXAM CHEST 2 VIEWS: CPT | Performed by: INTERNAL MEDICINE

## 2020-03-09 PROCEDURE — 99214 OFFICE O/P EST MOD 30 MIN: CPT | Performed by: INTERNAL MEDICINE

## 2020-03-09 PROCEDURE — 85025 COMPLETE CBC W/AUTO DIFF WBC: CPT | Performed by: INTERNAL MEDICINE

## 2020-03-09 NOTE — PROGRESS NOTES
Subjective     Dara Medina is a 64 y.o. female who presents with   Chief Complaint   Patient presents with   • Cough   • Fever   • Shortness of Breath   • Rib Pain       History of Present Illness     Cough for the past one week.  Cough is severe and non productive.  SOA is associated.  Fever up to 103 on Friday.  Her DIL gave her doxycycline yesterday.  Sinus pain and pressure.  Breathing is better today.  Fever is lower.  No travel.  Around sick grandkids.  Of note she is immunocompromised, has RA and pulmonary fibrosis.      Review of Systems   Constitutional: Positive for fatigue and fever.   HENT: Positive for congestion and sore throat.    Respiratory: Positive for chest tightness, shortness of breath and wheezing.    Cardiovascular: Positive for chest pain.       The following portions of the patient's history were reviewed and updated as appropriate: allergies, current medications and problem list.    Patient Active Problem List    Diagnosis Date Noted   • IgG monoclonal gammopathy of uncertain significance 02/20/2020   • Obesity due to excess calories without serious comorbidity 11/27/2019   • Chronic copper deficiency 09/04/2019   • Low serum copper for age 08/30/2019   • Pain in thoracic spine 08/15/2019   • Left arm numbness 08/15/2019   • Chronic idiopathic constipation 06/18/2019   • Esophagitis 06/18/2019   • Macrocytosis without anemia 03/09/2019   • Acquired lymphocytopenia 03/09/2019   • Tension headache 09/27/2018   • Adverse effect of iron or its compound, sequela 08/29/2018   • Vitamin B12 deficiency 08/29/2018   • Leukemoid reaction 08/29/2018   • Lt facial numbness 03/08/2018   • Abnormal finding on MRI of brain 02/06/2018     Note Last Updated: 2/6/2018 2/2018.  Plan recheck three months.       • Primary osteoarthritis of right knee 11/22/2017     Note Last Updated: 11/22/2017     Added automatically from request for surgery [id number]     • Chronic depression 07/18/2017   • Mixed  "hyperlipidemia 05/05/2017   • Tear of medial meniscus of right knee, current 05/04/2017   • Rheumatoid arthritis (CMS/[facility]) 07/07/2016   • Pulmonary fibrosis (CMS/[facility]) 07/06/2016     Note Last Updated: 10/23/2017     Secondary to methotrexate.       • Iron deficiency anemia 07/06/2016   • Pernicious anemia 07/06/2016   • Common variable immunodeficiency (CMS/[facility]) 07/01/2016   • Hypothyroidism 05/23/2016   • Sensory neuropathy 05/23/2016   • Osteoporosis 05/23/2016     Note Last Updated: 10/23/2017     H/o one year Forteo.  Not on bisphosphonates because of dental issues.  Fosamax in past caused stomach problems.       • Vitamin D deficiency 05/23/2016       Current Outpatient Medications on File Prior to Visit   Medication Sig Dispense Refill   • albuterol sulfate  (90 Base) MCG/ACT inhaler Inhale 2 puffs Every 4 (Four) Hours As Needed for Wheezing. 1 inhaler 0   • atorvastatin (LIPITOR) 20 MG tablet TAKE 1 TABLET BY MOUTH DAILY. 30 tablet 11   • azaTHIOprine (IMURAN) 50 MG tablet Take 50 mg by mouth 3 (Three) Times a Day.     • BD INTEGRA SYRINGE 25G X 1\" 3 ML misc USE AS DIRECTED WITH B12 12 each 2   • buPROPion XL (WELLBUTRIN XL) 150 MG 24 hr tablet TAKE 1 TABLET BY MOUTH EVERY MORNING. 30 tablet 3   • BYDUREON BCISE 2 MG/0.85ML auto-injector injection INJECT 0.85 ML UNDER THE SKIN INTO THE APPROPRIATE AREA 1 TIME PER WEEK 3.4 pen 11   • Cholecalciferol (VITAMIN D3) 5000 UNITS capsule capsule Take 5,000 Units by mouth 2 (Two) Times a Day.     • COPPER CAPS 2 MG capsule Take 2 mg by mouth 2 (Two) Times a Day. 180 capsule 3   • corticotropin (ACTHAR) 80 UNIT/ML injectable gel Inject  into the appropriate muscle as directed by prescriber.     • cyanocobalamin 1000 MCG/ML injection INJECT 1,000 MCG AS DIRECTED EVERY 30 (THIRTY) DAYS. 1 mL 2   • cyclobenzaprine (FLEXERIL) 10 MG tablet      • DULoxetine (CYMBALTA) 60 MG capsule Take 60 mg by mouth Daily.  2   • folic acid (FOLVITE) 1 MG tablet Take 1 mg by mouth " "daily.     • linaclotide (LINZESS) 290 MCG capsule capsule Take 1 capsule by mouth Every Morning Before Breakfast. 30 capsule 11   • lubiprostone (AMITIZA) 24 MCG capsule Take 1 capsule by mouth 2 (Two) Times a Day With Meals. 60 capsule 11   • omeprazole (priLOSEC) 40 MG capsule TAKE 1 CAPSULE BY MOUTH DAILY. 30 capsule 11   • OXcarbazepine (TRILEPTAL) 300 MG tablet 1/2 am, 1 hs 30 tablet 9   • riTUXimab (RITUXAN) 100 MG/10ML solution injection Administer RITUXAN 1000mg IV Q 2WK X2 INFUSIONS Q 6 MONTHS     • SYNTHROID 112 MCG tablet TAKE 1 TABLET BY MOUTH DAILY. 90 tablet 3   • topiramate (TOPAMAX) 100 MG tablet Take 1 tablet by mouth Every Night. 30 tablet 11   • topiramate (TOPAMAX) 25 MG tablet Take 1 tablet by mouth Every Night. 30 tablet 11     No current facility-administered medications on file prior to visit.        Objective     /92   Pulse 92   Temp 97.8 °F (36.6 °C)   Ht 165.1 cm (65\")   SpO2 96%   BMI 32.45 kg/m²     Physical Exam   Constitutional: She is oriented to person, place, and time. She appears well-developed and well-nourished.   HENT:   Head: Normocephalic and atraumatic.   Right Ear: Hearing and tympanic membrane normal.   Left Ear: Hearing and tympanic membrane normal.   Mouth/Throat: No oropharyngeal exudate or posterior oropharyngeal erythema.   Cardiovascular: Normal rate, regular rhythm and normal heart sounds.   Pulmonary/Chest: Effort normal. She has wheezes. She has rales.   Neurological: She is alert and oriented to person, place, and time.   Skin: Skin is warm and dry.   Psychiatric: She has a normal mood and affect. Her behavior is normal.     X-rays of the chest  performed today for following indication:   cough.  X-ray reveal diffuse interstitial changes.  There is no change from 1/3/2020.  X-ray sent to radiology for official interpretation and findings.        Assessment/Plan   Dara was seen today for cough, fever, shortness of breath and rib pain.    Diagnoses and " all orders for this visit:    Acute bronchitis, unspecified organism    Upper respiratory tract infection, unspecified type  -     XR Chest PA & Lateral  -     POC Influenza A / B  -     Respiratory Panel, PCR - Swab, Nasopharynx  -     POC CBC With / Auto Diff    Pulmonary fibrosis (CMS/HCC)    Rheumatoid arthritis involving multiple sites, unspecified rheumatoid factor presence (CMS/HCC)    Common variable immunodeficiency (CMS/HCC)        Discussion    Patient with multiple immunocompromising conditions presents with acute bronchitis.  CBC is normal.  Flu is negative.  CXR is unchanged but difficult interpretation with underlying lung disease.  Finish doxycycline.   The patient is instructed to take along with Mucinex DM.  Let me know they are not feeling better over the next 3 days or if there is any change in symptoms.             Future Appointments   Date Time Provider Department Center   3/16/2020  8:10 AM Zion uGo MD [facility] None   4/1/2020  8:00 AM REFERRED INFUSION CHAIR 12 [facility]   4/29/2020  8:00 AM REFERRED INFUSION CHAIR 12 [facility]   5/20/2020 10:20 AM [facility] None   5/27/2020  9:30 AM Randi Yang MD [facility] None   8/6/2020 10:00 AM LAB CHAIR [facility]   8/13/2020 11:20 AM VITALS ONLY [facility]   8/13/2020 11:40 AM Ivory Noel MD PhD [facility]   8/17/2020 12:30 PM Cj Null Jr., MD [facility]

## 2020-03-11 LAB
B PERT.PT PRMT NPH QL NAA+NON-PROBE: NOT DETECTED
C PNEUM DNA NPH QL NAA+NON-PROBE: NOT DETECTED
FLUAV H1 2009 PAN RNA NPH NAA+NON-PROBE: NOT DETECTED
FLUAV H1 RNA NPH QL NAA+NON-PROBE: NOT DETECTED
FLUAV H3 RNA NPH QL NAA+NON-PROBE: NOT DETECTED
FLUAV RNA NPH QL NAA+NON-PROBE: NOT DETECTED
FLUBV RNA NPH QL NAA+NON-PROBE: NOT DETECTED
HADV DNA NPH QL NAA+NON-PROBE: NOT DETECTED
HCOV 229E RNA NPH QL NAA+NON-PROBE: NOT DETECTED
HCOV HKU1 RNA NPH QL NAA+NON-PROBE: NOT DETECTED
HCOV NL63 RNA NPH QL NAA+NON-PROBE: NOT DETECTED
HCOV OC43 RNA NPH QL NAA+NON-PROBE: NOT DETECTED
HMPV RNA NPH QL NAA+NON-PROBE: NOT DETECTED
HPIV1 RNA NPH QL NAA+NON-PROBE: NOT DETECTED
HPIV2 RNA NPH QL NAA+NON-PROBE: NOT DETECTED
HPIV3 RNA NPH QL NAA+NON-PROBE: NOT DETECTED
HPIV4 RNA NPH QL NAA+NON-PROBE: NOT DETECTED
M PNEUMO DNA NPH QL NAA+NON-PROBE: NOT DETECTED
RSV RNA NPH QL NAA+NON-PROBE: NOT DETECTED
RV+EV RNA NPH QL NAA+NON-PROBE: NOT DETECTED

## 2020-03-13 RX ORDER — METHYLPREDNISOLONE 4 MG/1
TABLET ORAL
Qty: 21 TABLET | Refills: 0 | Status: SHIPPED | OUTPATIENT
Start: 2020-03-13 | End: 2020-05-27

## 2020-03-16 ENCOUNTER — INFUSION (OUTPATIENT)
Dept: ONCOLOGY | Facility: HOSPITAL | Age: 65
End: 2020-03-16

## 2020-03-16 ENCOUNTER — OFFICE VISIT (OUTPATIENT)
Dept: ORTHOPEDIC SURGERY | Facility: CLINIC | Age: 65
End: 2020-03-16

## 2020-03-16 VITALS
DIASTOLIC BLOOD PRESSURE: 78 MMHG | SYSTOLIC BLOOD PRESSURE: 116 MMHG | BODY MASS INDEX: 32.52 KG/M2 | WEIGHT: 195.4 LBS | HEART RATE: 87 BPM | OXYGEN SATURATION: 94 % | TEMPERATURE: 98.1 F

## 2020-03-16 VITALS — HEIGHT: 65 IN | BODY MASS INDEX: 32.15 KG/M2 | WEIGHT: 193 LBS | TEMPERATURE: 98 F

## 2020-03-16 DIAGNOSIS — Z96.651 HISTORY OF TOTAL KNEE ARTHROPLASTY, RIGHT: Primary | ICD-10-CM

## 2020-03-16 DIAGNOSIS — D83.9 COMMON VARIABLE IMMUNODEFICIENCY (HCC): Primary | ICD-10-CM

## 2020-03-16 PROCEDURE — 96366 THER/PROPH/DIAG IV INF ADDON: CPT

## 2020-03-16 PROCEDURE — 25010000002 IMMUNE GLOBULIN (HUMAN) 20 GM/200ML SOLUTION: Performed by: ALLERGY & IMMUNOLOGY

## 2020-03-16 PROCEDURE — 99212 OFFICE O/P EST SF 10 MIN: CPT | Performed by: ORTHOPAEDIC SURGERY

## 2020-03-16 PROCEDURE — 96365 THER/PROPH/DIAG IV INF INIT: CPT

## 2020-03-16 PROCEDURE — 73562 X-RAY EXAM OF KNEE 3: CPT | Performed by: ORTHOPAEDIC SURGERY

## 2020-03-16 RX ORDER — EPINEPHRINE 0.3 MG/.3ML
0.3 INJECTION SUBCUTANEOUS ONCE AS NEEDED
Status: CANCELLED
Start: 2020-03-23

## 2020-03-16 RX ORDER — TIZANIDINE 4 MG/1
TABLET ORAL
Qty: 30 TABLET | Refills: 1 | OUTPATIENT
Start: 2020-03-16

## 2020-03-16 RX ORDER — ACETAMINOPHEN 500 MG
500 TABLET ORAL EVERY 4 HOURS PRN
Status: CANCELLED
Start: 2020-03-23

## 2020-03-16 RX ORDER — DIPHENHYDRAMINE HCL 25 MG
25 CAPSULE ORAL ONCE
Status: CANCELLED
Start: 2020-03-23

## 2020-03-16 RX ORDER — ACETAMINOPHEN 325 MG/1
650 TABLET ORAL ONCE
Status: CANCELLED
Start: 2020-03-23

## 2020-03-16 RX ORDER — DIPHENHYDRAMINE HYDROCHLORIDE 50 MG/ML
50 INJECTION INTRAMUSCULAR; INTRAVENOUS ONCE AS NEEDED
Status: CANCELLED
Start: 2020-03-23

## 2020-03-16 RX ORDER — LEVOTHYROXINE SODIUM 112 MCG
112 TABLET ORAL DAILY
Qty: 30 TABLET | Refills: 11 | Status: SHIPPED | OUTPATIENT
Start: 2020-03-16 | End: 2021-03-03

## 2020-03-16 RX ADMIN — IMMUNE GLOBULIN (HUMAN) 40 G: 10 INJECTION INTRAVENOUS; SUBCUTANEOUS at 08:54

## 2020-03-16 NOTE — PROGRESS NOTES
"Dara Medina     : 1955     MRN: [id number]     DATE: 3/16/2020    DIAGNOSIS:  Annual follow up right total knee      SUBJECTIVE:  Patient returns today for 1 year follow up of right total knee replacement. Patient reports doing well with no unusual complaints. Denies any limitations due to the knee.    OBJECTIVE:    Temp 98 °F (36.7 °C) (Temporal)   Ht 165.1 cm (65\")   Wt 87.5 kg (193 lb)   BMI 32.12 kg/m²     Family History   Problem Relation Age of Onset   • Hyperlipidemia Mother    • Hypertension Mother    • Migraines Mother    • Colon cancer Father    • Diabetes Father    • Cancer Father    • Hyperlipidemia Brother    • Migraines Brother    • Migraines Sister    • Malig Hyperthermia Neg Hx        Past Medical History:   Diagnosis Date   • Acute colitis 2017   • Allergic Codeine, some antibiotics   • Anemia    • Cataract Removed       • Chronic depression    • Colon polyp 1 removed       • Diabetes mellitus (CMS/HCC)    • Fracture of rib    • GERD (gastroesophageal reflux disease)    • H/O Wrist fracture, right    • Headache    • History of bronchitis    • History of pneumonia    • History of transfusion    • Hyperlipidemia    • Hypothyroidism    • Inflammatory bowel disease    • Iron deficiency    • Irritable bowel syndrome    • Low serum copper for age 2019   • Migraine    • Obesity    • Osteopenia    • Osteoporosis    • Pneumonia 2016   • RA (rheumatoid arthritis) (CMS/HCC)    • Sensory neuropathy    • Sepsis, unspecified organism (CMS/HCC) 2017   • Vitamin D deficiency        Past Surgical History:   Procedure Laterality Date   • ADENOIDECTOMY     • BREAST AUGMENTATION     • BREAST IMPLANT REMOVAL Bilateral    • CARPAL TUNNEL RELEASE Left    • COLONOSCOPY     • EYE SURGERY Bilateral    • HYSTERECTOMY     • LASIK Bilateral    • ORIF WRIST FRACTURE Right    • MT TOTAL KNEE ARTHROPLASTY Right 2017    Procedure: TOTAL KNEE ARTHROPLASTY;  Surgeon: Zion Guo MD; "  Location: [facility] OR;  Service: Orthopedics   • SINUS SURGERY      x2   • TONSILLECTOMY         Social History     Socioeconomic History   • Marital status:      Spouse name: Not on file   • Number of children: 2   • Years of education: College   • Highest education level: Not on file   Occupational History   • Occupation: Homemaker     Employer: RETIRED   Tobacco Use   • Smoking status: Former Smoker     Packs/day: 0.50     Years: 5.00     Pack years: 2.50     Types: Cigarettes     Start date: 1974     Last attempt to quit: 1978     Years since quittin.9   • Smokeless tobacco: Never Used   • Tobacco comment: QUIT AGE 23   Substance and Sexual Activity   • Alcohol use: No     Comment: STOPPED    • Drug use: No       Review of Systems:   A 14 point review of systems is reviewed with the patient.  Pertinent positives are listed above.  All others negative.    Exam:  The incision is well healed. Range of motion is measured at 0 to 120. The calf is soft and nontender with a negative Homans sign. Alignment is neutral. Good quad strength. There is no evidence of varus/valgus or flexion instability. No effusion. Intact to light touch with palpable distal pulses.     DIAGNOSTIC STUDIES    Xrays: 3 views of the right knee (AP, lateral, and sunrise) were ordered and reviewed for evaluation of recent knee replacement. They demonstrate a well positioned, well aligned knee replacement without complicating factors noted. In comparison with previous films there has been no change.    ASSESSMENT: Annual follow up right knee replacement.    PLAN:  Continue activities as tolerated    Follow up as needed    Zion Guo MD

## 2020-03-23 RX ORDER — TIZANIDINE 4 MG/1
TABLET ORAL
Qty: 30 TABLET | Refills: 1 | OUTPATIENT
Start: 2020-03-23

## 2020-03-31 RX ORDER — CYANOCOBALAMIN 1000 UG/ML
INJECTION, SOLUTION INTRAMUSCULAR; SUBCUTANEOUS
Qty: 1 ML | Refills: 2 | Status: SHIPPED | OUTPATIENT
Start: 2020-03-31 | End: 2020-06-19

## 2020-04-13 ENCOUNTER — INFUSION (OUTPATIENT)
Dept: ONCOLOGY | Facility: HOSPITAL | Age: 65
End: 2020-04-13

## 2020-04-13 VITALS
TEMPERATURE: 98.2 F | HEART RATE: 89 BPM | OXYGEN SATURATION: 96 % | SYSTOLIC BLOOD PRESSURE: 105 MMHG | BODY MASS INDEX: 32.52 KG/M2 | DIASTOLIC BLOOD PRESSURE: 64 MMHG | WEIGHT: 195.4 LBS

## 2020-04-13 DIAGNOSIS — D83.9 COMMON VARIABLE IMMUNODEFICIENCY (HCC): Primary | ICD-10-CM

## 2020-04-13 PROCEDURE — 96365 THER/PROPH/DIAG IV INF INIT: CPT

## 2020-04-13 PROCEDURE — 96366 THER/PROPH/DIAG IV INF ADDON: CPT

## 2020-04-13 PROCEDURE — 25010000002 IMMUNE GLOBULIN (HUMAN) 20 GM/200ML SOLUTION: Performed by: ALLERGY & IMMUNOLOGY

## 2020-04-13 RX ORDER — ACETAMINOPHEN 325 MG/1
650 TABLET ORAL ONCE
Status: CANCELLED
Start: 2020-04-20

## 2020-04-13 RX ORDER — EPINEPHRINE 0.3 MG/.3ML
0.3 INJECTION SUBCUTANEOUS ONCE AS NEEDED
Status: CANCELLED
Start: 2020-04-20

## 2020-04-13 RX ORDER — DIPHENHYDRAMINE HCL 25 MG
25 CAPSULE ORAL ONCE
Status: CANCELLED
Start: 2020-04-20

## 2020-04-13 RX ORDER — TIZANIDINE 4 MG/1
TABLET ORAL
Qty: 30 TABLET | Refills: 1 | Status: SHIPPED | OUTPATIENT
Start: 2020-04-13 | End: 2020-05-27

## 2020-04-13 RX ORDER — DIPHENHYDRAMINE HYDROCHLORIDE 50 MG/ML
50 INJECTION INTRAMUSCULAR; INTRAVENOUS ONCE AS NEEDED
Status: CANCELLED
Start: 2020-04-20

## 2020-04-13 RX ORDER — ACETAMINOPHEN 500 MG
500 TABLET ORAL EVERY 4 HOURS PRN
Status: CANCELLED
Start: 2020-04-20

## 2020-04-13 RX ADMIN — IMMUNE GLOBULIN (HUMAN) 40 G: 10 INJECTION INTRAVENOUS; SUBCUTANEOUS at 08:43

## 2020-05-11 ENCOUNTER — INFUSION (OUTPATIENT)
Dept: ONCOLOGY | Facility: HOSPITAL | Age: 65
End: 2020-05-11

## 2020-05-11 VITALS
SYSTOLIC BLOOD PRESSURE: 143 MMHG | BODY MASS INDEX: 32.28 KG/M2 | DIASTOLIC BLOOD PRESSURE: 80 MMHG | WEIGHT: 194 LBS | HEART RATE: 80 BPM | OXYGEN SATURATION: 97 %

## 2020-05-11 DIAGNOSIS — D83.9 COMMON VARIABLE IMMUNODEFICIENCY (HCC): Primary | ICD-10-CM

## 2020-05-11 LAB — IGG1 SER-MCNC: 941 MG/DL (ref 700–1600)

## 2020-05-11 PROCEDURE — 96365 THER/PROPH/DIAG IV INF INIT: CPT

## 2020-05-11 PROCEDURE — 96366 THER/PROPH/DIAG IV INF ADDON: CPT

## 2020-05-11 PROCEDURE — 82784 ASSAY IGA/IGD/IGG/IGM EACH: CPT | Performed by: ALLERGY & IMMUNOLOGY

## 2020-05-11 PROCEDURE — 25010000002 IMMUNE GLOBULIN (HUMAN) 20 GM/200ML SOLUTION: Performed by: ALLERGY & IMMUNOLOGY

## 2020-05-11 RX ORDER — ACETAMINOPHEN 500 MG
500 TABLET ORAL EVERY 4 HOURS PRN
Status: CANCELLED
Start: 2020-05-18

## 2020-05-11 RX ORDER — ACETAMINOPHEN 325 MG/1
650 TABLET ORAL ONCE
Status: CANCELLED
Start: 2020-05-18

## 2020-05-11 RX ORDER — DIPHENHYDRAMINE HCL 25 MG
25 CAPSULE ORAL ONCE
Status: CANCELLED
Start: 2020-05-18

## 2020-05-11 RX ORDER — DIPHENHYDRAMINE HYDROCHLORIDE 50 MG/ML
50 INJECTION INTRAMUSCULAR; INTRAVENOUS ONCE AS NEEDED
Status: CANCELLED
Start: 2020-05-18

## 2020-05-11 RX ORDER — EPINEPHRINE 0.3 MG/.3ML
0.3 INJECTION SUBCUTANEOUS ONCE AS NEEDED
Status: CANCELLED
Start: 2020-05-18

## 2020-05-11 RX ADMIN — IMMUNE GLOBULIN (HUMAN) 40 G: 10 INJECTION INTRAVENOUS; SUBCUTANEOUS at 09:59

## 2020-05-11 NOTE — NURSING NOTE
Concerns voiced per Samantha Pleitez that no labs have been monitored in quite some time. This nurse called the office of Dr. FABIO Chowdhury [phone] and spoke with Carmel Fajardo R.N. and orders received for Ivig trough q 3 months and cbc, cmp q 6 months. Trough was drawn per phlebotomist prior to initiation of IvIg.    It is noted that she took her home premeds prior to admission.

## 2020-05-19 DIAGNOSIS — E55.9 VITAMIN D DEFICIENCY: Primary | ICD-10-CM

## 2020-05-19 DIAGNOSIS — E53.8 VITAMIN B12 DEFICIENCY: ICD-10-CM

## 2020-05-19 DIAGNOSIS — E03.9 ACQUIRED HYPOTHYROIDISM: ICD-10-CM

## 2020-05-19 DIAGNOSIS — E78.2 MIXED HYPERLIPIDEMIA: ICD-10-CM

## 2020-05-20 LAB
25(OH)D3+25(OH)D2 SERPL-MCNC: 45.5 NG/ML (ref 30–100)
ALBUMIN SERPL-MCNC: 4.5 G/DL (ref 3.5–5.2)
ALBUMIN/GLOB SERPL: 1.5 G/DL
ALP SERPL-CCNC: 96 U/L (ref 39–117)
ALT SERPL-CCNC: 15 U/L (ref 1–33)
AST SERPL-CCNC: 12 U/L (ref 1–32)
BASOPHILS # BLD AUTO: 0.03 10*3/MM3 (ref 0–0.2)
BASOPHILS NFR BLD AUTO: 0.7 % (ref 0–1.5)
BILIRUB SERPL-MCNC: 0.3 MG/DL (ref 0.2–1.2)
BUN SERPL-MCNC: 16 MG/DL (ref 8–23)
BUN/CREAT SERPL: 16.5 (ref 7–25)
CALCIUM SERPL-MCNC: 9.8 MG/DL (ref 8.6–10.5)
CHLORIDE SERPL-SCNC: 106 MMOL/L (ref 98–107)
CHOLEST SERPL-MCNC: 212 MG/DL (ref 0–200)
CO2 SERPL-SCNC: 25.7 MMOL/L (ref 22–29)
CREAT SERPL-MCNC: 0.97 MG/DL (ref 0.57–1)
EOSINOPHIL # BLD AUTO: 0.05 10*3/MM3 (ref 0–0.4)
EOSINOPHIL NFR BLD AUTO: 1.2 % (ref 0.3–6.2)
ERYTHROCYTE [DISTWIDTH] IN BLOOD BY AUTOMATED COUNT: 13.8 % (ref 12.3–15.4)
GLOBULIN SER CALC-MCNC: 3.1 GM/DL
GLUCOSE SERPL-MCNC: 129 MG/DL (ref 65–99)
HCT VFR BLD AUTO: 39.4 % (ref 34–46.6)
HDLC SERPL-MCNC: 60 MG/DL (ref 40–60)
HGB BLD-MCNC: 13.6 G/DL (ref 12–15.9)
IMM GRANULOCYTES # BLD AUTO: 0.02 10*3/MM3 (ref 0–0.05)
IMM GRANULOCYTES NFR BLD AUTO: 0.5 % (ref 0–0.5)
LDLC SERPL CALC-MCNC: 105 MG/DL (ref 0–100)
LYMPHOCYTES # BLD AUTO: 0.32 10*3/MM3 (ref 0.7–3.1)
LYMPHOCYTES NFR BLD AUTO: 7.5 % (ref 19.6–45.3)
MCH RBC QN AUTO: 34.3 PG (ref 26.6–33)
MCHC RBC AUTO-ENTMCNC: 34.5 G/DL (ref 31.5–35.7)
MCV RBC AUTO: 99.5 FL (ref 79–97)
MONOCYTES # BLD AUTO: 0.29 10*3/MM3 (ref 0.1–0.9)
MONOCYTES NFR BLD AUTO: 6.8 % (ref 5–12)
NEUTROPHILS # BLD AUTO: 3.58 10*3/MM3 (ref 1.7–7)
NEUTROPHILS NFR BLD AUTO: 83.3 % (ref 42.7–76)
NRBC BLD AUTO-RTO: 0 /100 WBC (ref 0–0.2)
PLATELET # BLD AUTO: 340 10*3/MM3 (ref 140–450)
POTASSIUM SERPL-SCNC: 4.7 MMOL/L (ref 3.5–5.2)
PROT SERPL-MCNC: 7.6 G/DL (ref 6–8.5)
RBC # BLD AUTO: 3.96 10*6/MM3 (ref 3.77–5.28)
SODIUM SERPL-SCNC: 140 MMOL/L (ref 136–145)
T4 FREE SERPL-MCNC: 0.93 NG/DL (ref 0.93–1.7)
TRIGL SERPL-MCNC: 233 MG/DL (ref 0–150)
TSH SERPL DL<=0.005 MIU/L-ACNC: 0.21 UIU/ML (ref 0.27–4.2)
VIT B12 SERPL-MCNC: 688 PG/ML (ref 211–946)
VLDLC SERPL CALC-MCNC: 46.6 MG/DL
WBC # BLD AUTO: 4.29 10*3/MM3 (ref 3.4–10.8)

## 2020-05-27 ENCOUNTER — OFFICE VISIT (OUTPATIENT)
Dept: INTERNAL MEDICINE | Facility: CLINIC | Age: 65
End: 2020-05-27

## 2020-05-27 VITALS
DIASTOLIC BLOOD PRESSURE: 68 MMHG | BODY MASS INDEX: 32.65 KG/M2 | RESPIRATION RATE: 18 BRPM | HEART RATE: 87 BPM | TEMPERATURE: 98 F | SYSTOLIC BLOOD PRESSURE: 126 MMHG | HEIGHT: 65 IN | WEIGHT: 196 LBS

## 2020-05-27 DIAGNOSIS — F32.A CHRONIC DEPRESSION: ICD-10-CM

## 2020-05-27 DIAGNOSIS — E78.2 MIXED HYPERLIPIDEMIA: Primary | ICD-10-CM

## 2020-05-27 DIAGNOSIS — E03.9 ACQUIRED HYPOTHYROIDISM: ICD-10-CM

## 2020-05-27 DIAGNOSIS — M81.0 OSTEOPOROSIS, UNSPECIFIED OSTEOPOROSIS TYPE, UNSPECIFIED PATHOLOGICAL FRACTURE PRESENCE: ICD-10-CM

## 2020-05-27 PROBLEM — M05.10 RHEUMATOID LUNG DISEASE: Status: ACTIVE | Noted: 2018-06-18

## 2020-05-27 PROCEDURE — 99214 OFFICE O/P EST MOD 30 MIN: CPT | Performed by: INTERNAL MEDICINE

## 2020-05-27 RX ORDER — DULOXETIN HYDROCHLORIDE 20 MG/1
20 CAPSULE, DELAYED RELEASE ORAL DAILY
Qty: 30 CAPSULE | Refills: 0 | Status: SHIPPED | OUTPATIENT
Start: 2020-05-27 | End: 2020-06-19 | Stop reason: SDUPTHER

## 2020-05-27 RX ORDER — DULOXETIN HYDROCHLORIDE 30 MG/1
30 CAPSULE, DELAYED RELEASE ORAL DAILY
Qty: 30 CAPSULE | Refills: 0 | Status: SHIPPED | OUTPATIENT
Start: 2020-05-27 | End: 2020-06-10

## 2020-05-27 NOTE — PROGRESS NOTES
Subjective     Dara Medina is a 65 y.o. female who presents with   Chief Complaint   Patient presents with   • Hyperlipidemia   • Hypothyroidism   • Osteoporosis       History of Present Illness     HLD.  Control is good.   Hypothyroidism.  Control is good.   OP.  She just completed Forteo.  We discussed Evenity.  We discussed black box warning for increased risk of MI and stroke.    RA/pulmonary fibrosis/CVIG.  She is followed by rhematology, allergy and pulmonary.  No recents infections.  Breathing is good.  RA is controlled.    Depression.  She is interested in weaning Cymbalta.      Review of Systems   Respiratory: Negative.    Cardiovascular: Negative.        The following portions of the patient's history were reviewed and updated as appropriate: allergies, current medications and problem list.    Patient Active Problem List    Diagnosis Date Noted   • IgG monoclonal gammopathy of uncertain significance 02/20/2020   • Obesity due to excess calories without serious comorbidity 11/27/2019   • Chronic copper deficiency 09/04/2019   • Low serum copper for age 08/30/2019   • Pain in thoracic spine 08/15/2019   • Left arm numbness 08/15/2019   • Chronic idiopathic constipation 06/18/2019   • Esophagitis 06/18/2019   • Macrocytosis without anemia 03/09/2019   • Acquired lymphocytopenia 03/09/2019   • Tension headache 09/27/2018   • Adverse effect of iron or its compound, sequela 08/29/2018   • Vitamin B12 deficiency 08/29/2018   • Leukemoid reaction 08/29/2018   • Rheumatoid lung disease (CMS/HCC) 06/18/2018   • Lt facial numbness 03/08/2018   • Abnormal finding on MRI of brain 02/06/2018     Note Last Updated: 2/6/2018 2/2018.  Plan recheck three months.       • Primary osteoarthritis of right knee 11/22/2017     Note Last Updated: 11/22/2017     Added automatically from request for surgery [id number]     • Chronic depression 07/18/2017   • Mixed hyperlipidemia 05/05/2017   • Tear of medial meniscus of right knee,  "current 05/04/2017   • Rheumatoid arthritis (CMS/[facility]) 07/07/2016   • Pulmonary fibrosis (CMS/[facility]) 07/06/2016     Note Last Updated: 10/23/2017     Secondary to methotrexate.       • Iron deficiency anemia 07/06/2016   • Pernicious anemia 07/06/2016   • Common variable immunodeficiency (CMS/[facility]) 07/01/2016   • Hypothyroidism 05/23/2016   • Sensory neuropathy 05/23/2016   • Osteoporosis 05/23/2016     Note Last Updated: 5/27/2020     H/o two year Forteo.  Not on bisphosphonates because of dental issues.  Fosamax in past caused stomach problems.  Start Evenity 5/2020     • Vitamin D deficiency 05/23/2016       Current Outpatient Medications on File Prior to Visit   Medication Sig Dispense Refill   • atorvastatin (LIPITOR) 20 MG tablet TAKE 1 TABLET BY MOUTH DAILY. 30 tablet 11   • azaTHIOprine (IMURAN) 50 MG tablet Take 50 mg by mouth 3 (Three) Times a Day.     • BD INTEGRA SYRINGE 25G X 1\" 3 ML misc USE AS DIRECTED WITH B12 12 each 2   • buPROPion XL (WELLBUTRIN XL) 150 MG 24 hr tablet TAKE 1 TABLET BY MOUTH EVERY MORNING. 30 tablet 3   • Cholecalciferol (VITAMIN D3) 5000 UNITS capsule capsule Take 5,000 Units by mouth 2 (Two) Times a Day.     • COPPER CAPS 2 MG capsule Take 2 mg by mouth 2 (Two) Times a Day. 180 capsule 3   • corticotropin (ACTHAR) 80 UNIT/ML injectable gel Inject  into the appropriate muscle as directed by prescriber.     • cyanocobalamin 1000 MCG/ML injection INJECT 1,000 MCG AS DIRECTED EVERY 30 (THIRTY) DAYS. 1 mL 2   • cyclobenzaprine (FLEXERIL) 10 MG tablet      • folic acid (FOLVITE) 1 MG tablet Take 1 mg by mouth daily.     • lubiprostone (AMITIZA) 24 MCG capsule Take 1 capsule by mouth 2 (Two) Times a Day With Meals. 60 capsule 11   • omeprazole (priLOSEC) 40 MG capsule TAKE 1 CAPSULE BY MOUTH DAILY. 30 capsule 11   • riTUXimab (RITUXAN) 100 MG/10ML solution injection Administer RITUXAN 1000mg IV Q 2WK X2 INFUSIONS Q 6 MONTHS     • SYNTHROID 112 MCG tablet TAKE 1 TABLET BY MOUTH DAILY. 30 " "tablet 11   • topiramate (TOPAMAX) 100 MG tablet Take 1 tablet by mouth Every Night. 30 tablet 11   • topiramate (TOPAMAX) 25 MG tablet Take 1 tablet by mouth Every Night. 30 tablet 11   • [DISCONTINUED] DULoxetine (CYMBALTA) 60 MG capsule Take 60 mg by mouth Daily.  2   • [DISCONTINUED] OXcarbazepine (TRILEPTAL) 300 MG tablet 1/2 am, 1 hs 30 tablet 9   • [DISCONTINUED] albuterol sulfate  (90 Base) MCG/ACT inhaler Inhale 2 puffs Every 4 (Four) Hours As Needed for Wheezing. 1 inhaler 0   • [DISCONTINUED] BYDUREON BCISE 2 MG/0.85ML auto-injector injection INJECT 0.85 ML UNDER THE SKIN INTO THE APPROPRIATE AREA 1 TIME PER WEEK 3.4 pen 11   • [DISCONTINUED] linaclotide (Linzess) 290 MCG capsule capsule Take 1 capsule by mouth Every Morning Before Breakfast. 30 capsule 11   • [DISCONTINUED] methylPREDNISolone (MEDROL, BETI,) 4 MG tablet Take as directed on package instructions. 21 tablet 0   • [DISCONTINUED] tiZANidine (ZANAFLEX) 4 MG tablet TAKE 1 TABLET BY MOUTH EVERY DAY 30 tablet 1     No current facility-administered medications on file prior to visit.        Objective     /68   Pulse 87   Temp 98 °F (36.7 °C) (Temporal)   Resp 18   Ht 165.1 cm (65\")   Wt 88.9 kg (196 lb)   BMI 32.62 kg/m²     Physical Exam   Constitutional: She is oriented to person, place, and time. She appears well-developed and well-nourished.   HENT:   Head: Normocephalic and atraumatic.   Cardiovascular: Normal rate, regular rhythm and normal heart sounds.   Pulmonary/Chest: Effort normal. She has rales.   Neurological: She is alert and oriented to person, place, and time.   Skin: Skin is warm and dry.   Psychiatric: She has a normal mood and affect. Her behavior is normal.       Assessment/Plan   Dara was seen today for hyperlipidemia, hypothyroidism and osteoporosis.    Diagnoses and all orders for this visit:    Mixed hyperlipidemia    Acquired hypothyroidism    Osteoporosis, unspecified osteoporosis type, unspecified " pathological fracture presence    Other orders  -     DULoxetine (CYMBALTA) 30 MG capsule; Take 1 capsule by mouth Daily.  -     DULoxetine (Cymbalta) 20 MG capsule; Take 1 capsule by mouth Daily.        Discussion    HLD.  The patient will continue current regimen.      Hypothyroidism.  The patient will continue current regimen.      OP.  Evenity is ordered.    Depression.  Call into wean Cymbalta.  Continue Wellbutrin for now.        Future Appointments   Date Time Provider Department Center   8/6/2020 10:00 AM LAB CHAIR [facility]   8/13/2020 11:20 AM VITALS ONLY CBC LAB [facility]   8/13/2020 11:40 AM Ivory Noel MD PhD [facility]   8/17/2020 12:30 PM Cj Null Jr., MD [facility]   11/19/2020  8:40 AM [facility] None   11/25/2020  9:00 AM Randi Yang MD [facility] None

## 2020-06-02 DIAGNOSIS — M81.0 OSTEOPOROSIS, UNSPECIFIED OSTEOPOROSIS TYPE, UNSPECIFIED PATHOLOGICAL FRACTURE PRESENCE: Primary | ICD-10-CM

## 2020-06-05 DIAGNOSIS — M81.0 OSTEOPOROSIS, UNSPECIFIED OSTEOPOROSIS TYPE, UNSPECIFIED PATHOLOGICAL FRACTURE PRESENCE: Primary | ICD-10-CM

## 2020-06-10 ENCOUNTER — LAB (OUTPATIENT)
Dept: OTHER | Facility: HOSPITAL | Age: 65
End: 2020-06-10

## 2020-06-10 ENCOUNTER — INFUSION (OUTPATIENT)
Dept: ONCOLOGY | Facility: HOSPITAL | Age: 65
End: 2020-06-10

## 2020-06-10 ENCOUNTER — APPOINTMENT (OUTPATIENT)
Dept: ONCOLOGY | Facility: HOSPITAL | Age: 65
End: 2020-06-10

## 2020-06-10 VITALS
HEIGHT: 65 IN | SYSTOLIC BLOOD PRESSURE: 113 MMHG | HEART RATE: 86 BPM | WEIGHT: 197 LBS | DIASTOLIC BLOOD PRESSURE: 71 MMHG | RESPIRATION RATE: 16 BRPM | OXYGEN SATURATION: 98 % | BODY MASS INDEX: 32.82 KG/M2 | TEMPERATURE: 97 F

## 2020-06-10 DIAGNOSIS — M81.0 OSTEOPOROSIS, UNSPECIFIED OSTEOPOROSIS TYPE, UNSPECIFIED PATHOLOGICAL FRACTURE PRESENCE: ICD-10-CM

## 2020-06-10 DIAGNOSIS — D83.9 COMMON VARIABLE IMMUNODEFICIENCY (HCC): Primary | ICD-10-CM

## 2020-06-10 LAB
ALBUMIN SERPL-MCNC: 4.3 G/DL (ref 3.5–5.2)
ALBUMIN/GLOB SERPL: 1.9 G/DL
ALP SERPL-CCNC: 105 U/L (ref 39–117)
ALT SERPL W P-5'-P-CCNC: 12 U/L (ref 1–33)
ANION GAP SERPL CALCULATED.3IONS-SCNC: 11.8 MMOL/L (ref 5–15)
AST SERPL-CCNC: 14 U/L (ref 1–32)
BILIRUB SERPL-MCNC: 0.4 MG/DL (ref 0.1–1.2)
BUN BLD-MCNC: 19 MG/DL (ref 8–23)
BUN/CREAT SERPL: 20 (ref 7–25)
CALCIUM SPEC-SCNC: 9.1 MG/DL (ref 8.6–10.5)
CHLORIDE SERPL-SCNC: 110 MMOL/L (ref 98–107)
CO2 SERPL-SCNC: 18.2 MMOL/L (ref 22–29)
CREAT BLD-MCNC: 0.95 MG/DL (ref 0.57–1)
GFR SERPL CREATININE-BSD FRML MDRD: 59 ML/MIN/1.73
GLOBULIN UR ELPH-MCNC: 2.3 GM/DL
GLUCOSE BLD-MCNC: 157 MG/DL (ref 65–99)
POTASSIUM BLD-SCNC: 4.4 MMOL/L (ref 3.5–5.2)
PROT SERPL-MCNC: 6.6 G/DL (ref 6–8.5)
SODIUM BLD-SCNC: 140 MMOL/L (ref 136–145)

## 2020-06-10 PROCEDURE — 25010000002 IMMUNE GLOBULIN (HUMAN) 20 GM/200ML SOLUTION: Performed by: ALLERGY & IMMUNOLOGY

## 2020-06-10 PROCEDURE — 80053 COMPREHEN METABOLIC PANEL: CPT | Performed by: INTERNAL MEDICINE

## 2020-06-10 PROCEDURE — 25010000002 ROMOSOZUMAB-AQQG 105 MG/1.17ML SOLUTION PREFILLED SYRINGE: Performed by: INTERNAL MEDICINE

## 2020-06-10 PROCEDURE — 96365 THER/PROPH/DIAG IV INF INIT: CPT

## 2020-06-10 PROCEDURE — 96366 THER/PROPH/DIAG IV INF ADDON: CPT

## 2020-06-10 PROCEDURE — 96372 THER/PROPH/DIAG INJ SC/IM: CPT

## 2020-06-10 RX ORDER — DIPHENHYDRAMINE HYDROCHLORIDE 50 MG/ML
50 INJECTION INTRAMUSCULAR; INTRAVENOUS ONCE AS NEEDED
Status: CANCELLED
Start: 2020-06-17

## 2020-06-10 RX ORDER — ACETAMINOPHEN 500 MG
500 TABLET ORAL EVERY 4 HOURS PRN
Status: CANCELLED
Start: 2020-06-17

## 2020-06-10 RX ORDER — DIPHENHYDRAMINE HCL 25 MG
25 CAPSULE ORAL ONCE
Status: CANCELLED
Start: 2020-06-17

## 2020-06-10 RX ORDER — OXCARBAZEPINE 150 MG/1
150 TABLET, FILM COATED ORAL
COMMUNITY
End: 2020-08-12 | Stop reason: SDUPTHER

## 2020-06-10 RX ORDER — ACETAMINOPHEN 325 MG/1
650 TABLET ORAL ONCE
Status: CANCELLED
Start: 2020-06-17

## 2020-06-10 RX ORDER — EPINEPHRINE 0.3 MG/.3ML
0.3 INJECTION SUBCUTANEOUS ONCE AS NEEDED
Status: CANCELLED
Start: 2020-06-17

## 2020-06-10 RX ORDER — OXCARBAZEPINE 300 MG/5ML
SUSPENSION ORAL EVERY EVENING
COMMUNITY
End: 2020-09-04

## 2020-06-10 RX ADMIN — ROMOSOZUMAB-AQQG 210 MG: 105 INJECTION, SOLUTION SUBCUTANEOUS at 09:18

## 2020-06-10 RX ADMIN — IMMUNE GLOBULIN (HUMAN) 40 G: 10 INJECTION INTRAVENOUS; SUBCUTANEOUS at 08:39

## 2020-06-11 DIAGNOSIS — R73.9 HYPERGLYCEMIA: Primary | ICD-10-CM

## 2020-06-16 LAB — HBA1C MFR BLD: 5.2 % (ref 4.8–5.6)

## 2020-06-19 RX ORDER — CYANOCOBALAMIN 1000 UG/ML
INJECTION, SOLUTION INTRAMUSCULAR; SUBCUTANEOUS
Qty: 1 ML | Refills: 11 | Status: SHIPPED | OUTPATIENT
Start: 2020-06-19 | End: 2021-05-20

## 2020-06-19 RX ORDER — DULOXETIN HYDROCHLORIDE 20 MG/1
20 CAPSULE, DELAYED RELEASE ORAL DAILY
Qty: 90 CAPSULE | Refills: 1 | Status: SHIPPED | OUTPATIENT
Start: 2020-06-19 | End: 2020-06-23 | Stop reason: SDUPTHER

## 2020-06-23 RX ORDER — DULOXETIN HYDROCHLORIDE 20 MG/1
20 CAPSULE, DELAYED RELEASE ORAL DAILY
Qty: 90 CAPSULE | Refills: 1 | Status: SHIPPED | OUTPATIENT
Start: 2020-06-23 | End: 2020-09-04

## 2020-07-08 ENCOUNTER — APPOINTMENT (OUTPATIENT)
Dept: ONCOLOGY | Facility: HOSPITAL | Age: 65
End: 2020-07-08

## 2020-07-08 ENCOUNTER — INFUSION (OUTPATIENT)
Dept: ONCOLOGY | Facility: HOSPITAL | Age: 65
End: 2020-07-08

## 2020-07-08 VITALS
TEMPERATURE: 97.5 F | BODY MASS INDEX: 31.66 KG/M2 | SYSTOLIC BLOOD PRESSURE: 111 MMHG | WEIGHT: 197 LBS | RESPIRATION RATE: 18 BRPM | HEIGHT: 66 IN | HEART RATE: 82 BPM | OXYGEN SATURATION: 97 % | DIASTOLIC BLOOD PRESSURE: 76 MMHG

## 2020-07-08 DIAGNOSIS — D83.9 COMMON VARIABLE IMMUNODEFICIENCY (HCC): Primary | ICD-10-CM

## 2020-07-08 DIAGNOSIS — M81.0 OSTEOPOROSIS, UNSPECIFIED OSTEOPOROSIS TYPE, UNSPECIFIED PATHOLOGICAL FRACTURE PRESENCE: ICD-10-CM

## 2020-07-08 LAB
ALBUMIN SERPL-MCNC: 4.3 G/DL (ref 3.5–5.2)
ALBUMIN/GLOB SERPL: 1.7 G/DL
ALP SERPL-CCNC: 131 U/L (ref 39–117)
ALT SERPL W P-5'-P-CCNC: 17 U/L (ref 1–33)
ANION GAP SERPL CALCULATED.3IONS-SCNC: 10 MMOL/L (ref 5–15)
AST SERPL-CCNC: 15 U/L (ref 1–32)
BILIRUB SERPL-MCNC: 0.3 MG/DL (ref 0–1.2)
BUN SERPL-MCNC: 26 MG/DL (ref 8–23)
BUN/CREAT SERPL: 24.5 (ref 7–25)
CALCIUM SPEC-SCNC: 9.7 MG/DL (ref 8.6–10.5)
CHLORIDE SERPL-SCNC: 108 MMOL/L (ref 98–107)
CO2 SERPL-SCNC: 21 MMOL/L (ref 22–29)
CREAT SERPL-MCNC: 1.06 MG/DL (ref 0.57–1)
GFR SERPL CREATININE-BSD FRML MDRD: 52 ML/MIN/1.73
GLOBULIN UR ELPH-MCNC: 2.6 GM/DL
GLUCOSE SERPL-MCNC: 136 MG/DL (ref 65–99)
IGG1 SER-MCNC: 991 MG/DL (ref 700–1600)
POTASSIUM SERPL-SCNC: 4.3 MMOL/L (ref 3.5–5.2)
PROT SERPL-MCNC: 6.9 G/DL (ref 6–8.5)
SODIUM SERPL-SCNC: 139 MMOL/L (ref 136–145)

## 2020-07-08 PROCEDURE — 25010000002 ROMOSOZUMAB-AQQG 105 MG/1.17ML SOLUTION PREFILLED SYRINGE: Performed by: INTERNAL MEDICINE

## 2020-07-08 PROCEDURE — 96365 THER/PROPH/DIAG IV INF INIT: CPT

## 2020-07-08 PROCEDURE — 25010000002 IMMUNE GLOBULIN (HUMAN) 20 GM/200ML SOLUTION: Performed by: ALLERGY & IMMUNOLOGY

## 2020-07-08 PROCEDURE — 82784 ASSAY IGA/IGD/IGG/IGM EACH: CPT | Performed by: ALLERGY & IMMUNOLOGY

## 2020-07-08 PROCEDURE — 96372 THER/PROPH/DIAG INJ SC/IM: CPT

## 2020-07-08 PROCEDURE — 80053 COMPREHEN METABOLIC PANEL: CPT | Performed by: INTERNAL MEDICINE

## 2020-07-08 PROCEDURE — 96366 THER/PROPH/DIAG IV INF ADDON: CPT

## 2020-07-08 RX ORDER — ACETAMINOPHEN 325 MG/1
650 TABLET ORAL ONCE
Status: CANCELLED
Start: 2020-07-15

## 2020-07-08 RX ORDER — DIPHENHYDRAMINE HCL 25 MG
25 CAPSULE ORAL ONCE
Status: CANCELLED
Start: 2020-07-15

## 2020-07-08 RX ORDER — EPINEPHRINE 0.3 MG/.3ML
0.3 INJECTION SUBCUTANEOUS ONCE AS NEEDED
Status: CANCELLED
Start: 2020-07-15

## 2020-07-08 RX ORDER — DIPHENHYDRAMINE HYDROCHLORIDE 50 MG/ML
50 INJECTION INTRAMUSCULAR; INTRAVENOUS ONCE AS NEEDED
Status: CANCELLED
Start: 2020-07-15

## 2020-07-08 RX ORDER — ACETAMINOPHEN 500 MG
500 TABLET ORAL EVERY 4 HOURS PRN
Status: CANCELLED
Start: 2020-07-15

## 2020-07-08 RX ADMIN — ROMOSOZUMAB-AQQG 210 MG: 105 INJECTION, SOLUTION SUBCUTANEOUS at 11:37

## 2020-07-08 RX ADMIN — IMMUNE GLOBULIN (HUMAN) 40 G: 10 INJECTION INTRAVENOUS; SUBCUTANEOUS at 08:53

## 2020-07-09 RX ORDER — OXCARBAZEPINE 300 MG/1
TABLET, FILM COATED ORAL
Qty: 90 TABLET | Refills: 3 | OUTPATIENT
Start: 2020-07-09

## 2020-07-13 RX ORDER — BUPROPION HYDROCHLORIDE 150 MG/1
TABLET ORAL
Qty: 90 TABLET | Refills: 3 | Status: SHIPPED | OUTPATIENT
Start: 2020-07-13 | End: 2020-09-04

## 2020-08-05 ENCOUNTER — INFUSION (OUTPATIENT)
Dept: ONCOLOGY | Facility: HOSPITAL | Age: 65
End: 2020-08-05

## 2020-08-05 ENCOUNTER — LAB (OUTPATIENT)
Dept: OTHER | Facility: HOSPITAL | Age: 65
End: 2020-08-05

## 2020-08-05 ENCOUNTER — APPOINTMENT (OUTPATIENT)
Dept: ONCOLOGY | Facility: HOSPITAL | Age: 65
End: 2020-08-05

## 2020-08-05 VITALS
SYSTOLIC BLOOD PRESSURE: 110 MMHG | HEART RATE: 65 BPM | OXYGEN SATURATION: 97 % | HEIGHT: 65 IN | WEIGHT: 198 LBS | BODY MASS INDEX: 32.99 KG/M2 | TEMPERATURE: 98.1 F | RESPIRATION RATE: 18 BRPM | DIASTOLIC BLOOD PRESSURE: 65 MMHG

## 2020-08-05 DIAGNOSIS — D75.89 MACROCYTOSIS WITHOUT ANEMIA: Primary | ICD-10-CM

## 2020-08-05 DIAGNOSIS — D47.2 IGG MONOCLONAL GAMMOPATHY OF UNCERTAIN SIGNIFICANCE: ICD-10-CM

## 2020-08-05 DIAGNOSIS — D83.9 COMMON VARIABLE IMMUNODEFICIENCY (HCC): ICD-10-CM

## 2020-08-05 DIAGNOSIS — M81.0 OSTEOPOROSIS, UNSPECIFIED OSTEOPOROSIS TYPE, UNSPECIFIED PATHOLOGICAL FRACTURE PRESENCE: ICD-10-CM

## 2020-08-05 LAB
ALBUMIN SERPL-MCNC: 4.1 G/DL (ref 3.5–5.2)
ALBUMIN/GLOB SERPL: 1.6 G/DL
ALP SERPL-CCNC: 121 U/L (ref 39–117)
ALT SERPL W P-5'-P-CCNC: 10 U/L (ref 1–33)
ANION GAP SERPL CALCULATED.3IONS-SCNC: 10.8 MMOL/L (ref 5–15)
AST SERPL-CCNC: 12 U/L (ref 1–32)
B2 MICROGLOB SERPL-MCNC: 2.1 MG/L (ref 0.8–2.2)
BASOPHILS # BLD AUTO: 0.01 10*3/MM3 (ref 0–0.2)
BASOPHILS NFR BLD AUTO: 0.2 % (ref 0–1.5)
BILIRUB SERPL-MCNC: 0.2 MG/DL (ref 0–1.2)
BUN SERPL-MCNC: 18 MG/DL (ref 8–23)
BUN/CREAT SERPL: 19.4 (ref 7–25)
CALCIUM SPEC-SCNC: 9 MG/DL (ref 8.6–10.5)
CHLORIDE SERPL-SCNC: 113 MMOL/L (ref 98–107)
CO2 SERPL-SCNC: 19.2 MMOL/L (ref 22–29)
CREAT SERPL-MCNC: 0.93 MG/DL (ref 0.57–1)
DEPRECATED RDW RBC AUTO: 48.2 FL (ref 37–54)
EOSINOPHIL # BLD AUTO: 0.05 10*3/MM3 (ref 0–0.4)
EOSINOPHIL NFR BLD AUTO: 1 % (ref 0.3–6.2)
ERYTHROCYTE [DISTWIDTH] IN BLOOD BY AUTOMATED COUNT: 13.2 % (ref 12.3–15.4)
GFR SERPL CREATININE-BSD FRML MDRD: 61 ML/MIN/1.73
GLOBULIN UR ELPH-MCNC: 2.5 GM/DL
GLUCOSE SERPL-MCNC: 119 MG/DL (ref 65–99)
HCT VFR BLD AUTO: 40.7 % (ref 34–46.6)
HGB BLD-MCNC: 13.7 G/DL (ref 12–15.9)
IMM GRANULOCYTES # BLD AUTO: 0.02 10*3/MM3 (ref 0–0.05)
IMM GRANULOCYTES NFR BLD AUTO: 0.4 % (ref 0–0.5)
LYMPHOCYTES # BLD AUTO: 0.3 10*3/MM3 (ref 0.7–3.1)
LYMPHOCYTES NFR BLD AUTO: 6.2 % (ref 19.6–45.3)
MCH RBC QN AUTO: 33.6 PG (ref 26.6–33)
MCHC RBC AUTO-ENTMCNC: 33.7 G/DL (ref 31.5–35.7)
MCV RBC AUTO: 99.8 FL (ref 79–97)
MONOCYTES # BLD AUTO: 0.41 10*3/MM3 (ref 0.1–0.9)
MONOCYTES NFR BLD AUTO: 8.5 % (ref 5–12)
NEUTROPHILS NFR BLD AUTO: 4.02 10*3/MM3 (ref 1.7–7)
NEUTROPHILS NFR BLD AUTO: 83.7 % (ref 42.7–76)
NRBC BLD AUTO-RTO: 0 /100 WBC (ref 0–0.2)
PLATELET # BLD AUTO: 260 10*3/MM3 (ref 140–450)
PMV BLD AUTO: 10.4 FL (ref 6–12)
POTASSIUM SERPL-SCNC: 4 MMOL/L (ref 3.5–5.2)
PROT SERPL-MCNC: 6.6 G/DL (ref 6–8.5)
RBC # BLD AUTO: 4.08 10*6/MM3 (ref 3.77–5.28)
SODIUM SERPL-SCNC: 143 MMOL/L (ref 136–145)
WBC # BLD AUTO: 4.81 10*3/MM3 (ref 3.4–10.8)

## 2020-08-05 PROCEDURE — 80053 COMPREHEN METABOLIC PANEL: CPT | Performed by: INTERNAL MEDICINE

## 2020-08-05 PROCEDURE — 82232 ASSAY OF BETA-2 PROTEIN: CPT | Performed by: INTERNAL MEDICINE

## 2020-08-05 PROCEDURE — 25010000002 IMMUNE GLOBULIN (HUMAN) 20 GM/200ML SOLUTION: Performed by: ALLERGY & IMMUNOLOGY

## 2020-08-05 PROCEDURE — 96366 THER/PROPH/DIAG IV INF ADDON: CPT

## 2020-08-05 PROCEDURE — 84165 PROTEIN E-PHORESIS SERUM: CPT | Performed by: INTERNAL MEDICINE

## 2020-08-05 PROCEDURE — 83883 ASSAY NEPHELOMETRY NOT SPEC: CPT | Performed by: INTERNAL MEDICINE

## 2020-08-05 PROCEDURE — 82784 ASSAY IGA/IGD/IGG/IGM EACH: CPT | Performed by: INTERNAL MEDICINE

## 2020-08-05 PROCEDURE — 96365 THER/PROPH/DIAG IV INF INIT: CPT

## 2020-08-05 PROCEDURE — 82525 ASSAY OF COPPER: CPT | Performed by: INTERNAL MEDICINE

## 2020-08-05 PROCEDURE — 85025 COMPLETE CBC W/AUTO DIFF WBC: CPT | Performed by: INTERNAL MEDICINE

## 2020-08-05 PROCEDURE — 25010000002 ROMOSOZUMAB-AQQG 105 MG/1.17ML SOLUTION PREFILLED SYRINGE: Performed by: INTERNAL MEDICINE

## 2020-08-05 PROCEDURE — 86334 IMMUNOFIX E-PHORESIS SERUM: CPT | Performed by: INTERNAL MEDICINE

## 2020-08-05 PROCEDURE — 25010000002 IMMUNE GLOBULIN (HUMAN) 10 GM/100ML SOLUTION: Performed by: ALLERGY & IMMUNOLOGY

## 2020-08-05 PROCEDURE — 96372 THER/PROPH/DIAG INJ SC/IM: CPT

## 2020-08-05 RX ORDER — DIPHENHYDRAMINE HYDROCHLORIDE 50 MG/ML
50 INJECTION INTRAMUSCULAR; INTRAVENOUS ONCE AS NEEDED
Status: CANCELLED
Start: 2020-08-12

## 2020-08-05 RX ORDER — ACETAMINOPHEN 325 MG/1
650 TABLET ORAL ONCE
Status: CANCELLED
Start: 2020-08-12

## 2020-08-05 RX ORDER — DIPHENHYDRAMINE HCL 25 MG
25 CAPSULE ORAL ONCE
Status: CANCELLED
Start: 2020-08-12

## 2020-08-05 RX ORDER — ACETAMINOPHEN 500 MG
500 TABLET ORAL EVERY 4 HOURS PRN
Status: CANCELLED
Start: 2020-08-12

## 2020-08-05 RX ORDER — EPINEPHRINE 0.3 MG/.3ML
0.3 INJECTION SUBCUTANEOUS ONCE AS NEEDED
Status: CANCELLED
Start: 2020-08-12

## 2020-08-05 RX ADMIN — IMMUNE GLOBULIN (HUMAN) 20 G: 10 INJECTION INTRAVENOUS; SUBCUTANEOUS at 11:10

## 2020-08-05 RX ADMIN — ROMOSOZUMAB-AQQG 210 MG: 105 INJECTION, SOLUTION SUBCUTANEOUS at 09:54

## 2020-08-05 RX ADMIN — IMMUNE GLOBULIN (HUMAN) 20 G: 10 INJECTION INTRAVENOUS; SUBCUTANEOUS at 09:26

## 2020-08-06 ENCOUNTER — APPOINTMENT (OUTPATIENT)
Dept: OTHER | Facility: HOSPITAL | Age: 65
End: 2020-08-06

## 2020-08-06 LAB
ALBUMIN SERPL-MCNC: 3.5 G/DL (ref 2.9–4.4)
ALBUMIN/GLOB SERPL: 1.2 {RATIO} (ref 0.7–1.7)
ALPHA1 GLOB FLD ELPH-MCNC: 0.2 G/DL (ref 0–0.4)
ALPHA2 GLOB SERPL ELPH-MCNC: 0.8 G/DL (ref 0.4–1)
B-GLOBULIN SERPL ELPH-MCNC: 1 G/DL (ref 0.7–1.3)
GAMMA GLOB SERPL ELPH-MCNC: 1 G/DL (ref 0.4–1.8)
GLOBULIN SER CALC-MCNC: 3 G/DL (ref 2.2–3.9)
IGA SERPL-MCNC: 98 MG/DL (ref 87–352)
IGG SERPL-MCNC: 961 MG/DL (ref 586–1602)
IGM SERPL-MCNC: 28 MG/DL (ref 26–217)
INTERPRETATION SERPL IEP-IMP: ABNORMAL
KAPPA LC SERPL-MCNC: 27 MG/L (ref 3.3–19.4)
KAPPA LC/LAMBDA SER: 1.38 {RATIO} (ref 0.26–1.65)
LAMBDA LC FREE SERPL-MCNC: 19.5 MG/L (ref 5.7–26.3)
Lab: ABNORMAL
M-SPIKE: ABNORMAL G/DL
PROT SERPL-MCNC: 6.5 G/DL (ref 6–8.5)

## 2020-08-07 LAB — COPPER SERPL-MCNC: 69 UG/DL (ref 72–166)

## 2020-08-12 ENCOUNTER — OFFICE VISIT (OUTPATIENT)
Dept: NEUROLOGY | Facility: CLINIC | Age: 65
End: 2020-08-12

## 2020-08-12 DIAGNOSIS — G62.9 SENSORY NEUROPATHY: ICD-10-CM

## 2020-08-12 DIAGNOSIS — G44.209 TENSION HEADACHE: Primary | ICD-10-CM

## 2020-08-12 DIAGNOSIS — R20.0 LT FACIAL NUMBNESS: ICD-10-CM

## 2020-08-12 PROCEDURE — 99442 PR PHYS/QHP TELEPHONE EVALUATION 11-20 MIN: CPT | Performed by: PSYCHIATRY & NEUROLOGY

## 2020-08-12 RX ORDER — SUMATRIPTAN 100 MG/1
TABLET, FILM COATED ORAL
COMMUNITY
Start: 2020-07-07 | End: 2020-11-23

## 2020-08-12 RX ORDER — TOPIRAMATE 100 MG/1
100 TABLET, FILM COATED ORAL 2 TIMES DAILY
Qty: 60 TABLET | Refills: 11 | Status: SHIPPED | OUTPATIENT
Start: 2020-08-12 | End: 2022-06-01

## 2020-08-12 RX ORDER — TOPIRAMATE 25 MG/1
25 TABLET ORAL NIGHTLY
Qty: 30 TABLET | Refills: 11 | Status: SHIPPED | OUTPATIENT
Start: 2020-08-12 | End: 2020-11-23

## 2020-08-12 RX ORDER — TOPIRAMATE 100 MG/1
100 TABLET, FILM COATED ORAL 2 TIMES DAILY
COMMUNITY
Start: 2020-07-13 | End: 2020-08-12 | Stop reason: SDUPTHER

## 2020-08-12 NOTE — PROGRESS NOTES
Phone visit.  Follow-up of left facial numbness and headache.  Abnormal MRI of the brain in the past.  She is currently on Topamax 100 mg a morning 125 mg at night.  This dosage was increased by Dr. Cruz.  Botox there as well.  Not sure she is getting much help from oxcarbazepine 300 mg at night.         Dara was seen today for migraine.    Diagnoses and all orders for this visit:    Tension headache    Sensory neuropathy    Lt facial numbness    Other orders  -     topiramate (TOPAMAX) 100 MG tablet; Take 1 tablet by mouth 2 (two) times a day.  -     topiramate (TOPAMAX) 25 MG tablet; Take 1 tablet by mouth Every Night.    Do not clear as to the etiology of her symptoms.  Skeptical that oxcarbazepine has helped much.  Will wean over the next month or so.  We will try to follow-up with Dr. Cruz.  Could follow-up in the office with Dr. Cuba or Zion if necessary.  Thank you for allowing me to share in the care of this patient.  Cj Null M.D.      You have chosen to receive care through a telephone visit. Do you consent to use a telephone visit for your medical care today? Yes  This visit has been rescheduled as a phone visit to comply with patient safety concerns in accordance with CDC recommendations. Total time of discussion was 20 minutes.

## 2020-08-13 ENCOUNTER — APPOINTMENT (OUTPATIENT)
Dept: OTHER | Facility: HOSPITAL | Age: 65
End: 2020-08-13

## 2020-08-13 ENCOUNTER — OFFICE VISIT (OUTPATIENT)
Dept: ONCOLOGY | Facility: CLINIC | Age: 65
End: 2020-08-13

## 2020-08-13 VITALS
HEART RATE: 84 BPM | DIASTOLIC BLOOD PRESSURE: 84 MMHG | BODY MASS INDEX: 32.95 KG/M2 | OXYGEN SATURATION: 96 % | SYSTOLIC BLOOD PRESSURE: 138 MMHG | TEMPERATURE: 97.3 F | HEIGHT: 65 IN | RESPIRATION RATE: 16 BRPM

## 2020-08-13 DIAGNOSIS — E53.8 VITAMIN B12 DEFICIENCY: ICD-10-CM

## 2020-08-13 DIAGNOSIS — D75.89 MACROCYTOSIS WITHOUT ANEMIA: Primary | ICD-10-CM

## 2020-08-13 DIAGNOSIS — E61.0 CHRONIC COPPER DEFICIENCY: ICD-10-CM

## 2020-08-13 DIAGNOSIS — D47.2 IGG MONOCLONAL GAMMOPATHY OF UNCERTAIN SIGNIFICANCE: ICD-10-CM

## 2020-08-13 PROCEDURE — 99214 OFFICE O/P EST MOD 30 MIN: CPT | Performed by: INTERNAL MEDICINE

## 2020-08-13 RX ORDER — CALCIUM CARBONATE/VITAMIN D3 500-10/5ML
4 LIQUID (ML) ORAL 2 TIMES DAILY
Qty: 360 CAPSULE | Refills: 3 | Status: SHIPPED | OUTPATIENT
Start: 2020-08-13 | End: 2022-06-01

## 2020-08-13 NOTE — PROGRESS NOTES
REASON FOR FOLLOW UP:     1. Iron deficiency anemia, recurrent, not able to tolerate oral iron treatment due to severe constipation.  Patient was given PRBC transfusion and IV iron therapy in 2016.    · Patient has a recurrent severe iron deficiency in late August 2018, and was given Injectafer 2 doses in September 2018.  Had great response with normalization of hemoglobin and iron studies.  2. Persistent worsening macrocytosis.  Patient was found to having copper deficiency.    · Patient started on oral copper gluconate 2 mg once daily on 9/4/2019.    · The most recent laboratory study on 2/12/2020 reported persistent copper deficiency.  Copper gluconate increased to 2 mg twice a day.  · Patient is also on Imuran for rheumatoid disease.    3. Monoclonal gammopathy of undetermined significance, IgG lambda, unable to be quantified by SPEP on 9/23/2020.          HISTORY OF PRESENT ILLNESS:  The patient is a 65 y.o. female who is here for 6 month follow-up with results from laboratory study obtained a week earlier.    Patient states that there is nothing new. Patient continuing one B12 injection a month, as well as one oral tablet a day.    Patient was taking 2 mg copper gluconate twice daily. She does not notice any real side effects but she has experienced headaches, which could be unrelated.     Patient is due to follow-up with rheumatologist every 4 months with blood work in between. She will see rheumatologist next week.    Laboratory studies from 8/5/2020 reported normal CBC including WBC 4810 with ANC 4020 lymphocytes 300 monocytes 410, platelets 260,000, and hemoglobin 13.7, MCV 99.8 MCHC 33.7.  Her serum copper was marginally low at 69 mcg/dL, and unremarkable CMP.  Serum protein study was negative for monoclonal proteins by immunofixation and protein electrophoresis.  She had a mildly elevated serum free kappa chain 27.0 mg/L, normal lambda chain 19.5, normal serum IgG B 61, IgA 98 and IgM 28, beta-2  myoglobin 2.1 mg/L.      Past Medical History:   Diagnosis Date   • Acute colitis 1/18/2017   • Allergic Codeine, some antibiotics   • Anemia    • Cataract Removed    2012   • Chronic depression    • Colon polyp 1 removed    2016   • Diabetes mellitus (CMS/HCC)    • Fracture of rib    • GERD (gastroesophageal reflux disease)    • H/O Wrist fracture, right    • Headache    • History of bronchitis    • History of pneumonia    • History of transfusion    • Hyperlipidemia    • Hypothyroidism    • Inflammatory bowel disease    • Iron deficiency    • Irritable bowel syndrome    • Low serum copper for age 8/30/2019   • Migraine    • Obesity    • Osteopenia    • Osteoporosis    • Pneumonia June 2016   • RA (rheumatoid arthritis) (CMS/HCC)    • Sensory neuropathy    • Sepsis, unspecified organism (CMS/HCC) 1/18/2017   • Vitamin D deficiency      Past Surgical History:   Procedure Laterality Date   • ADENOIDECTOMY     • BREAST AUGMENTATION     • BREAST IMPLANT REMOVAL Bilateral    • CARPAL TUNNEL RELEASE Left 2015   • COLONOSCOPY     • EYE SURGERY Bilateral    • HYSTERECTOMY     • LASIK Bilateral    • ORIF WRIST FRACTURE Right    • NE TOTAL KNEE ARTHROPLASTY Right 12/14/2017    Procedure: TOTAL KNEE ARTHROPLASTY;  Surgeon: Zion Guo MD;  Location: [facility];  Service: Orthopedics   • SINUS SURGERY      x2   • TONSILLECTOMY         HEMATOLOGIC/ONCOLOGIC HISTORY:  The patient is a 63 y.o. year old femalewho is here for evaluation and management of her anemia. This patient has chronic disease including rheumatoid arthritis. She was on methotrexate for about 11 years and currently on Imuran and also history for pernicious anemia/vitamin B12 deficiency undergoing monthly intramuscular injection, history of hyperlipidemia, hypothyroidism, gastroesophageal reflux disease and rheumatoid arthritis. The patient reports she was not able to tolerate oral iron treatment because of significant constipation. The last time she  was taking oral iron was about 2-3 years ago at which time she had significant anemia and was given 2 units PRBC transfusion. She was also given intravenous iron therapy at that time. Patient reports she had GI evaluation by Dr. Celeste with both EGD and a colonoscopic examination a couple of years ago. I searched electronic medical records at [facility] and according to Dr. Celeste’s clinical note in 08/2016 and 11/2016, she had both EGD and colonoscopic examination supposedly in August; however, I could not find the procedure note itself. Nevertheless, patient reports she was told not having malignancy. I did find the pathology evaluation on 08/24/2016, which reported mild to focally moderate chronic active gastritis with intestinal metaplasia; negative for H. pylori. The small intestinal biopsy was benign. No signs for abnormal histology. The distal esophagus shows moderate chronic active inflammation and no intestinal metaplasia or dysplasia. The descending colon polyp was tubular adenoma with low-grade dysplasia. Patient reports no melena, no hematochezia. She does have pica for ice chips.     Patient reports she was diagnosed of rheumatoid arthritis in 2003. She was on methotrexate from 2003 to 2014. Subsequently treatment changed to Imuran. Patient reports she recently has been on large dose prednisone 40 mg daily for about 1 year and is in the process of tapering down of her prednisone. She also previously was given Rituxan treatment about 10 years ago. Most recently about 4 months ago she was restarted back on Rituxan.     Patient also reports about 2 years ago, she had recurrent pneumonia and hospitalization and since that time she has been given IVIG regularly for the past 2 years and since that time she has been doing very well. No recurrent pneumonia.     Patient complains of significant fatigue, pica for ice chips. She also complains of exertional dyspnea. No cough or hemoptysis.  She has also soreness on her tongue. She has vague intermittent abdominal pain and constipation. Denies melena or hematochezia. No nausea. No vomiting. She has some weight gain secondary to long-term oral steroid use. She also reports heat intolerance. Patient continues to have joint swelling and pain from her rheumatoid arthritis. She also has chronic low back pain. She reports intermittent headaches and also numbness involving her extremities mostly on hands but denies fainting or syncope. She has no easy bleeding or bruising.    I reviewed her laboratory results within the [facility] system. She had most recent iron study on 04/27/2018, which reported iron deficiency with ferritin 8.28 ng/mL, serum free iron 32 mcg/dL with no iron saturation. She had supratherapeutic folic acid level more than 20 ng/mL. However, had low normal vitamin B12 level 346 pg/mL. She had mild anemia; hemoglobin 10.7, MCV 92.2, MCHC 29.3. Her platelets were 506,000 and WBC 10,990 including neutrophils 17054, lymphocytes 780 and monocytes 410. Her chemistry lab updated earlier on 04/23/2018 reported creatinine 1.02. Normal electrolytes. Glucose 190. Marginally elevated alkaline phosphatase 118 and otherwise normal liver function panel. Total serum protein 7.2 and albumin 3.9. Had a normal TSH of 1.57.     On 01/22/2017, she had serum free iron 31, TIBC 463 and iron saturation 7% without ferritin level. Folic acid was more than 20 ng/mL and vitamin B12 was 1022 pg/mL. She had hemoglobin 11.4, MCV 85.1, MCHC 29.8. Her platelets were 487,000 and WBC was 23,700. Apparently, patient was hospitalized at that time at [facility] for about a week because of sepsis and acute colitis. Laboratory study on 06/06/2016 reported serum ferritin 9.0 ng/mL, free iron 22, TIBC 416 and iron saturation 5%. Her vitamin B12 level was 287 pg/mL and RBC folic acid was 1575 ng/mL. Her hemoglobin was 8.6, MCV 76.3, MCHC 29.4. Platelets  were 617,000 and WBC 12,000. This patient had worsening hemoglobin on 06/07/2016 with hemoglobin 7.6 and she was given 2 units PRBC transfusion. Post transfusion hemoglobin was 10.4 on 06/08/2016. It was at that time the patient had hospitalization for pneumonia.    This patient has chronic iron deficiency for the past several years. She was not able to tolerate oral iron treatment because of severe constipation. She previously in 2016 received 2 units PRBC transfusion and intravenous iron therapy. She had workup with both EGD and colonoscopy in 08/2016 shortly after her discharge from hospital at that time. Procedure report was not able to be found, however, I reviewed pathology report from that procedure. There was chronic gastritis and esophagitis but no report of ulceration. She also had benign colon polyp.     Laboratory study on 8/29/2018 reported anemia Hb 10.3, platelets 412,000 and WBC 11,400 including in C 9800.  Iron study reported a ferritin less than 5 ng/mL, free iron 32 TIBC 454 and iron saturation 7%.    This patient is symptomatic with profound fatigue and pica for ice chips. I discussed with the patient, recommend stat iron study and arrange for her intravenous iron therapy with Injectafer 750 mg once a week for 2 doses in September 2018.      Patient had resolution of pica for ice chips after IV iron therapy.  Repeated laboratory study on 9/27/2018 reported normalized iron saturation 29%, and supratherapeutic ferritin 553 ng/mL.  She had a normalization of hemoglobin 12.4 and platelets 307,000.    Laboratory study on 8/26/2019 reported normal Hb 14.5 however worsening macrocytosis .0.  She has normal WBC 10,650, but elevated ANC 9950 and decreased lymphocytes 250.  She has normal platelets 317,000.  Her iron study was normal reporting ferritin 216, iron saturation 35%, slightly supratherapeutic B12 level at 1220 pg/mL, however her copper deficiency 68 mcg/dL.     Patient was started on  copper gluconate 2 mg daily.    Patient was here on 2/19/2020 for 6-month re-evaluation of her iron deficiency anemia, copper deficiency and macrocytosis with laboratory review.  The patient presents by herself today.    The patient reports feeling well overall today.  She notes some neck pain, but denies any adenopathy.  She has not been checking herself regularly for adenopathy.    She continues on monthly Vitamin B12 injections.  She feels more energized right after receiving the B-12 injections but notes this is not long lasting.  She is also taking the oral copper gluconate 2 mg once daily with good tolerance.  She has a good performance status.      The patient is followed by rheumatology for her rheumatoid arthritis.  She has been on Rituxan for 2 years and oral Azathioprine 100 mg two times a day for the past 4-5 years, with good control of her RA.  She notes some joint swelling in her hands and fingers, but she denies any leg swelling.      Recent laboratory studies on 2/12/2020 showed persistent copper deficiency at 60 mcg/dL.  She has, however with normal hemoglobin 14.6.  She also has normal  her iron level reported ferritin 236, free iron 73 TIBC 281 and iron saturation 26%.  She has 0 and platelets 340,000.  Excellent RBC folate 1174 ng/mL.    Lab study on 11/20/2019 reported excellent vitamin B12 level more than 2000 pg/mL.     On 9/23/2019, patient was found to have a monoclonal gammopathy, IgG lambda subtype however unable to be quantified by SPEP.She had a normal serum IgG 811 mg/dL, IgA 100, IgM 20.  No free light chains were measured.    Patient has persistently low copper level 60 mcg/dL around 2/12/2020.  Had a persistent macrocytosis .0 but a normal hemoglobin 14.6 and normal platelets and WBC.  Oral copper gluconate was increased to 2 mg twice a day.        MEDICATIONS    Current Outpatient Medications:   •  atorvastatin (LIPITOR) 20 MG tablet, TAKE 1 TABLET BY MOUTH DAILY.,  "Disp: 30 tablet, Rfl: 11  •  azaTHIOprine (IMURAN) 50 MG tablet, Take 50 mg by mouth 3 (Three) Times a Day., Disp: , Rfl:   •  B Complex-C (B COMPLEX-B12-C IJ), Inject 1,000 mcg under the skin into the appropriate area as directed Every 30 (Thirty) Days., Disp: , Rfl:   •  buPROPion XL (WELLBUTRIN XL) 150 MG 24 hr tablet, TAKE 1 TABLET BY MOUTH EVERY MORNING., Disp: 90 tablet, Rfl: 3  •  Cholecalciferol (VITAMIN D3) 5000 UNITS capsule capsule, Take 5,000 Units by mouth 2 (Two) Times a Day., Disp: , Rfl:   •  COPPER CAPS 2 MG capsule, Take 4 mg by mouth 2 (Two) Times a Day., Disp: 360 capsule, Rfl: 3  •  cyanocobalamin 1000 MCG/ML injection, INJECT 1,000 MCG AS DIRECTED EVERY 30 (THIRTY) DAYS., Disp: 1 mL, Rfl: 11  •  cyclobenzaprine (FLEXERIL) 10 MG tablet, , Disp: , Rfl:   •  DULoxetine (Cymbalta) 20 MG capsule, Take 1 capsule by mouth Daily., Disp: 90 capsule, Rfl: 1  •  folic acid (FOLVITE) 1 MG tablet, Take 1 mg by mouth daily., Disp: , Rfl:   •  lubiprostone (AMITIZA) 24 MCG capsule, Take 1 capsule by mouth 2 (Two) Times a Day With Meals., Disp: 60 capsule, Rfl: 11  •  omeprazole (priLOSEC) 40 MG capsule, TAKE 1 CAPSULE BY MOUTH DAILY., Disp: 30 capsule, Rfl: 11  •  OXcarbazepine (TRILEPTAL) 300 MG/5ML suspension, Take  by mouth Every Evening., Disp: , Rfl:   •  riTUXimab (RITUXAN) 100 MG/10ML solution injection, Administer RITUXAN 1000mg IV Q 2WK X2 INFUSIONS Q 6 MONTHS, Disp: , Rfl:   •  SUMAtriptan (IMITREX) 100 MG tablet, , Disp: , Rfl:   •  SYNTHROID 112 MCG tablet, TAKE 1 TABLET BY MOUTH DAILY., Disp: 30 tablet, Rfl: 11  •  topiramate (TOPAMAX) 100 MG tablet, Take 1 tablet by mouth 2 (two) times a day., Disp: 60 tablet, Rfl: 11  •  topiramate (TOPAMAX) 25 MG tablet, Take 1 tablet by mouth Every Night., Disp: 30 tablet, Rfl: 11  •  Tuberculin-Allergy Syringes (UltiCare Tuberculin Safety Syr) 25G X 1\" 1 ML misc, use with sq acthar injections, Disp: , Rfl:     ALLERGIES:     Allergies   Allergen Reactions "   • Codeine Nausea And Vomiting       SOCIAL HISTORY:       Social History     Socioeconomic History   • Marital status:      Spouse name: Not on file   • Number of children: 2   • Years of education: College   • Highest education level: Not on file   Occupational History   • Occupation: Homemaker     Employer: RETIRED   Tobacco Use   • Smoking status: Former Smoker     Packs/day: 0.50     Years: 5.00     Pack years: 2.50     Types: Cigarettes     Start date: 1974     Last attempt to quit: 1978     Years since quittin.3   • Smokeless tobacco: Never Used   • Tobacco comment: QUIT AGE 23   Substance and Sexual Activity   • Alcohol use: No     Comment: STOPPED    • Drug use: No        FAMILY HISTORY:   Father diagnosed of colon cancer at age 64,  of colon cancer age 65.  Mother is in excellent health condition in her early 90s.  Patient has a brother and a sister both living excellent condition.  Patient has 2 adult children both in excellent health condition.    Family History   Problem Relation Age of Onset   • Hyperlipidemia Mother    • Hypertension Mother    • Migraines Mother    • Colon cancer Father    • Diabetes Father    • Cancer Father    • Hyperlipidemia Brother    • Migraines Brother    • Migraines Sister    • Malig Hyperthermia Neg Hx        REVIEW OF SYSTEMS:  Review of Systems   Constitutional: Negative for appetite change, fatigue, fever and unexpected weight change.   HENT: Negative for mouth sores, rhinorrhea and sore throat.    Eyes: Negative for pain and visual disturbance.   Respiratory: Negative for cough and shortness of breath.    Cardiovascular: Negative for chest pain, palpitations and leg swelling.   Gastrointestinal: Positive for constipation. Negative for abdominal pain, blood in stool and nausea.   Endocrine: Positive for heat intolerance. Negative for polyphagia.   Genitourinary: Negative for dysuria and hematuria.   Musculoskeletal: Positive for arthralgias,  "joint swelling (hands) and neck pain.   Skin: Negative for rash.   Allergic/Immunologic: Positive for immunocompromised state (On Imuran treatment for her rheumatoid arthritis).   Hematological: Negative for adenopathy. Does not bruise/bleed easily.   Psychiatric/Behavioral: Negative for agitation.          Vitals:    08/13/20 1143   BP: 138/84   Pulse: 84   Resp: 16   Temp: 97.3 °F (36.3 °C)   TempSrc: Skin   SpO2: 96%   Height: 165.1 cm (65\")   PainSc: 0-No pain     Current Status 8/13/2020   ECOG score 0      PHYSICAL EXAM:      GENERAL:  Well-developed, well-nourished  female in no acute distress. Patient is wearing a facemask due to coronavirus pandemic.   SKIN:  Warm, dry without rashes, purpura or petechiae.  HEAD:  Normocephalic.  EYES:  Pupils equal, round.  Conjunctivae normal.  EARS:  Hearing intact.  NECK:  Supple; no thyromegaly or masses.  LYMPHATICS:  No cervical, supraclavicular, axillary adenopathy.  CHEST:  Lungs clear to auscultation. Good airflow.  Normal respiratory effort.  CARDIAC:  Regular rate and rhythm without murmurs. Normal S1,S2.  ABDOMEN:  Soft, nontender with no organomegaly or masses.  Bowel sounds normal.    EXTREMITIES:  No clubbing, cyanosis or edema.  NEUROLOGICAL:  Cranial Nerves II-XII grossly intact.  No focal neurological deficits.  PSYCHIATRIC:  Normal affect and mood.          RECENT LABS:  Lab Results   Component Value Date    WBC 4.81 08/05/2020    HGB 13.7 08/05/2020    HCT 40.7 08/05/2020    MCV 99.8 (H) 08/05/2020     08/05/2020     Lab Results   Component Value Date    NEUTROABS 4.02 08/05/2020     Lab Results   Component Value Date    LQLXZGCF57 688 05/20/2020     Lab Results   Component Value Date    FOLATE >20.00 04/27/2018     Lab Results   Component Value Date    IRON 73 02/12/2020    TIBC 281 02/12/2020    FERRITIN 236.10 (H) 02/12/2020   On 2/12/2020 iron saturation 26%     Serum copper 69 mcg/dL 8/5/2020.  Serum copper 60 mcg/dL 2/12/2020, "   Serum copper 68 mcg/dL on 8/26/2019.      Assessment/Plan      1. Recurrent iron deficiency in the past.   · She was not able to tolerate oral iron treatment because of severe constipation. She previously in 2016 received 2 units PRBC transfusion and intravenous iron therapy. She had workup with both EGD and colonoscopy in 08/2016 with pathology evaluation reported chronic gastritis and esophagitis but no report of ulceration.    · This patient is symptomatic with profound fatigue and pica for ice chips and recurrent iron deficiency anemia in August 2018.  We treated her with 2 doses of Injectafer in September 2018.   · This patient had resolution of pica for ice chips and improved energy level, normalization of hemoglobin after IV iron therapy.  Post treatment ferritin was 553 on 9/26/2018.  3/8/2019 laboratory study reported worsened but is still normal ferritin at 240 mg/mL, and good iron saturation 33%.   · On 08/26/2019, laboratory study reported ferritin at 216 and iron saturation of 35%.  · Laboratory studies on 2/12/2020 showed excellent ferritin 236 and iron saturation 26%.   · Maintains normal hemoglobin 13.7 on 8/5/2020.  No iron studies.    2. Pernicious anemia/vitamin B12 deficiency. This patient has monthly vitamin B12 injection.   I advised patient to start taking oral vitamin B12 at 1000 mcg daily.   · On 3/8/2019 her vitamin B12 level is 701 pg/mL, not as high as expected.  · On 08/26/2019, Vitamin B12 reported supratheraputicat 1,220, patient is to continue on Vitamin B12 injections.  · Patient had excellent supratherapeutic B12 level more than 2000 on 11/20/2019.  · Normal vitamin B12 level 688 pg/mL 5/20/2020.  She will continue vitamin B12 injections.    3.  Macrocytosis despite normal vitamin B12 level.  Her macrocytosis is newly developed in September 2018.  Patient reports no history of hepatitis or alcohol use.    · She previously had supratherapeutic folic acid level in April 2018.     · Macrocytosis needs to be monitored.  Could this caused by medication Imuran use for her rheumatoid arthritis?    · I discussed with patient 3/8/2019.  Her previous CT scan examination for abdomen and pelvis on 1/18/2017 reported a mild fatty liver.  Not sure whether this is the cause of her macrocytosis.    · Most laboratory study on 8/26/2019 reported serum copper level was low at 68.   · On 9/4/2019, I explained to the patient that she will need to take copper supplementation once daily.  Patient is agreeable.  We started her on copper gluconate 2 mg daily.   · Laboratory study on 2/12/2020 showed a lower serum copper level at 60.    · Patient is compliant with copper gluconate.  We discussed with patient on 2/19/2020, we will increase the dose to 2 mg twice daily.  · 8/5/2020, copper 69 mcg/dL, still low.  We discussed 8/13/2020, since the patient is still copper deficient at this time, we would increase her copper gluconate to 4 mg twice a day.  I also discussed with the patient the Imuran could cause macrocytosis.    4.  Lymphocytopenia.  This is secondary to Rituxan treatment in October for rheumatoid arthritis.  I reviewed medical records from her dermatologist office.     5.  Monoclonal gammopathy IgG lambda on 9/23/2019.  · She is positive for serum protein immunofixation, unable to be quantified by SPEP.  Had a normal serum IgG 811 mg/dL, IgA 100, IgM 20.  No free light chains were measured.    · Laboratory study on 8/5/2020 reported no detectable monoclonal proteins by immunofixation and protein electrophoresis.  It in the elevation of serum IgG or free lambda chain.  We will monitor on annual basis.      PLAN:   1. Increase oral copper gluconate to 4 mg twice daily. I E-scribed the prescription to the patient's pharmacy today.  2. I encouraged the patient to regularly check herself for adenopathy as patient is on immunosuppressant azathioprine.  3. Continue monthly injection of B12 at Dr. Yang's  office.    4. Patient is due to follow-up with rheumatologist every 4 months with blood work in between.   5. Follow-up with me in 6 months with labs performed 2 weeks prior to visit. Labs will be CMP, B12, Ferritin, CBC, Copper.       By signing my name here, I Alec Wu, attest that all documentation on 08/13/20 at 13:19 has been prepared under the direction and in the presence of Dr. Alejandro Noel MD.    I reviewed the note scribed by Alec Wu and made appropriate corrections.      ALEJANDRO NOEL M.D., Ph.D.          CC: MD Howard Caraballo M.D.    Cj Null M.D.

## 2020-09-02 ENCOUNTER — APPOINTMENT (OUTPATIENT)
Dept: OTHER | Facility: HOSPITAL | Age: 65
End: 2020-09-02

## 2020-09-02 ENCOUNTER — INFUSION (OUTPATIENT)
Dept: ONCOLOGY | Facility: HOSPITAL | Age: 65
End: 2020-09-02

## 2020-09-02 VITALS
TEMPERATURE: 97.8 F | HEIGHT: 65 IN | DIASTOLIC BLOOD PRESSURE: 67 MMHG | SYSTOLIC BLOOD PRESSURE: 115 MMHG | HEART RATE: 73 BPM | RESPIRATION RATE: 16 BRPM | OXYGEN SATURATION: 97 % | WEIGHT: 202 LBS | BODY MASS INDEX: 33.66 KG/M2

## 2020-09-02 DIAGNOSIS — M81.0 OSTEOPOROSIS, UNSPECIFIED OSTEOPOROSIS TYPE, UNSPECIFIED PATHOLOGICAL FRACTURE PRESENCE: ICD-10-CM

## 2020-09-02 DIAGNOSIS — M05.10 RHEUMATOID LUNG DISEASE (HCC): Primary | ICD-10-CM

## 2020-09-02 DIAGNOSIS — D75.89 MACROCYTOSIS WITHOUT ANEMIA: ICD-10-CM

## 2020-09-02 DIAGNOSIS — D83.9 COMMON VARIABLE IMMUNODEFICIENCY (HCC): ICD-10-CM

## 2020-09-02 LAB
ALBUMIN SERPL-MCNC: 4 G/DL (ref 3.5–5.2)
ALBUMIN SERPL-MCNC: 4.1 G/DL (ref 3.5–5.2)
ALBUMIN/GLOB SERPL: 1.4 G/DL
ALP SERPL-CCNC: 132 U/L (ref 39–117)
ALP SERPL-CCNC: 133 U/L (ref 39–117)
ALT SERPL W P-5'-P-CCNC: 14 U/L (ref 1–33)
ALT SERPL W P-5'-P-CCNC: 15 U/L (ref 1–33)
ANION GAP SERPL CALCULATED.3IONS-SCNC: 10.9 MMOL/L (ref 5–15)
AST SERPL-CCNC: 16 U/L (ref 1–32)
AST SERPL-CCNC: 16 U/L (ref 1–32)
BILIRUB CONJ SERPL-MCNC: <0.2 MG/DL (ref 0–0.3)
BILIRUB INDIRECT SERPL-MCNC: ABNORMAL MG/DL
BILIRUB SERPL-MCNC: 0.3 MG/DL (ref 0–1.2)
BILIRUB SERPL-MCNC: 0.3 MG/DL (ref 0–1.2)
BUN SERPL-MCNC: 16 MG/DL (ref 8–23)
BUN/CREAT SERPL: 15.4 (ref 7–25)
CALCIUM SPEC-SCNC: 9.3 MG/DL (ref 8.6–10.5)
CHLORIDE SERPL-SCNC: 111 MMOL/L (ref 98–107)
CO2 SERPL-SCNC: 19.1 MMOL/L (ref 22–29)
CREAT SERPL-MCNC: 1.04 MG/DL (ref 0.57–1)
GFR SERPL CREATININE-BSD FRML MDRD: 53 ML/MIN/1.73
GLOBULIN UR ELPH-MCNC: 2.8 GM/DL
GLUCOSE SERPL-MCNC: 122 MG/DL (ref 65–99)
POTASSIUM SERPL-SCNC: 4.5 MMOL/L (ref 3.5–5.2)
PROT SERPL-MCNC: 6.8 G/DL (ref 6–8.5)
PROT SERPL-MCNC: 6.8 G/DL (ref 6–8.5)
SODIUM SERPL-SCNC: 141 MMOL/L (ref 136–145)

## 2020-09-02 PROCEDURE — 80053 COMPREHEN METABOLIC PANEL: CPT | Performed by: INTERNAL MEDICINE

## 2020-09-02 PROCEDURE — 80076 HEPATIC FUNCTION PANEL: CPT | Performed by: INTERNAL MEDICINE

## 2020-09-02 PROCEDURE — 96372 THER/PROPH/DIAG INJ SC/IM: CPT

## 2020-09-02 PROCEDURE — 25010000002 ROMOSOZUMAB-AQQG 105 MG/1.17ML SOLUTION PREFILLED SYRINGE: Performed by: INTERNAL MEDICINE

## 2020-09-02 PROCEDURE — 96366 THER/PROPH/DIAG IV INF ADDON: CPT

## 2020-09-02 PROCEDURE — 96365 THER/PROPH/DIAG IV INF INIT: CPT

## 2020-09-02 PROCEDURE — 25010000002 IMMUNE GLOBULIN (HUMAN) 20 GM/200ML SOLUTION: Performed by: ALLERGY & IMMUNOLOGY

## 2020-09-02 RX ORDER — ACETAMINOPHEN 500 MG
500 TABLET ORAL EVERY 4 HOURS PRN
Status: CANCELLED
Start: 2020-09-09

## 2020-09-02 RX ORDER — ACETAMINOPHEN 325 MG/1
650 TABLET ORAL ONCE
Status: CANCELLED
Start: 2020-09-09

## 2020-09-02 RX ORDER — DIPHENHYDRAMINE HCL 25 MG
25 CAPSULE ORAL ONCE
Status: CANCELLED
Start: 2020-09-09

## 2020-09-02 RX ORDER — EPINEPHRINE 0.3 MG/.3ML
0.3 INJECTION SUBCUTANEOUS ONCE AS NEEDED
Status: CANCELLED
Start: 2020-09-09

## 2020-09-02 RX ORDER — DIPHENHYDRAMINE HYDROCHLORIDE 50 MG/ML
50 INJECTION INTRAMUSCULAR; INTRAVENOUS ONCE AS NEEDED
Status: CANCELLED
Start: 2020-09-09

## 2020-09-02 RX ADMIN — ROMOSOZUMAB-AQQG 210 MG: 105 INJECTION, SOLUTION SUBCUTANEOUS at 10:30

## 2020-09-02 RX ADMIN — IMMUNE GLOBULIN (HUMAN) 40 G: 10 INJECTION INTRAVENOUS; SUBCUTANEOUS at 08:43

## 2020-09-03 ENCOUNTER — TELEPHONE (OUTPATIENT)
Dept: ONCOLOGY | Facility: CLINIC | Age: 65
End: 2020-09-03

## 2020-09-03 NOTE — TELEPHONE ENCOUNTER
Patient has an appt on 01/28/21 for labs and was wondering if she can have the labs drawn on 01/27/20 when she is getting a IVIG done       Call patient back at [phone]

## 2020-09-04 ENCOUNTER — OFFICE VISIT (OUTPATIENT)
Dept: INTERNAL MEDICINE | Facility: CLINIC | Age: 65
End: 2020-09-04

## 2020-09-04 VITALS
HEIGHT: 65 IN | BODY MASS INDEX: 33.15 KG/M2 | OXYGEN SATURATION: 98 % | SYSTOLIC BLOOD PRESSURE: 114 MMHG | DIASTOLIC BLOOD PRESSURE: 70 MMHG | HEART RATE: 83 BPM | WEIGHT: 199 LBS

## 2020-09-04 DIAGNOSIS — R20.2 NUMBNESS AND TINGLING OF BOTH FEET: Primary | ICD-10-CM

## 2020-09-04 DIAGNOSIS — R20.0 NUMBNESS AND TINGLING OF BOTH FEET: Primary | ICD-10-CM

## 2020-09-04 PROCEDURE — 99214 OFFICE O/P EST MOD 30 MIN: CPT | Performed by: INTERNAL MEDICINE

## 2020-09-04 RX ORDER — BUPROPION HYDROCHLORIDE 300 MG/1
300 TABLET ORAL DAILY
Qty: 90 TABLET | Refills: 3 | Status: SHIPPED | OUTPATIENT
Start: 2020-09-04 | End: 2021-08-13

## 2020-09-04 NOTE — PROGRESS NOTES
Subjective     Dara Medina is a 65 y.o. female who presents with   Chief Complaint   Patient presents with   • Nerve Pain       History of Present Illness     Numbness in feet.  Going on for 2 years.  Bilateral feet.  On B12 shots.  Not diabetic.      Obesity.  She is frustrated by inability to lose weight. She follows a 1400 calorie a day diet.  She is not exercising.  We discussed the following classes of obesity medications.      Phentermine:  It is a stimulant.  It can increase blood pressure and cause palpitations.  Rare side effects include cardiac ischemia (lack of blood flow to heart), pulmonary HTN, psychosis, abuse and dependency and withdrawal.  It is a controlled substance in the [facility].      Contrave (naltrexone/buproprion).  This is a combination drug.  Bupropion is an antidepressant.  Naltrexone is an opoid blocker.       Qsymia (phentermine/topiramate).  One component phentermine see above.  Topiramate is an antiseizure medication.  Common side effects of topiramate include kidney stones, paresthesias and difficulty with word finding.      Saxenda (liraglutide).  Active ingredient is also in diabetes medication.  It is a daily injection.  It works by activating the GLP-1 receptor in the brain, regulating appetite and caloric intake.  Contraindicated if you have family history of thyroid cancer.  Common reaction is nausea.     She is already on wellbutrin and topamax.  I would not use phentermine for her.  Saxenda is an option but I think cost could be problematic.      Review of Systems   Constitutional: Negative for fever.   Respiratory: Negative.    Cardiovascular: Negative.        The following portions of the patient's history were reviewed and updated as appropriate: allergies, current medications and problem list.    Patient Active Problem List    Diagnosis Date Noted   • IgG monoclonal gammopathy of uncertain significance 02/20/2020   • Obesity due to excess calories without serious  comorbidity 11/27/2019   • Chronic copper deficiency 09/04/2019   • Low serum copper for age 08/30/2019   • Pain in thoracic spine 08/15/2019   • Left arm numbness 08/15/2019   • Chronic idiopathic constipation 06/18/2019   • Esophagitis 06/18/2019   • Macrocytosis without anemia 03/09/2019   • Acquired lymphocytopenia 03/09/2019   • Tension headache 09/27/2018   • Adverse effect of iron or its compound, sequela 08/29/2018   • Vitamin B12 deficiency 08/29/2018   • Leukemoid reaction 08/29/2018   • Rheumatoid lung disease (CMS/HCC) 06/18/2018   • Lt facial numbness 03/08/2018   • Abnormal finding on MRI of brain 02/06/2018     Note Last Updated: 2/6/2018 2/2018.  Plan recheck three months.       • Primary osteoarthritis of right knee 11/22/2017     Note Last Updated: 11/22/2017     Added automatically from request for surgery [id number]     • Chronic depression 07/18/2017   • Mixed hyperlipidemia 05/05/2017   • Tear of medial meniscus of right knee, current 05/04/2017   • Rheumatoid arthritis (CMS/HCC) 07/07/2016   • Pulmonary fibrosis (CMS/[facility]) 07/06/2016     Note Last Updated: 10/23/2017     Secondary to methotrexate.       • Iron deficiency anemia 07/06/2016   • Pernicious anemia 07/06/2016   • Common variable immunodeficiency (CMS/[facility]) 07/01/2016   • Hypothyroidism 05/23/2016   • Sensory neuropathy 05/23/2016   • Osteoporosis 05/23/2016     Note Last Updated: 5/27/2020     H/o two year Forteo.  Not on bisphosphonates because of dental issues.  Fosamax in past caused stomach problems.  Start Evenity 5/2020     • Vitamin D deficiency 05/23/2016       Current Outpatient Medications on File Prior to Visit   Medication Sig Dispense Refill   • atorvastatin (LIPITOR) 20 MG tablet TAKE 1 TABLET BY MOUTH DAILY. 30 tablet 11   • azaTHIOprine (IMURAN) 50 MG tablet Take 50 mg by mouth 3 (Three) Times a Day.     • B Complex-C (B COMPLEX-B12-C IJ) Inject 1,000 mcg under the skin into the appropriate area as directed Every 30  "(Thirty) Days.     • buPROPion XL (WELLBUTRIN XL) 150 MG 24 hr tablet TAKE 1 TABLET BY MOUTH EVERY MORNING. 90 tablet 3   • Cholecalciferol (VITAMIN D3) 5000 UNITS capsule capsule Take 5,000 Units by mouth 2 (Two) Times a Day.     • COPPER CAPS 2 MG capsule Take 4 mg by mouth 2 (Two) Times a Day. 360 capsule 3   • cyanocobalamin 1000 MCG/ML injection INJECT 1,000 MCG AS DIRECTED EVERY 30 (THIRTY) DAYS. 1 mL 11   • cyclobenzaprine (FLEXERIL) 10 MG tablet      • DULoxetine (Cymbalta) 20 MG capsule Take 1 capsule by mouth Daily. 90 capsule 1   • folic acid (FOLVITE) 1 MG tablet Take 1 mg by mouth daily.     • lubiprostone (AMITIZA) 24 MCG capsule Take 1 capsule by mouth 2 (Two) Times a Day With Meals. 60 capsule 11   • omeprazole (priLOSEC) 40 MG capsule TAKE 1 CAPSULE BY MOUTH DAILY. 30 capsule 11   • OXcarbazepine (TRILEPTAL) 300 MG/5ML suspension Take  by mouth Every Evening.     • riTUXimab (RITUXAN) 100 MG/10ML solution injection Administer RITUXAN 1000mg IV Q 2WK X2 INFUSIONS Q 6 MONTHS     • SUMAtriptan (IMITREX) 100 MG tablet      • SYNTHROID 112 MCG tablet TAKE 1 TABLET BY MOUTH DAILY. 30 tablet 11   • topiramate (TOPAMAX) 100 MG tablet Take 1 tablet by mouth 2 (two) times a day. 60 tablet 11   • topiramate (TOPAMAX) 25 MG tablet Take 1 tablet by mouth Every Night. 30 tablet 11   • Tuberculin-Allergy Syringes (UltiCare Tuberculin Safety Syr) 25G X 1\" 1 ML misc use with sq acthar injections       No current facility-administered medications on file prior to visit.        Objective     /70   Pulse 83   Ht 165.1 cm (65\")   Wt 90.3 kg (199 lb)   SpO2 98%   BMI 33.12 kg/m²     Physical Exam   Constitutional: She is oriented to person, place, and time. She appears well-developed and well-nourished.   HENT:   Head: Normocephalic and atraumatic.   Pulmonary/Chest: Effort normal.   Neurological: She is alert and oriented to person, place, and time. She has normal strength. A sensory deficit is present. "   Reflex Scores:       Patellar reflexes are 1+ on the right side and 1+ on the left side.       Achilles reflexes are 1+ on the right side and 1+ on the left side.  Decreased sensation of bilateral feet.   Psychiatric: She has a normal mood and affect. Her behavior is normal.       Assessment/Plan   Dara was seen today for nerve pain.    Diagnoses and all orders for this visit:    Numbness and tingling of both feet  -     EMG & Nerve Conduction Test; Future    Other orders  -     buPROPion XL (Wellbutrin XL) 300 MG 24 hr tablet; Take 1 tablet by mouth Daily.        Discussion    Patient presents with bilateral fee numbness.  Likely neuropathy.  She is on B12 shots.  She had recent HBGA1c.  She is not diabetic.  EMG/NCS is ordered to further evaluate.   Obesity.  Increase Wellbutrin to 300mg daily.  She needs to add exercise to her daily regimen.         Future Appointments   Date Time Provider Department Center   9/30/2020  8:00 AM REFERRED INFUSION BED 1 EP [facility]   10/28/2020  8:00 AM REFERRED INFUSION BED 1 EP [facility]   11/19/2020  8:40 AM [facility] None   11/25/2020  9:00 AM Randi Yang MD [facility] None   12/30/2020  8:00 AM REFERRED INFUSION BED 1 EP [facility]   1/27/2021  7:40 AM LAB CHAIR [facility]   1/27/2021  8:00 AM REFERRED INFUSION BED 1 EP [facility]   2/11/2021 11:20 AM VITALS ONLY [facility] LAB [place] [facility]   2/11/2021 11:40 AM Ivory Noel MD PhD [facility]

## 2020-09-30 ENCOUNTER — LAB (OUTPATIENT)
Dept: OTHER | Facility: HOSPITAL | Age: 65
End: 2020-09-30

## 2020-09-30 ENCOUNTER — INFUSION (OUTPATIENT)
Dept: ONCOLOGY | Facility: HOSPITAL | Age: 65
End: 2020-09-30

## 2020-09-30 VITALS
RESPIRATION RATE: 18 BRPM | OXYGEN SATURATION: 100 % | SYSTOLIC BLOOD PRESSURE: 118 MMHG | WEIGHT: 197 LBS | HEIGHT: 65 IN | TEMPERATURE: 98.3 F | DIASTOLIC BLOOD PRESSURE: 72 MMHG | HEART RATE: 83 BPM | BODY MASS INDEX: 32.82 KG/M2

## 2020-09-30 DIAGNOSIS — D83.9 COMMON VARIABLE IMMUNODEFICIENCY (HCC): Primary | ICD-10-CM

## 2020-09-30 DIAGNOSIS — M81.0 OSTEOPOROSIS, UNSPECIFIED OSTEOPOROSIS TYPE, UNSPECIFIED PATHOLOGICAL FRACTURE PRESENCE: ICD-10-CM

## 2020-09-30 LAB
ALBUMIN SERPL-MCNC: 4.1 G/DL (ref 3.5–5.2)
ALBUMIN/GLOB SERPL: 1.5 G/DL
ALP SERPL-CCNC: 136 U/L (ref 39–117)
ALT SERPL W P-5'-P-CCNC: 82 U/L (ref 1–33)
ANION GAP SERPL CALCULATED.3IONS-SCNC: 6.5 MMOL/L (ref 5–15)
AST SERPL-CCNC: 54 U/L (ref 1–32)
BILIRUB SERPL-MCNC: 0.6 MG/DL (ref 0–1.2)
BUN SERPL-MCNC: 20 MG/DL (ref 8–23)
BUN/CREAT SERPL: 19.8 (ref 7–25)
CALCIUM SPEC-SCNC: 9.4 MG/DL (ref 8.6–10.5)
CHLORIDE SERPL-SCNC: 109 MMOL/L (ref 98–107)
CO2 SERPL-SCNC: 22.5 MMOL/L (ref 22–29)
CREAT SERPL-MCNC: 1.01 MG/DL (ref 0.57–1)
GFR SERPL CREATININE-BSD FRML MDRD: 55 ML/MIN/1.73
GLOBULIN UR ELPH-MCNC: 2.8 GM/DL
GLUCOSE SERPL-MCNC: 143 MG/DL (ref 65–99)
IGG1 SER-MCNC: 1003 MG/DL (ref 700–1600)
POTASSIUM SERPL-SCNC: 4.8 MMOL/L (ref 3.5–5.2)
PROT SERPL-MCNC: 6.9 G/DL (ref 6–8.5)
SODIUM SERPL-SCNC: 138 MMOL/L (ref 136–145)

## 2020-09-30 PROCEDURE — 82784 ASSAY IGA/IGD/IGG/IGM EACH: CPT | Performed by: ALLERGY & IMMUNOLOGY

## 2020-09-30 PROCEDURE — 36415 COLL VENOUS BLD VENIPUNCTURE: CPT

## 2020-09-30 PROCEDURE — 25010000002 ROMOSOZUMAB-AQQG 105 MG/1.17ML SOLUTION PREFILLED SYRINGE: Performed by: INTERNAL MEDICINE

## 2020-09-30 PROCEDURE — 96366 THER/PROPH/DIAG IV INF ADDON: CPT

## 2020-09-30 PROCEDURE — 96365 THER/PROPH/DIAG IV INF INIT: CPT

## 2020-09-30 PROCEDURE — 96372 THER/PROPH/DIAG INJ SC/IM: CPT

## 2020-09-30 PROCEDURE — 25010000002 IMMUNE GLOBULIN (HUMAN) 20 GM/200ML SOLUTION: Performed by: ALLERGY & IMMUNOLOGY

## 2020-09-30 PROCEDURE — 80053 COMPREHEN METABOLIC PANEL: CPT | Performed by: INTERNAL MEDICINE

## 2020-09-30 RX ORDER — ACETAMINOPHEN 325 MG/1
650 TABLET ORAL ONCE
Status: CANCELLED
Start: 2020-10-07

## 2020-09-30 RX ORDER — EPINEPHRINE 0.3 MG/.3ML
0.3 INJECTION SUBCUTANEOUS ONCE AS NEEDED
Status: CANCELLED
Start: 2020-10-07

## 2020-09-30 RX ORDER — ACETAMINOPHEN 500 MG
500 TABLET ORAL EVERY 4 HOURS PRN
Status: CANCELLED
Start: 2020-10-07

## 2020-09-30 RX ORDER — DIPHENHYDRAMINE HCL 25 MG
25 CAPSULE ORAL ONCE
Status: CANCELLED
Start: 2020-10-07

## 2020-09-30 RX ORDER — DIPHENHYDRAMINE HYDROCHLORIDE 50 MG/ML
50 INJECTION INTRAMUSCULAR; INTRAVENOUS ONCE AS NEEDED
Status: CANCELLED
Start: 2020-10-07

## 2020-09-30 RX ADMIN — ROMOSOZUMAB-AQQG 210 MG: 105 INJECTION, SOLUTION SUBCUTANEOUS at 12:31

## 2020-09-30 RX ADMIN — IMMUNE GLOBULIN (HUMAN) 40 G: 10 INJECTION INTRAVENOUS; SUBCUTANEOUS at 09:19

## 2020-09-30 NOTE — NURSING NOTE
Pt requested copy of lab results and those were printed and reviewed with pt. Pt stated she will contact her rheumatologist, Dr. Contreras with regard to her AST/ALT values as he had discontinued a medication she thought may affect those values.

## 2020-10-01 DIAGNOSIS — R94.5 ABNORMAL RESULTS OF LIVER FUNCTION STUDIES: Primary | ICD-10-CM

## 2020-10-07 LAB
ALBUMIN SERPL-MCNC: 4.3 G/DL (ref 3.5–5.2)
ALP SERPL-CCNC: 133 U/L (ref 39–117)
ALT SERPL-CCNC: 46 U/L (ref 1–33)
AST SERPL-CCNC: 35 U/L (ref 1–32)
BILIRUB DIRECT SERPL-MCNC: 0.2 MG/DL (ref 0–0.3)
BILIRUB SERPL-MCNC: 0.5 MG/DL (ref 0–1.2)
PROT SERPL-MCNC: 7 G/DL (ref 6–8.5)

## 2020-10-09 ENCOUNTER — APPOINTMENT (OUTPATIENT)
Dept: NEUROLOGY | Facility: HOSPITAL | Age: 65
End: 2020-10-09

## 2020-10-11 RX ORDER — OMEPRAZOLE 40 MG/1
40 CAPSULE, DELAYED RELEASE ORAL DAILY
Qty: 30 CAPSULE | Refills: 11 | Status: SHIPPED | OUTPATIENT
Start: 2020-10-11 | End: 2021-05-26 | Stop reason: SDUPTHER

## 2020-10-26 RX ORDER — DIPHENHYDRAMINE HYDROCHLORIDE 50 MG/ML
50 INJECTION INTRAMUSCULAR; INTRAVENOUS ONCE AS NEEDED
Status: CANCELLED
Start: 2020-10-26

## 2020-10-26 RX ORDER — ACETAMINOPHEN 500 MG
500 TABLET ORAL EVERY 4 HOURS PRN
Status: CANCELLED
Start: 2020-10-26

## 2020-10-26 RX ORDER — EPINEPHRINE 0.3 MG/.3ML
0.3 INJECTION SUBCUTANEOUS ONCE AS NEEDED
Status: CANCELLED
Start: 2020-10-26

## 2020-10-28 ENCOUNTER — APPOINTMENT (OUTPATIENT)
Dept: OTHER | Facility: HOSPITAL | Age: 65
End: 2020-10-28

## 2020-10-28 ENCOUNTER — INFUSION (OUTPATIENT)
Dept: ONCOLOGY | Facility: HOSPITAL | Age: 65
End: 2020-10-28

## 2020-10-28 VITALS
DIASTOLIC BLOOD PRESSURE: 75 MMHG | BODY MASS INDEX: 32.28 KG/M2 | TEMPERATURE: 97.6 F | SYSTOLIC BLOOD PRESSURE: 129 MMHG | HEART RATE: 86 BPM | WEIGHT: 194 LBS

## 2020-10-28 DIAGNOSIS — R94.5 ABNORMAL RESULTS OF LIVER FUNCTION STUDIES: Primary | ICD-10-CM

## 2020-10-28 DIAGNOSIS — D83.9 COMMON VARIABLE IMMUNODEFICIENCY (HCC): Primary | ICD-10-CM

## 2020-10-28 DIAGNOSIS — M81.0 OSTEOPOROSIS, UNSPECIFIED OSTEOPOROSIS TYPE, UNSPECIFIED PATHOLOGICAL FRACTURE PRESENCE: ICD-10-CM

## 2020-10-28 LAB
ALBUMIN SERPL-MCNC: 4.3 G/DL (ref 3.5–5.2)
ALBUMIN/GLOB SERPL: 1.4 G/DL
ALP SERPL-CCNC: 126 U/L (ref 39–117)
ALT SERPL W P-5'-P-CCNC: 64 U/L (ref 1–33)
ANION GAP SERPL CALCULATED.3IONS-SCNC: 10.8 MMOL/L (ref 5–15)
AST SERPL-CCNC: 46 U/L (ref 1–32)
BILIRUB SERPL-MCNC: 0.4 MG/DL (ref 0–1.2)
BUN SERPL-MCNC: 20 MG/DL (ref 8–23)
BUN/CREAT SERPL: 19.4 (ref 7–25)
CALCIUM SPEC-SCNC: 9.5 MG/DL (ref 8.6–10.5)
CHLORIDE SERPL-SCNC: 109 MMOL/L (ref 98–107)
CO2 SERPL-SCNC: 22.2 MMOL/L (ref 22–29)
CREAT SERPL-MCNC: 1.03 MG/DL (ref 0.57–1)
GFR SERPL CREATININE-BSD FRML MDRD: 54 ML/MIN/1.73
GLOBULIN UR ELPH-MCNC: 3 GM/DL
GLUCOSE SERPL-MCNC: 126 MG/DL (ref 65–99)
MAGNESIUM SERPL-MCNC: 2.1 MG/DL (ref 1.6–2.4)
PHOSPHATE SERPL-MCNC: 3.1 MG/DL (ref 2.5–4.5)
POTASSIUM SERPL-SCNC: 4.6 MMOL/L (ref 3.5–5.2)
PROT SERPL-MCNC: 7.3 G/DL (ref 6–8.5)
SODIUM SERPL-SCNC: 142 MMOL/L (ref 136–145)

## 2020-10-28 PROCEDURE — 25010000002 IMMUNE GLOBULIN (HUMAN) 40 GM/400ML SOLUTION: Performed by: ALLERGY & IMMUNOLOGY

## 2020-10-28 PROCEDURE — 96366 THER/PROPH/DIAG IV INF ADDON: CPT

## 2020-10-28 PROCEDURE — 96365 THER/PROPH/DIAG IV INF INIT: CPT

## 2020-10-28 PROCEDURE — 80053 COMPREHEN METABOLIC PANEL: CPT | Performed by: INTERNAL MEDICINE

## 2020-10-28 PROCEDURE — 84100 ASSAY OF PHOSPHORUS: CPT | Performed by: INTERNAL MEDICINE

## 2020-10-28 PROCEDURE — 96372 THER/PROPH/DIAG INJ SC/IM: CPT

## 2020-10-28 PROCEDURE — 83735 ASSAY OF MAGNESIUM: CPT | Performed by: INTERNAL MEDICINE

## 2020-10-28 PROCEDURE — 25010000002 ROMOSOZUMAB-AQQG 105 MG/1.17ML SOLUTION PREFILLED SYRINGE: Performed by: INTERNAL MEDICINE

## 2020-10-28 RX ORDER — EPINEPHRINE 0.3 MG/.3ML
0.3 INJECTION SUBCUTANEOUS ONCE AS NEEDED
Status: CANCELLED
Start: 2020-11-04

## 2020-10-28 RX ORDER — DIPHENHYDRAMINE HYDROCHLORIDE 50 MG/ML
50 INJECTION INTRAMUSCULAR; INTRAVENOUS ONCE AS NEEDED
Status: CANCELLED
Start: 2020-11-04

## 2020-10-28 RX ORDER — SODIUM CHLORIDE 9 MG/ML
INJECTION, SOLUTION INTRAVENOUS
Status: DISCONTINUED
Start: 2020-10-28 | End: 2020-10-28 | Stop reason: HOSPADM

## 2020-10-28 RX ORDER — ACETAMINOPHEN 500 MG
500 TABLET ORAL EVERY 4 HOURS PRN
Status: CANCELLED
Start: 2020-11-04

## 2020-10-28 RX ORDER — ACETAMINOPHEN 325 MG/1
650 TABLET ORAL ONCE
Status: CANCELLED
Start: 2020-11-04

## 2020-10-28 RX ORDER — DIPHENHYDRAMINE HCL 25 MG
25 CAPSULE ORAL ONCE
Status: DISCONTINUED | OUTPATIENT
Start: 2020-10-28 | End: 2020-10-28 | Stop reason: HOSPADM

## 2020-10-28 RX ORDER — ACETAMINOPHEN 325 MG/1
650 TABLET ORAL ONCE
Status: DISCONTINUED | OUTPATIENT
Start: 2020-10-28 | End: 2020-10-28 | Stop reason: HOSPADM

## 2020-10-28 RX ORDER — DIPHENHYDRAMINE HCL 25 MG
25 CAPSULE ORAL ONCE
Status: CANCELLED
Start: 2020-11-04

## 2020-10-28 RX ADMIN — ROMOSOZUMAB-AQQG 210 MG: 105 INJECTION, SOLUTION SUBCUTANEOUS at 11:59

## 2020-10-28 RX ADMIN — IMMUNE GLOBULIN (HUMAN) 40 G: 10 INJECTION INTRAVENOUS; SUBCUTANEOUS at 08:53

## 2020-10-28 NOTE — NURSING NOTE
Patient is tolerating IVIG without difficulty. Verbal order per Dr. Chowdhury to continue IVIG as ordered.

## 2020-11-02 ENCOUNTER — HOSPITAL ENCOUNTER (OUTPATIENT)
Dept: ULTRASOUND IMAGING | Facility: HOSPITAL | Age: 65
Discharge: HOME | End: 2020-11-02
Admitting: INTERNAL MEDICINE

## 2020-11-02 DIAGNOSIS — R94.5 ABNORMAL RESULTS OF LIVER FUNCTION STUDIES: ICD-10-CM

## 2020-11-02 PROCEDURE — 76705 ECHO EXAM OF ABDOMEN: CPT

## 2020-11-18 DIAGNOSIS — E53.8 VITAMIN B12 DEFICIENCY: ICD-10-CM

## 2020-11-18 DIAGNOSIS — R73.9 ELEVATED BLOOD SUGAR: ICD-10-CM

## 2020-11-18 DIAGNOSIS — E55.9 VITAMIN D DEFICIENCY: ICD-10-CM

## 2020-11-18 DIAGNOSIS — E78.2 MIXED HYPERLIPIDEMIA: Primary | ICD-10-CM

## 2020-11-19 LAB
25(OH)D3+25(OH)D2 SERPL-MCNC: 58.8 NG/ML (ref 30–100)
ALBUMIN SERPL-MCNC: 4.6 G/DL (ref 3.5–5.2)
ALBUMIN/GLOB SERPL: 1.8 G/DL
ALP SERPL-CCNC: 172 U/L (ref 39–117)
ALT SERPL-CCNC: 32 U/L (ref 1–33)
AST SERPL-CCNC: 29 U/L (ref 1–32)
BASOPHILS # BLD AUTO: 0.03 10*3/MM3 (ref 0–0.2)
BASOPHILS NFR BLD AUTO: 0.6 % (ref 0–1.5)
BILIRUB SERPL-MCNC: 0.5 MG/DL (ref 0–1.2)
BUN SERPL-MCNC: 16 MG/DL (ref 8–23)
BUN/CREAT SERPL: 13.9 (ref 7–25)
CALCIUM SERPL-MCNC: 9.5 MG/DL (ref 8.6–10.5)
CHLORIDE SERPL-SCNC: 109 MMOL/L (ref 98–107)
CHOLEST SERPL-MCNC: 188 MG/DL (ref 0–200)
CO2 SERPL-SCNC: 23.2 MMOL/L (ref 22–29)
CREAT SERPL-MCNC: 1.15 MG/DL (ref 0.57–1)
EOSINOPHIL # BLD AUTO: 0.07 10*3/MM3 (ref 0–0.4)
EOSINOPHIL NFR BLD AUTO: 1.4 % (ref 0.3–6.2)
ERYTHROCYTE [DISTWIDTH] IN BLOOD BY AUTOMATED COUNT: 14.4 % (ref 12.3–15.4)
GLOBULIN SER CALC-MCNC: 2.6 GM/DL
GLUCOSE SERPL-MCNC: 128 MG/DL (ref 65–99)
HBA1C MFR BLD: 5.5 % (ref 4.8–5.6)
HCT VFR BLD AUTO: 46.4 % (ref 34–46.6)
HDLC SERPL-MCNC: 72 MG/DL (ref 40–60)
HGB BLD-MCNC: 15.5 G/DL (ref 12–15.9)
IMM GRANULOCYTES # BLD AUTO: 0.01 10*3/MM3 (ref 0–0.05)
IMM GRANULOCYTES NFR BLD AUTO: 0.2 % (ref 0–0.5)
LDLC SERPL CALC-MCNC: 98 MG/DL (ref 0–100)
LYMPHOCYTES # BLD AUTO: 0.26 10*3/MM3 (ref 0.7–3.1)
LYMPHOCYTES NFR BLD AUTO: 5.1 % (ref 19.6–45.3)
MCH RBC QN AUTO: 33.5 PG (ref 26.6–33)
MCHC RBC AUTO-ENTMCNC: 33.4 G/DL (ref 31.5–35.7)
MCV RBC AUTO: 100.4 FL (ref 79–97)
MONOCYTES # BLD AUTO: 0.31 10*3/MM3 (ref 0.1–0.9)
MONOCYTES NFR BLD AUTO: 6.1 % (ref 5–12)
NEUTROPHILS # BLD AUTO: 4.37 10*3/MM3 (ref 1.7–7)
NEUTROPHILS NFR BLD AUTO: 86.6 % (ref 42.7–76)
NRBC BLD AUTO-RTO: 0 /100 WBC (ref 0–0.2)
PLATELET # BLD AUTO: 311 10*3/MM3 (ref 140–450)
POTASSIUM SERPL-SCNC: 4.9 MMOL/L (ref 3.5–5.2)
PROT SERPL-MCNC: 7.2 G/DL (ref 6–8.5)
RBC # BLD AUTO: 4.62 10*6/MM3 (ref 3.77–5.28)
SODIUM SERPL-SCNC: 144 MMOL/L (ref 136–145)
TRIGL SERPL-MCNC: 100 MG/DL (ref 0–150)
TSH SERPL DL<=0.005 MIU/L-ACNC: 1.49 UIU/ML (ref 0.27–4.2)
VIT B12 SERPL-MCNC: 1077 PG/ML (ref 211–946)
VLDLC SERPL CALC-MCNC: 18 MG/DL (ref 5–40)
WBC # BLD AUTO: 5.05 10*3/MM3 (ref 3.4–10.8)

## 2020-11-23 ENCOUNTER — OFFICE VISIT (OUTPATIENT)
Dept: INTERNAL MEDICINE | Facility: CLINIC | Age: 65
End: 2020-11-23

## 2020-11-23 VITALS
SYSTOLIC BLOOD PRESSURE: 110 MMHG | BODY MASS INDEX: 31.49 KG/M2 | DIASTOLIC BLOOD PRESSURE: 78 MMHG | HEART RATE: 81 BPM | WEIGHT: 189 LBS | OXYGEN SATURATION: 98 % | HEIGHT: 65 IN

## 2020-11-23 DIAGNOSIS — E03.9 ACQUIRED HYPOTHYROIDISM: ICD-10-CM

## 2020-11-23 DIAGNOSIS — E78.2 MIXED HYPERLIPIDEMIA: Primary | ICD-10-CM

## 2020-11-23 DIAGNOSIS — M81.0 OSTEOPOROSIS, UNSPECIFIED OSTEOPOROSIS TYPE, UNSPECIFIED PATHOLOGICAL FRACTURE PRESENCE: ICD-10-CM

## 2020-11-23 DIAGNOSIS — M06.9 RHEUMATOID ARTHRITIS INVOLVING MULTIPLE SITES, UNSPECIFIED WHETHER RHEUMATOID FACTOR PRESENT (HCC): ICD-10-CM

## 2020-11-23 PROCEDURE — 99214 OFFICE O/P EST MOD 30 MIN: CPT | Performed by: INTERNAL MEDICINE

## 2020-11-23 RX ORDER — ACETAMINOPHEN 500 MG
500 TABLET ORAL AS NEEDED
Status: CANCELLED
Start: 2020-11-23

## 2020-11-23 NOTE — PROGRESS NOTES
Subjective     Dara Medina is a 65 y.o. female who presents with   Chief Complaint   Patient presents with   • Hyperlipidemia   • Hypothyroidism   • Osteoporosis       History of Present Illness     HLD.  Control is excellent.  Hypothyroidism.  Good control with levothyroxine.    OP.  Doing well with monthly Evenity.   RA.  She has been told she may be in remission.   Mild increase in alk phos.  AST and ALT have normalized.  Reviewed liver us.        Review of Systems   Constitutional: Negative for fever.   Respiratory: Negative.    Cardiovascular: Negative.        The following portions of the patient's history were reviewed and updated as appropriate: allergies, current medications and problem list.    Patient Active Problem List    Diagnosis Date Noted   • IgG monoclonal gammopathy of uncertain significance 02/20/2020   • Obesity due to excess calories without serious comorbidity 11/27/2019   • Chronic copper deficiency 09/04/2019   • Low serum copper for age 08/30/2019   • Pain in thoracic spine 08/15/2019   • Left arm numbness 08/15/2019   • Chronic idiopathic constipation 06/18/2019   • Esophagitis 06/18/2019   • Macrocytosis without anemia 03/09/2019   • Acquired lymphocytopenia 03/09/2019   • Tension headache 09/27/2018   • Adverse effect of iron or its compound, sequela 08/29/2018   • Vitamin B12 deficiency 08/29/2018   • Leukemoid reaction 08/29/2018   • Rheumatoid lung disease (CMS/HCC) 06/18/2018   • Lt facial numbness 03/08/2018   • Abnormal finding on MRI of brain 02/06/2018     Note Last Updated: 2/6/2018 2/2018.  Plan recheck three months.       • Primary osteoarthritis of right knee 11/22/2017     Note Last Updated: 11/22/2017     Added automatically from request for surgery [id number]     • Chronic depression 07/18/2017   • Mixed hyperlipidemia 05/05/2017   • Tear of medial meniscus of right knee, current 05/04/2017   • Rheumatoid arthritis (CMS/HCC) 07/07/2016   • Pulmonary fibrosis (CMS/HCC)  07/06/2016     Note Last Updated: 10/23/2017     Secondary to methotrexate.       • Iron deficiency anemia 07/06/2016   • Pernicious anemia 07/06/2016   • Common variable immunodeficiency (CMS/HCC) 07/01/2016   • Hypothyroidism 05/23/2016   • Sensory neuropathy 05/23/2016   • Osteoporosis 05/23/2016     Note Last Updated: 5/27/2020     H/o two year Forteo.  Not on bisphosphonates because of dental issues.  Fosamax in past caused stomach problems.  Start Evenity 5/2020     • Vitamin D deficiency 05/23/2016       Current Outpatient Medications on File Prior to Visit   Medication Sig Dispense Refill   • atorvastatin (LIPITOR) 20 MG tablet TAKE 1 TABLET BY MOUTH DAILY. 30 tablet 11   • azaTHIOprine (IMURAN) 50 MG tablet Take 50 mg by mouth 3 (Three) Times a Day.     • B Complex-C (B COMPLEX-B12-C IJ) Inject 1,000 mcg under the skin into the appropriate area as directed Every 30 (Thirty) Days.     • buPROPion XL (Wellbutrin XL) 300 MG 24 hr tablet Take 1 tablet by mouth Daily. 90 tablet 3   • Cholecalciferol (VITAMIN D3) 5000 UNITS capsule capsule Take 5,000 Units by mouth 2 (Two) Times a Day.     • COPPER CAPS 2 MG capsule Take 4 mg by mouth 2 (Two) Times a Day. 360 capsule 3   • cyanocobalamin 1000 MCG/ML injection INJECT 1,000 MCG AS DIRECTED EVERY 30 (THIRTY) DAYS. 1 mL 11   • cyclobenzaprine (FLEXERIL) 10 MG tablet      • folic acid (FOLVITE) 1 MG tablet Take 1 mg by mouth daily.     • omeprazole (priLOSEC) 40 MG capsule TAKE 1 CAPSULE BY MOUTH DAILY. 30 capsule 11   • SYNTHROID 112 MCG tablet TAKE 1 TABLET BY MOUTH DAILY. 30 tablet 11   • topiramate (TOPAMAX) 100 MG tablet Take 1 tablet by mouth 2 (two) times a day. 60 tablet 11   • [DISCONTINUED] lubiprostone (AMITIZA) 24 MCG capsule Take 1 capsule by mouth 2 (Two) Times a Day With Meals. 60 capsule 11   • [DISCONTINUED] SUMAtriptan (IMITREX) 100 MG tablet      • [DISCONTINUED] topiramate (TOPAMAX) 25 MG tablet Take 1 tablet by mouth Every Night. 30 tablet 11   •  "[DISCONTINUED] Tuberculin-Allergy Syringes (UltiCare Tuberculin Safety Syr) 25G X 1\" 1 ML misc use with sq acthar injections       No current facility-administered medications on file prior to visit.        Objective     /78   Pulse 81   Ht 165.1 cm (65\")   Wt 85.7 kg (189 lb)   SpO2 98%   BMI 31.45 kg/m²     Physical Exam  Constitutional:       Appearance: She is well-developed.   HENT:      Head: Normocephalic and atraumatic.   Cardiovascular:      Rate and Rhythm: Normal rate and regular rhythm.      Heart sounds: Normal heart sounds.   Pulmonary:      Effort: Pulmonary effort is normal.      Breath sounds: Rales present.   Skin:     General: Skin is warm and dry.   Neurological:      Mental Status: She is alert and oriented to person, place, and time.   Psychiatric:         Behavior: Behavior normal.         Assessment/Plan   Diagnoses and all orders for this visit:    1. Mixed hyperlipidemia (Primary)  -     Alkaline Phosphatase, Isoenzymes  -     Gamma GT    2. Acquired hypothyroidism  -     Alkaline Phosphatase, Isoenzymes  -     Gamma GT    3. Osteoporosis, unspecified osteoporosis type, unspecified pathological fracture presence  -     Alkaline Phosphatase, Isoenzymes  -     Gamma GT    4. Rheumatoid arthritis involving multiple sites, unspecified whether rheumatoid factor present (CMS/[facility])        Discussion    HLD.  The patient will continue current regimen.      Hypothyroidism.  The patient will continue current regimen.      OP.  I recommend to get 1200 mg of calcium and 1000 IUs of vitamin D through diet and supplements and to participate in a weight based exercise to prevent loss of bone mineral density. Continue Evenity.    RA.  Continue Imuran with rheumatology.         Future Appointments   Date Time Provider Department Center   11/25/2020  8:00 AM REFERRED INFUSION BED 1 EP  [facility]   12/4/2020  1:00 PM Jeremy Cuba MD  [facility]   12/23/2020  8:00 AM REFERRED INFUSION BED 1 " EP [facility] INFUS [facility]   1/20/2021  9:00 AM REFERRED INFUSION CHAIR 12 EP [facility] INFUS EP [facility]   1/27/2021  7:40 AM LAB CHAIR [facility] [place] [facility]   2/11/2021 11:20 [facility]   2/11/2021 11:40 AM Ivory Noel MD PhD [facility]   5/20/2021  9:20 AM [facility]   5/26/2021 10:00 AM Randi Yang MD [facility]

## 2020-11-24 LAB
ALP BONE CFR SERPL: 75 % (ref 14–68)
ALP INTEST CFR SERPL: 3 % (ref 0–18)
ALP LIVER CFR SERPL: 22 % (ref 18–85)
ALP SERPL-CCNC: 156 IU/L (ref 39–117)
GGT SERPL-CCNC: 65 U/L (ref 5–36)

## 2020-11-25 ENCOUNTER — INFUSION (OUTPATIENT)
Dept: ONCOLOGY | Facility: HOSPITAL | Age: 65
End: 2020-11-25

## 2020-11-25 VITALS
DIASTOLIC BLOOD PRESSURE: 82 MMHG | BODY MASS INDEX: 31.82 KG/M2 | TEMPERATURE: 97.7 F | RESPIRATION RATE: 18 BRPM | SYSTOLIC BLOOD PRESSURE: 135 MMHG | OXYGEN SATURATION: 97 % | HEART RATE: 86 BPM | HEIGHT: 65 IN | WEIGHT: 191 LBS

## 2020-11-25 DIAGNOSIS — M81.0 OSTEOPOROSIS, UNSPECIFIED OSTEOPOROSIS TYPE, UNSPECIFIED PATHOLOGICAL FRACTURE PRESENCE: Primary | ICD-10-CM

## 2020-11-25 DIAGNOSIS — D83.9 COMMON VARIABLE IMMUNODEFICIENCY (HCC): ICD-10-CM

## 2020-11-25 LAB
ALBUMIN SERPL-MCNC: 4.1 G/DL (ref 3.5–5.2)
ALBUMIN/GLOB SERPL: 1.4 G/DL
ALP SERPL-CCNC: 139 U/L (ref 39–117)
ALT SERPL W P-5'-P-CCNC: 30 U/L (ref 1–33)
ANION GAP SERPL CALCULATED.3IONS-SCNC: 7.9 MMOL/L (ref 5–15)
AST SERPL-CCNC: 26 U/L (ref 1–32)
BILIRUB SERPL-MCNC: 0.4 MG/DL (ref 0–1.2)
BUN SERPL-MCNC: 16 MG/DL (ref 8–23)
BUN/CREAT SERPL: 14.4 (ref 7–25)
CALCIUM SPEC-SCNC: 9.5 MG/DL (ref 8.6–10.5)
CHLORIDE SERPL-SCNC: 112 MMOL/L (ref 98–107)
CO2 SERPL-SCNC: 23.1 MMOL/L (ref 22–29)
CREAT SERPL-MCNC: 1.11 MG/DL (ref 0.57–1)
GFR SERPL CREATININE-BSD FRML MDRD: 49 ML/MIN/1.73
GLOBULIN UR ELPH-MCNC: 3 GM/DL
GLUCOSE SERPL-MCNC: 126 MG/DL (ref 65–99)
POTASSIUM SERPL-SCNC: 5 MMOL/L (ref 3.5–5.2)
PROT SERPL-MCNC: 7.1 G/DL (ref 6–8.5)
SODIUM SERPL-SCNC: 143 MMOL/L (ref 136–145)

## 2020-11-25 PROCEDURE — 96365 THER/PROPH/DIAG IV INF INIT: CPT

## 2020-11-25 PROCEDURE — 80053 COMPREHEN METABOLIC PANEL: CPT | Performed by: INTERNAL MEDICINE

## 2020-11-25 PROCEDURE — 25010000002 IMMUNE GLOBULIN (HUMAN) 40 GM/400ML SOLUTION: Performed by: ALLERGY & IMMUNOLOGY

## 2020-11-25 PROCEDURE — 25010000002 ROMOSOZUMAB-AQQG 105 MG/1.17ML SOLUTION PREFILLED SYRINGE: Performed by: INTERNAL MEDICINE

## 2020-11-25 PROCEDURE — 96366 THER/PROPH/DIAG IV INF ADDON: CPT

## 2020-11-25 PROCEDURE — 96372 THER/PROPH/DIAG INJ SC/IM: CPT

## 2020-11-25 RX ORDER — DIPHENHYDRAMINE HCL 25 MG
25 CAPSULE ORAL ONCE
Status: CANCELLED
Start: 2020-12-16

## 2020-11-25 RX ORDER — DIPHENHYDRAMINE HYDROCHLORIDE 50 MG/ML
50 INJECTION INTRAMUSCULAR; INTRAVENOUS ONCE AS NEEDED
Status: CANCELLED
Start: 2020-12-16

## 2020-11-25 RX ORDER — EPINEPHRINE 0.3 MG/.3ML
0.3 INJECTION SUBCUTANEOUS ONCE AS NEEDED
Status: CANCELLED
Start: 2020-12-16

## 2020-11-25 RX ORDER — ACETAMINOPHEN 325 MG/1
650 TABLET ORAL ONCE
Status: CANCELLED
Start: 2020-12-16

## 2020-11-25 RX ORDER — ACETAMINOPHEN 500 MG
500 TABLET ORAL AS NEEDED
Status: CANCELLED
Start: 2020-12-16

## 2020-11-25 RX ADMIN — ROMOSOZUMAB-AQQG 210 MG: 105 INJECTION, SOLUTION SUBCUTANEOUS at 08:49

## 2020-11-25 RX ADMIN — IMMUNE GLOBULIN (HUMAN) 40 G: 10 INJECTION INTRAVENOUS; SUBCUTANEOUS at 08:37

## 2020-12-04 ENCOUNTER — HOSPITAL ENCOUNTER (OUTPATIENT)
Dept: INFUSION THERAPY | Facility: HOSPITAL | Age: 65
Discharge: HOME | End: 2020-12-04
Admitting: PSYCHIATRY & NEUROLOGY

## 2020-12-04 DIAGNOSIS — R20.2 NUMBNESS AND TINGLING OF BOTH FEET: ICD-10-CM

## 2020-12-04 DIAGNOSIS — R20.0 NUMBNESS AND TINGLING OF BOTH FEET: ICD-10-CM

## 2020-12-04 PROCEDURE — 95886 MUSC TEST DONE W/N TEST COMP: CPT | Performed by: PSYCHIATRY & NEUROLOGY

## 2020-12-04 PROCEDURE — 95886 MUSC TEST DONE W/N TEST COMP: CPT

## 2020-12-04 PROCEDURE — 95909 NRV CNDJ TST 5-6 STUDIES: CPT | Performed by: PSYCHIATRY & NEUROLOGY

## 2020-12-04 PROCEDURE — 95909 NRV CNDJ TST 5-6 STUDIES: CPT

## 2020-12-04 NOTE — PROCEDURES
EMG and Nerve Conduction Studies    I.      Instrument used: Neuromax 1002  II.     Please see data sheets for tabular summary of NCS and details on methods, temperatures and lab standards.   III.    EMG muscles tested for upper extremity studies include the deltoid, biceps, triceps, pronator teres, extensor digitorum communis, first dorsal interosseous and abductor pollicis brevis.    IV.   EMG muscles tested for lower extremity studies include the vastus lateralis, tibialis anterior, peroneus longus, medial gastrocnemius and extensor digitorum brevis.    V.    Additional muscles tested as needed.  Paraspinal muscles tested as needed.   VI.   Please see data sheets for tabular summary of EMG findings.   VII. The complete report includes the data sheets.      Indication: Numbness in the feet  History: 65-year-old woman with numbness distally in the feet.  Symptoms are symmetrical.  She has some low back pain more on the left but it does not radiate down either leg.  She describes some balance trouble also.  She denies a history of diabetes      Ht: Not reported  Wt: 86.6 kg  HbA1C:   Lab Results   Component Value Date    HGBA1C 5.50 11/19/2020     TSH:   Lab Results   Component Value Date    TSH 1.490 11/19/2020       Technical summary:  Nerve conduction studies were obtained in the right leg with 1 comparison on the left.  Skin temperatures were a cold and temperature correction was used if indicated.  Needle examination was obtained on selected muscles in both legs.    Results:  Nerve conduction studies delete that 1.  Normal right sural sensory study.  2.  Normal superficial peroneal sensory latencies bilaterally with low amplitudes of 3.0 µV on the right and 3.3 µV on the left.  3.  Slow right peroneal motor velocity below the knee at 33.1 m/s with normal velocity in the short segment across the fibular head.  Normal distal latency with low amplitude of 1.2 mV from ankle stimulation.  4.  Slow right tibial motor  velocity at 32.3 m/s with normal distal latency and amplitude from ankle stimulation.  5.  Needle examination of selected muscles in both legs showed normal insertional activities throughout except for probably no insertional activity over the left extensor digitorum brevis.  There were no voluntary motor units in the left extensor digitorum brevis.  There was an increased number of large motor units in the right extensor digitorum brevis as well as in the tibialis anterior, medial gastrocnemius and peroneus longus muscles bilaterally with essentially full interference patterns.  The vastus lateralis muscles showed normal motor units and recruitment patterns bilaterally.  Lumbar paraspinals at L5 showed no abnormality on either side.    Impression:  Abnormal study showing sensorimotor peripheral neuropathy of moderate degree.  There was some chronic needle exam changes in muscles below the knees however this is consistent with the nerve conduction findings.  No acute changes are seen and no paraspinal abnormalities were seen to correlate with root level involvement.  Study results were discussed with the patient.    Jeremy Cuba M.D.              Dictated utilizing Dragon dictation.

## 2020-12-23 ENCOUNTER — INFUSION (OUTPATIENT)
Dept: ONCOLOGY | Facility: HOSPITAL | Age: 65
End: 2020-12-23

## 2020-12-23 VITALS
DIASTOLIC BLOOD PRESSURE: 72 MMHG | TEMPERATURE: 97.5 F | HEIGHT: 65 IN | OXYGEN SATURATION: 98 % | WEIGHT: 187.4 LBS | BODY MASS INDEX: 31.22 KG/M2 | SYSTOLIC BLOOD PRESSURE: 114 MMHG | HEART RATE: 88 BPM | RESPIRATION RATE: 18 BRPM

## 2020-12-23 DIAGNOSIS — D83.9 COMMON VARIABLE IMMUNODEFICIENCY (HCC): ICD-10-CM

## 2020-12-23 DIAGNOSIS — M81.0 OSTEOPOROSIS, UNSPECIFIED OSTEOPOROSIS TYPE, UNSPECIFIED PATHOLOGICAL FRACTURE PRESENCE: ICD-10-CM

## 2020-12-23 DIAGNOSIS — M81.8 OTHER OSTEOPOROSIS, UNSPECIFIED PATHOLOGICAL FRACTURE PRESENCE: Primary | ICD-10-CM

## 2020-12-23 LAB
ANION GAP SERPL CALCULATED.3IONS-SCNC: 9 MMOL/L (ref 5–15)
BUN SERPL-MCNC: 17 MG/DL (ref 8–23)
BUN/CREAT SERPL: 17 (ref 7–25)
CALCIUM SPEC-SCNC: 9.8 MG/DL (ref 8.6–10.5)
CHLORIDE SERPL-SCNC: 112 MMOL/L (ref 98–107)
CO2 SERPL-SCNC: 20 MMOL/L (ref 22–29)
CREAT SERPL-MCNC: 1 MG/DL (ref 0.57–1)
GFR SERPL CREATININE-BSD FRML MDRD: 56 ML/MIN/1.73
GLUCOSE SERPL-MCNC: 133 MG/DL (ref 65–99)
POTASSIUM SERPL-SCNC: 4.1 MMOL/L (ref 3.5–5.2)
SODIUM SERPL-SCNC: 141 MMOL/L (ref 136–145)

## 2020-12-23 PROCEDURE — 96366 THER/PROPH/DIAG IV INF ADDON: CPT

## 2020-12-23 PROCEDURE — 96365 THER/PROPH/DIAG IV INF INIT: CPT

## 2020-12-23 PROCEDURE — 25010000002 ROMOSOZUMAB-AQQG 105 MG/1.17ML SOLUTION PREFILLED SYRINGE: Performed by: INTERNAL MEDICINE

## 2020-12-23 PROCEDURE — 25010000002 IMMUNE GLOBULIN (HUMAN) 40 GM/400ML SOLUTION: Performed by: ALLERGY & IMMUNOLOGY

## 2020-12-23 PROCEDURE — 96372 THER/PROPH/DIAG INJ SC/IM: CPT

## 2020-12-23 PROCEDURE — 80048 BASIC METABOLIC PNL TOTAL CA: CPT | Performed by: INTERNAL MEDICINE

## 2020-12-23 RX ORDER — DIPHENHYDRAMINE HCL 25 MG
25 CAPSULE ORAL ONCE
Status: CANCELLED
Start: 2021-01-13

## 2020-12-23 RX ORDER — EPINEPHRINE 0.3 MG/.3ML
0.3 INJECTION SUBCUTANEOUS ONCE AS NEEDED
Status: CANCELLED
Start: 2021-01-13

## 2020-12-23 RX ORDER — ACETAMINOPHEN 500 MG
500 TABLET ORAL AS NEEDED
Status: CANCELLED
Start: 2021-01-13

## 2020-12-23 RX ORDER — DIPHENHYDRAMINE HYDROCHLORIDE 50 MG/ML
50 INJECTION INTRAMUSCULAR; INTRAVENOUS ONCE AS NEEDED
Status: CANCELLED
Start: 2021-01-13

## 2020-12-23 RX ORDER — ACETAMINOPHEN 325 MG/1
650 TABLET ORAL ONCE
Status: CANCELLED
Start: 2021-01-13

## 2020-12-23 RX ADMIN — ROMOSOZUMAB-AQQG 210 MG: 105 INJECTION, SOLUTION SUBCUTANEOUS at 09:27

## 2020-12-23 RX ADMIN — IMMUNE GLOBULIN (HUMAN) 40 G: 10 INJECTION INTRAVENOUS; SUBCUTANEOUS at 08:32

## 2020-12-23 NOTE — NURSING NOTE
Patient stated she took Tylenol 1000 mg PO x 1 and Benadryl 25 mg PO x 1 today for her premedications.

## 2020-12-23 NOTE — NURSING NOTE
Patient arrived ambulatory for lab and RN Review. No complaints. Dosing did not change. Patient v/u that if any concerns she will contact physician.    Dosing:  Sun                   7.5 mg  All other days    5 mg  Total weekly: 37.5 mg

## 2021-01-20 ENCOUNTER — INFUSION (OUTPATIENT)
Dept: ONCOLOGY | Facility: HOSPITAL | Age: 66
End: 2021-01-20

## 2021-01-20 ENCOUNTER — APPOINTMENT (OUTPATIENT)
Dept: OTHER | Facility: HOSPITAL | Age: 66
End: 2021-01-20

## 2021-01-20 VITALS
OXYGEN SATURATION: 100 % | WEIGHT: 187.6 LBS | BODY MASS INDEX: 34.52 KG/M2 | DIASTOLIC BLOOD PRESSURE: 67 MMHG | RESPIRATION RATE: 18 BRPM | TEMPERATURE: 97.5 F | SYSTOLIC BLOOD PRESSURE: 104 MMHG | HEIGHT: 62 IN | HEART RATE: 91 BPM

## 2021-01-20 DIAGNOSIS — D83.9 COMMON VARIABLE IMMUNODEFICIENCY (HCC): Primary | ICD-10-CM

## 2021-01-20 DIAGNOSIS — M81.0 OSTEOPOROSIS, UNSPECIFIED OSTEOPOROSIS TYPE, UNSPECIFIED PATHOLOGICAL FRACTURE PRESENCE: ICD-10-CM

## 2021-01-20 LAB
ALBUMIN SERPL-MCNC: 4.1 G/DL (ref 3.5–5.2)
ALBUMIN SERPL-MCNC: 4.2 G/DL (ref 3.5–5.2)
ALBUMIN/GLOB SERPL: 1.6 G/DL
ALP SERPL-CCNC: 106 U/L (ref 39–117)
ALP SERPL-CCNC: 106 U/L (ref 39–117)
ALT SERPL W P-5'-P-CCNC: 18 U/L (ref 1–33)
ALT SERPL W P-5'-P-CCNC: 18 U/L (ref 1–33)
ANION GAP SERPL CALCULATED.3IONS-SCNC: 5.2 MMOL/L (ref 5–15)
AST SERPL-CCNC: 18 U/L (ref 1–32)
AST SERPL-CCNC: 18 U/L (ref 1–32)
BASOPHILS # BLD AUTO: 0.02 10*3/MM3 (ref 0–0.2)
BASOPHILS NFR BLD AUTO: 0.4 % (ref 0–1.5)
BILIRUB CONJ SERPL-MCNC: <0.2 MG/DL (ref 0–0.3)
BILIRUB INDIRECT SERPL-MCNC: NORMAL MG/DL
BILIRUB SERPL-MCNC: 0.4 MG/DL (ref 0–1.2)
BILIRUB SERPL-MCNC: 0.4 MG/DL (ref 0–1.2)
BUN SERPL-MCNC: 17 MG/DL (ref 8–23)
BUN/CREAT SERPL: 16.5 (ref 7–25)
CALCIUM SPEC-SCNC: 9.5 MG/DL (ref 8.6–10.5)
CHLORIDE SERPL-SCNC: 113 MMOL/L (ref 98–107)
CO2 SERPL-SCNC: 24.8 MMOL/L (ref 22–29)
CREAT SERPL-MCNC: 1.03 MG/DL (ref 0.57–1)
DEPRECATED RDW RBC AUTO: 52.7 FL (ref 37–54)
EOSINOPHIL # BLD AUTO: 0.04 10*3/MM3 (ref 0–0.4)
EOSINOPHIL NFR BLD AUTO: 0.7 % (ref 0.3–6.2)
ERYTHROCYTE [DISTWIDTH] IN BLOOD BY AUTOMATED COUNT: 13.5 % (ref 12.3–15.4)
GFR SERPL CREATININE-BSD FRML MDRD: 54 ML/MIN/1.73
GLOBULIN UR ELPH-MCNC: 2.7 GM/DL
GLUCOSE SERPL-MCNC: 86 MG/DL (ref 65–99)
HCT VFR BLD AUTO: 45.2 % (ref 34–46.6)
HGB BLD-MCNC: 14.2 G/DL (ref 12–15.9)
IMM GRANULOCYTES # BLD AUTO: 0.01 10*3/MM3 (ref 0–0.05)
IMM GRANULOCYTES NFR BLD AUTO: 0.2 % (ref 0–0.5)
LYMPHOCYTES # BLD AUTO: 0.37 10*3/MM3 (ref 0.7–3.1)
LYMPHOCYTES NFR BLD AUTO: 6.9 % (ref 19.6–45.3)
MCH RBC QN AUTO: 33.5 PG (ref 26.6–33)
MCHC RBC AUTO-ENTMCNC: 31.4 G/DL (ref 31.5–35.7)
MCV RBC AUTO: 106.6 FL (ref 79–97)
MONOCYTES # BLD AUTO: 0.4 10*3/MM3 (ref 0.1–0.9)
MONOCYTES NFR BLD AUTO: 7.4 % (ref 5–12)
NEUTROPHILS NFR BLD AUTO: 4.55 10*3/MM3 (ref 1.7–7)
NEUTROPHILS NFR BLD AUTO: 84.4 % (ref 42.7–76)
NRBC BLD AUTO-RTO: 0 /100 WBC (ref 0–0.2)
PLAT MORPH BLD: NORMAL
PLATELET # BLD AUTO: 252 10*3/MM3 (ref 140–450)
PMV BLD AUTO: 10.8 FL (ref 6–12)
POTASSIUM SERPL-SCNC: 4.4 MMOL/L (ref 3.5–5.2)
PROT SERPL-MCNC: 6.9 G/DL (ref 6–8.5)
PROT SERPL-MCNC: 6.9 G/DL (ref 6–8.5)
RBC # BLD AUTO: 4.24 10*6/MM3 (ref 3.77–5.28)
SODIUM SERPL-SCNC: 143 MMOL/L (ref 136–145)
SPHEROCYTES BLD QL SMEAR: NORMAL
WBC # BLD AUTO: 5.39 10*3/MM3 (ref 3.4–10.8)
WBC MORPH BLD: NORMAL

## 2021-01-20 PROCEDURE — 25010000002 IMMUNE GLOBULIN (HUMAN) 40 GM/400ML SOLUTION: Performed by: ALLERGY & IMMUNOLOGY

## 2021-01-20 PROCEDURE — 80053 COMPREHEN METABOLIC PANEL: CPT | Performed by: INTERNAL MEDICINE

## 2021-01-20 PROCEDURE — 96372 THER/PROPH/DIAG INJ SC/IM: CPT

## 2021-01-20 PROCEDURE — 96365 THER/PROPH/DIAG IV INF INIT: CPT

## 2021-01-20 PROCEDURE — 85025 COMPLETE CBC W/AUTO DIFF WBC: CPT | Performed by: INTERNAL MEDICINE

## 2021-01-20 PROCEDURE — 25010000002 ROMOSOZUMAB-AQQG 105 MG/1.17ML SOLUTION PREFILLED SYRINGE: Performed by: INTERNAL MEDICINE

## 2021-01-20 PROCEDURE — 96366 THER/PROPH/DIAG IV INF ADDON: CPT

## 2021-01-20 PROCEDURE — 80076 HEPATIC FUNCTION PANEL: CPT | Performed by: INTERNAL MEDICINE

## 2021-01-20 PROCEDURE — 85007 BL SMEAR W/DIFF WBC COUNT: CPT | Performed by: INTERNAL MEDICINE

## 2021-01-20 RX ORDER — EPINEPHRINE 0.3 MG/.3ML
0.3 INJECTION SUBCUTANEOUS ONCE AS NEEDED
Status: CANCELLED
Start: 2021-02-10

## 2021-01-20 RX ORDER — ACETAMINOPHEN 325 MG/1
650 TABLET ORAL ONCE
Status: CANCELLED
Start: 2021-02-10

## 2021-01-20 RX ORDER — DIPHENHYDRAMINE HCL 25 MG
25 CAPSULE ORAL ONCE
Status: CANCELLED
Start: 2021-02-10

## 2021-01-20 RX ORDER — DIPHENHYDRAMINE HYDROCHLORIDE 50 MG/ML
50 INJECTION INTRAMUSCULAR; INTRAVENOUS ONCE AS NEEDED
Status: CANCELLED
Start: 2021-02-10

## 2021-01-20 RX ORDER — ACETAMINOPHEN 500 MG
500 TABLET ORAL AS NEEDED
Status: CANCELLED
Start: 2021-02-10

## 2021-01-20 RX ADMIN — IMMUNE GLOBULIN (HUMAN) 40 G: 10 INJECTION INTRAVENOUS; SUBCUTANEOUS at 08:40

## 2021-01-20 RX ADMIN — ROMOSOZUMAB-AQQG 210 MG: 105 INJECTION, SOLUTION SUBCUTANEOUS at 09:37

## 2021-01-20 NOTE — NURSING NOTE
Patient states she took her own Tylenol 1000 mg PO x 1 and Benadryl 25 mg PO x 1 of her own premedications for today's treatment.

## 2021-01-27 ENCOUNTER — LAB (OUTPATIENT)
Dept: OTHER | Facility: HOSPITAL | Age: 66
End: 2021-01-27

## 2021-01-27 ENCOUNTER — APPOINTMENT (OUTPATIENT)
Dept: OTHER | Facility: HOSPITAL | Age: 66
End: 2021-01-27

## 2021-01-27 DIAGNOSIS — D75.89 MACROCYTOSIS WITHOUT ANEMIA: ICD-10-CM

## 2021-01-27 LAB
ALBUMIN SERPL-MCNC: 4.3 G/DL (ref 3.5–5.2)
ALBUMIN/GLOB SERPL: 1.2 G/DL
ALP SERPL-CCNC: 108 U/L (ref 39–117)
ALT SERPL W P-5'-P-CCNC: 19 U/L (ref 1–33)
ANION GAP SERPL CALCULATED.3IONS-SCNC: 6.9 MMOL/L (ref 5–15)
AST SERPL-CCNC: 21 U/L (ref 1–32)
BASOPHILS # BLD AUTO: 0.02 10*3/MM3 (ref 0–0.2)
BASOPHILS NFR BLD AUTO: 0.4 % (ref 0–1.5)
BILIRUB SERPL-MCNC: 0.5 MG/DL (ref 0–1.2)
BUN SERPL-MCNC: 13 MG/DL (ref 8–23)
BUN/CREAT SERPL: 11.4 (ref 7–25)
CALCIUM SPEC-SCNC: 9.8 MG/DL (ref 8.6–10.5)
CHLORIDE SERPL-SCNC: 109 MMOL/L (ref 98–107)
CO2 SERPL-SCNC: 26.1 MMOL/L (ref 22–29)
CREAT SERPL-MCNC: 1.14 MG/DL (ref 0.57–1)
DEPRECATED RDW RBC AUTO: 49.6 FL (ref 37–54)
EOSINOPHIL # BLD AUTO: 0.06 10*3/MM3 (ref 0–0.4)
EOSINOPHIL NFR BLD AUTO: 1.1 % (ref 0.3–6.2)
ERYTHROCYTE [DISTWIDTH] IN BLOOD BY AUTOMATED COUNT: 13.3 % (ref 12.3–15.4)
FOLATE SERPL-MCNC: >20 NG/ML (ref 4.78–24.2)
GFR SERPL CREATININE-BSD FRML MDRD: 48 ML/MIN/1.73
GLOBULIN UR ELPH-MCNC: 3.6 GM/DL
GLUCOSE SERPL-MCNC: 107 MG/DL (ref 65–99)
HCT VFR BLD AUTO: 43.3 % (ref 34–46.6)
HGB BLD-MCNC: 14.4 G/DL (ref 12–15.9)
IMM GRANULOCYTES # BLD AUTO: 0.01 10*3/MM3 (ref 0–0.05)
IMM GRANULOCYTES NFR BLD AUTO: 0.2 % (ref 0–0.5)
LYMPHOCYTES # BLD AUTO: 0.43 10*3/MM3 (ref 0.7–3.1)
LYMPHOCYTES NFR BLD AUTO: 8.2 % (ref 19.6–45.3)
MCH RBC QN AUTO: 33.8 PG (ref 26.6–33)
MCHC RBC AUTO-ENTMCNC: 33.3 G/DL (ref 31.5–35.7)
MCV RBC AUTO: 101.6 FL (ref 79–97)
MONOCYTES # BLD AUTO: 0.4 10*3/MM3 (ref 0.1–0.9)
MONOCYTES NFR BLD AUTO: 7.6 % (ref 5–12)
NEUTROPHILS NFR BLD AUTO: 4.32 10*3/MM3 (ref 1.7–7)
NEUTROPHILS NFR BLD AUTO: 82.5 % (ref 42.7–76)
NRBC BLD AUTO-RTO: 0 /100 WBC (ref 0–0.2)
PLATELET # BLD AUTO: 250 10*3/MM3 (ref 140–450)
PMV BLD AUTO: 10.8 FL (ref 6–12)
POTASSIUM SERPL-SCNC: 4.8 MMOL/L (ref 3.5–5.2)
PROT SERPL-MCNC: 7.9 G/DL (ref 6–8.5)
RBC # BLD AUTO: 4.26 10*6/MM3 (ref 3.77–5.28)
SODIUM SERPL-SCNC: 142 MMOL/L (ref 136–145)
VIT B12 BLD-MCNC: 909 PG/ML (ref 211–946)
WBC # BLD AUTO: 5.24 10*3/MM3 (ref 3.4–10.8)

## 2021-01-27 PROCEDURE — 82607 VITAMIN B-12: CPT | Performed by: INTERNAL MEDICINE

## 2021-01-27 PROCEDURE — 82525 ASSAY OF COPPER: CPT | Performed by: INTERNAL MEDICINE

## 2021-01-27 PROCEDURE — 36415 COLL VENOUS BLD VENIPUNCTURE: CPT

## 2021-01-27 PROCEDURE — 85025 COMPLETE CBC W/AUTO DIFF WBC: CPT | Performed by: INTERNAL MEDICINE

## 2021-01-27 PROCEDURE — 82746 ASSAY OF FOLIC ACID SERUM: CPT | Performed by: INTERNAL MEDICINE

## 2021-01-27 PROCEDURE — 80053 COMPREHEN METABOLIC PANEL: CPT | Performed by: INTERNAL MEDICINE

## 2021-01-27 RX ORDER — ATORVASTATIN CALCIUM 20 MG/1
20 TABLET, FILM COATED ORAL DAILY
Qty: 90 TABLET | Refills: 3 | Status: SHIPPED | OUTPATIENT
Start: 2021-01-27 | End: 2021-11-08

## 2021-01-30 LAB — COPPER SERPL-MCNC: 58 UG/DL (ref 80–158)

## 2021-02-10 ENCOUNTER — TELEPHONE (OUTPATIENT)
Dept: ONCOLOGY | Facility: CLINIC | Age: 66
End: 2021-02-10

## 2021-02-10 NOTE — TELEPHONE ENCOUNTER
Caller: POPPY    Relationship to patient: SELF    Best call back number: [phone]    Chief complaint: WEATHER     Type of visit: VITALS AND F/U    Requested date: TELE-HEALTH OR [employer] VIDEO    If rescheduling, when is the original appointment: 2/11/21    Additional notes:

## 2021-02-11 ENCOUNTER — APPOINTMENT (OUTPATIENT)
Dept: OTHER | Facility: HOSPITAL | Age: 66
End: 2021-02-11

## 2021-02-11 ENCOUNTER — TELEMEDICINE (OUTPATIENT)
Dept: ONCOLOGY | Facility: CLINIC | Age: 66
End: 2021-02-11

## 2021-02-11 DIAGNOSIS — R79.0 LOW SERUM COPPER FOR AGE: ICD-10-CM

## 2021-02-11 DIAGNOSIS — D75.89 MACROCYTOSIS WITHOUT ANEMIA: Primary | ICD-10-CM

## 2021-02-11 DIAGNOSIS — D47.2 IGG MONOCLONAL GAMMOPATHY OF UNCERTAIN SIGNIFICANCE: ICD-10-CM

## 2021-02-11 PROCEDURE — 99442 PR PHYS/QHP TELEPHONE EVALUATION 11-20 MIN: CPT | Performed by: INTERNAL MEDICINE

## 2021-02-11 NOTE — PROGRESS NOTES
REASON FOR FOLLOW UP:     1. Iron deficiency anemia, recurrent, not able to tolerate oral iron treatment due to severe constipation.  Patient was given PRBC transfusion and IV iron therapy in 2016.    · Patient has a recurrent severe iron deficiency in late August 2018, and was given Injectafer 2 doses in September 2018.  Had great response with normalization of hemoglobin and iron studies.  2. Persistent worsening macrocytosis.  Patient was found to having copper deficiency.    · Patient started on oral copper gluconate 2 mg once daily on 9/4/2019.    · The most recent laboratory study on 2/12/2020 reported persistent copper deficiency.  Copper gluconate increased to 2 mg twice a day.  · Patient is also on Imuran for rheumatoid disease.    3. Monoclonal gammopathy of undetermined significance, IgG lambda, unable to be quantified by SPEP on 9/23/2020.          HISTORY OF PRESENT ILLNESS:  The patient is a 65 y.o. female who is here for 6 month follow-up with results from laboratory study obtained two weeks earlier.  This was reported as a telemedicine evaluation, via video conference, however due to technology department, we converted to a phone interview.  Patient is agreeable with this.    Patient reports she was out of oral copper supplement for about 3 months, because her [facility] does not have the supply.  She eventually brought from [employer] website.  She has been taking copper gluconate 4 mg twice a day for about 3 months now.  She also recently was on oral zinc supplement, only for about 1 week, and subsequently stopped it due to concern that it could interfere with copper absorption.     Patient otherwise reports that she has a baseline condition, she continues oral Imuran 100 mg twice a day for rheumatology disease.  Patient continuing one B12 injection monthly.  He is also taking oral folic acid as well as vitamin B complex daily.    Patient continues to follow-up with rheumatologist every 4  months with blood work in between.     Laboratory studies on 1/27/2021 reported serum copper 58 mcg/dL, normal B12 level at 909 pg/mL and supratherapeutic folate more than 20 ng/mL.  Normal hemoglobin 14.4 however .6, normal platelets 250,000 and a WBC 5240 including ANC 4320 lymphocytes 430..  Chemistry lab reported creatinine 1.14, otherwise normal electrolytes, liver function panel, total protein 7.9 albumin 4.3 and globulin 3.6 and glucose 107.        Past Medical History:   Diagnosis Date   • Acute colitis 1/18/2017   • Allergic Codeine, some antibiotics   • Anemia    • Cataract Removed    2012   • Chronic depression    • Colon polyp 1 removed    2016   • Diabetes mellitus (CMS/HCC)    • Fracture of rib    • GERD (gastroesophageal reflux disease)    • H/O Wrist fracture, right    • Headache    • History of bronchitis    • History of pneumonia    • History of transfusion    • Hyperlipidemia    • Hypothyroidism    • Inflammatory bowel disease    • Iron deficiency    • Irritable bowel syndrome    • Low serum copper for age 8/30/2019   • Migraine    • Obesity    • Osteopenia    • Osteoporosis    • Pneumonia June 2016   • RA (rheumatoid arthritis) (CMS/HCC)    • Sensory neuropathy    • Sepsis, unspecified organism (CMS/HCC) 1/18/2017   • Vitamin D deficiency      Past Surgical History:   Procedure Laterality Date   • ADENOIDECTOMY     • BREAST AUGMENTATION     • BREAST IMPLANT REMOVAL Bilateral    • CARPAL TUNNEL RELEASE Left 2015   • COLONOSCOPY     • EYE SURGERY Bilateral    • HYSTERECTOMY     • LASIK Bilateral    • ORIF WRIST FRACTURE Right    • MA TOTAL KNEE ARTHROPLASTY Right 12/14/2017    Procedure: TOTAL KNEE ARTHROPLASTY;  Surgeon: Zion Guo MD;  Location: [facility];  Service: Orthopedics   • SINUS SURGERY      x2   • TONSILLECTOMY         HEMATOLOGIC/ONCOLOGIC HISTORY:  The patient is a 63 y.o. year old femalewho is here for evaluation and management of her anemia. This patient has  chronic disease including rheumatoid arthritis. She was on methotrexate for about 11 years and currently on Imuran and also history for pernicious anemia/vitamin B12 deficiency undergoing monthly intramuscular injection, history of hyperlipidemia, hypothyroidism, gastroesophageal reflux disease and rheumatoid arthritis. The patient reports she was not able to tolerate oral iron treatment because of significant constipation. The last time she was taking oral iron was about 2-3 years ago at which time she had significant anemia and was given 2 units PRBC transfusion. She was also given intravenous iron therapy at that time. Patient reports she had GI evaluation by Dr. Celeste with both EGD and a colonoscopic examination a couple of years ago. I searched electronic medical records at [facility] and according to Dr. Celeste’s clinical note in 08/2016 and 11/2016, she had both EGD and colonoscopic examination supposedly in August; however, I could not find the procedure note itself. Nevertheless, patient reports she was told not having malignancy. I did find the pathology evaluation on 08/24/2016, which reported mild to focally moderate chronic active gastritis with intestinal metaplasia; negative for H. pylori. The small intestinal biopsy was benign. No signs for abnormal histology. The distal esophagus shows moderate chronic active inflammation and no intestinal metaplasia or dysplasia. The descending colon polyp was tubular adenoma with low-grade dysplasia. Patient reports no melena, no hematochezia. She does have pica for ice chips.     Patient reports she was diagnosed of rheumatoid arthritis in 2003. She was on methotrexate from 2003 to 2014. Subsequently treatment changed to Imuran. Patient reports she recently has been on large dose prednisone 40 mg daily for about 1 year and is in the process of tapering down of her prednisone. She also previously was given Rituxan treatment about 10 years  ago. Most recently about 4 months ago she was restarted back on Rituxan.     Patient also reports about 2 years ago, she had recurrent pneumonia and hospitalization and since that time she has been given IVIG regularly for the past 2 years and since that time she has been doing very well. No recurrent pneumonia.     Patient complains of significant fatigue, pica for ice chips. She also complains of exertional dyspnea. No cough or hemoptysis. She has also soreness on her tongue. She has vague intermittent abdominal pain and constipation. Denies melena or hematochezia. No nausea. No vomiting. She has some weight gain secondary to long-term oral steroid use. She also reports heat intolerance. Patient continues to have joint swelling and pain from her rheumatoid arthritis. She also has chronic low back pain. She reports intermittent headaches and also numbness involving her extremities mostly on hands but denies fainting or syncope. She has no easy bleeding or bruising.    I reviewed her laboratory results within the [facility] system. She had most recent iron study on 04/27/2018, which reported iron deficiency with ferritin 8.28 ng/mL, serum free iron 32 mcg/dL with no iron saturation. She had supratherapeutic folic acid level more than 20 ng/mL. However, had low normal vitamin B12 level 346 pg/mL. She had mild anemia; hemoglobin 10.7, MCV 92.2, MCHC 29.3. Her platelets were 506,000 and WBC 10,990 including neutrophils 66027, lymphocytes 780 and monocytes 410. Her chemistry lab updated earlier on 04/23/2018 reported creatinine 1.02. Normal electrolytes. Glucose 190. Marginally elevated alkaline phosphatase 118 and otherwise normal liver function panel. Total serum protein 7.2 and albumin 3.9. Had a normal TSH of 1.57.     On 01/22/2017, she had serum free iron 31, TIBC 463 and iron saturation 7% without ferritin level. Folic acid was more than 20 ng/mL and vitamin B12 was 1022 pg/mL. She had hemoglobin  11.4, MCV 85.1, MCHC 29.8. Her platelets were 487,000 and WBC was 23,700. Apparently, patient was hospitalized at that time at [facility] for about a week because of sepsis and acute colitis. Laboratory study on 06/06/2016 reported serum ferritin 9.0 ng/mL, free iron 22, TIBC 416 and iron saturation 5%. Her vitamin B12 level was 287 pg/mL and RBC folic acid was 1575 ng/mL. Her hemoglobin was 8.6, MCV 76.3, MCHC 29.4. Platelets were 617,000 and WBC 12,000. This patient had worsening hemoglobin on 06/07/2016 with hemoglobin 7.6 and she was given 2 units PRBC transfusion. Post transfusion hemoglobin was 10.4 on 06/08/2016. It was at that time the patient had hospitalization for pneumonia.    This patient has chronic iron deficiency for the past several years. She was not able to tolerate oral iron treatment because of severe constipation. She previously in 2016 received 2 units PRBC transfusion and intravenous iron therapy. She had workup with both EGD and colonoscopy in 08/2016 shortly after her discharge from hospital at that time. Procedure report was not able to be found, however, I reviewed pathology report from that procedure. There was chronic gastritis and esophagitis but no report of ulceration. She also had benign colon polyp.     Laboratory study on 8/29/2018 reported anemia Hb 10.3, platelets 412,000 and WBC 11,400 including in C 9800.  Iron study reported a ferritin less than 5 ng/mL, free iron 32 TIBC 454 and iron saturation 7%.    This patient is symptomatic with profound fatigue and pica for ice chips. I discussed with the patient, recommend stat iron study and arrange for her intravenous iron therapy with Injectafer 750 mg once a week for 2 doses in September 2018.      Patient had resolution of pica for ice chips after IV iron therapy.  Repeated laboratory study on 9/27/2018 reported normalized iron saturation 29%, and supratherapeutic ferritin 553 ng/mL.  She had a normalization of  hemoglobin 12.4 and platelets 307,000.    Laboratory study on 8/26/2019 reported normal Hb 14.5 however worsening macrocytosis .0.  She has normal WBC 10,650, but elevated ANC 9950 and decreased lymphocytes 250.  She has normal platelets 317,000.  Her iron study was normal reporting ferritin 216, iron saturation 35%, slightly supratherapeutic B12 level at 1220 pg/mL, however her copper deficiency 68 mcg/dL.     Patient was started on copper gluconate 2 mg daily.    Patient was here on 2/19/2020 for 6-month re-evaluation of her iron deficiency anemia, copper deficiency and macrocytosis with laboratory review.  The patient presents by herself today.    The patient reports feeling well overall today.  She notes some neck pain, but denies any adenopathy.  She has not been checking herself regularly for adenopathy.    She continues on monthly Vitamin B12 injections.  She feels more energized right after receiving the B-12 injections but notes this is not long lasting.  She is also taking the oral copper gluconate 2 mg once daily with good tolerance.  She has a good performance status.      The patient is followed by rheumatology for her rheumatoid arthritis.  She has been on Rituxan for 2 years and oral Azathioprine 100 mg two times a day for the past 4-5 years, with good control of her RA.  She notes some joint swelling in her hands and fingers, but she denies any leg swelling.      Recent laboratory studies on 2/12/2020 showed persistent copper deficiency at 60 mcg/dL.  She has, however with normal hemoglobin 14.6.  She also has normal  her iron level reported ferritin 236, free iron 73 TIBC 281 and iron saturation 26%.  She has 0 and platelets 340,000.  Excellent RBC folate 1174 ng/mL.    Lab study on 11/20/2019 reported excellent vitamin B12 level more than 2000 pg/mL.     On 9/23/2019, patient was found to have a monoclonal gammopathy, IgG lambda subtype however unable to be quantified by SPEP.She had  a normal serum IgG 811 mg/dL, IgA 100, IgM 20.  No free light chains were measured.    Patient has persistently low copper level 60 mcg/dL around 2/12/2020.  Had a persistent macrocytosis .0 but a normal hemoglobin 14.6 and normal platelets and WBC.  Oral copper gluconate was increased to 2 mg twice a day.    Laboratory studies from 8/5/2020 reported normal CBC including WBC 4810 with ANC 4020 lymphocytes 300 monocytes 410, platelets 260,000, and hemoglobin 13.7, MCV 99.8 MCHC 33.7.  Her serum copper was marginally low at 69 mcg/dL, and unremarkable CMP.  Serum protein study was negative for monoclonal proteins by immunofixation and protein electrophoresis.  She had a mildly elevated serum free kappa chain 27.0 mg/L, normal lambda chain 19.5, normal serum IgG B 61, IgA 98 and IgM 28, beta-2 myoglobin 2.1 mg/L.     MEDICATIONS    Current Outpatient Medications:   •  atorvastatin (LIPITOR) 20 MG tablet, TAKE 1 TABLET BY MOUTH DAILY., Disp: 90 tablet, Rfl: 3  •  azaTHIOprine (IMURAN) 50 MG tablet, Take 50 mg by mouth 3 (Three) Times a Day., Disp: , Rfl:   •  B Complex-C (B COMPLEX-B12-C IJ), Inject 1,000 mcg under the skin into the appropriate area as directed Every 30 (Thirty) Days., Disp: , Rfl:   •  buPROPion XL (Wellbutrin XL) 300 MG 24 hr tablet, Take 1 tablet by mouth Daily., Disp: 90 tablet, Rfl: 3  •  Cholecalciferol (VITAMIN D3) 5000 UNITS capsule capsule, Take 5,000 Units by mouth 2 (Two) Times a Day., Disp: , Rfl:   •  COPPER CAPS 2 MG capsule, Take 4 mg by mouth 2 (Two) Times a Day., Disp: 360 capsule, Rfl: 3  •  cyanocobalamin 1000 MCG/ML injection, INJECT 1,000 MCG AS DIRECTED EVERY 30 (THIRTY) DAYS., Disp: 1 mL, Rfl: 11  •  cyclobenzaprine (FLEXERIL) 10 MG tablet, , Disp: , Rfl:   •  folic acid (FOLVITE) 1 MG tablet, Take 1 mg by mouth daily., Disp: , Rfl:   •  omeprazole (priLOSEC) 40 MG capsule, TAKE 1 CAPSULE BY MOUTH DAILY., Disp: 30 capsule, Rfl: 11  •  SYNTHROID 112 MCG tablet, TAKE 1 TABLET  BY MOUTH DAILY., Disp: 30 tablet, Rfl: 11  •  topiramate (TOPAMAX) 100 MG tablet, Take 1 tablet by mouth 2 (two) times a day., Disp: 60 tablet, Rfl: 11    ALLERGIES:     Allergies   Allergen Reactions   • Codeine Nausea And Vomiting       SOCIAL HISTORY:       Social History     Socioeconomic History   • Marital status:      Spouse name: Not on file   • Number of children: 2   • Years of education: College   • Highest education level: Not on file   Occupational History   • Occupation: Homemaker     Employer: RETIRED   Tobacco Use   • Smoking status: Former Smoker     Packs/day: 0.50     Years: 5.00     Pack years: 2.50     Types: Cigarettes     Start date: 1974     Quit date: 1978     Years since quittin.8   • Smokeless tobacco: Never Used   • Tobacco comment: QUIT AGE 23   Substance and Sexual Activity   • Alcohol use: No     Comment: STOPPED    • Drug use: No        FAMILY HISTORY:   Father diagnosed of colon cancer at age 64,  of colon cancer age 65.  Mother is in excellent health condition in her early 90s.  Patient has a brother and a sister both living excellent condition.  Patient has 2 adult children both in excellent health condition.    Family History   Problem Relation Age of Onset   • Hyperlipidemia Mother    • Hypertension Mother    • Migraines Mother    • Colon cancer Father    • Diabetes Father    • Cancer Father    • Hyperlipidemia Brother    • Migraines Brother    • Migraines Sister    • Malig Hyperthermia Neg Hx        REVIEW OF SYSTEMS:  Review of Systems   Constitutional: Negative for appetite change, fatigue, fever and unexpected weight change.   HENT: Negative for mouth sores, rhinorrhea and sore throat.    Eyes: Negative for pain and visual disturbance.   Respiratory: Negative for cough and shortness of breath.    Cardiovascular: Negative for chest pain, palpitations and leg swelling.   Gastrointestinal: Positive for constipation. Negative for abdominal pain, blood  in stool and nausea.   Endocrine: Positive for heat intolerance. Negative for polyphagia.   Genitourinary: Negative for dysuria and hematuria.   Musculoskeletal: Positive for arthralgias, joint swelling (hands) and neck pain.   Skin: Negative for rash.   Allergic/Immunologic: Positive for immunocompromised state (On Imuran treatment for her rheumatoid arthritis).   Hematological: Negative for adenopathy. Does not bruise/bleed easily.   Psychiatric/Behavioral: Negative for agitation.   No change of review of system on 2/11/2021.           There were no vitals filed for this visit.  Current Status 8/13/2020   ECOG score 0      PHYSICAL EXAM: This is a telemedicine, no physical examination.      RECENT LABS:  Lab Results   Component Value Date    WBC 5.24 01/27/2021    HGB 14.4 01/27/2021    HCT 43.3 01/27/2021    .6 (H) 01/27/2021     01/27/2021     Lab Results   Component Value Date    NEUTROABS 4.32 01/27/2021     Lab Results   Component Value Date    GCNXJJOQ25 909 01/27/2021     Lab Results   Component Value Date    FOLATE >20.00 01/27/2021     Lab Results   Component Value Date    IRON 73 02/12/2020    TIBC 281 02/12/2020    FERRITIN 236.10 (H) 02/12/2020   On 2/12/2020 iron saturation 26%     Serum copper 58 mcg/dL 1/27/2021.  Serum copper 69 mcg/dL 8/5/2020.  Serum copper 60 mcg/dL 2/12/2020,   Serum copper 68 mcg/dL on 8/26/2019.      Assessment/Plan      1. Recurrent iron deficiency in the past.   · She was not able to tolerate oral iron treatment because of severe constipation. She previously in 2016 received 2 units PRBC transfusion and intravenous iron therapy. She had workup with both EGD and colonoscopy in 08/2016 with pathology evaluation reported chronic gastritis and esophagitis but no report of ulceration.    · This patient is symptomatic with profound fatigue and pica for ice chips and recurrent iron deficiency anemia in August 2018.  We treated her with 2 doses of Injectafer in  September 2018.   · This patient had resolution of pica for ice chips and improved energy level, normalization of hemoglobin after IV iron therapy.  Post treatment ferritin was 553 on 9/26/2018.  3/8/2019 laboratory study reported worsened but is still normal ferritin at 240 mg/mL, and good iron saturation 33%.   · On 08/26/2019, laboratory study reported ferritin at 216 and iron saturation of 35%.  · Laboratory studies on 2/12/2020 showed excellent ferritin 236 and iron saturation 26%.   · Maintains normal hemoglobin 13.7 on 8/5/2020.  No iron studies.   · Normal hemoglobin 14.4 on 1/27/2021.     2. Pernicious anemia/vitamin B12 deficiency. This patient has monthly vitamin B12 injection.   I advised patient to start taking oral vitamin B12 at 1000 mcg daily.   · On 3/8/2019 her vitamin B12 level is 701 pg/mL, not as high as expected.  · On 08/26/2019, Vitamin B12 reported supratheraputicat 1,220, patient is to continue on Vitamin B12 injections.  · Patient had excellent supratherapeutic B12 level more than 2000 on 11/20/2019.  · Normal vitamin B12 level 688 pg/mL 5/20/2020.  She will continue vitamin B12 injections.  · Normal vitamin B12 level 909 pg/mL on 1/27/2021.  Continue monthly B12 injection.    3.  Macrocytosis despite normal vitamin B12 level.  Her macrocytosis is newly developed in September 2018.  Patient reports no history of hepatitis or alcohol use.    · She previously had supratherapeutic folic acid level in April 2018.    · Macrocytosis needs to be monitored.  Could this caused by medication Imuran use for her rheumatoid arthritis?    · I discussed with patient 3/8/2019.  Her previous CT scan examination for abdomen and pelvis on 1/18/2017 reported a mild fatty liver.  Not sure whether this is the cause of her macrocytosis.    · Laboratory study on 8/26/2019 reported serum copper level was low at 68.   · On 9/4/2019, I explained to the patient that she will need to take copper supplementation once  daily.  Patient is agreeable.  We started her on copper gluconate 2 mg daily.   · Laboratory study on 2/12/2020 showed a lower serum copper level at 60.    · Patient is compliant with copper gluconate.  We discussed with patient on 2/19/2020, we will increase the dose to 2 mg twice daily.  · 8/5/2020, copper 69 mcg/dL, still low.  We discussed 8/13/2020, since the patient is still copper deficient at this time, we would increase her copper gluconate to 4 mg twice a day.  I also discussed with the patient the Imuran could cause macrocytosis.   · On 1/27/2021, copper level 58 mcg/dL.  However she has normal vitamin B12 level and a supratherapeutic folate level.  .6 and hemoglobin 14.4.  She also continues Imuran 100 mg twice a day.  This could also cause macrocytosis.  She was off oral copper, due to lack of supply from pharmacy however it is being on copper gluconate 4 mg twice a day for the past 3 months.  I discussed with patient today and recommended to increase his 6 mg twice a day.  She voiced understanding and agreeable.     4.  Lymphocytopenia.  This is secondary to Rituxan treatment in October for rheumatoid arthritis.  I reviewed medical records from her dermatologist office.   · Lymphocytes 430 on 1/27/2021.  Continue to monitor.    5.  Monoclonal gammopathy IgG lambda (MGUS) on 9/23/2019.  · She is positive for serum protein immunofixation, unable to be quantified by SPEP.  Had a normal serum IgG 811 mg/dL, IgA 100, IgM 20.  No free light chains were measured.    · Laboratory study on 8/5/2020 reported no detectable monoclonal proteins by immunofixation and protein electrophoresis.  There was marginally elevated serum kappa free light chain 27 mg/L, however no elevation of serum IgG/A/M or free lambda chain.  We will monitor on annual basis.    5.  Pulmonary fibrosis.  Patient reports she has been Imuran 100 mg twice a day.  She follows pulmonologist routinely.      PLAN:   1. Increase oral copper  gluconate to 6 mg twice daily.   2. I encouraged the patient to regularly check herself for adenopathy as patient is on immunosuppressant azathioprine.  3. Continue monthly injection of B12 at Dr. Yang's office.    4. Patient is due to follow-up with rheumatologist every 4 months with blood work in between.   5. Follow-up with me in 6 months with labs performed 2 weeks prior to visit. Labs will be CMP, B12, CBC, zinc and Copper.  We will also obtain serum paraprotein studies.      You have chosen to receive care through a telephone visit today. Do you consent to use a telephone visit for your medical care today? Yes     This visit has been rescheduled as a phone visit to comply with patient safety concerns in accordance with CDC recommendations. Total time of discussion was 12 minutes.        ALEJANDRO METCALF M.D., Ph.D.    2/11/2021       CC: MD Howard Caraballo M.D.    Cj Null M.D.

## 2021-02-17 ENCOUNTER — INFUSION (OUTPATIENT)
Dept: ONCOLOGY | Facility: HOSPITAL | Age: 66
End: 2021-02-17

## 2021-02-17 VITALS
TEMPERATURE: 97.1 F | OXYGEN SATURATION: 99 % | BODY MASS INDEX: 31.32 KG/M2 | HEIGHT: 65 IN | DIASTOLIC BLOOD PRESSURE: 84 MMHG | RESPIRATION RATE: 18 BRPM | SYSTOLIC BLOOD PRESSURE: 134 MMHG | WEIGHT: 188 LBS | HEART RATE: 90 BPM

## 2021-02-17 DIAGNOSIS — M81.0 OSTEOPOROSIS, UNSPECIFIED OSTEOPOROSIS TYPE, UNSPECIFIED PATHOLOGICAL FRACTURE PRESENCE: ICD-10-CM

## 2021-02-17 DIAGNOSIS — D83.9 COMMON VARIABLE IMMUNODEFICIENCY (HCC): Primary | ICD-10-CM

## 2021-02-17 LAB
ALBUMIN SERPL-MCNC: 4 G/DL (ref 3.5–5.2)
ALBUMIN/GLOB SERPL: 1.3 G/DL
ALP SERPL-CCNC: 102 U/L (ref 39–117)
ALT SERPL W P-5'-P-CCNC: 15 U/L (ref 1–33)
ANION GAP SERPL CALCULATED.3IONS-SCNC: 9.7 MMOL/L (ref 5–15)
AST SERPL-CCNC: 17 U/L (ref 1–32)
BILIRUB SERPL-MCNC: 0.5 MG/DL (ref 0–1.2)
BUN SERPL-MCNC: 17 MG/DL (ref 8–23)
BUN/CREAT SERPL: 14.8 (ref 7–25)
CALCIUM SPEC-SCNC: 9.5 MG/DL (ref 8.6–10.5)
CHLORIDE SERPL-SCNC: 112 MMOL/L (ref 98–107)
CO2 SERPL-SCNC: 17.3 MMOL/L (ref 22–29)
CREAT SERPL-MCNC: 1.15 MG/DL (ref 0.57–1)
GFR SERPL CREATININE-BSD FRML MDRD: 47 ML/MIN/1.73
GLOBULIN UR ELPH-MCNC: 3.2 GM/DL
GLUCOSE SERPL-MCNC: 116 MG/DL (ref 65–99)
IGG1 SER-MCNC: 1026 MG/DL (ref 700–1600)
POTASSIUM SERPL-SCNC: 4.3 MMOL/L (ref 3.5–5.2)
PROT SERPL-MCNC: 7.2 G/DL (ref 6–8.5)
SODIUM SERPL-SCNC: 139 MMOL/L (ref 136–145)

## 2021-02-17 PROCEDURE — 96372 THER/PROPH/DIAG INJ SC/IM: CPT

## 2021-02-17 PROCEDURE — 25010000002 ROMOSOZUMAB-AQQG 105 MG/1.17ML SOLUTION PREFILLED SYRINGE: Performed by: INTERNAL MEDICINE

## 2021-02-17 PROCEDURE — 25010000002 IMMUNE GLOBULIN (HUMAN) 40 GM/400ML SOLUTION: Performed by: ALLERGY & IMMUNOLOGY

## 2021-02-17 PROCEDURE — 82784 ASSAY IGA/IGD/IGG/IGM EACH: CPT | Performed by: ALLERGY & IMMUNOLOGY

## 2021-02-17 PROCEDURE — 96365 THER/PROPH/DIAG IV INF INIT: CPT

## 2021-02-17 PROCEDURE — 96366 THER/PROPH/DIAG IV INF ADDON: CPT

## 2021-02-17 PROCEDURE — 80053 COMPREHEN METABOLIC PANEL: CPT | Performed by: INTERNAL MEDICINE

## 2021-02-17 RX ORDER — DIPHENHYDRAMINE HYDROCHLORIDE 50 MG/ML
50 INJECTION INTRAMUSCULAR; INTRAVENOUS ONCE AS NEEDED
Status: CANCELLED
Start: 2021-03-10

## 2021-02-17 RX ORDER — ACETAMINOPHEN 325 MG/1
650 TABLET ORAL ONCE
Status: CANCELLED
Start: 2021-03-10

## 2021-02-17 RX ORDER — DIPHENHYDRAMINE HCL 25 MG
25 CAPSULE ORAL ONCE
Status: CANCELLED
Start: 2021-03-10

## 2021-02-17 RX ORDER — EPINEPHRINE 0.3 MG/.3ML
0.3 INJECTION SUBCUTANEOUS ONCE AS NEEDED
Status: CANCELLED
Start: 2021-03-10

## 2021-02-17 RX ORDER — ACETAMINOPHEN 500 MG
500 TABLET ORAL AS NEEDED
Status: CANCELLED
Start: 2021-03-10

## 2021-02-17 RX ADMIN — IMMUNE GLOBULIN (HUMAN) 40 G: 10 INJECTION INTRAVENOUS; SUBCUTANEOUS at 08:38

## 2021-02-17 RX ADMIN — ROMOSOZUMAB-AQQG 210 MG: 105 INJECTION, SOLUTION SUBCUTANEOUS at 09:48

## 2021-02-17 NOTE — NURSING NOTE
Pt stated she took her premedications, Tylenol and Benadryl at home prior to her arrival for her infusion.

## 2021-03-03 RX ORDER — LEVOTHYROXINE SODIUM 112 MCG
112 TABLET ORAL DAILY
Qty: 30 TABLET | Refills: 11 | Status: SHIPPED | OUTPATIENT
Start: 2021-03-03 | End: 2022-01-05 | Stop reason: SDUPTHER

## 2021-03-16 ENCOUNTER — BULK ORDERING (OUTPATIENT)
Dept: CASE MANAGEMENT | Facility: OTHER | Age: 66
End: 2021-03-16

## 2021-03-16 DIAGNOSIS — Z23 IMMUNIZATION DUE: ICD-10-CM

## 2021-03-17 ENCOUNTER — INFUSION (OUTPATIENT)
Dept: ONCOLOGY | Facility: HOSPITAL | Age: 66
End: 2021-03-17

## 2021-03-17 VITALS
HEIGHT: 65 IN | OXYGEN SATURATION: 100 % | TEMPERATURE: 97.1 F | DIASTOLIC BLOOD PRESSURE: 81 MMHG | WEIGHT: 186 LBS | SYSTOLIC BLOOD PRESSURE: 121 MMHG | HEART RATE: 72 BPM | BODY MASS INDEX: 30.99 KG/M2 | RESPIRATION RATE: 18 BRPM

## 2021-03-17 DIAGNOSIS — D75.89 MACROCYTOSIS WITHOUT ANEMIA: ICD-10-CM

## 2021-03-17 DIAGNOSIS — M81.0 OSTEOPOROSIS, UNSPECIFIED OSTEOPOROSIS TYPE, UNSPECIFIED PATHOLOGICAL FRACTURE PRESENCE: ICD-10-CM

## 2021-03-17 DIAGNOSIS — D83.9 COMMON VARIABLE IMMUNODEFICIENCY (HCC): Primary | ICD-10-CM

## 2021-03-17 LAB
ALBUMIN SERPL-MCNC: 4.1 G/DL (ref 3.5–5.2)
ALBUMIN/GLOB SERPL: 1.3 G/DL
ALP SERPL-CCNC: 104 U/L (ref 39–117)
ALT SERPL W P-5'-P-CCNC: 14 U/L (ref 1–33)
ANION GAP SERPL CALCULATED.3IONS-SCNC: 9.1 MMOL/L (ref 5–15)
AST SERPL-CCNC: 17 U/L (ref 1–32)
BILIRUB SERPL-MCNC: 0.6 MG/DL (ref 0–1.2)
BUN SERPL-MCNC: 11 MG/DL (ref 8–23)
BUN/CREAT SERPL: 10.9 (ref 7–25)
CALCIUM SPEC-SCNC: 9.8 MG/DL (ref 8.6–10.5)
CHLORIDE SERPL-SCNC: 112 MMOL/L (ref 98–107)
CO2 SERPL-SCNC: 21.9 MMOL/L (ref 22–29)
CREAT SERPL-MCNC: 1.01 MG/DL (ref 0.57–1)
GFR SERPL CREATININE-BSD FRML MDRD: 55 ML/MIN/1.73
GLOBULIN UR ELPH-MCNC: 3.1 GM/DL
GLUCOSE SERPL-MCNC: 102 MG/DL (ref 65–99)
MAGNESIUM SERPL-MCNC: 2 MG/DL (ref 1.6–2.4)
PHOSPHATE SERPL-MCNC: 3.3 MG/DL (ref 2.5–4.5)
POTASSIUM SERPL-SCNC: 4.4 MMOL/L (ref 3.5–5.2)
PROT SERPL-MCNC: 7.2 G/DL (ref 6–8.5)
SODIUM SERPL-SCNC: 143 MMOL/L (ref 136–145)

## 2021-03-17 PROCEDURE — 84100 ASSAY OF PHOSPHORUS: CPT | Performed by: INTERNAL MEDICINE

## 2021-03-17 PROCEDURE — 96372 THER/PROPH/DIAG INJ SC/IM: CPT

## 2021-03-17 PROCEDURE — 83735 ASSAY OF MAGNESIUM: CPT | Performed by: INTERNAL MEDICINE

## 2021-03-17 PROCEDURE — 96365 THER/PROPH/DIAG IV INF INIT: CPT

## 2021-03-17 PROCEDURE — 25010000002 ROMOSOZUMAB-AQQG 105 MG/1.17ML SOLUTION PREFILLED SYRINGE: Performed by: INTERNAL MEDICINE

## 2021-03-17 PROCEDURE — 25010000002 IMMUNE GLOBULIN (HUMAN) 20 GM/200ML SOLUTION: Performed by: ALLERGY & IMMUNOLOGY

## 2021-03-17 PROCEDURE — 96366 THER/PROPH/DIAG IV INF ADDON: CPT

## 2021-03-17 PROCEDURE — 80053 COMPREHEN METABOLIC PANEL: CPT | Performed by: INTERNAL MEDICINE

## 2021-03-17 RX ORDER — ACETAMINOPHEN 500 MG
500 TABLET ORAL AS NEEDED
Status: CANCELLED
Start: 2021-04-07

## 2021-03-17 RX ORDER — EPINEPHRINE 0.3 MG/.3ML
0.3 INJECTION SUBCUTANEOUS ONCE AS NEEDED
Status: CANCELLED
Start: 2021-04-07

## 2021-03-17 RX ORDER — DIPHENHYDRAMINE HYDROCHLORIDE 50 MG/ML
50 INJECTION INTRAMUSCULAR; INTRAVENOUS ONCE AS NEEDED
Status: CANCELLED
Start: 2021-04-07

## 2021-03-17 RX ORDER — ACETAMINOPHEN 325 MG/1
650 TABLET ORAL ONCE
Status: CANCELLED
Start: 2021-04-07 | End: 2021-04-07

## 2021-03-17 RX ORDER — DIPHENHYDRAMINE HCL 25 MG
25 CAPSULE ORAL ONCE
Status: CANCELLED
Start: 2021-04-07 | End: 2021-04-07

## 2021-03-17 RX ADMIN — ROMOSOZUMAB-AQQG 210 MG: 105 INJECTION, SOLUTION SUBCUTANEOUS at 10:19

## 2021-03-17 RX ADMIN — IMMUNE GLOBULIN (HUMAN) 40 G: 10 INJECTION INTRAVENOUS; SUBCUTANEOUS at 08:38

## 2021-04-09 ENCOUNTER — TELEPHONE (OUTPATIENT)
Dept: INTERNAL MEDICINE | Facility: CLINIC | Age: 66
End: 2021-04-09

## 2021-04-09 DIAGNOSIS — M81.8 OTHER OSTEOPOROSIS, UNSPECIFIED PATHOLOGICAL FRACTURE PRESENCE: Primary | ICD-10-CM

## 2021-04-09 NOTE — TELEPHONE ENCOUNTER
----- Message from Cristine Cuba RPH sent at 4/9/2021  8:57 AM EDT -----  Regarding: Lab orders for Evenity on 4/14/21  This patient is scheduled to receive Evenity at the [facility] infusion suite on 4/14/21. We are able to obtain labs at our office prior to the injection if desired. Please enter an order in EPIC for desired labs (CMP, Magnesium, phosphorus) if you would like for them to be obtained here.    Thank you,   Cristine Cuba RPH  [phone]

## 2021-04-14 ENCOUNTER — INFUSION (OUTPATIENT)
Dept: ONCOLOGY | Facility: HOSPITAL | Age: 66
End: 2021-04-14

## 2021-04-14 VITALS
BODY MASS INDEX: 30.96 KG/M2 | SYSTOLIC BLOOD PRESSURE: 116 MMHG | OXYGEN SATURATION: 96 % | RESPIRATION RATE: 16 BRPM | TEMPERATURE: 96.9 F | HEIGHT: 65 IN | DIASTOLIC BLOOD PRESSURE: 73 MMHG | WEIGHT: 185.8 LBS | HEART RATE: 82 BPM

## 2021-04-14 DIAGNOSIS — D83.9 COMMON VARIABLE IMMUNODEFICIENCY (HCC): ICD-10-CM

## 2021-04-14 DIAGNOSIS — M81.0 OSTEOPOROSIS, UNSPECIFIED OSTEOPOROSIS TYPE, UNSPECIFIED PATHOLOGICAL FRACTURE PRESENCE: ICD-10-CM

## 2021-04-14 DIAGNOSIS — M81.8 OTHER OSTEOPOROSIS, UNSPECIFIED PATHOLOGICAL FRACTURE PRESENCE: Primary | ICD-10-CM

## 2021-04-14 LAB
ALBUMIN SERPL-MCNC: 3.9 G/DL (ref 3.5–5.2)
ALBUMIN/GLOB SERPL: 1.2 G/DL
ALP SERPL-CCNC: 100 U/L (ref 39–117)
ALT SERPL W P-5'-P-CCNC: 12 U/L (ref 1–33)
ANION GAP SERPL CALCULATED.3IONS-SCNC: 11.2 MMOL/L (ref 5–15)
AST SERPL-CCNC: 17 U/L (ref 1–32)
BILIRUB SERPL-MCNC: 0.4 MG/DL (ref 0–1.2)
BUN SERPL-MCNC: 13 MG/DL (ref 8–23)
BUN/CREAT SERPL: 12.9 (ref 7–25)
CALCIUM SPEC-SCNC: 9.2 MG/DL (ref 8.6–10.5)
CHLORIDE SERPL-SCNC: 112 MMOL/L (ref 98–107)
CO2 SERPL-SCNC: 16.8 MMOL/L (ref 22–29)
CREAT SERPL-MCNC: 1.01 MG/DL (ref 0.57–1)
GFR SERPL CREATININE-BSD FRML MDRD: 55 ML/MIN/1.73
GLOBULIN UR ELPH-MCNC: 3.2 GM/DL
GLUCOSE SERPL-MCNC: 113 MG/DL (ref 65–99)
MAGNESIUM SERPL-MCNC: 2 MG/DL (ref 1.6–2.4)
PHOSPHATE SERPL-MCNC: 3.1 MG/DL (ref 2.5–4.5)
POTASSIUM SERPL-SCNC: 4.3 MMOL/L (ref 3.5–5.2)
PROT SERPL-MCNC: 7.1 G/DL (ref 6–8.5)
SODIUM SERPL-SCNC: 140 MMOL/L (ref 136–145)

## 2021-04-14 PROCEDURE — 25010000002 ROMOSOZUMAB-AQQG 105 MG/1.17ML SOLUTION PREFILLED SYRINGE: Performed by: INTERNAL MEDICINE

## 2021-04-14 PROCEDURE — 80053 COMPREHEN METABOLIC PANEL: CPT | Performed by: INTERNAL MEDICINE

## 2021-04-14 PROCEDURE — 96372 THER/PROPH/DIAG INJ SC/IM: CPT

## 2021-04-14 PROCEDURE — 25010000002 IMMUNE GLOBULIN (HUMAN) 20 GM/200ML SOLUTION: Performed by: ALLERGY & IMMUNOLOGY

## 2021-04-14 PROCEDURE — 83735 ASSAY OF MAGNESIUM: CPT | Performed by: INTERNAL MEDICINE

## 2021-04-14 PROCEDURE — 96365 THER/PROPH/DIAG IV INF INIT: CPT

## 2021-04-14 PROCEDURE — 96366 THER/PROPH/DIAG IV INF ADDON: CPT

## 2021-04-14 PROCEDURE — 84100 ASSAY OF PHOSPHORUS: CPT | Performed by: INTERNAL MEDICINE

## 2021-04-14 RX ORDER — DIPHENHYDRAMINE HYDROCHLORIDE 50 MG/ML
50 INJECTION INTRAMUSCULAR; INTRAVENOUS ONCE AS NEEDED
Status: CANCELLED
Start: 2021-05-05

## 2021-04-14 RX ORDER — ACETAMINOPHEN 325 MG/1
650 TABLET ORAL ONCE
Status: CANCELLED
Start: 2021-05-05 | End: 2021-05-05

## 2021-04-14 RX ORDER — DIPHENHYDRAMINE HCL 25 MG
25 CAPSULE ORAL ONCE
Status: CANCELLED
Start: 2021-05-05 | End: 2021-05-05

## 2021-04-14 RX ORDER — EPINEPHRINE 0.3 MG/.3ML
0.3 INJECTION SUBCUTANEOUS ONCE AS NEEDED
Status: CANCELLED
Start: 2021-05-05

## 2021-04-14 RX ORDER — ACETAMINOPHEN 500 MG
500 TABLET ORAL AS NEEDED
Status: CANCELLED
Start: 2021-05-05

## 2021-04-14 RX ADMIN — IMMUNE GLOBULIN (HUMAN) 40 G: 10 INJECTION INTRAVENOUS; SUBCUTANEOUS at 08:55

## 2021-04-14 RX ADMIN — ROMOSOZUMAB-AQQG 210 MG: 105 INJECTION, SOLUTION SUBCUTANEOUS at 11:28

## 2021-04-14 NOTE — NURSING NOTE
Patient states she took her own Tylenol 1000 mg PO x 1 and Benadryl 25 mg PO x 1 for her premedications for today's treatment.

## 2021-04-19 ENCOUNTER — TELEPHONE (OUTPATIENT)
Dept: GASTROENTEROLOGY | Facility: CLINIC | Age: 66
End: 2021-04-19

## 2021-04-20 ENCOUNTER — TRANSCRIBE ORDERS (OUTPATIENT)
Dept: LAB | Facility: SURGERY CENTER | Age: 66
End: 2021-04-20

## 2021-04-20 ENCOUNTER — PREP FOR SURGERY (OUTPATIENT)
Dept: SURGERY | Facility: SURGERY CENTER | Age: 66
End: 2021-04-20

## 2021-04-20 DIAGNOSIS — Z01.818 OTHER SPECIFIED PRE-OPERATIVE EXAMINATION: Primary | ICD-10-CM

## 2021-04-20 DIAGNOSIS — Z12.11 ENCOUNTER FOR SCREENING FOR MALIGNANT NEOPLASM OF COLON: Primary | ICD-10-CM

## 2021-04-20 DIAGNOSIS — Z86.010 PERSONAL HISTORY OF COLONIC POLYPS: ICD-10-CM

## 2021-04-20 DIAGNOSIS — Z80.0 FAMILY HISTORY OF COLON CANCER: ICD-10-CM

## 2021-04-20 PROBLEM — Z86.0100 PERSONAL HISTORY OF COLONIC POLYPS: Status: ACTIVE | Noted: 2021-04-20

## 2021-05-07 ENCOUNTER — TELEPHONE (OUTPATIENT)
Dept: INTERNAL MEDICINE | Facility: CLINIC | Age: 66
End: 2021-05-07

## 2021-05-07 NOTE — TELEPHONE ENCOUNTER
----- Message from Randi Yang MD sent at 5/7/2021 12:41 PM EDT -----  I would only recommend for 12 months.    Judie, can you call and let her know she is done with Evenity.      SLW  ----- Message -----  From: Pricilla Taylor, [facility]  Sent: 5/7/2021  12:28 PM EDT  To: MD Dr. Celia Caraballo,   This patient has completed 12 doses of Evenity. She is currently on our scheduled 5/12 to receive Evenity. If osteoporosis therapy remains warranted, continued therapy with an anti-resorptive agent should be considered. Please evaluate and advise at your convenience.   Thank you!  Pricilla Taylor [facility]  [phone]

## 2021-05-12 ENCOUNTER — INFUSION (OUTPATIENT)
Dept: ONCOLOGY | Facility: HOSPITAL | Age: 66
End: 2021-05-12

## 2021-05-12 VITALS
OXYGEN SATURATION: 97 % | SYSTOLIC BLOOD PRESSURE: 117 MMHG | DIASTOLIC BLOOD PRESSURE: 67 MMHG | BODY MASS INDEX: 30.92 KG/M2 | RESPIRATION RATE: 18 BRPM | WEIGHT: 185.6 LBS | TEMPERATURE: 97.3 F | HEIGHT: 65 IN | HEART RATE: 82 BPM

## 2021-05-12 DIAGNOSIS — D83.9 COMMON VARIABLE IMMUNODEFICIENCY (HCC): Primary | ICD-10-CM

## 2021-05-12 PROCEDURE — 25010000002 IMMUNE GLOBULIN (HUMAN) 20 GM/200ML SOLUTION: Performed by: ALLERGY & IMMUNOLOGY

## 2021-05-12 PROCEDURE — 96366 THER/PROPH/DIAG IV INF ADDON: CPT

## 2021-05-12 PROCEDURE — 96365 THER/PROPH/DIAG IV INF INIT: CPT

## 2021-05-12 RX ORDER — ACETAMINOPHEN 325 MG/1
650 TABLET ORAL ONCE
Status: CANCELLED
Start: 2021-06-02 | End: 2021-06-02

## 2021-05-12 RX ORDER — ACETAMINOPHEN 500 MG
500 TABLET ORAL AS NEEDED
Status: CANCELLED
Start: 2021-06-02

## 2021-05-12 RX ORDER — DIPHENHYDRAMINE HCL 25 MG
25 CAPSULE ORAL ONCE
Status: CANCELLED
Start: 2021-06-02 | End: 2021-06-02

## 2021-05-12 RX ORDER — DIPHENHYDRAMINE HYDROCHLORIDE 50 MG/ML
50 INJECTION INTRAMUSCULAR; INTRAVENOUS ONCE AS NEEDED
Status: CANCELLED
Start: 2021-06-02

## 2021-05-12 RX ORDER — EPINEPHRINE 0.3 MG/.3ML
0.3 INJECTION SUBCUTANEOUS ONCE AS NEEDED
Status: CANCELLED
Start: 2021-06-02

## 2021-05-12 RX ADMIN — IMMUNE GLOBULIN (HUMAN) 40 G: 10 INJECTION INTRAVENOUS; SUBCUTANEOUS at 08:48

## 2021-05-20 DIAGNOSIS — E55.9 VITAMIN D DEFICIENCY: ICD-10-CM

## 2021-05-20 DIAGNOSIS — E03.9 ACQUIRED HYPOTHYROIDISM: ICD-10-CM

## 2021-05-20 DIAGNOSIS — E53.8 VITAMIN B12 DEFICIENCY: ICD-10-CM

## 2021-05-20 DIAGNOSIS — E78.2 MIXED HYPERLIPIDEMIA: Primary | ICD-10-CM

## 2021-05-20 RX ORDER — CYANOCOBALAMIN 1000 UG/ML
INJECTION, SOLUTION INTRAMUSCULAR; SUBCUTANEOUS
Qty: 1 ML | Refills: 11 | Status: SHIPPED | OUTPATIENT
Start: 2021-05-20 | End: 2022-04-12 | Stop reason: SDUPTHER

## 2021-05-21 LAB
25(OH)D3+25(OH)D2 SERPL-MCNC: 59.8 NG/ML (ref 30–100)
ALBUMIN SERPL-MCNC: 4.3 G/DL (ref 3.5–5.2)
ALBUMIN/GLOB SERPL: 1.4 G/DL
ALP SERPL-CCNC: 95 U/L (ref 39–117)
ALT SERPL-CCNC: 12 U/L (ref 1–33)
APPEARANCE UR: ABNORMAL
AST SERPL-CCNC: 16 U/L (ref 1–32)
BACTERIA #/AREA URNS HPF: NORMAL /HPF
BASOPHILS # BLD AUTO: 0.02 10*3/MM3 (ref 0–0.2)
BASOPHILS NFR BLD AUTO: 0.4 % (ref 0–1.5)
BILIRUB SERPL-MCNC: 0.4 MG/DL (ref 0–1.2)
BILIRUB UR QL STRIP: NEGATIVE
BUN SERPL-MCNC: 13 MG/DL (ref 8–23)
BUN/CREAT SERPL: 12.9 (ref 7–25)
CALCIUM SERPL-MCNC: 9.8 MG/DL (ref 8.6–10.5)
CHLORIDE SERPL-SCNC: 111 MMOL/L (ref 98–107)
CHOLEST SERPL-MCNC: 206 MG/DL (ref 0–200)
CO2 SERPL-SCNC: 22.5 MMOL/L (ref 22–29)
COLOR UR: YELLOW
CREAT SERPL-MCNC: 1.01 MG/DL (ref 0.57–1)
EOSINOPHIL # BLD AUTO: 0.05 10*3/MM3 (ref 0–0.4)
EOSINOPHIL NFR BLD AUTO: 1.1 % (ref 0.3–6.2)
EPI CELLS #/AREA URNS HPF: NORMAL /HPF
ERYTHROCYTE [DISTWIDTH] IN BLOOD BY AUTOMATED COUNT: 13 % (ref 12.3–15.4)
GLOBULIN SER CALC-MCNC: 3 GM/DL
GLUCOSE SERPL-MCNC: 105 MG/DL (ref 65–99)
GLUCOSE UR QL: NEGATIVE
HCT VFR BLD AUTO: 46 % (ref 34–46.6)
HDLC SERPL-MCNC: 75 MG/DL (ref 40–60)
HGB BLD-MCNC: 15.4 G/DL (ref 12–15.9)
HGB UR QL STRIP: NEGATIVE
IMM GRANULOCYTES # BLD AUTO: 0.01 10*3/MM3 (ref 0–0.05)
IMM GRANULOCYTES NFR BLD AUTO: 0.2 % (ref 0–0.5)
KETONES UR QL STRIP: NEGATIVE
LDLC SERPL CALC-MCNC: 110 MG/DL (ref 0–100)
LEUKOCYTE ESTERASE UR QL STRIP: NEGATIVE
LYMPHOCYTES # BLD AUTO: 0.29 10*3/MM3 (ref 0.7–3.1)
LYMPHOCYTES NFR BLD AUTO: 6.3 % (ref 19.6–45.3)
MCH RBC QN AUTO: 33.6 PG (ref 26.6–33)
MCHC RBC AUTO-ENTMCNC: 33.5 G/DL (ref 31.5–35.7)
MCV RBC AUTO: 100.4 FL (ref 79–97)
MONOCYTES # BLD AUTO: 0.27 10*3/MM3 (ref 0.1–0.9)
MONOCYTES NFR BLD AUTO: 5.9 % (ref 5–12)
NEUTROPHILS # BLD AUTO: 3.94 10*3/MM3 (ref 1.7–7)
NEUTROPHILS NFR BLD AUTO: 86.1 % (ref 42.7–76)
NITRITE UR QL STRIP: NEGATIVE
NRBC BLD AUTO-RTO: 0 /100 WBC (ref 0–0.2)
PH UR STRIP: 7 [PH] (ref 5–8)
PLATELET # BLD AUTO: 248 10*3/MM3 (ref 140–450)
POTASSIUM SERPL-SCNC: 4.6 MMOL/L (ref 3.5–5.2)
PROT SERPL-MCNC: 7.3 G/DL (ref 6–8.5)
PROT UR QL STRIP: NEGATIVE
RBC # BLD AUTO: 4.58 10*6/MM3 (ref 3.77–5.28)
RBC #/AREA URNS HPF: NORMAL /HPF
SODIUM SERPL-SCNC: 144 MMOL/L (ref 136–145)
SP GR UR: 1.02 (ref 1–1.03)
T4 FREE SERPL-MCNC: 1.04 NG/DL (ref 0.93–1.7)
TRIGL SERPL-MCNC: 118 MG/DL (ref 0–150)
TSH SERPL DL<=0.005 MIU/L-ACNC: 0.25 UIU/ML (ref 0.27–4.2)
UROBILINOGEN UR STRIP-MCNC: ABNORMAL MG/DL
VIT B12 SERPL-MCNC: 593 PG/ML (ref 211–946)
VLDLC SERPL CALC-MCNC: 21 MG/DL (ref 5–40)
WBC # BLD AUTO: 4.58 10*3/MM3 (ref 3.4–10.8)
WBC #/AREA URNS HPF: NORMAL /HPF

## 2021-05-24 ENCOUNTER — TELEPHONE (OUTPATIENT)
Dept: INTERNAL MEDICINE | Facility: CLINIC | Age: 66
End: 2021-05-24

## 2021-05-24 DIAGNOSIS — Z11.59 SCREENING FOR VIRAL DISEASE: Primary | ICD-10-CM

## 2021-05-24 NOTE — TELEPHONE ENCOUNTER
Caller: Dara Medina    Relationship: Self    Best call back number: [phone]  What orders are you requesting (i.e. lab or imaging): COVID ANTIBODIES TEST    In what timeframe would the patient need to come in: ASAP    Where will you receive your lab/imaging services: IN OFFICE    Additional notes: PATIENT STATED THAT SHE HAD AN ANTIBODIES TEST AND IT WAS NEGATIVE BUT HER RHEUMATOLOGIST STATED THAT HER MEDICATION COULD'VE CAUSED IT TO BE NEGATIVE. PATIENT WOULD LIKE TO BE RETESTED ASAP.

## 2021-05-26 ENCOUNTER — OFFICE VISIT (OUTPATIENT)
Dept: INTERNAL MEDICINE | Facility: CLINIC | Age: 66
End: 2021-05-26

## 2021-05-26 VITALS
WEIGHT: 185 LBS | SYSTOLIC BLOOD PRESSURE: 112 MMHG | HEIGHT: 65 IN | BODY MASS INDEX: 30.82 KG/M2 | OXYGEN SATURATION: 98 % | HEART RATE: 87 BPM | DIASTOLIC BLOOD PRESSURE: 80 MMHG

## 2021-05-26 DIAGNOSIS — M81.8 OTHER OSTEOPOROSIS, UNSPECIFIED PATHOLOGICAL FRACTURE PRESENCE: ICD-10-CM

## 2021-05-26 DIAGNOSIS — Z00.00 MEDICARE ANNUAL WELLNESS VISIT, SUBSEQUENT: Primary | ICD-10-CM

## 2021-05-26 DIAGNOSIS — Z12.31 ENCOUNTER FOR SCREENING MAMMOGRAM FOR BREAST CANCER: ICD-10-CM

## 2021-05-26 DIAGNOSIS — E78.2 MIXED HYPERLIPIDEMIA: ICD-10-CM

## 2021-05-26 DIAGNOSIS — D83.9 COMMON VARIABLE IMMUNODEFICIENCY (HCC): ICD-10-CM

## 2021-05-26 DIAGNOSIS — E03.9 ACQUIRED HYPOTHYROIDISM: ICD-10-CM

## 2021-05-26 DIAGNOSIS — F32.A CHRONIC DEPRESSION: ICD-10-CM

## 2021-05-26 DIAGNOSIS — J84.10 PULMONARY FIBROSIS (HCC): ICD-10-CM

## 2021-05-26 DIAGNOSIS — M06.9 RHEUMATOID ARTHRITIS INVOLVING MULTIPLE SITES, UNSPECIFIED WHETHER RHEUMATOID FACTOR PRESENT (HCC): ICD-10-CM

## 2021-05-26 PROBLEM — R20.0 LEFT ARM NUMBNESS: Status: RESOLVED | Noted: 2019-08-15 | Resolved: 2021-05-26

## 2021-05-26 PROBLEM — G44.209 TENSION HEADACHE: Status: RESOLVED | Noted: 2018-09-27 | Resolved: 2021-05-26

## 2021-05-26 PROBLEM — M54.6 PAIN IN THORACIC SPINE: Status: RESOLVED | Noted: 2019-08-15 | Resolved: 2021-05-26

## 2021-05-26 PROBLEM — R79.0 LOW SERUM COPPER FOR AGE: Status: RESOLVED | Noted: 2019-08-30 | Resolved: 2021-05-26

## 2021-05-26 PROBLEM — Z12.11 ENCOUNTER FOR SCREENING FOR MALIGNANT NEOPLASM OF COLON: Status: RESOLVED | Noted: 2021-04-20 | Resolved: 2021-05-26

## 2021-05-26 PROBLEM — K20.90 ESOPHAGITIS: Status: RESOLVED | Noted: 2019-06-18 | Resolved: 2021-05-26

## 2021-05-26 PROBLEM — R20.0 LT FACIAL NUMBNESS: Status: RESOLVED | Noted: 2018-03-08 | Resolved: 2021-05-26

## 2021-05-26 PROCEDURE — G0439 PPPS, SUBSEQ VISIT: HCPCS | Performed by: INTERNAL MEDICINE

## 2021-05-26 PROCEDURE — 99214 OFFICE O/P EST MOD 30 MIN: CPT | Performed by: INTERNAL MEDICINE

## 2021-05-26 NOTE — PATIENT INSTRUCTIONS
Medicare Wellness  Personal Prevention Plan of Service     Date of Office Visit:  2021  Encounter Provider:  Randi Yang MD  Place of Service:  [facility]  Patient Name: Dara Medina  :  1955    As part of the Medicare Wellness portion of your visit today, we are providing you with this personalized preventive plan of services (PPPS). This plan is based upon recommendations of the [place] Preventive Services Task Force (USPSTF) and the Advisory Committee on Immunization Practices (ACIP).    This lists the preventive care services that should be considered, and provides dates of when you are due. Items listed as completed are up-to-date and do not require any further intervention.    Health Maintenance   Topic Date Due   • ANNUAL WELLNESS VISIT  Never done   • MAMMOGRAM  2021   • COVID-19 Vaccine (2 - Pfizer 2-dose series) 2021   • INFLUENZA VACCINE  2021   • COLORECTAL CANCER SCREENING  2021   • DXA SCAN  2021   • LIPID PANEL  2022   • Pneumococcal Vaccine 65+ (2 of 2 - PPSV23) 10/02/2022   • TDAP/TD VACCINES (2 - Td or Tdap) 10/23/2027   • HEPATITIS C SCREENING  Completed   • ZOSTER VACCINE  Addressed   • PAP SMEAR  Discontinued       No orders of the defined types were placed in this encounter.      No follow-ups on file.

## 2021-05-26 NOTE — PROGRESS NOTES
The ABCs of the Annual Wellness Visit  Subsequent Medicare Wellness Visit    Chief Complaint   Patient presents with   • Medicare Wellness-subsequent       Subjective   History of Present Illness:  Dara Medina is a 66 y.o. female who presents for a Subsequent Medicare Wellness Visit.    The following data was reviewed by: Randi Yang MD on 05/26/2021:  Common labs    Common Labsle 3/17/21 4/14/21 5/20/21 5/20/21 5/20/21      0914 0914 0914   Glucose 102 (A) 113 (A)      Glucose    105 (A)    BUN 11 13  13    Creatinine 1.01 (A) 1.01 (A)  1.01 (A)    eGFR Non African Am 55 (A) 55 (A)  55 (A)    eGFR African Am    66    Sodium 143 140  144    Potassium 4.4 4.3  4.6    Chloride 112 (A) 112 (A)  111 (A)    Calcium 9.8 9.2  9.8    Total Protein    7.3    Albumin 4.10 3.90  4.30    Total Bilirubin 0.6 0.4  0.4    Alkaline Phosphatase 104 100  95    AST (SGOT) 17 17  16    ALT (SGPT) 14 12  12    WBC   4.58     Hemoglobin   15.4     Hematocrit   46.0     Platelets   248     Total Cholesterol     206 (A)   Triglycerides     118   HDL Cholesterol     75 (A)   LDL Cholesterol      110 (A)   (A) Abnormal value       Comments are available for some flowsheets but are not being displayed.           HLD.  Fair control on lipitor.    Hypothyroidism.  Good control of TSH on levothyroxine.  Depression.  Feels well on Wellbutrin.    OP.  She has completed Evenity.  Check Dexa in fall.  Consider Prolia.    PF.  No issues with breathing.  Coming off Imuran.   CVIG.  On monthly shots without issue.  RA.  No active issues.  In remission.  Followed by rheum.           HEALTH RISK ASSESSMENT    Recent Hospitalizations:  No hospitalization(s) within the last year.    Current Medical Providers:  Patient Care Team:  Randi Yang MD as PCP - General (Internal Medicine)  Howard Contreras MD as Consulting Physician (Rheumatology)  Florentin Camara MD (Pulmonary Disease)  Ivory Noel MD PhD as Consulting Physician (Hematology and  Oncology)  Randi Yang MD as Referring Physician (Internal Medicine)  Isael Chowdhury MD as Consulting Physician (Allergy)    Smoking Status:  Social History     Tobacco Use   Smoking Status Former Smoker   • Packs/day: 0.50   • Years: 5.00   • Pack years: 2.50   • Types: Cigarettes   • Start date: 1974   • Quit date: 1978   • Years since quittin.1   Smokeless Tobacco Never Used   Tobacco Comment    QUIT AGE 23       Alcohol Consumption:  Social History     Substance and Sexual Activity   Alcohol Use No    Comment: STOPPED        Depression Screen:   PHQ-2/PHQ-9 Depression Screening 2021   Little interest or pleasure in doing things 0   Feeling down, depressed, or hopeless 0   Total Score 0       Fall Risk Screen:  SAMINA Fall Risk Assessment was completed, and patient is at LOW risk for falls.Assessment completed on:2021    Health Habits and Functional and Cognitive Screening:  Functional & Cognitive Status 2021   Do you have difficulty preparing food and eating? No   Do you have difficulty bathing yourself, getting dressed or grooming yourself? No   Do you have difficulty using the toilet? No   Do you have difficulty moving around from place to place? No   Do you have trouble with steps or getting out of a bed or a chair? No   Exercise (times per week) 3 times per week   Current Exercise Activities Include Walking   Do you need help using the phone?  No   Are you deaf or do you have serious difficulty hearing?  No   Do you need help with transportation? No   Do you need help shopping? No   Do you need help preparing meals?  No   Do you need help with housework?  No   Do you need help with laundry? No   Do you need help taking your medications? No   Do you need help managing money? No   Do you ever drive or ride in a car without wearing a seat belt? No   Have you felt unusual stress, anger or loneliness in the last month? No   Who do you live with? Spouse   If you need help,  do you have trouble finding someone available to you? No   Have you been bothered in the last four weeks by sexual problems? No   Do you have difficulty concentrating, remembering or making decisions? No         Does the patient have evidence of cognitive impairment? No    Asprin use counseling:Does not need ASA (and currently is not on it)    Age-appropriate Screening Schedule:  Refer to the list below for future screening recommendations based on patient's age, sex and/or medical conditions. Orders for these recommended tests are listed in the plan section. The patient has been provided with a written plan.    Health Maintenance   Topic Date Due   • MAMMOGRAM  01/02/2021   • INFLUENZA VACCINE  08/01/2021   • DXA SCAN  11/27/2021   • LIPID PANEL  05/20/2022   • TDAP/TD VACCINES (2 - Td or Tdap) 10/23/2027   • ZOSTER VACCINE  Addressed   • PAP SMEAR  Discontinued          The following portions of the patient's history were reviewed and updated as appropriate: allergies, current medications, past family history, past medical history, past social history, past surgical history and problem list.    Outpatient Medications Prior to Visit   Medication Sig Dispense Refill   • atorvastatin (LIPITOR) 20 MG tablet TAKE 1 TABLET BY MOUTH DAILY. 90 tablet 3   • B Complex-C (B COMPLEX-B12-C IJ) Inject 1,000 mcg under the skin into the appropriate area as directed Every 30 (Thirty) Days.     • buPROPion XL (Wellbutrin XL) 300 MG 24 hr tablet Take 1 tablet by mouth Daily. 90 tablet 3   • Cholecalciferol (VITAMIN D3) 5000 UNITS capsule capsule Take 5,000 Units by mouth 2 (Two) Times a Day.     • COPPER CAPS 2 MG capsule Take 4 mg by mouth 2 (Two) Times a Day. (Patient taking differently: Take 6 mg by mouth 2 (Two) Times a Day.) 360 capsule 3   • cyanocobalamin 1000 MCG/ML injection INJECT 1,000 MCG AS DIRECTED EVERY 30 (THIRTY) DAYS. 1 mL 11   • cyclobenzaprine (FLEXERIL) 10 MG tablet      • folic acid (FOLVITE) 1 MG tablet Take 1  mg by mouth daily.     • Synthroid 112 MCG tablet TAKE 1 TABLET BY MOUTH DAILY. 30 tablet 11   • topiramate (TOPAMAX) 100 MG tablet Take 1 tablet by mouth 2 (two) times a day. 60 tablet 11   • azaTHIOprine (IMURAN) 50 MG tablet Take 50 mg by mouth 3 (Three) Times a Day.     • omeprazole (priLOSEC) 40 MG capsule TAKE 1 CAPSULE BY MOUTH DAILY. 30 capsule 11     No facility-administered medications prior to visit.       Patient Active Problem List   Diagnosis   • Hypothyroidism   • Sensory neuropathy   • Osteoporosis   • Vitamin D deficiency   • Common variable immunodeficiency (CMS/HCC)   • Pulmonary fibrosis (CMS/HCC)   • Iron deficiency anemia   • Pernicious anemia   • Rheumatoid arthritis (CMS/HCC)   • Tear of medial meniscus of right knee, current   • Mixed hyperlipidemia   • Chronic depression   • Primary osteoarthritis of right knee   • Abnormal finding on MRI of brain   • Adverse effect of iron or its compound, sequela   • Vitamin B12 deficiency   • Leukemoid reaction   • Macrocytosis without anemia   • Acquired lymphocytopenia   • Chronic idiopathic constipation   • Chronic copper deficiency   • Obesity due to excess calories without serious comorbidity   • IgG monoclonal gammopathy of uncertain significance   • Rheumatoid lung disease (CMS/HCC)   • Personal history of colonic polyps   • Family history of colon cancer       Advanced Care Planning:  ACP discussion was held with the patient during this visit. Patient has an advance directive in EMR which is still valid.     Review of Systems   Respiratory: Negative.    Cardiovascular: Negative.        Compared to one year ago, the patient feels her physical health is the same.  Compared to one year ago, the patient feels her mental health is the same.    Reviewed chart for potential of high risk medication in the elderly: yes  Reviewed chart for potential of harmful drug interactions in the elderly:yes    Objective         Vitals:    05/26/21 0958   BP: 112/80  "  Pulse: 87   SpO2: 98%   Weight: 83.9 kg (185 lb)   Height: 165.1 cm (65\")   PainSc: 0-No pain       Body mass index is 30.79 kg/m².  Discussed the patient's BMI with her. The BMI is in the acceptable range.    Physical Exam  Constitutional:       Appearance: She is well-developed.   HENT:      Head: Normocephalic and atraumatic.      Right Ear: Hearing, tympanic membrane and external ear normal.      Left Ear: Hearing, tympanic membrane and external ear normal.      Nose: Nose normal.   Neck:      Thyroid: No thyromegaly.   Cardiovascular:      Rate and Rhythm: Normal rate and regular rhythm.      Heart sounds: Normal heart sounds. No murmur heard.     Pulmonary:      Effort: Pulmonary effort is normal.      Breath sounds: Normal breath sounds.   Chest:      Breasts:         Right: No mass.         Left: No mass.   Abdominal:      General: There is no distension.      Palpations: Abdomen is soft.      Tenderness: There is no abdominal tenderness.   Musculoskeletal:      Cervical back: Neck supple.   Lymphadenopathy:      Cervical: No cervical adenopathy.   Skin:     General: Skin is warm and dry.   Neurological:      Mental Status: She is alert and oriented to person, place, and time.   Psychiatric:         Speech: Speech normal.         Behavior: Behavior normal.         Thought Content: Thought content normal.         Judgment: Judgment normal.         Lab Results   Component Value Date     (H) 05/20/2021    CHLPL 206 (H) 05/20/2021    TRIG 118 05/20/2021    HDL 75 (H) 05/20/2021     (H) 05/20/2021    VLDL 21 05/20/2021        Assessment/Plan   Medicare Risks and Personalized Health Plan  CMS Preventative Services Quick Reference  Breast Cancer/Mammogram Screening  Colon Cancer Screening    The above risks/problems have been discussed with the patient.  Pertinent information has been shared with the patient in the After Visit Summary.  Follow up plans and orders are seen below in the " Assessment/Plan Section.    Diagnoses and all orders for this visit:    1. Medicare annual wellness visit, subsequent (Primary)    2. Mixed hyperlipidemia    3. Acquired hypothyroidism    4. Chronic depression    5. Other osteoporosis, unspecified pathological fracture presence    6. Pulmonary fibrosis (CMS/HCC)    7. Common variable immunodeficiency (CMS/[facility])    8. Rheumatoid arthritis involving multiple sites, unspecified whether rheumatoid factor present (CMS/[facility])    9. Encounter for screening mammogram for breast cancer  -     Mammo Screening Digital Tomosynthesis Bilateral With CAD; Future    HLD. The patient is advised to continue current dosage of atorvastatin.  Hypothyroidism.  The patient is advised to continue current dosage of levothyroxine.    MDD.  The patient is advised to continue current dosage of Welbutrin.  OP.  I recommend to get 1200 mg of calcium and 1000 IUs of vitamin D through diet and supplements and to participate in a weight based exercise to prevent loss of bone mineral density. Bone mineral will be monitored every two years.  Consider Prolia since she has done bisphosphoates, Forteo and Evenity.   RA.  In remission.  Continue with rheum.  PF.  Stable.  Continue with pulmonary.    CVIG.  Continue monthly immunoglobulin injections.            Follow Up:  Return in about 6 months (around 11/26/2021) for Recheck.     An After Visit Summary and PPPS were given to the patient.

## 2021-06-09 ENCOUNTER — INFUSION (OUTPATIENT)
Dept: ONCOLOGY | Facility: HOSPITAL | Age: 66
End: 2021-06-09

## 2021-06-09 VITALS
RESPIRATION RATE: 18 BRPM | BODY MASS INDEX: 30.12 KG/M2 | OXYGEN SATURATION: 98 % | DIASTOLIC BLOOD PRESSURE: 68 MMHG | HEART RATE: 75 BPM | WEIGHT: 181 LBS | TEMPERATURE: 96.8 F | SYSTOLIC BLOOD PRESSURE: 101 MMHG

## 2021-06-09 DIAGNOSIS — D83.9 COMMON VARIABLE IMMUNODEFICIENCY (HCC): Primary | ICD-10-CM

## 2021-06-09 PROCEDURE — 96365 THER/PROPH/DIAG IV INF INIT: CPT

## 2021-06-09 PROCEDURE — 25010000002 IMMUNE GLOBULIN (HUMAN) 10 GM/100ML SOLUTION: Performed by: ALLERGY & IMMUNOLOGY

## 2021-06-09 PROCEDURE — 25010000002 IMMUNE GLOBULIN (HUMAN) 5 GM/50ML SOLUTION: Performed by: ALLERGY & IMMUNOLOGY

## 2021-06-09 PROCEDURE — 25010000002 IMMUNE GLOBULIN (HUMAN) 20 GM/200ML SOLUTION: Performed by: ALLERGY & IMMUNOLOGY

## 2021-06-09 PROCEDURE — 96366 THER/PROPH/DIAG IV INF ADDON: CPT

## 2021-06-09 RX ORDER — DIPHENHYDRAMINE HCL 25 MG
25 CAPSULE ORAL ONCE
Status: CANCELLED
Start: 2021-06-30 | End: 2021-06-30

## 2021-06-09 RX ORDER — ACETAMINOPHEN 500 MG
500 TABLET ORAL AS NEEDED
Status: CANCELLED
Start: 2021-06-30

## 2021-06-09 RX ORDER — DIPHENHYDRAMINE HYDROCHLORIDE 50 MG/ML
50 INJECTION INTRAMUSCULAR; INTRAVENOUS ONCE AS NEEDED
Status: CANCELLED
Start: 2021-06-30

## 2021-06-09 RX ORDER — EPINEPHRINE 0.3 MG/.3ML
0.3 INJECTION SUBCUTANEOUS ONCE AS NEEDED
Status: CANCELLED
Start: 2021-06-30

## 2021-06-09 RX ORDER — ACETAMINOPHEN 325 MG/1
650 TABLET ORAL ONCE
Status: CANCELLED
Start: 2021-06-30 | End: 2021-06-30

## 2021-06-09 RX ADMIN — IMMUNE GLOBULIN (HUMAN) 10 G: 10 INJECTION INTRAVENOUS; SUBCUTANEOUS at 08:41

## 2021-06-09 RX ADMIN — IMMUNE GLOBULIN (HUMAN) 10 G: 10 INJECTION INTRAVENOUS; SUBCUTANEOUS at 09:56

## 2021-06-09 RX ADMIN — IMMUNE GLOBULIN (HUMAN) 20 G: 10 INJECTION INTRAVENOUS; SUBCUTANEOUS at 10:36

## 2021-07-07 ENCOUNTER — INFUSION (OUTPATIENT)
Dept: ONCOLOGY | Facility: HOSPITAL | Age: 66
End: 2021-07-07

## 2021-07-07 VITALS
BODY MASS INDEX: 30.59 KG/M2 | OXYGEN SATURATION: 100 % | TEMPERATURE: 97.1 F | HEART RATE: 73 BPM | HEIGHT: 65 IN | SYSTOLIC BLOOD PRESSURE: 113 MMHG | RESPIRATION RATE: 18 BRPM | DIASTOLIC BLOOD PRESSURE: 72 MMHG | WEIGHT: 183.6 LBS

## 2021-07-07 DIAGNOSIS — D83.9 COMMON VARIABLE IMMUNODEFICIENCY (HCC): Primary | ICD-10-CM

## 2021-07-07 PROCEDURE — 25010000002 IMMUNE GLOBULIN (HUMAN) 10 GM/100ML SOLUTION: Performed by: ALLERGY & IMMUNOLOGY

## 2021-07-07 PROCEDURE — 96365 THER/PROPH/DIAG IV INF INIT: CPT

## 2021-07-07 PROCEDURE — 25010000002 IMMUNE GLOBULIN (HUMAN) 20 GM/200ML SOLUTION: Performed by: ALLERGY & IMMUNOLOGY

## 2021-07-07 PROCEDURE — 96366 THER/PROPH/DIAG IV INF ADDON: CPT

## 2021-07-07 RX ORDER — DIPHENHYDRAMINE HYDROCHLORIDE 50 MG/ML
50 INJECTION INTRAMUSCULAR; INTRAVENOUS ONCE AS NEEDED
Status: CANCELLED
Start: 2021-07-28

## 2021-07-07 RX ORDER — DIPHENHYDRAMINE HCL 25 MG
25 CAPSULE ORAL ONCE
Status: CANCELLED
Start: 2021-07-28 | End: 2021-07-28

## 2021-07-07 RX ORDER — ACETAMINOPHEN 500 MG
500 TABLET ORAL AS NEEDED
Status: CANCELLED
Start: 2021-07-28

## 2021-07-07 RX ORDER — ACETAMINOPHEN 325 MG/1
650 TABLET ORAL ONCE
Status: CANCELLED
Start: 2021-07-28 | End: 2021-07-28

## 2021-07-07 RX ORDER — EPINEPHRINE 0.3 MG/.3ML
0.3 INJECTION SUBCUTANEOUS ONCE AS NEEDED
Status: CANCELLED
Start: 2021-07-28

## 2021-07-07 RX ADMIN — IMMUNE GLOBULIN (HUMAN) 20 G: 10 INJECTION INTRAVENOUS; SUBCUTANEOUS at 08:54

## 2021-07-07 RX ADMIN — IMMUNE GLOBULIN (HUMAN) 20 G: 10 INJECTION INTRAVENOUS; SUBCUTANEOUS at 10:56

## 2021-07-07 NOTE — NURSING NOTE
Patient took own supply of Tylenol and benadryl prior to arriving for appointment.    Patient is tolerating IVIG without difficulty. Verbal order per Dr. Isael Chowdhury  to continue IVIG as ordered.

## 2021-07-29 ENCOUNTER — LAB (OUTPATIENT)
Dept: OTHER | Facility: HOSPITAL | Age: 66
End: 2021-07-29

## 2021-07-29 ENCOUNTER — OFFICE VISIT (OUTPATIENT)
Dept: ONCOLOGY | Facility: CLINIC | Age: 66
End: 2021-07-29

## 2021-07-29 VITALS
BODY MASS INDEX: 30.55 KG/M2 | HEART RATE: 91 BPM | OXYGEN SATURATION: 95 % | TEMPERATURE: 97.2 F | SYSTOLIC BLOOD PRESSURE: 143 MMHG | DIASTOLIC BLOOD PRESSURE: 81 MMHG | RESPIRATION RATE: 18 BRPM | HEIGHT: 65 IN

## 2021-07-29 DIAGNOSIS — E53.8 VITAMIN B12 DEFICIENCY: ICD-10-CM

## 2021-07-29 DIAGNOSIS — D50.9 IRON DEFICIENCY ANEMIA, UNSPECIFIED IRON DEFICIENCY ANEMIA TYPE: ICD-10-CM

## 2021-07-29 DIAGNOSIS — E61.0 CHRONIC COPPER DEFICIENCY: ICD-10-CM

## 2021-07-29 DIAGNOSIS — D47.2 IGG MONOCLONAL GAMMOPATHY OF UNCERTAIN SIGNIFICANCE: ICD-10-CM

## 2021-07-29 DIAGNOSIS — D75.89 MACROCYTOSIS WITHOUT ANEMIA: ICD-10-CM

## 2021-07-29 DIAGNOSIS — D47.2 IGG MONOCLONAL GAMMOPATHY OF UNCERTAIN SIGNIFICANCE: Primary | ICD-10-CM

## 2021-07-29 DIAGNOSIS — D50.9 IRON DEFICIENCY ANEMIA, UNSPECIFIED IRON DEFICIENCY ANEMIA TYPE: Primary | ICD-10-CM

## 2021-07-29 LAB
ALBUMIN SERPL-MCNC: 4 G/DL (ref 3.5–5.2)
ALBUMIN/GLOB SERPL: 1.1 G/DL
ALP SERPL-CCNC: 89 U/L (ref 39–117)
ALT SERPL W P-5'-P-CCNC: 13 U/L (ref 1–33)
ANION GAP SERPL CALCULATED.3IONS-SCNC: 7.2 MMOL/L (ref 5–15)
AST SERPL-CCNC: 15 U/L (ref 1–32)
BASOPHILS # BLD AUTO: 0.03 10*3/MM3 (ref 0–0.2)
BASOPHILS NFR BLD AUTO: 0.5 % (ref 0–1.5)
BILIRUB SERPL-MCNC: 0.3 MG/DL (ref 0–1.2)
BUN SERPL-MCNC: 13 MG/DL (ref 8–23)
BUN/CREAT SERPL: 11.4 (ref 7–25)
CALCIUM SPEC-SCNC: 9.5 MG/DL (ref 8.6–10.5)
CHLORIDE SERPL-SCNC: 112 MMOL/L (ref 98–107)
CO2 SERPL-SCNC: 22.8 MMOL/L (ref 22–29)
CREAT SERPL-MCNC: 1.14 MG/DL (ref 0.57–1)
DEPRECATED RDW RBC AUTO: 47.1 FL (ref 37–54)
EOSINOPHIL # BLD AUTO: 0.09 10*3/MM3 (ref 0–0.4)
EOSINOPHIL NFR BLD AUTO: 1.4 % (ref 0.3–6.2)
ERYTHROCYTE [DISTWIDTH] IN BLOOD BY AUTOMATED COUNT: 12.8 % (ref 12.3–15.4)
FERRITIN SERPL-MCNC: 106.6 NG/ML (ref 13–150)
GFR SERPL CREATININE-BSD FRML MDRD: 48 ML/MIN/1.73
GLOBULIN UR ELPH-MCNC: 3.5 GM/DL
GLUCOSE SERPL-MCNC: 79 MG/DL (ref 65–99)
HCT VFR BLD AUTO: 44.6 % (ref 34–46.6)
HGB BLD-MCNC: 14.4 G/DL (ref 12–15.9)
IMM GRANULOCYTES # BLD AUTO: 0.02 10*3/MM3 (ref 0–0.05)
IMM GRANULOCYTES NFR BLD AUTO: 0.3 % (ref 0–0.5)
IRON 24H UR-MRATE: 86 MCG/DL (ref 37–145)
IRON SATN MFR SERPL: 28 % (ref 20–50)
LYMPHOCYTES # BLD AUTO: 0.57 10*3/MM3 (ref 0.7–3.1)
LYMPHOCYTES NFR BLD AUTO: 8.8 % (ref 19.6–45.3)
MCH RBC QN AUTO: 32.1 PG (ref 26.6–33)
MCHC RBC AUTO-ENTMCNC: 32.3 G/DL (ref 31.5–35.7)
MCV RBC AUTO: 99.6 FL (ref 79–97)
MONOCYTES # BLD AUTO: 0.41 10*3/MM3 (ref 0.1–0.9)
MONOCYTES NFR BLD AUTO: 6.3 % (ref 5–12)
NEUTROPHILS NFR BLD AUTO: 5.38 10*3/MM3 (ref 1.7–7)
NEUTROPHILS NFR BLD AUTO: 82.7 % (ref 42.7–76)
NRBC BLD AUTO-RTO: 0 /100 WBC (ref 0–0.2)
PLATELET # BLD AUTO: 277 10*3/MM3 (ref 140–450)
PMV BLD AUTO: 10.6 FL (ref 6–12)
POTASSIUM SERPL-SCNC: 4.4 MMOL/L (ref 3.5–5.2)
PROT SERPL-MCNC: 7.5 G/DL (ref 6–8.5)
RBC # BLD AUTO: 4.48 10*6/MM3 (ref 3.77–5.28)
SODIUM SERPL-SCNC: 142 MMOL/L (ref 136–145)
TIBC SERPL-MCNC: 310 MCG/DL (ref 298–536)
TRANSFERRIN SERPL-MCNC: 208 MG/DL (ref 200–360)
VIT B12 BLD-MCNC: 707 PG/ML (ref 211–946)
WBC # BLD AUTO: 6.5 10*3/MM3 (ref 3.4–10.8)

## 2021-07-29 PROCEDURE — 83883 ASSAY NEPHELOMETRY NOT SPEC: CPT | Performed by: INTERNAL MEDICINE

## 2021-07-29 PROCEDURE — 86334 IMMUNOFIX E-PHORESIS SERUM: CPT | Performed by: INTERNAL MEDICINE

## 2021-07-29 PROCEDURE — 85025 COMPLETE CBC W/AUTO DIFF WBC: CPT | Performed by: INTERNAL MEDICINE

## 2021-07-29 PROCEDURE — 84630 ASSAY OF ZINC: CPT | Performed by: INTERNAL MEDICINE

## 2021-07-29 PROCEDURE — 82728 ASSAY OF FERRITIN: CPT | Performed by: INTERNAL MEDICINE

## 2021-07-29 PROCEDURE — 83540 ASSAY OF IRON: CPT | Performed by: INTERNAL MEDICINE

## 2021-07-29 PROCEDURE — 99214 OFFICE O/P EST MOD 30 MIN: CPT | Performed by: INTERNAL MEDICINE

## 2021-07-29 PROCEDURE — 82525 ASSAY OF COPPER: CPT | Performed by: INTERNAL MEDICINE

## 2021-07-29 PROCEDURE — 80053 COMPREHEN METABOLIC PANEL: CPT | Performed by: INTERNAL MEDICINE

## 2021-07-29 PROCEDURE — 84165 PROTEIN E-PHORESIS SERUM: CPT | Performed by: INTERNAL MEDICINE

## 2021-07-29 PROCEDURE — 82607 VITAMIN B-12: CPT | Performed by: INTERNAL MEDICINE

## 2021-07-29 PROCEDURE — 36415 COLL VENOUS BLD VENIPUNCTURE: CPT

## 2021-07-29 PROCEDURE — 82784 ASSAY IGA/IGD/IGG/IGM EACH: CPT | Performed by: INTERNAL MEDICINE

## 2021-07-29 PROCEDURE — 84466 ASSAY OF TRANSFERRIN: CPT | Performed by: INTERNAL MEDICINE

## 2021-07-29 RX ORDER — DOXYCYCLINE HYCLATE 100 MG/1
CAPSULE ORAL
COMMUNITY
Start: 2021-06-03 | End: 2021-08-01

## 2021-07-29 RX ORDER — FOLIC ACID 1 MG/1
TABLET ORAL
COMMUNITY
Start: 2021-06-30 | End: 2022-06-01

## 2021-07-29 RX ORDER — CYCLOBENZAPRINE HCL 10 MG
TABLET ORAL
COMMUNITY
Start: 2021-05-25

## 2021-07-29 RX ORDER — AZATHIOPRINE 50 MG/1
2 TABLET ORAL EVERY 12 HOURS
COMMUNITY
Start: 2021-05-24 | End: 2021-08-01

## 2021-07-30 LAB
ALBUMIN SERPL ELPH-MCNC: 3.7 G/DL (ref 2.9–4.4)
ALBUMIN/GLOB SERPL: 1.2 {RATIO} (ref 0.7–1.7)
ALPHA1 GLOB SERPL ELPH-MCNC: 0.2 G/DL (ref 0–0.4)
ALPHA2 GLOB SERPL ELPH-MCNC: 0.9 G/DL (ref 0.4–1)
B-GLOBULIN SERPL ELPH-MCNC: 1 G/DL (ref 0.7–1.3)
GAMMA GLOB SERPL ELPH-MCNC: 1.1 G/DL (ref 0.4–1.8)
GLOBULIN SER-MCNC: 3.2 G/DL (ref 2.2–3.9)
IGA SERPL-MCNC: 125 MG/DL (ref 87–352)
IGG SERPL-MCNC: 1162 MG/DL (ref 586–1602)
IGM SERPL-MCNC: 27 MG/DL (ref 26–217)
INTERPRETATION SERPL IEP-IMP: ABNORMAL
KAPPA LC FREE SER-MCNC: 33.6 MG/L (ref 3.3–19.4)
KAPPA LC FREE/LAMBDA FREE SER: 1.44 {RATIO} (ref 0.26–1.65)
LABORATORY COMMENT REPORT: ABNORMAL
LAMBDA LC FREE SERPL-MCNC: 23.3 MG/L (ref 5.7–26.3)
M PROTEIN SERPL ELPH-MCNC: ABNORMAL G/DL
PROT SERPL-MCNC: 6.9 G/DL (ref 6–8.5)

## 2021-07-31 LAB
COPPER SERPL-MCNC: 81 UG/DL (ref 80–158)
ZINC SERPL-MCNC: 94 UG/DL (ref 44–115)

## 2021-08-04 ENCOUNTER — INFUSION (OUTPATIENT)
Dept: ONCOLOGY | Facility: HOSPITAL | Age: 66
End: 2021-08-04

## 2021-08-04 VITALS
OXYGEN SATURATION: 100 % | BODY MASS INDEX: 30.32 KG/M2 | HEIGHT: 65 IN | DIASTOLIC BLOOD PRESSURE: 68 MMHG | HEART RATE: 99 BPM | TEMPERATURE: 97.1 F | WEIGHT: 182 LBS | SYSTOLIC BLOOD PRESSURE: 106 MMHG

## 2021-08-04 DIAGNOSIS — D47.2 IGG MONOCLONAL GAMMOPATHY OF UNCERTAIN SIGNIFICANCE: Primary | ICD-10-CM

## 2021-08-04 DIAGNOSIS — D83.9 COMMON VARIABLE IMMUNODEFICIENCY (HCC): ICD-10-CM

## 2021-08-04 LAB
IGA1 MFR SER: 118 MG/DL (ref 70–400)
IGG1 SER-MCNC: 991 MG/DL (ref 700–1600)
IGM SERPL-MCNC: 26 MG/DL (ref 40–230)

## 2021-08-04 PROCEDURE — 96365 THER/PROPH/DIAG IV INF INIT: CPT

## 2021-08-04 PROCEDURE — 96366 THER/PROPH/DIAG IV INF ADDON: CPT

## 2021-08-04 PROCEDURE — 25010000002 IMMUNE GLOBULIN (HUMAN) 5 GM/50ML SOLUTION: Performed by: ALLERGY & IMMUNOLOGY

## 2021-08-04 PROCEDURE — 82784 ASSAY IGA/IGD/IGG/IGM EACH: CPT | Performed by: ALLERGY & IMMUNOLOGY

## 2021-08-04 RX ORDER — EPINEPHRINE 0.3 MG/.3ML
0.3 INJECTION SUBCUTANEOUS ONCE AS NEEDED
Status: CANCELLED
Start: 2021-08-25

## 2021-08-04 RX ORDER — ACETAMINOPHEN 325 MG/1
650 TABLET ORAL ONCE
Status: CANCELLED
Start: 2021-08-25 | End: 2021-08-25

## 2021-08-04 RX ORDER — DIPHENHYDRAMINE HCL 25 MG
25 CAPSULE ORAL ONCE
Status: CANCELLED
Start: 2021-08-25 | End: 2021-08-25

## 2021-08-04 RX ORDER — DIPHENHYDRAMINE HYDROCHLORIDE 50 MG/ML
50 INJECTION INTRAMUSCULAR; INTRAVENOUS ONCE AS NEEDED
Status: CANCELLED
Start: 2021-08-25

## 2021-08-04 RX ORDER — ACETAMINOPHEN 500 MG
500 TABLET ORAL AS NEEDED
Status: CANCELLED
Start: 2021-08-25

## 2021-08-04 RX ADMIN — IMMUNE GLOBULIN (HUMAN) 40 G: 10 INJECTION INTRAVENOUS; SUBCUTANEOUS at 08:44

## 2021-08-13 RX ORDER — BUPROPION HYDROCHLORIDE 300 MG/1
TABLET ORAL
Qty: 90 TABLET | Refills: 3 | Status: SHIPPED | OUTPATIENT
Start: 2021-08-13 | End: 2022-07-18 | Stop reason: SDUPTHER

## 2021-08-26 ENCOUNTER — HOSPITAL ENCOUNTER (OUTPATIENT)
Dept: MAMMOGRAPHY | Facility: HOSPITAL | Age: 66
Discharge: HOME | End: 2021-08-26
Admitting: INTERNAL MEDICINE

## 2021-08-26 ENCOUNTER — PREP FOR SURGERY (OUTPATIENT)
Dept: SURGERY | Facility: SURGERY CENTER | Age: 66
End: 2021-08-26

## 2021-08-26 DIAGNOSIS — Z12.11 ENCOUNTER FOR SCREENING FOR MALIGNANT NEOPLASM OF COLON: Primary | ICD-10-CM

## 2021-08-26 DIAGNOSIS — Z12.31 ENCOUNTER FOR SCREENING MAMMOGRAM FOR BREAST CANCER: ICD-10-CM

## 2021-08-26 PROCEDURE — 77067 SCR MAMMO BI INCL CAD: CPT

## 2021-08-26 PROCEDURE — 77063 BREAST TOMOSYNTHESIS BI: CPT

## 2021-08-30 ENCOUNTER — OFFICE VISIT (OUTPATIENT)
Dept: INTERNAL MEDICINE | Facility: CLINIC | Age: 66
End: 2021-08-30

## 2021-08-30 VITALS
BODY MASS INDEX: 30.32 KG/M2 | WEIGHT: 182 LBS | SYSTOLIC BLOOD PRESSURE: 124 MMHG | HEART RATE: 92 BPM | DIASTOLIC BLOOD PRESSURE: 80 MMHG | HEIGHT: 65 IN | OXYGEN SATURATION: 98 %

## 2021-08-30 DIAGNOSIS — L50.9 HIVES: Primary | ICD-10-CM

## 2021-08-30 PROCEDURE — 96372 THER/PROPH/DIAG INJ SC/IM: CPT | Performed by: INTERNAL MEDICINE

## 2021-08-30 PROCEDURE — 99213 OFFICE O/P EST LOW 20 MIN: CPT | Performed by: INTERNAL MEDICINE

## 2021-08-30 RX ORDER — METHYLPREDNISOLONE ACETATE 40 MG/ML
40 INJECTION, SUSPENSION INTRA-ARTICULAR; INTRALESIONAL; INTRAMUSCULAR; SOFT TISSUE ONCE
Status: COMPLETED | OUTPATIENT
Start: 2021-08-30 | End: 2021-08-30

## 2021-08-30 RX ORDER — METHYLPREDNISOLONE ACETATE 80 MG/ML
80 INJECTION, SUSPENSION INTRA-ARTICULAR; INTRALESIONAL; INTRAMUSCULAR; SOFT TISSUE ONCE
Status: DISCONTINUED | OUTPATIENT
Start: 2021-08-30 | End: 2021-08-30

## 2021-08-30 RX ORDER — METHYLPREDNISOLONE 4 MG/1
TABLET ORAL
Qty: 21 TABLET | Refills: 0 | Status: SHIPPED | OUTPATIENT
Start: 2021-08-30 | End: 2021-12-08

## 2021-08-30 RX ADMIN — METHYLPREDNISOLONE ACETATE 40 MG: 40 INJECTION, SUSPENSION INTRA-ARTICULAR; INTRALESIONAL; INTRAMUSCULAR; SOFT TISSUE at 09:59

## 2021-08-30 NOTE — PROGRESS NOTES
Subjective     Dara Medina is a 66 y.o. female who presents with   Chief Complaint   Patient presents with   • Itching       History of Present Illness     She got booster Pfizer shot 2 weeks ago.  She developed hives two weeks ago.  Itching is associated.        Review of Systems   Respiratory: Negative.    Cardiovascular: Negative.        The following portions of the patient's history were reviewed and updated as appropriate: allergies, current medications and problem list.    Patient Active Problem List    Diagnosis Date Noted   • Personal history of colonic polyps 04/20/2021     Note Last Updated: 4/20/2021     Added automatically from request for surgery [id number]     • Family history of colon cancer 04/20/2021     Note Last Updated: 4/20/2021     Added automatically from request for surgery [id number]     • IgG monoclonal gammopathy of uncertain significance 02/20/2020   • Obesity due to excess calories without serious comorbidity 11/27/2019   • Chronic copper deficiency 09/04/2019   • Chronic idiopathic constipation 06/18/2019   • Macrocytosis without anemia 03/09/2019   • Acquired lymphocytopenia 03/09/2019   • Adverse effect of iron or its compound, sequela 08/29/2018   • Vitamin B12 deficiency 08/29/2018   • Leukemoid reaction 08/29/2018   • Rheumatoid lung disease (CMS/HCC) 06/18/2018   • Abnormal finding on MRI of brain 02/06/2018     Note Last Updated: 2/6/2018 2/2018.  Plan recheck three months.       • Primary osteoarthritis of right knee 11/22/2017     Note Last Updated: 11/22/2017     Added automatically from request for surgery [id number]     • Chronic depression 07/18/2017   • Mixed hyperlipidemia 05/05/2017   • Tear of medial meniscus of right knee, current 05/04/2017   • Rheumatoid arthritis (CMS/HCC) 07/07/2016   • Pulmonary fibrosis (CMS/HCC) 07/06/2016     Note Last Updated: 10/23/2017     Secondary to methotrexate.       • Iron deficiency anemia 07/06/2016   • Pernicious anemia 07/06/2016   •  "Common variable immunodeficiency (CMS/[facility]) 07/01/2016   • Hypothyroidism 05/23/2016   • Sensory neuropathy 05/23/2016   • Osteoporosis 05/23/2016     Note Last Updated: 5/27/2020     H/o two year Forteo.  Not on bisphosphonates because of dental issues.  Fosamax in past caused stomach problems.  Start Evenity 5/2020     • Vitamin D deficiency 05/23/2016       Current Outpatient Medications on File Prior to Visit   Medication Sig Dispense Refill   • atorvastatin (LIPITOR) 20 MG tablet TAKE 1 TABLET BY MOUTH DAILY. 90 tablet 3   • B Complex-C (B COMPLEX-B12-C IJ) Inject 1,000 mcg under the skin into the appropriate area as directed Every 30 (Thirty) Days.     • buPROPion XL (WELLBUTRIN XL) 300 MG 24 hr tablet TAKE 1 TABLET BY MOUTH EVERY DAY 90 tablet 3   • COPPER CAPS 2 MG capsule Take 4 mg by mouth 2 (Two) Times a Day. (Patient taking differently: Take 6 mg by mouth 2 (Two) Times a Day.) 360 capsule 3   • cyanocobalamin 1000 MCG/ML injection INJECT 1,000 MCG AS DIRECTED EVERY 30 (THIRTY) DAYS. 1 mL 11   • cyclobenzaprine (FLEXERIL) 10 MG tablet TAKE 1 TABLET BY MOUTH THREE TIMES A DAY AS NEEDED     • folic acid (FOLVITE) 1 MG tablet TAKE 1 TABLET BY MOUTH EVERY DAY     • Synthroid 112 MCG tablet TAKE 1 TABLET BY MOUTH DAILY. 30 tablet 11   • topiramate (TOPAMAX) 100 MG tablet Take 1 tablet by mouth 2 (two) times a day. 60 tablet 11     No current facility-administered medications on file prior to visit.       Objective     /80   Pulse 92   Ht 165.1 cm (65\")   Wt 82.6 kg (182 lb)   SpO2 98%   BMI 30.29 kg/m²     Physical Exam  Constitutional:       Appearance: She is well-developed.   HENT:      Head: Normocephalic and atraumatic.   Pulmonary:      Effort: Pulmonary effort is normal.   Skin:     Comments: Erythematous papular rash diffusely on body.     Neurological:      Mental Status: She is alert and oriented to person, place, and time.   Psychiatric:         Behavior: Behavior normal. "         Assessment/Plan   Diagnoses and all orders for this visit:    1. Hives (Primary)  -     Discontinue: methylPREDNISolone acetate (DEPO-medrol) injection 80 mg  -     methylPREDNISolone acetate (DEPO-medrol) injection 40 mg        Discussion    Patient presents with hives after Pfizer shot.  Steroid shot today.  She is advised to also take Zyrtec and Pepcid OTC.  The patient is instructed to let our office know if not feeling better over the next several days or if there is any change in symptoms.           Future Appointments   Date Time Provider Department Center   9/1/2021  9:00 AM REFERRED INFUSION BED 1 [facility]   9/29/2021  8:00 AM REFERRED INFUSION CHAIR 12 [facility]   10/27/2021  9:00 AM REFERRED INFUSION CHAIR 12 [facility]   11/30/2021 10:30 AM [facility]   12/1/2021  8:15 AM REFERRED INFUSION CHAIR 12 [facility]   12/8/2021  9:00 AM Randi Yang MD [facility]   12/29/2021  8:00 AM REFERRED INFUSION CHAIR 12 [facility] IN[facility]US [facility]   7/28/2022  8:50 [facility]   7/28/2022  9:20 AM Ivory Noel MD PhD [facility]

## 2021-08-31 ENCOUNTER — TELEPHONE (OUTPATIENT)
Dept: GASTROENTEROLOGY | Facility: CLINIC | Age: 66
End: 2021-08-31

## 2021-08-31 PROBLEM — Z12.11 ENCOUNTER FOR SCREENING FOR MALIGNANT NEOPLASM OF COLON: Status: ACTIVE | Noted: 2021-04-20

## 2021-09-01 ENCOUNTER — INFUSION (OUTPATIENT)
Dept: ONCOLOGY | Facility: HOSPITAL | Age: 66
End: 2021-09-01

## 2021-09-01 ENCOUNTER — TELEPHONE (OUTPATIENT)
Dept: INTERNAL MEDICINE | Facility: CLINIC | Age: 66
End: 2021-09-01

## 2021-09-01 VITALS
DIASTOLIC BLOOD PRESSURE: 72 MMHG | SYSTOLIC BLOOD PRESSURE: 120 MMHG | HEART RATE: 91 BPM | BODY MASS INDEX: 30.22 KG/M2 | OXYGEN SATURATION: 99 % | HEIGHT: 65 IN | WEIGHT: 181.4 LBS | RESPIRATION RATE: 18 BRPM | TEMPERATURE: 96.8 F

## 2021-09-01 DIAGNOSIS — D83.9 COMMON VARIABLE IMMUNODEFICIENCY (HCC): Primary | ICD-10-CM

## 2021-09-01 PROCEDURE — 96365 THER/PROPH/DIAG IV INF INIT: CPT

## 2021-09-01 PROCEDURE — 25010000002 IMMUNE GLOBULIN (HUMAN) 5 GM/50ML SOLUTION: Performed by: ALLERGY & IMMUNOLOGY

## 2021-09-01 PROCEDURE — 25010000002 IMMUNE GLOBULIN (HUMAN) 20 GM/200ML SOLUTION: Performed by: ALLERGY & IMMUNOLOGY

## 2021-09-01 PROCEDURE — 96366 THER/PROPH/DIAG IV INF ADDON: CPT

## 2021-09-01 RX ORDER — CETIRIZINE HYDROCHLORIDE 10 MG/1
20 TABLET ORAL DAILY
COMMUNITY
End: 2021-12-08

## 2021-09-01 RX ORDER — DIPHENHYDRAMINE HYDROCHLORIDE 50 MG/ML
50 INJECTION INTRAMUSCULAR; INTRAVENOUS ONCE AS NEEDED
Status: CANCELLED
Start: 2021-09-22

## 2021-09-01 RX ORDER — HYDROXYZINE 50 MG/1
50 TABLET, FILM COATED ORAL 3 TIMES DAILY PRN
Qty: 30 TABLET | Refills: 0 | Status: SHIPPED | OUTPATIENT
Start: 2021-09-01 | End: 2021-12-08

## 2021-09-01 RX ORDER — ACETAMINOPHEN 500 MG
500 TABLET ORAL AS NEEDED
Status: CANCELLED
Start: 2021-09-22

## 2021-09-01 RX ORDER — DIPHENHYDRAMINE HCL 25 MG
25 CAPSULE ORAL ONCE
Status: CANCELLED
Start: 2021-09-22 | End: 2021-09-22

## 2021-09-01 RX ORDER — ACETAMINOPHEN 325 MG/1
650 TABLET ORAL ONCE
Status: CANCELLED
Start: 2021-09-22 | End: 2021-09-22

## 2021-09-01 RX ORDER — EPINEPHRINE 0.3 MG/.3ML
0.3 INJECTION SUBCUTANEOUS ONCE AS NEEDED
Status: CANCELLED
Start: 2021-09-22

## 2021-09-01 RX ADMIN — IMMUNE GLOBULIN (HUMAN) 40 G: 10 INJECTION INTRAVENOUS; SUBCUTANEOUS at 09:38

## 2021-09-01 NOTE — TELEPHONE ENCOUNTER
Caller: Dara Medina    Relationship: Self    Best call back number: [phone] (H)    What medication are you requesting:     What are your current symptoms: PATIENT STATES STILL HAVING TREMENDOUS ITCHING PATIENT WOULD LIKE TO KNOW IF THERE IS ANY OTHER SUGGESTIONS FOR THE ITCHING    How long have you been experiencing symptoms:      Have you had these symptoms before:    [x] Yes  [] No    Have you been treated for these symptoms before:   [x] Yes  [] No    If a prescription is needed, what is your preferred pharmacy and phone number: [id number] - [place], [place] - [address] [phone] [facility] [phone]      Additional notes:

## 2021-09-01 NOTE — NURSING NOTE
Spoke with Beverley at Dr. Chowdhury's office as pt on Solu-Medrol dose pack and Zyrtec for reaction to covid booster. Beverley spoke to AIYANA Cohn for Dr. Chowdhury and per Salma rivera to proceed with IVIG infusion today as planned.    Pt took home supply of Tylenol and Benadryl prior to infusion.

## 2021-09-01 NOTE — TELEPHONE ENCOUNTER
CALLED AND SPOKE WITH PATIENT.  SHE SPOKE WITH HOSPITAL YESTERDAY AND FEELS COMFORTABLE TO PROCEED ON 09/03/2021.

## 2021-09-03 ENCOUNTER — HOSPITAL ENCOUNTER (OUTPATIENT)
Facility: SURGERY CENTER | Age: 66
Setting detail: HOSPITAL OUTPATIENT SURGERY
Discharge: HOME | End: 2021-09-03
Attending: INTERNAL MEDICINE | Admitting: INTERNAL MEDICINE

## 2021-09-03 ENCOUNTER — ANESTHESIA (OUTPATIENT)
Dept: SURGERY | Facility: SURGERY CENTER | Age: 66
End: 2021-09-03

## 2021-09-03 ENCOUNTER — ANESTHESIA EVENT (OUTPATIENT)
Dept: SURGERY | Facility: SURGERY CENTER | Age: 66
End: 2021-09-03

## 2021-09-03 VITALS
HEIGHT: 65 IN | TEMPERATURE: 97.8 F | HEART RATE: 66 BPM | BODY MASS INDEX: 30.16 KG/M2 | DIASTOLIC BLOOD PRESSURE: 90 MMHG | RESPIRATION RATE: 16 BRPM | OXYGEN SATURATION: 96 % | SYSTOLIC BLOOD PRESSURE: 151 MMHG | WEIGHT: 181 LBS

## 2021-09-03 DIAGNOSIS — Z12.11 ENCOUNTER FOR SCREENING FOR MALIGNANT NEOPLASM OF COLON: ICD-10-CM

## 2021-09-03 DIAGNOSIS — Z86.010 PERSONAL HISTORY OF COLONIC POLYPS: ICD-10-CM

## 2021-09-03 DIAGNOSIS — Z80.0 FAMILY HISTORY OF COLON CANCER: ICD-10-CM

## 2021-09-03 PROCEDURE — G0105 COLORECTAL SCRN; HI RISK IND: HCPCS | Performed by: INTERNAL MEDICINE

## 2021-09-03 PROCEDURE — 25010000002 PROPOFOL 10 MG/ML EMULSION: Performed by: ANESTHESIOLOGY

## 2021-09-03 PROCEDURE — 0DJD8ZZ INSPECTION OF LOWER INTESTINAL TRACT, VIA NATURAL OR ARTIFICIAL OPENING ENDOSCOPIC: ICD-10-PCS | Performed by: INTERNAL MEDICINE

## 2021-09-03 RX ORDER — LIDOCAINE HYDROCHLORIDE 20 MG/ML
INJECTION, SOLUTION INFILTRATION; PERINEURAL AS NEEDED
Status: DISCONTINUED | OUTPATIENT
Start: 2021-09-03 | End: 2021-09-03 | Stop reason: SURG

## 2021-09-03 RX ORDER — SODIUM CHLORIDE, SODIUM LACTATE, POTASSIUM CHLORIDE, CALCIUM CHLORIDE 600; 310; 30; 20 MG/100ML; MG/100ML; MG/100ML; MG/100ML
1000 INJECTION, SOLUTION INTRAVENOUS CONTINUOUS
Status: DISCONTINUED | OUTPATIENT
Start: 2021-09-03 | End: 2021-09-03 | Stop reason: HOSPADM

## 2021-09-03 RX ADMIN — SODIUM CHLORIDE, POTASSIUM CHLORIDE, SODIUM LACTATE AND CALCIUM CHLORIDE 1000 ML: 600; 310; 30; 20 INJECTION, SOLUTION INTRAVENOUS at 08:14

## 2021-09-03 RX ADMIN — LIDOCAINE HYDROCHLORIDE 60 MG: 20 INJECTION, SOLUTION INFILTRATION; PERINEURAL at 08:57

## 2021-09-03 RX ADMIN — PROPOFOL 200 MCG/KG/MIN: 10 INJECTION, EMULSION INTRAVENOUS at 08:58

## 2021-09-03 NOTE — ANESTHESIA POSTPROCEDURE EVALUATION
Patient: Dara Medina    Procedure Summary     Date: 09/03/21 Room / Location: [facility] / [facility]    Anesthesia Start: 0855 Anesthesia Stop: 0924    Procedure: COLONOSCOPY (N/A ) Diagnosis:       Encounter for screening for malignant neoplasm of colon      Personal history of colonic polyps      Family history of colon cancer      (Encounter for screening for malignant neoplasm of colon [Z12.11])      (Personal history of colonic polyps [Z86.010])      (Family history of colon cancer [Z80.0])    Surgeons: Prasad Celeste MD Provider: Kirk Nolen MD    Anesthesia Type: MAC ASA Status: 3          Anesthesia Type: MAC    Vitals  Vitals Value Taken Time   /90 09/03/21 0940   Temp 36.6 °C (97.8 °F) 09/03/21 0923   Pulse 66 09/03/21 0940   Resp 16 09/03/21 0940   SpO2 96 % 09/03/21 0940           Post Anesthesia Care and Evaluation    Patient location during evaluation: bedside  Pain management: adequate  Airway patency: patent  Anesthetic complications: No anesthetic complications    Cardiovascular status: acceptable  Respiratory status: acceptable  Hydration status: acceptable

## 2021-09-03 NOTE — BRIEF OP NOTE
COLONOSCOPY  Progress Note    Dara Medina  9/3/2021    Pre-op Diagnosis:   Encounter for screening for malignant neoplasm of colon [Z12.11]  Personal history of colonic polyps [Z86.010]  Family history of colon cancer [Z80.0]       Post-Op Diagnosis Codes:     * Encounter for screening for malignant neoplasm of colon [Z12.11]     * Personal history of colonic polyps [Z86.010]     * Family history of colon cancer [Z80.0]    Procedure/CPT® Codes:        Procedure(s):  COLONOSCOPY    Surgeon(s):  Prasad Celeste MD    Anesthesia: Monitored Anesthesia Care    Staff:   Endo Technician: Cj Macias  Endo Nurse: Bridgette Zuniga RN         Estimated Blood Loss: none    Urine Voided: * No values recorded between 9/3/2021  8:55 AM and 9/3/2021  9:21 AM *    Specimens:                None          Drains: * No LDAs found *    Findings: [place] to [place]  Normal Mucosa    Complications: none          Prasad Celeste MD     Date: 9/3/2021  Time: 09:20 EDT

## 2021-09-03 NOTE — ANESTHESIA PREPROCEDURE EVALUATION
Anesthesia Evaluation     NPO Solid Status: > 8 hours             Airway   Mallampati: II  TM distance: >3 FB  Neck ROM: full  Dental - normal exam     Pulmonary - normal exam   (+) pneumonia resolved ,     ROS comment: History of MTX-induced pulmonary fibrosis, resolved  Cardiovascular - normal exam    (+) hyperlipidemia,       Neuro/Psych  GI/Hepatic/Renal/Endo    (+) obesity,  GERD,  diabetes mellitus, thyroid problem hypothyroidism    Musculoskeletal     Abdominal    Substance History      OB/GYN          Other   arthritis (rheumatoid, in remission),                      Anesthesia Plan    ASA 3     MAC       Anesthetic plan, all risks, benefits, and alternatives have been provided, discussed and informed consent has been obtained with: patient.

## 2021-09-03 NOTE — H&P
Patient Care Team:  Randi Yang MD as PCP - General (Internal Medicine)  Howard Contreras MD as Consulting Physician (Rheumatology)  Florentin Camara MD (Pulmonary Disease)  Ivory Noel MD PhD as Consulting Physician (Hematology and Oncology)  Randi Yang MD as Referring Physician (Internal Medicine)  Isael Chowdhury MD as Consulting Physician (Allergy)    CHIEF COMPLAINT: Personal hx colon polyps    HISTORY OF PRESENT ILLNESS:  Last exam was 2016    Past Medical History:   Diagnosis Date   • Acute colitis 1/18/2017   • Allergic Codeine, some antibiotics   • Anemia    • Cataract Removed    2012   • Chronic depression    • Colon polyp 1 removed    2016   • Fracture of rib    • GERD (gastroesophageal reflux disease)    • H/O Wrist fracture, right    • Headache    • History of bronchitis    • History of pneumonia    • History of transfusion    • Hyperlipidemia    • Hypothyroidism    • Inflammatory bowel disease    • Iron deficiency    • Irritable bowel syndrome    • Low serum copper for age 8/30/2019   • Migraine    • Obesity    • Osteopenia    • Osteoporosis    • Pneumonia June 2016   • RA (rheumatoid arthritis) (CMS/[facility])    • Sensory neuropathy    • Sepsis, unspecified organism (CMS/[facility]) 1/18/2017   • Vitamin D deficiency      Past Surgical History:   Procedure Laterality Date   • ADENOIDECTOMY     • AUGMENTATION MAMMAPLASTY Bilateral 2000    removed about 2014   • BREAST AUGMENTATION     • BREAST IMPLANT REMOVAL Bilateral    • CARPAL TUNNEL RELEASE Left 2015   • COLONOSCOPY     • EYE SURGERY Bilateral    • HYSTERECTOMY     • LASIK Bilateral    • ORIF WRIST FRACTURE Right    • MO TOTAL KNEE ARTHROPLASTY Right 12/14/2017    Procedure: TOTAL KNEE ARTHROPLASTY;  Surgeon: Zion Guo MD;  Location: [facility] OR;  Service: Orthopedics   • SINUS SURGERY      x2   • TONSILLECTOMY       Family History   Problem Relation Age of Onset   • Hyperlipidemia Mother    • Hypertension Mother    • Migraines  Mother    • Colon cancer Father    • Diabetes Father    • Cancer Father    • Hyperlipidemia Brother    • Migraines Brother    • Migraines Sister    • Malig Hyperthermia Neg Hx    • Breast cancer Neg Hx      Social History     Tobacco Use   • Smoking status: Former Smoker     Packs/day: 0.50     Years: 5.00     Pack years: 2.50     Types: Cigarettes     Start date: 1974     Quit date: 1978     Years since quittin.4   • Smokeless tobacco: Never Used   • Tobacco comment: QUIT AGE 23   Substance Use Topics   • Alcohol use: No     Comment: STOPPED    • Drug use: No     Medications Prior to Admission   Medication Sig Dispense Refill Last Dose   • atorvastatin (LIPITOR) 20 MG tablet TAKE 1 TABLET BY MOUTH DAILY. 90 tablet 3 2021 at Unknown time   • B Complex-C (B COMPLEX-B12-C IJ) Inject 1,000 mcg under the skin into the appropriate area as directed Every 30 (Thirty) Days.   Past Week at Unknown time   • buPROPion XL (WELLBUTRIN XL) 300 MG 24 hr tablet TAKE 1 TABLET BY MOUTH EVERY DAY 90 tablet 3 2021 at Unknown time   • cetirizine (zyrTEC) 10 MG tablet Take 20 mg by mouth Daily.   Past Week at Unknown time   • COPPER CAPS 2 MG capsule Take 4 mg by mouth 2 (Two) Times a Day. (Patient taking differently: Take 6 mg by mouth 2 (Two) Times a Day.) 360 capsule 3 Past Week at Unknown time   • cyanocobalamin 1000 MCG/ML injection INJECT 1,000 MCG AS DIRECTED EVERY 30 (THIRTY) DAYS. 1 mL 11 Past Month at Unknown time   • cyclobenzaprine (FLEXERIL) 10 MG tablet TAKE 1 TABLET BY MOUTH THREE TIMES A DAY AS NEEDED   Past Week at Unknown time   • folic acid (FOLVITE) 1 MG tablet TAKE 1 TABLET BY MOUTH EVERY DAY   2021 at Unknown time   • hydrOXYzine (ATARAX) 50 MG tablet Take 1 tablet by mouth 3 (Three) Times a Day As Needed for Itching. 30 tablet 0 2021 at Unknown time   • methylPREDNISolone (MEDROL) 4 MG dose pack Take as directed on package instructions. 21 tablet 0 2021 at Unknown time   •  "Synthroid 112 MCG tablet TAKE 1 TABLET BY MOUTH DAILY. 30 tablet 11 9/2/2021 at Unknown time   • topiramate (TOPAMAX) 100 MG tablet Take 1 tablet by mouth 2 (two) times a day. 60 tablet 11      Allergies:  Clavulanic acid and Codeine    REVIEW OF SYSTEMS:  Please see the above history of present illness for pertinent positives and negatives.  The remainder of the patient's systems have been reviewed and are negative.     Vital Signs  Temp:  [97.5 °F (36.4 °C)] 97.5 °F (36.4 °C)  Heart Rate:  [80] 80  Resp:  [16] 16  BP: (147)/(82) 147/82    Flowsheet Rows      First Filed Value   Admission Height  167.6 cm (66\") Documented at 08/31/2021 1443   Admission Weight  81.6 kg (180 lb) Documented at 08/31/2021 1443           Physical Exam:  Physical Exam   Constitutional: Patient appears well-developed and well-nourished and in no acute distress   HEENT:   Head: Normocephalic and atraumatic.   Eyes:  Pupils are equal, round, and reactive to light. EOM are intact. Sclerae are anicteric and non-injected.  Mouth and Throat: Patient has moist mucous membranes. Oropharynx is clear of any erythema or exudate.     Neck: Neck supple. No JVD present. No thyromegaly present. No lymphadenopathy present.  Cardiovascular: Regular rate, regular rhythm, S1 normal and S2 normal.  Exam reveals no gallop and no friction rub.  No murmur heard.  Pulmonary/Chest: Lungs are clear to auscultation bilaterally. No respiratory distress. No wheezes. No rhonchi. No rales.   Abdominal: Soft. Bowel sounds are normal. No distension and no mass. There is no hepatosplenomegaly. There is no tenderness.   Musculoskeletal: Normal Muscle tone  Extremities: No edema. Pulses are palpable in all 4 extremities.  Neurological: Patient is alert and oriented to person, place, and time. Cranial nerves II-XII are grossly intact with no focal deficits.  Skin: Skin is warm. No rash noted. Nails show no clubbing.  No cyanosis or erythema.    Debilities/Disabilities " Identified: None  Emotional Behavior: Appropriate     Results Review:   I reviewed the patient's new clinical results.    Lab Results (most recent)     None          Imaging Results (Most Recent)     None        reviewed    ECG/EMG Results (most recent)     None        reviewed    Assessment/Plan   Personal hx colon polyps/  colonoscopy      I discussed the patient's findings and my recommendations with patient.     Prasad Celeste MD  09/03/21  08:54 EDT    Time: 10 min prior to procedure.

## 2021-09-15 ENCOUNTER — TELEPHONE (OUTPATIENT)
Dept: INTERNAL MEDICINE | Facility: CLINIC | Age: 66
End: 2021-09-15

## 2021-09-15 DIAGNOSIS — R68.84 JAW PAIN: Primary | ICD-10-CM

## 2021-09-29 ENCOUNTER — INFUSION (OUTPATIENT)
Dept: ONCOLOGY | Facility: HOSPITAL | Age: 66
End: 2021-09-29

## 2021-09-29 VITALS
HEART RATE: 68 BPM | OXYGEN SATURATION: 98 % | TEMPERATURE: 96.9 F | DIASTOLIC BLOOD PRESSURE: 78 MMHG | RESPIRATION RATE: 18 BRPM | HEIGHT: 65 IN | WEIGHT: 184.2 LBS | BODY MASS INDEX: 30.69 KG/M2 | SYSTOLIC BLOOD PRESSURE: 123 MMHG

## 2021-09-29 DIAGNOSIS — D83.9 COMMON VARIABLE IMMUNODEFICIENCY (HCC): Primary | ICD-10-CM

## 2021-09-29 DIAGNOSIS — D83.9 COMMON VARIABLE IMMUNODEFICIENCY (HCC): ICD-10-CM

## 2021-09-29 PROCEDURE — 25010000002 IMMUNE GLOBULIN (HUMAN) 20 GM/200ML SOLUTION: Performed by: ALLERGY & IMMUNOLOGY

## 2021-09-29 PROCEDURE — 96365 THER/PROPH/DIAG IV INF INIT: CPT

## 2021-09-29 PROCEDURE — 25010000002 IMMUNE GLOBULIN (HUMAN) 5 GM/50ML SOLUTION: Performed by: ALLERGY & IMMUNOLOGY

## 2021-09-29 PROCEDURE — 96366 THER/PROPH/DIAG IV INF ADDON: CPT

## 2021-09-29 RX ORDER — DIPHENHYDRAMINE HCL 25 MG
25 CAPSULE ORAL ONCE
Status: CANCELLED
Start: 2021-10-20 | End: 2021-10-20

## 2021-09-29 RX ORDER — EPINEPHRINE 0.3 MG/.3ML
0.3 INJECTION SUBCUTANEOUS ONCE AS NEEDED
Status: CANCELLED
Start: 2021-10-20

## 2021-09-29 RX ORDER — ACETAMINOPHEN 500 MG
500 TABLET ORAL AS NEEDED
Status: CANCELLED
Start: 2021-10-20

## 2021-09-29 RX ORDER — ACETAMINOPHEN 325 MG/1
650 TABLET ORAL ONCE
Status: CANCELLED
Start: 2021-10-20 | End: 2021-10-20

## 2021-09-29 RX ORDER — DIPHENHYDRAMINE HYDROCHLORIDE 50 MG/ML
50 INJECTION INTRAMUSCULAR; INTRAVENOUS ONCE AS NEEDED
Status: CANCELLED
Start: 2021-10-20

## 2021-09-29 RX ADMIN — IMMUNE GLOBULIN (HUMAN) 20 G: 10 INJECTION INTRAVENOUS; SUBCUTANEOUS at 08:40

## 2021-09-29 RX ADMIN — IMMUNE GLOBULIN (HUMAN) 20 G: 10 INJECTION INTRAVENOUS; SUBCUTANEOUS at 10:25

## 2021-10-25 RX ORDER — EPINEPHRINE 0.3 MG/.3ML
0.3 INJECTION SUBCUTANEOUS ONCE
Status: CANCELLED
Start: 2021-10-27 | End: 2021-10-27

## 2021-10-25 RX ORDER — ACETAMINOPHEN 325 MG/1
650 TABLET ORAL ONCE
Status: CANCELLED | OUTPATIENT
Start: 2021-10-27

## 2021-10-25 RX ORDER — ACETAMINOPHEN 500 MG
500 TABLET ORAL AS NEEDED
Status: CANCELLED
Start: 2021-10-27

## 2021-10-25 RX ORDER — DIPHENHYDRAMINE HYDROCHLORIDE 50 MG/ML
50 INJECTION INTRAMUSCULAR; INTRAVENOUS AS NEEDED
Status: CANCELLED | OUTPATIENT
Start: 2021-10-27

## 2021-10-25 RX ORDER — DIPHENHYDRAMINE HCL 25 MG
25 CAPSULE ORAL ONCE
Status: CANCELLED | OUTPATIENT
Start: 2021-10-27

## 2021-10-27 ENCOUNTER — INFUSION (OUTPATIENT)
Dept: ONCOLOGY | Facility: HOSPITAL | Age: 66
End: 2021-10-27

## 2021-10-27 VITALS
HEIGHT: 65 IN | BODY MASS INDEX: 30.82 KG/M2 | WEIGHT: 185 LBS | SYSTOLIC BLOOD PRESSURE: 118 MMHG | TEMPERATURE: 96.8 F | HEART RATE: 90 BPM | DIASTOLIC BLOOD PRESSURE: 77 MMHG | RESPIRATION RATE: 18 BRPM | OXYGEN SATURATION: 98 %

## 2021-10-27 DIAGNOSIS — M81.0 OSTEOPOROSIS, UNSPECIFIED OSTEOPOROSIS TYPE, UNSPECIFIED PATHOLOGICAL FRACTURE PRESENCE: Primary | ICD-10-CM

## 2021-10-27 PROCEDURE — 96366 THER/PROPH/DIAG IV INF ADDON: CPT

## 2021-10-27 PROCEDURE — 96365 THER/PROPH/DIAG IV INF INIT: CPT

## 2021-10-27 PROCEDURE — 25010000002 IMMUNE GLOBULIN (HUMAN) 20 GM/200ML SOLUTION: Performed by: ALLERGY & IMMUNOLOGY

## 2021-10-27 RX ORDER — ACETAMINOPHEN 325 MG/1
650 TABLET ORAL ONCE
Status: CANCELLED | OUTPATIENT
Start: 2021-11-24

## 2021-10-27 RX ORDER — EPINEPHRINE 0.3 MG/.3ML
0.3 INJECTION SUBCUTANEOUS ONCE
Status: CANCELLED
Start: 2021-11-24 | End: 2021-11-24

## 2021-10-27 RX ORDER — ACETAMINOPHEN 500 MG
500 TABLET ORAL AS NEEDED
Status: CANCELLED
Start: 2021-11-24

## 2021-10-27 RX ORDER — DIPHENHYDRAMINE HYDROCHLORIDE 50 MG/ML
50 INJECTION INTRAMUSCULAR; INTRAVENOUS AS NEEDED
Status: CANCELLED | OUTPATIENT
Start: 2021-11-24

## 2021-10-27 RX ORDER — DIPHENHYDRAMINE HCL 25 MG
25 CAPSULE ORAL ONCE
Status: CANCELLED | OUTPATIENT
Start: 2021-11-24

## 2021-10-27 RX ADMIN — IMMUNE GLOBULIN (HUMAN) 40 G: 10 INJECTION INTRAVENOUS; SUBCUTANEOUS at 09:31

## 2021-11-08 RX ORDER — ATORVASTATIN CALCIUM 20 MG/1
20 TABLET, FILM COATED ORAL DAILY
Qty: 90 TABLET | Refills: 3 | Status: SHIPPED | OUTPATIENT
Start: 2021-11-08 | End: 2022-10-21

## 2021-11-24 DIAGNOSIS — D50.9 IRON DEFICIENCY ANEMIA, UNSPECIFIED IRON DEFICIENCY ANEMIA TYPE: ICD-10-CM

## 2021-11-24 DIAGNOSIS — E78.2 MIXED HYPERLIPIDEMIA: Primary | ICD-10-CM

## 2021-11-24 DIAGNOSIS — E03.9 ACQUIRED HYPOTHYROIDISM: ICD-10-CM

## 2021-12-01 ENCOUNTER — INFUSION (OUTPATIENT)
Dept: ONCOLOGY | Facility: HOSPITAL | Age: 66
End: 2021-12-01

## 2021-12-01 VITALS
WEIGHT: 183 LBS | DIASTOLIC BLOOD PRESSURE: 75 MMHG | HEART RATE: 87 BPM | SYSTOLIC BLOOD PRESSURE: 124 MMHG | RESPIRATION RATE: 18 BRPM | OXYGEN SATURATION: 99 % | BODY MASS INDEX: 30.49 KG/M2 | HEIGHT: 65 IN | TEMPERATURE: 96.9 F

## 2021-12-01 DIAGNOSIS — M81.0 OSTEOPOROSIS, UNSPECIFIED OSTEOPOROSIS TYPE, UNSPECIFIED PATHOLOGICAL FRACTURE PRESENCE: Primary | ICD-10-CM

## 2021-12-01 LAB
ALBUMIN SERPL-MCNC: 4.3 G/DL (ref 3.8–4.8)
ALBUMIN/GLOB SERPL: 1.8 {RATIO} (ref 1.2–2.2)
ALP SERPL-CCNC: 84 IU/L (ref 44–121)
ALT SERPL-CCNC: 16 IU/L (ref 0–32)
AST SERPL-CCNC: 19 IU/L (ref 0–40)
BASOPHILS # BLD AUTO: 0 X10E3/UL (ref 0–0.2)
BASOPHILS NFR BLD AUTO: 1 %
BILIRUB SERPL-MCNC: 0.4 MG/DL (ref 0–1.2)
BUN SERPL-MCNC: 15 MG/DL (ref 8–27)
BUN/CREAT SERPL: 14 (ref 12–28)
CALCIUM SERPL-MCNC: 9.6 MG/DL (ref 8.7–10.3)
CHLORIDE SERPL-SCNC: 110 MMOL/L (ref 96–106)
CHOLEST SERPL-MCNC: 164 MG/DL (ref 100–199)
CO2 SERPL-SCNC: 20 MMOL/L (ref 20–29)
CREAT SERPL-MCNC: 1.11 MG/DL (ref 0.57–1)
EOSINOPHIL # BLD AUTO: 0.1 X10E3/UL (ref 0–0.4)
EOSINOPHIL NFR BLD AUTO: 2 %
ERYTHROCYTE [DISTWIDTH] IN BLOOD BY AUTOMATED COUNT: 12 % (ref 11.7–15.4)
GLOBULIN SER CALC-MCNC: 2.4 G/DL (ref 1.5–4.5)
GLUCOSE SERPL-MCNC: 83 MG/DL (ref 65–99)
HCT VFR BLD AUTO: 42.6 % (ref 34–46.6)
HDLC SERPL-MCNC: 62 MG/DL
HGB BLD-MCNC: 14.1 G/DL (ref 11.1–15.9)
IMM GRANULOCYTES # BLD AUTO: 0 X10E3/UL (ref 0–0.1)
IMM GRANULOCYTES NFR BLD AUTO: 0 %
LDLC SERPL CALC-MCNC: 81 MG/DL (ref 0–99)
LYMPHOCYTES # BLD AUTO: 0.8 X10E3/UL (ref 0.7–3.1)
LYMPHOCYTES NFR BLD AUTO: 12 %
MCH RBC QN AUTO: 31.7 PG (ref 26.6–33)
MCHC RBC AUTO-ENTMCNC: 33.1 G/DL (ref 31.5–35.7)
MCV RBC AUTO: 96 FL (ref 79–97)
MONOCYTES # BLD AUTO: 0.5 X10E3/UL (ref 0.1–0.9)
MONOCYTES NFR BLD AUTO: 7 %
NEUTROPHILS # BLD AUTO: 5.8 X10E3/UL (ref 1.4–7)
NEUTROPHILS NFR BLD AUTO: 78 %
PLATELET # BLD AUTO: 271 X10E3/UL (ref 150–450)
POTASSIUM SERPL-SCNC: 4.9 MMOL/L (ref 3.5–5.2)
PROT SERPL-MCNC: 6.7 G/DL (ref 6–8.5)
RBC # BLD AUTO: 4.45 X10E6/UL (ref 3.77–5.28)
SODIUM SERPL-SCNC: 143 MMOL/L (ref 134–144)
T4 FREE SERPL-MCNC: 1.25 NG/DL (ref 0.82–1.77)
TRIGL SERPL-MCNC: 116 MG/DL (ref 0–149)
TSH SERPL DL<=0.005 MIU/L-ACNC: 0.07 UIU/ML (ref 0.45–4.5)
VLDLC SERPL CALC-MCNC: 21 MG/DL (ref 5–40)
WBC # BLD AUTO: 7.3 X10E3/UL (ref 3.4–10.8)

## 2021-12-01 PROCEDURE — 96366 THER/PROPH/DIAG IV INF ADDON: CPT

## 2021-12-01 PROCEDURE — 25010000002 IMMUNE GLOBULIN (HUMAN) 20 GM/200ML SOLUTION: Performed by: ALLERGY & IMMUNOLOGY

## 2021-12-01 PROCEDURE — 96365 THER/PROPH/DIAG IV INF INIT: CPT

## 2021-12-01 RX ORDER — ACETAMINOPHEN 500 MG
500 TABLET ORAL AS NEEDED
Status: CANCELLED
Start: 2021-12-22

## 2021-12-01 RX ORDER — ACETAMINOPHEN 325 MG/1
650 TABLET ORAL ONCE
Status: CANCELLED | OUTPATIENT
Start: 2021-12-22

## 2021-12-01 RX ORDER — DIPHENHYDRAMINE HCL 25 MG
25 CAPSULE ORAL ONCE
Status: CANCELLED | OUTPATIENT
Start: 2021-12-22

## 2021-12-01 RX ORDER — DIPHENHYDRAMINE HYDROCHLORIDE 50 MG/ML
50 INJECTION INTRAMUSCULAR; INTRAVENOUS AS NEEDED
Status: CANCELLED | OUTPATIENT
Start: 2021-12-22

## 2021-12-01 RX ORDER — EPINEPHRINE 0.3 MG/.3ML
0.3 INJECTION SUBCUTANEOUS ONCE
Status: CANCELLED
Start: 2021-12-22 | End: 2021-12-22

## 2021-12-01 RX ADMIN — IMMUNE GLOBULIN (HUMAN) 40 G: 10 INJECTION INTRAVENOUS; SUBCUTANEOUS at 08:44

## 2021-12-01 NOTE — NURSING NOTE
Patient is tolerating IVIG without difficulty. Verbal order per Dr. Noel to continue IVIG as ordered.

## 2021-12-08 ENCOUNTER — OFFICE VISIT (OUTPATIENT)
Dept: INTERNAL MEDICINE | Facility: CLINIC | Age: 66
End: 2021-12-08

## 2021-12-08 VITALS
OXYGEN SATURATION: 98 % | BODY MASS INDEX: 29.99 KG/M2 | SYSTOLIC BLOOD PRESSURE: 120 MMHG | WEIGHT: 180 LBS | HEIGHT: 65 IN | HEART RATE: 74 BPM | DIASTOLIC BLOOD PRESSURE: 80 MMHG

## 2021-12-08 DIAGNOSIS — E03.9 ACQUIRED HYPOTHYROIDISM: ICD-10-CM

## 2021-12-08 DIAGNOSIS — E78.2 MIXED HYPERLIPIDEMIA: Primary | ICD-10-CM

## 2021-12-08 DIAGNOSIS — F43.21 SITUATIONAL DEPRESSION: ICD-10-CM

## 2021-12-08 DIAGNOSIS — J84.10 PULMONARY FIBROSIS (HCC): Primary | ICD-10-CM

## 2021-12-08 DIAGNOSIS — M06.9 RHEUMATOID ARTHRITIS INVOLVING MULTIPLE SITES, UNSPECIFIED WHETHER RHEUMATOID FACTOR PRESENT (HCC): ICD-10-CM

## 2021-12-08 DIAGNOSIS — M81.8 OTHER OSTEOPOROSIS, UNSPECIFIED PATHOLOGICAL FRACTURE PRESENCE: ICD-10-CM

## 2021-12-08 PROBLEM — F32.A CHRONIC DEPRESSION: Status: RESOLVED | Noted: 2017-07-18 | Resolved: 2021-12-08

## 2021-12-08 PROCEDURE — 99214 OFFICE O/P EST MOD 30 MIN: CPT | Performed by: INTERNAL MEDICINE

## 2021-12-08 RX ORDER — ESCITALOPRAM OXALATE 10 MG/1
10 TABLET ORAL DAILY
Qty: 90 TABLET | Refills: 3 | Status: SHIPPED | OUTPATIENT
Start: 2021-12-08 | End: 2022-06-01

## 2021-12-08 NOTE — PROGRESS NOTES
Subjective     Dara Medina is a 66 y.o. female who presents with   Chief Complaint   Patient presents with   • Hyperlipidemia   • Hypothyroidism       History of Present Illness     The following data was reviewed by: Randi Yang MD on 12/08/2021:  Common labs    Common Labsle 5/20/21 5/20/21 5/20/21 7/29/21 7/29/21 7/29/21 11/30/21 11/30/21 11/30/21    0914 0914 0914 1104 1104 1104 1017 1017 1017   Glucose  105 (A)   79  83     BUN  13   13  15     Creatinine  1.01 (A)   1.14 (A)  1.11 (A)     eGFR Non  Am  55 (A)   48 (A)  52 (A)     eGFR  Am  66     60     Sodium  144   142  143     Potassium  4.6   4.4  4.9     Chloride  111 (A)   112 (A)  110 (A)     Calcium  9.8   9.5  9.6     Total Protein  7.3    6.9 6.7     Albumin  4.30   4.00 3.7 4.3     Total Bilirubin  0.4   0.3  0.4     Alkaline Phosphatase  95   89  84     AST (SGOT)  16   15  19     ALT (SGPT)  12   13  16     WBC 4.58   6.50     7.3   Hemoglobin 15.4   14.4     14.1   Hematocrit 46.0   44.6     42.6   Platelets 248   277     271   Total Cholesterol   206 (A)     164    Triglycerides   118     116    HDL Cholesterol   75 (A)     62    LDL Cholesterol    110 (A)     81    (A) Abnormal value       Comments are available for some flowsheets but are not being displayed.           HLD.  Control is excellent.  Hypothyroidism  TSH slight decrease and free T4 is normal.    OP.  She is due for recheck of DEXA.    Depression.  Mainly from taking care of 95 year old mom.      Review of Systems   Constitutional: Negative.    Respiratory: Positive for cough.    Cardiovascular: Negative.        The following portions of the patient's history were reviewed and updated as appropriate: allergies, current medications and problem list.    Patient Active Problem List    Diagnosis Date Noted   • Situational depression 12/08/2021   • Personal history of colonic polyps 04/20/2021     Note Last Updated: 4/20/2021     Added automatically from request for  surgery [id number]     • Family history of colon cancer 04/20/2021     Note Last Updated: 4/20/2021     Added automatically from request for surgery [id number]     • Encounter for screening for malignant neoplasm of colon 04/20/2021     Note Last Updated: 8/31/2021     Added automatically from request for surgery [id number]     • IgG monoclonal gammopathy of uncertain significance 02/20/2020   • Obesity due to excess calories without serious comorbidity 11/27/2019   • Chronic copper deficiency 09/04/2019   • Chronic idiopathic constipation 06/18/2019   • Macrocytosis without anemia 03/09/2019   • Acquired lymphocytopenia 03/09/2019   • Adverse effect of iron or its compound, sequela 08/29/2018   • Vitamin B12 deficiency 08/29/2018   • Leukemoid reaction 08/29/2018   • Rheumatoid lung disease (HCC) 06/18/2018   • Abnormal finding on MRI of brain 02/06/2018     Note Last Updated: 2/6/2018 2/2018.  Plan recheck three months.       • Primary osteoarthritis of right knee 11/22/2017     Note Last Updated: 11/22/2017     Added automatically from request for surgery [id number]     • Mixed hyperlipidemia 05/05/2017   • Tear of medial meniscus of right knee, current 05/04/2017   • Rheumatoid arthritis (HCC) 07/07/2016   • Pulmonary fibrosis (HCC) 07/06/2016     Note Last Updated: 10/23/2017     Secondary to methotrexate.       • Iron deficiency anemia 07/06/2016   • Pernicious anemia 07/06/2016   • Common variable immunodeficiency (HCC) 07/01/2016   • Hypothyroidism 05/23/2016   • Sensory neuropathy 05/23/2016   • Osteoporosis 05/23/2016     Note Last Updated: 5/27/2020     H/o two year Forteo.  Not on bisphosphonates because of dental issues.  Fosamax in past caused stomach problems.  Start Evenity 5/2020     • Vitamin D deficiency 05/23/2016       Current Outpatient Medications on File Prior to Visit   Medication Sig Dispense Refill   • atorvastatin (LIPITOR) 20 MG tablet TAKE 1 TABLET BY MOUTH DAILY. 90 tablet 3   • B Complex-C (B  "COMPLEX-B12-C IJ) Inject 1,000 mcg under the skin into the appropriate area as directed Every 30 (Thirty) Days.     • buPROPion XL (WELLBUTRIN XL) 300 MG 24 hr tablet TAKE 1 TABLET BY MOUTH EVERY DAY 90 tablet 3   • COPPER CAPS 2 MG capsule Take 4 mg by mouth 2 (Two) Times a Day. (Patient taking differently: Take 6 mg by mouth 2 (Two) Times a Day.) 360 capsule 3   • cyanocobalamin 1000 MCG/ML injection INJECT 1,000 MCG AS DIRECTED EVERY 30 (THIRTY) DAYS. 1 mL 11   • cyclobenzaprine (FLEXERIL) 10 MG tablet TAKE 1 TABLET BY MOUTH THREE TIMES A DAY AS NEEDED     • folic acid (FOLVITE) 1 MG tablet TAKE 1 TABLET BY MOUTH EVERY DAY     • Synthroid 112 MCG tablet TAKE 1 TABLET BY MOUTH DAILY. 30 tablet 11   • topiramate (TOPAMAX) 100 MG tablet Take 1 tablet by mouth 2 (two) times a day. 60 tablet 11   • [DISCONTINUED] cetirizine (zyrTEC) 10 MG tablet Take 20 mg by mouth Daily.     • [DISCONTINUED] hydrOXYzine (ATARAX) 50 MG tablet Take 1 tablet by mouth 3 (Three) Times a Day As Needed for Itching. 30 tablet 0   • [DISCONTINUED] methylPREDNISolone (MEDROL) 4 MG dose pack Take as directed on package instructions. 21 tablet 0     No current facility-administered medications on file prior to visit.       Objective     /80   Pulse 74   Ht 165.1 cm (65\")   Wt 81.6 kg (180 lb)   SpO2 98%   BMI 29.95 kg/m²     Physical Exam  Constitutional:       Appearance: She is well-developed.   HENT:      Head: Normocephalic and atraumatic.   Cardiovascular:      Rate and Rhythm: Normal rate and regular rhythm.      Heart sounds: Normal heart sounds.   Pulmonary:      Effort: Pulmonary effort is normal.      Breath sounds: Rales present.   Skin:     General: Skin is warm and dry.   Neurological:      Mental Status: She is alert and oriented to person, place, and time.   Psychiatric:         Behavior: Behavior normal.         Assessment/Plan   Diagnoses and all orders for this visit:    1. Mixed hyperlipidemia (Primary)    2. " Acquired hypothyroidism    3. Other osteoporosis, unspecified pathological fracture presence  -     DEXA Bone Density Axial; Future    4. Situational depression    Other orders  -     escitalopram (Lexapro) 10 MG tablet; Take 1 tablet by mouth Daily.  Dispense: 90 tablet; Refill: 3        Discussion  HLD.  Control is good.  The patient is advised to continue current dosage of atorvastatin.  Hypothyroidism.  Control is good.  The patient is advised to continue current dosage of levothyroxine.   OP.  Check DEXA.    Situational depression.  Trial of Lexapro.       Patient has been erroneously marked as diabetic. Based on the available clinical information, she does not have diabetes and should therefore be excluded from diabetic health maintenance and quality measures for the remainder of the reporting period.      Future Appointments   Date Time Provider Department Center   12/29/2021  8:00 AM REFERRED INFUSION CHAIR 12 EP [facility]   1/26/2022  9:00 AM REFERRED INFUSION BED 1 EP [facility]   2/23/2022  8:00 AM REFERRED INFUSION CHAIR 12 EP [facility]   3/23/2022  8:00 AM REFERRED INFUSION BED 1 EP [facility]   4/20/2022  8:00 AM REFERRED INFUSION BED 1 EP [facility] IN[facility]   7/28/2022  8:50 [facility]   7/28/2022  9:20 AM Ivory Noel MD PhD [facility]

## 2021-12-16 ENCOUNTER — HOSPITAL ENCOUNTER (OUTPATIENT)
Dept: BONE DENSITY | Facility: HOSPITAL | Age: 66
Discharge: HOME | End: 2021-12-16

## 2021-12-16 ENCOUNTER — HOSPITAL ENCOUNTER (OUTPATIENT)
Dept: CT IMAGING | Facility: HOSPITAL | Age: 66
Discharge: HOME | End: 2021-12-16

## 2021-12-16 DIAGNOSIS — J84.10 PULMONARY FIBROSIS (HCC): ICD-10-CM

## 2021-12-16 DIAGNOSIS — M81.8 OTHER OSTEOPOROSIS, UNSPECIFIED PATHOLOGICAL FRACTURE PRESENCE: ICD-10-CM

## 2021-12-16 PROCEDURE — 71250 CT THORAX DX C-: CPT

## 2021-12-16 PROCEDURE — 77080 DXA BONE DENSITY AXIAL: CPT

## 2021-12-17 ENCOUNTER — TELEPHONE (OUTPATIENT)
Dept: INTERNAL MEDICINE | Facility: CLINIC | Age: 66
End: 2021-12-17

## 2021-12-17 RX ORDER — AZITHROMYCIN 250 MG/1
TABLET, FILM COATED ORAL
Qty: 6 TABLET | Refills: 0 | Status: SHIPPED | OUTPATIENT
Start: 2021-12-17 | End: 2022-06-01

## 2021-12-17 NOTE — TELEPHONE ENCOUNTER
I d/w patient.  She has known ILD.  Increased cough  for three weeks.  Increased SOA.  Reviewed CT.  Peripheral predominant reticulation and groundglass opacity has  slightly progressed from 2016. Mild honeycomb formation is identified  bilaterally, as above. Findings can be seen with usual interstitial  pneumonia (UIP).  Darrell called in to treat bronchitis.

## 2021-12-17 NOTE — TELEPHONE ENCOUNTER
----- Message from Judie Leonardo MA sent at 12/17/2021  1:26 PM EST -----  Regarding: FW: Lung Question    ----- Message -----  From: Dara Medina  Sent: 12/17/2021   1:21 PM EST  To: Taylor Bill [facility]  Subject: Lung Question                                    Dr. Yang,  I had a conversation with [facility]’s office 5 minutes ago. Suggested waiting till next year because of my income but gave me Foundation name, number and website.  I the meantime, I am coughing, and having a little pressure on my chest.  Tried to see him Out till Jan 10th.  Had CT scan yesterday, no results yet I’m sure.  I told the MA I had a CT scan she said she would send a message to him!!!!    I am so irritated I can’t tell you!  I am on the Imuran  so I’m wondering if it could be causing me to cough anything up?    So many questions?    Dara

## 2021-12-20 DIAGNOSIS — M81.8 OTHER OSTEOPOROSIS, UNSPECIFIED PATHOLOGICAL FRACTURE PRESENCE: ICD-10-CM

## 2021-12-20 DIAGNOSIS — D83.9 COMMON VARIABLE IMMUNODEFICIENCY (HCC): Primary | ICD-10-CM

## 2021-12-28 RX ORDER — EPINEPHRINE 0.3 MG/.3ML
0.3 INJECTION SUBCUTANEOUS ONCE
Status: CANCELLED
Start: 2021-12-29 | End: 2021-12-29

## 2021-12-28 RX ORDER — ACETAMINOPHEN 500 MG
500 TABLET ORAL ONCE
Status: CANCELLED | OUTPATIENT
Start: 2021-12-29

## 2021-12-28 RX ORDER — DIPHENHYDRAMINE HYDROCHLORIDE 50 MG/ML
50 INJECTION INTRAMUSCULAR; INTRAVENOUS AS NEEDED
Status: CANCELLED | OUTPATIENT
Start: 2021-12-29

## 2021-12-29 ENCOUNTER — INFUSION (OUTPATIENT)
Dept: ONCOLOGY | Facility: HOSPITAL | Age: 66
End: 2021-12-29

## 2021-12-29 VITALS
SYSTOLIC BLOOD PRESSURE: 130 MMHG | DIASTOLIC BLOOD PRESSURE: 75 MMHG | BODY MASS INDEX: 30.39 KG/M2 | TEMPERATURE: 98.7 F | OXYGEN SATURATION: 98 % | HEIGHT: 65 IN | HEART RATE: 81 BPM | RESPIRATION RATE: 18 BRPM | WEIGHT: 182.4 LBS

## 2021-12-29 DIAGNOSIS — D83.9 COMMON VARIABLE IMMUNODEFICIENCY (HCC): Primary | ICD-10-CM

## 2021-12-29 PROCEDURE — 96366 THER/PROPH/DIAG IV INF ADDON: CPT

## 2021-12-29 PROCEDURE — 25010000002 IMMUNE GLOBULIN (HUMAN) 20 GM/200ML SOLUTION: Performed by: ALLERGY & IMMUNOLOGY

## 2021-12-29 PROCEDURE — 96365 THER/PROPH/DIAG IV INF INIT: CPT

## 2021-12-29 RX ORDER — EPINEPHRINE 0.3 MG/.3ML
0.3 INJECTION SUBCUTANEOUS ONCE
Status: CANCELLED
Start: 2022-01-26 | End: 2022-01-26

## 2021-12-29 RX ORDER — DIPHENHYDRAMINE HYDROCHLORIDE 50 MG/ML
50 INJECTION INTRAMUSCULAR; INTRAVENOUS AS NEEDED
Status: CANCELLED | OUTPATIENT
Start: 2022-01-26

## 2021-12-29 RX ORDER — ACETAMINOPHEN 500 MG
500 TABLET ORAL ONCE
Status: CANCELLED | OUTPATIENT
Start: 2022-01-26

## 2021-12-29 RX ADMIN — IMMUNE GLOBULIN (HUMAN) 40 G: 10 INJECTION INTRAVENOUS; SUBCUTANEOUS at 08:38

## 2022-01-05 RX ORDER — LEVOTHYROXINE SODIUM 112 MCG
112 TABLET ORAL DAILY
Qty: 30 TABLET | Refills: 11 | Status: CANCELLED | OUTPATIENT
Start: 2022-01-05

## 2022-01-05 RX ORDER — LEVOTHYROXINE SODIUM 112 MCG
112 TABLET ORAL DAILY
Qty: 30 TABLET | Refills: 11 | Status: SHIPPED | OUTPATIENT
Start: 2022-01-05 | End: 2022-03-21 | Stop reason: SDUPTHER

## 2022-01-10 ENCOUNTER — TELEPHONE (OUTPATIENT)
Dept: INTERNAL MEDICINE | Facility: CLINIC | Age: 67
End: 2022-01-10

## 2022-01-10 NOTE — TELEPHONE ENCOUNTER
----- Message from Elizabeth Ken MA sent at 1/10/2022  9:35 AM EST -----  Regarding: FW: Lung   Referral sent on 12/14/2021 their office will contact her  ----- Message -----  From: Dara Medina  Sent: 1/10/2022   9:26 AM EST  To: Taylor Bill [facility]  Subject: Lung .                                         Dr Yang,  I still have not heard from the lung  that was supposed to contact me.  I am wondering if you have heard anything or your advice on what to do.    I have an appt. with Dr Rockwell on Feb 1st. that I will keep but would prefer to have another Dr on board.  Thanks,  Dara

## 2022-01-10 NOTE — TELEPHONE ENCOUNTER
----- Message from Dara Medina sent at 1/10/2022  4:43 PM EST -----  Regarding: Dr José Yang,  The  I talked to said ( of course ) they have received nothing!  So, here is the FAX number she gave me to send the info to see if Dr. HAINES will even see me!!    [phone]    I tried to talk my way in and this is the Clinic I went to when I saw Dr. Camara, so I’m surprised they didn’t still have my info!      Thanks for whatever you can do.   Dara

## 2022-01-10 NOTE — TELEPHONE ENCOUNTER
Sent message to patient stating that I have faxed order and records to office multiple times. Gave patient phone # to office to call and schedule appointment. If she needs me to fax records over again I will be more than happy to

## 2022-01-12 ENCOUNTER — TELEMEDICINE (OUTPATIENT)
Dept: INTERNAL MEDICINE | Facility: CLINIC | Age: 67
End: 2022-01-12

## 2022-01-12 ENCOUNTER — TELEPHONE (OUTPATIENT)
Dept: INTERNAL MEDICINE | Facility: CLINIC | Age: 67
End: 2022-01-12

## 2022-01-12 DIAGNOSIS — J01.90 ACUTE SINUSITIS, RECURRENCE NOT SPECIFIED, UNSPECIFIED LOCATION: Primary | ICD-10-CM

## 2022-01-12 PROCEDURE — 99213 OFFICE O/P EST LOW 20 MIN: CPT | Performed by: INTERNAL MEDICINE

## 2022-01-12 RX ORDER — AMOXICILLIN AND CLAVULANATE POTASSIUM 875; 125 MG/1; MG/1
1 TABLET, FILM COATED ORAL 2 TIMES DAILY
Qty: 20 TABLET | Refills: 0 | Status: SHIPPED | OUTPATIENT
Start: 2022-01-12 | End: 2022-01-22

## 2022-01-12 NOTE — TELEPHONE ENCOUNTER
----- Message from Elizabeth Ken MA sent at 1/12/2022 12:04 PM EST -----  Regarding: FW: sinus infection    ----- Message -----  From: Dara Medina  Sent: 1/12/2022  11:57 AM EST  To: Taylor Bill [facility]  Subject: sinus infection                                  Dr. Yang,  I am leaving in the morning for [place] to see the grandkids and Kera.  I seemed to have developed a sinus infection…wouldn’t you know!!  I did a Covid test and I am NEGATIVE.    Will you prescribe me a Zpak or Amoxycillin or something?  Whatever will help me.  I’d really appreciate it.    Thanks,    Dara

## 2022-01-12 NOTE — PROGRESS NOTES
Subjective     Dara Medina is a 66 y.o. female who presents with   Chief Complaint   Patient presents with   • Sinus Problem       History of Present Illness     You have chosen to receive care through a telehealth visit.  Do you consent to use a video/audio connection for your medical care today? Yes.    Sinus pain and congestion.  Started yesterday.  COVID test at home was negative.  Mild cough from drainage.  No ear pain.  No fever.  Right sided teeth pain.      Review of Systems   Constitutional: Negative for fever.   HENT: Negative for sore throat.    Respiratory: Negative for shortness of breath and wheezing.    Neurological: Negative for headaches.       The following portions of the patient's history were reviewed and updated as appropriate: allergies, current medications and problem list.    Patient Active Problem List    Diagnosis Date Noted   • Situational depression 12/08/2021   • Personal history of colonic polyps 04/20/2021     Note Last Updated: 4/20/2021     Added automatically from request for surgery [id number]     • Family history of colon cancer 04/20/2021     Note Last Updated: 4/20/2021     Added automatically from request for surgery [id number]     • Encounter for screening for malignant neoplasm of colon 04/20/2021     Note Last Updated: 8/31/2021     Added automatically from request for surgery [id number]     • IgG monoclonal gammopathy of uncertain significance 02/20/2020   • Obesity due to excess calories without serious comorbidity 11/27/2019   • Chronic copper deficiency 09/04/2019   • Chronic idiopathic constipation 06/18/2019   • Macrocytosis without anemia 03/09/2019   • Acquired lymphocytopenia 03/09/2019   • Adverse effect of iron or its compound, sequela 08/29/2018   • Vitamin B12 deficiency 08/29/2018   • Leukemoid reaction 08/29/2018   • Rheumatoid lung disease (HCC) 06/18/2018   • Abnormal finding on MRI of brain 02/06/2018     Note Last Updated: 2/6/2018 2/2018.  Plan recheck three  "months.       • Primary osteoarthritis of right knee 11/22/2017     Note Last Updated: 11/22/2017     Added automatically from request for surgery [id number]     • Mixed hyperlipidemia 05/05/2017   • Tear of medial meniscus of right knee, current 05/04/2017   • Rheumatoid arthritis (HCC) 07/07/2016   • Pulmonary fibrosis (HCC) 07/06/2016     Note Last Updated: 10/23/2017     Secondary to methotrexate.       • Iron deficiency anemia 07/06/2016   • Pernicious anemia 07/06/2016   • Common variable immunodeficiency (HCC) 07/01/2016   • Hypothyroidism 05/23/2016   • Sensory neuropathy 05/23/2016   • Osteoporosis 05/23/2016     Note Last Updated: 5/27/2020     H/o two year Forteo.  Not on bisphosphonates because of dental issues.  Fosamax in past caused stomach problems.  Start Evenity 5/2020     • Vitamin D deficiency 05/23/2016       Current Outpatient Medications on File Prior to Visit   Medication Sig Dispense Refill   • atorvastatin (LIPITOR) 20 MG tablet TAKE 1 TABLET BY MOUTH DAILY. 90 tablet 3   • azithromycin (Zithromax Z-Juliocesar) 250 MG tablet Take 2 tablets the first day, then 1 tablet daily for 4 days. 6 tablet 0   • B Complex-C (B COMPLEX-B12-C IJ) Inject 1,000 mcg under the skin into the appropriate area as directed Every 30 (Thirty) Days.     • buPROPion XL (WELLBUTRIN XL) 300 MG 24 hr tablet TAKE 1 TABLET BY MOUTH EVERY DAY 90 tablet 3   • COPPER CAPS 2 MG capsule Take 4 mg by mouth 2 (Two) Times a Day. (Patient taking differently: Take 6 mg by mouth 2 (Two) Times a Day.) 360 capsule 3   • cyanocobalamin 1000 MCG/ML injection INJECT 1,000 MCG AS DIRECTED EVERY 30 (THIRTY) DAYS. 1 mL 11   • cyclobenzaprine (FLEXERIL) 10 MG tablet TAKE 1 TABLET BY MOUTH THREE TIMES A DAY AS NEEDED     • escitalopram (Lexapro) 10 MG tablet Take 1 tablet by mouth Daily. 90 tablet 3   • folic acid (FOLVITE) 1 MG tablet TAKE 1 TABLET BY MOUTH EVERY DAY     • Needle, Disp, 27G X 1/2\" misc USE ONCE MONTHLY FOR B-12 INJECTION. 3 each 3   • " Synthroid 112 MCG tablet Take 1 tablet by mouth Daily. 30 tablet 11   • topiramate (TOPAMAX) 100 MG tablet Take 1 tablet by mouth 2 (two) times a day. 60 tablet 11     No current facility-administered medications on file prior to visit.       Objective     There were no vitals taken for this visit.    Physical Exam  Constitutional:       Appearance: She is well-developed.   HENT:      Head: Normocephalic and atraumatic.   Pulmonary:      Effort: Pulmonary effort is normal.   Neurological:      Mental Status: She is alert and oriented to person, place, and time.   Psychiatric:         Behavior: Behavior normal.         Assessment/Plan   Diagnoses and all orders for this visit:    1. Acute sinusitis, recurrence not specified, unspecified location (Primary)    Other orders  -     amoxicillin-clavulanate (AUGMENTIN) 875-125 MG per tablet; Take 1 tablet by mouth 2 (Two) Times a Day for 10 days.  Dispense: 20 tablet; Refill: 0        Discussion    Patient presents with an acute sinus infection.  Rx for antibiotics are called in.  The patient is encouraged to take with OTC Mucinex D.  Let me know if not feeling better over the next 3 days or if there is any change in symptoms.  She will          Future Appointments   Date Time Provider Department Center   1/26/2022  9:00 AM REFERRED INFUSION BED 1 EP [facility] INFUS [facility]   1/26/2022  1:30 PM REFERRED INJECTION CHAIR EP [facility] INF[facility]   2/23/2022  8:00 AM REFERRED INFUSION CHAIR 12 EP [facility] INFUS EP [facility]   3/23/2022  8:00 AM REFERRED INFUSION BED 1 EP [facility] INFUS [facility]   4/20/2022  8:00 AM REFERRED INFUSION BED 1 EP [facility] IN[facility]US [facility]   5/25/2022  8:10 AM [facility]   6/1/2022  7:45 AM Randi Yang MD [facility]   7/28/2022  8:50 AM LAB CHAIR [facility]   7/28/2022  9:20 AM Ivory Noel MD PhD [facility]   7/28/2022  1:30 PM REFERRED INJECTION CHAIR EP [facility] INFUS [facility]

## 2022-01-14 ENCOUNTER — HOSPITAL ENCOUNTER (OUTPATIENT)
Dept: INFUSION THERAPY | Facility: HOSPITAL | Age: 67
Discharge: HOME | End: 2022-01-14
Admitting: INTERNAL MEDICINE

## 2022-01-14 ENCOUNTER — TRANSCRIBE ORDERS (OUTPATIENT)
Dept: ADMINISTRATIVE | Facility: HOSPITAL | Age: 67
End: 2022-01-14

## 2022-01-14 ENCOUNTER — TELEPHONE (OUTPATIENT)
Dept: INTERNAL MEDICINE | Facility: CLINIC | Age: 67
End: 2022-01-14

## 2022-01-14 VITALS
TEMPERATURE: 97 F | OXYGEN SATURATION: 94 % | RESPIRATION RATE: 20 BRPM | DIASTOLIC BLOOD PRESSURE: 65 MMHG | HEART RATE: 85 BPM | SYSTOLIC BLOOD PRESSURE: 124 MMHG

## 2022-01-14 DIAGNOSIS — U07.1 CLINICAL DIAGNOSIS OF COVID-19: Primary | ICD-10-CM

## 2022-01-14 DIAGNOSIS — U07.1 CLINICAL DIAGNOSIS OF SEVERE ACUTE RESPIRATORY SYNDROME CORONAVIRUS 2 (SARS-COV-2) DISEASE: Primary | ICD-10-CM

## 2022-01-14 PROCEDURE — M0247 HC INTRAVENOUS INFUSION SOTROVIMAB: HCPCS | Performed by: INTERNAL MEDICINE

## 2022-01-14 PROCEDURE — 96365 THER/PROPH/DIAG IV INF INIT: CPT

## 2022-01-14 PROCEDURE — 25010000002 SOTROVIMAB 500 MG/8ML SOLUTION: Performed by: INTERNAL MEDICINE

## 2022-01-14 RX ORDER — METHYLPREDNISOLONE SODIUM SUCCINATE 125 MG/2ML
125 INJECTION, POWDER, LYOPHILIZED, FOR SOLUTION INTRAMUSCULAR; INTRAVENOUS AS NEEDED
Status: CANCELLED | OUTPATIENT
Start: 2022-01-14

## 2022-01-14 RX ORDER — DIPHENHYDRAMINE HCL 50 MG
50 CAPSULE ORAL ONCE AS NEEDED
Status: CANCELLED | OUTPATIENT
Start: 2022-01-14

## 2022-01-14 RX ORDER — DIPHENHYDRAMINE HYDROCHLORIDE 50 MG/ML
50 INJECTION INTRAMUSCULAR; INTRAVENOUS ONCE AS NEEDED
Status: DISCONTINUED | OUTPATIENT
Start: 2022-01-14 | End: 2022-01-16 | Stop reason: HOSPADM

## 2022-01-14 RX ORDER — SODIUM CHLORIDE 9 MG/ML
30 INJECTION, SOLUTION INTRAVENOUS ONCE
Status: CANCELLED | OUTPATIENT
Start: 2022-01-14

## 2022-01-14 RX ORDER — DIPHENHYDRAMINE HCL 25 MG
50 CAPSULE ORAL ONCE AS NEEDED
Status: DISCONTINUED | OUTPATIENT
Start: 2022-01-14 | End: 2022-01-16 | Stop reason: HOSPADM

## 2022-01-14 RX ORDER — EPINEPHRINE 1 MG/ML
0.3 INJECTION, SOLUTION, CONCENTRATE INTRAVENOUS AS NEEDED
Status: CANCELLED | OUTPATIENT
Start: 2022-01-14

## 2022-01-14 RX ORDER — DIPHENHYDRAMINE HYDROCHLORIDE 50 MG/ML
50 INJECTION INTRAMUSCULAR; INTRAVENOUS ONCE AS NEEDED
Status: CANCELLED | OUTPATIENT
Start: 2022-01-14

## 2022-01-14 RX ORDER — SODIUM CHLORIDE 9 MG/ML
30 INJECTION, SOLUTION INTRAVENOUS ONCE
Status: COMPLETED | OUTPATIENT
Start: 2022-01-14 | End: 2022-01-14

## 2022-01-14 RX ORDER — METHYLPREDNISOLONE SODIUM SUCCINATE 125 MG/2ML
125 INJECTION, POWDER, LYOPHILIZED, FOR SOLUTION INTRAMUSCULAR; INTRAVENOUS AS NEEDED
Status: DISCONTINUED | OUTPATIENT
Start: 2022-01-14 | End: 2022-01-16 | Stop reason: HOSPADM

## 2022-01-14 RX ORDER — DIPHENHYDRAMINE HCL 25 MG
50 CAPSULE ORAL ONCE AS NEEDED
Status: CANCELLED | OUTPATIENT
Start: 2022-01-14

## 2022-01-14 RX ADMIN — SODIUM CHLORIDE 30 ML/HR: 9 INJECTION, SOLUTION INTRAVENOUS at 14:20

## 2022-01-14 RX ADMIN — SODIUM CHLORIDE 500 MG: 9 INJECTION, SOLUTION INTRAVENOUS at 14:20

## 2022-01-14 NOTE — PROGRESS NOTES
Patient provided with Fact Sheet for Patients, Parents and Caregivers Emergency Use Authorization (EUA) of Sotrovimab for Coronavirus Disease 2019 (COVID-19) form.    Reviewed and patient verbalized understanding.  Appropriate PPE worn during the care of the patient.  Advised patient not to receive Covid vaccine for 90 days.

## 2022-01-14 NOTE — TELEPHONE ENCOUNTER
Patient tested positive for COVID today.  The patient is a candidate for monoclonal antibodies which is approved by the FDA for emergency use.   Benefits include decreased risk of hospitalization and ER visits.   Risks include possibility of allergic reactions.

## 2022-01-14 NOTE — TELEPHONE ENCOUNTER
----- Message from Elizabeth Ken MA sent at 1/14/2022 11:04 AM EST -----  Regarding: FW: Thank you    ----- Message -----  From: Draa Medina  Sent: 1/14/2022  10:49 AM EST  To: [facility]  Subject: Thank you                                        Dr Yang,  Didn’t know if they would make you aware but I have an appointment for the infusion at 2:00 today.  Thank you for getting me in!    I also got an Appt with Dr HAINES’s associate for Jan 31st.  I couldn’t get into him until June and he had suggested her.  Will keep you apprised.    Dara

## 2022-01-20 PROBLEM — D83.8: Status: ACTIVE | Noted: 2022-01-20

## 2022-01-20 RX ORDER — DIPHENHYDRAMINE HCL 25 MG
25 CAPSULE ORAL ONCE
Status: CANCELLED | OUTPATIENT
Start: 2022-01-20

## 2022-01-20 RX ORDER — ACETAMINOPHEN 325 MG/1
650 TABLET ORAL ONCE
Status: CANCELLED | OUTPATIENT
Start: 2022-01-20

## 2022-01-20 RX ORDER — ACETAMINOPHEN 500 MG
500 TABLET ORAL ONCE AS NEEDED
Status: CANCELLED
Start: 2022-01-20

## 2022-01-20 RX ORDER — EPINEPHRINE 1 MG/ML
0.3 INJECTION, SOLUTION, CONCENTRATE INTRAVENOUS ONCE
Status: CANCELLED
Start: 2022-01-20 | End: 2022-01-20

## 2022-01-20 RX ORDER — DIPHENHYDRAMINE HYDROCHLORIDE 50 MG/ML
50 INJECTION INTRAMUSCULAR; INTRAVENOUS AS NEEDED
Status: CANCELLED | OUTPATIENT
Start: 2022-01-20

## 2022-01-26 ENCOUNTER — INFUSION (OUTPATIENT)
Dept: ONCOLOGY | Facility: HOSPITAL | Age: 67
End: 2022-01-26

## 2022-01-26 ENCOUNTER — APPOINTMENT (OUTPATIENT)
Dept: ONCOLOGY | Facility: HOSPITAL | Age: 67
End: 2022-01-26

## 2022-01-26 VITALS
TEMPERATURE: 97.4 F | BODY MASS INDEX: 29.52 KG/M2 | SYSTOLIC BLOOD PRESSURE: 113 MMHG | HEIGHT: 65 IN | RESPIRATION RATE: 18 BRPM | WEIGHT: 177.2 LBS | HEART RATE: 84 BPM | DIASTOLIC BLOOD PRESSURE: 72 MMHG | OXYGEN SATURATION: 100 %

## 2022-01-26 DIAGNOSIS — D83.8 OTHER COMMON VARIABLE IMMUNODEFICIENCIES: ICD-10-CM

## 2022-01-26 DIAGNOSIS — D83.9 COMMON VARIABLE IMMUNODEFICIENCY: ICD-10-CM

## 2022-01-26 DIAGNOSIS — M81.8 OTHER OSTEOPOROSIS, UNSPECIFIED PATHOLOGICAL FRACTURE PRESENCE: Primary | ICD-10-CM

## 2022-01-26 LAB
ALBUMIN SERPL-MCNC: 3.7 G/DL (ref 3.5–5.2)
ALBUMIN/GLOB SERPL: 1.1 G/DL
ALP SERPL-CCNC: 87 U/L (ref 39–117)
ALT SERPL W P-5'-P-CCNC: 34 U/L (ref 1–33)
ANION GAP SERPL CALCULATED.3IONS-SCNC: 12.2 MMOL/L (ref 5–15)
AST SERPL-CCNC: 22 U/L (ref 1–32)
BILIRUB SERPL-MCNC: 0.4 MG/DL (ref 0–1.2)
BUN SERPL-MCNC: 13 MG/DL (ref 8–23)
BUN/CREAT SERPL: 12 (ref 7–25)
CALCIUM SPEC-SCNC: 9.5 MG/DL (ref 8.6–10.5)
CHLORIDE SERPL-SCNC: 111 MMOL/L (ref 98–107)
CO2 SERPL-SCNC: 20.8 MMOL/L (ref 22–29)
CREAT SERPL-MCNC: 1.08 MG/DL (ref 0.57–1)
GFR SERPL CREATININE-BSD FRML MDRD: 51 ML/MIN/1.73
GLOBULIN UR ELPH-MCNC: 3.4 GM/DL
GLUCOSE SERPL-MCNC: 138 MG/DL (ref 65–99)
MAGNESIUM SERPL-MCNC: 2.2 MG/DL (ref 1.6–2.4)
PHOSPHATE SERPL-MCNC: 3.4 MG/DL (ref 2.5–4.5)
POTASSIUM SERPL-SCNC: 4.6 MMOL/L (ref 3.5–5.2)
PROT SERPL-MCNC: 7.1 G/DL (ref 6–8.5)
SODIUM SERPL-SCNC: 144 MMOL/L (ref 136–145)

## 2022-01-26 PROCEDURE — 25010000002 IMMUNE GLOBULIN (HUMAN) 20 GM/200ML SOLUTION: Performed by: ALLERGY & IMMUNOLOGY

## 2022-01-26 PROCEDURE — 96366 THER/PROPH/DIAG IV INF ADDON: CPT

## 2022-01-26 PROCEDURE — 83735 ASSAY OF MAGNESIUM: CPT | Performed by: INTERNAL MEDICINE

## 2022-01-26 PROCEDURE — 84100 ASSAY OF PHOSPHORUS: CPT | Performed by: INTERNAL MEDICINE

## 2022-01-26 PROCEDURE — 96365 THER/PROPH/DIAG IV INF INIT: CPT

## 2022-01-26 PROCEDURE — 25010000002 DENOSUMAB 60 MG/ML SOLUTION PREFILLED SYRINGE: Performed by: INTERNAL MEDICINE

## 2022-01-26 PROCEDURE — 80053 COMPREHEN METABOLIC PANEL: CPT | Performed by: INTERNAL MEDICINE

## 2022-01-26 PROCEDURE — 96372 THER/PROPH/DIAG INJ SC/IM: CPT

## 2022-01-26 RX ORDER — EPINEPHRINE 0.3 MG/.3ML
0.3 INJECTION SUBCUTANEOUS ONCE
Status: CANCELLED
Start: 2022-01-29 | End: 2022-01-29

## 2022-01-26 RX ORDER — DIPHENHYDRAMINE HYDROCHLORIDE 50 MG/ML
50 INJECTION INTRAMUSCULAR; INTRAVENOUS AS NEEDED
Status: CANCELLED | OUTPATIENT
Start: 2022-01-29

## 2022-01-26 RX ORDER — DIPHENHYDRAMINE HCL 25 MG
25 CAPSULE ORAL ONCE
Status: CANCELLED | OUTPATIENT
Start: 2022-01-29

## 2022-01-26 RX ORDER — ACETAMINOPHEN 325 MG/1
650 TABLET ORAL ONCE
Status: CANCELLED | OUTPATIENT
Start: 2022-01-29

## 2022-01-26 RX ORDER — ACETAMINOPHEN 500 MG
500 TABLET ORAL ONCE AS NEEDED
Status: CANCELLED
Start: 2022-01-29

## 2022-01-26 RX ADMIN — IMMUNE GLOBULIN (HUMAN) 40 G: 10 INJECTION INTRAVENOUS; SUBCUTANEOUS at 09:43

## 2022-01-26 RX ADMIN — DENOSUMAB 60 MG: 60 INJECTION SUBCUTANEOUS at 11:24

## 2022-01-26 NOTE — NURSING NOTE
Patient is tolerating IVIG without difficulty. Verbal order per Dr. Yang to continue IVIG as ordered.  Arrived  for prolia injection. Indication and side effects reviewed. Denies recent dental work. Labs and medications verified. Prolia administered in l arm without incidence. Instructed to call prescribing MD for any concerns or questions and instructed on how to schedule future appts.  Pt vu and discharged ambulatory.

## 2022-02-23 ENCOUNTER — INFUSION (OUTPATIENT)
Dept: ONCOLOGY | Facility: HOSPITAL | Age: 67
End: 2022-02-23

## 2022-02-23 VITALS
DIASTOLIC BLOOD PRESSURE: 70 MMHG | BODY MASS INDEX: 29.89 KG/M2 | OXYGEN SATURATION: 96 % | WEIGHT: 179.6 LBS | SYSTOLIC BLOOD PRESSURE: 112 MMHG | HEART RATE: 91 BPM | TEMPERATURE: 96.6 F

## 2022-02-23 DIAGNOSIS — D83.9 COMMON VARIABLE IMMUNODEFICIENCY: ICD-10-CM

## 2022-02-23 DIAGNOSIS — D83.8 OTHER COMMON VARIABLE IMMUNODEFICIENCIES: ICD-10-CM

## 2022-02-23 DIAGNOSIS — M81.8 OTHER OSTEOPOROSIS, UNSPECIFIED PATHOLOGICAL FRACTURE PRESENCE: Primary | ICD-10-CM

## 2022-02-23 LAB — IGG1 SER-MCNC: 1225 MG/DL (ref 700–1600)

## 2022-02-23 PROCEDURE — 96366 THER/PROPH/DIAG IV INF ADDON: CPT

## 2022-02-23 PROCEDURE — 25010000002 IMMUNE GLOBULIN (HUMAN) 20 GM/200ML SOLUTION: Performed by: ALLERGY & IMMUNOLOGY

## 2022-02-23 PROCEDURE — 96365 THER/PROPH/DIAG IV INF INIT: CPT

## 2022-02-23 PROCEDURE — 82784 ASSAY IGA/IGD/IGG/IGM EACH: CPT | Performed by: ALLERGY & IMMUNOLOGY

## 2022-02-23 RX ORDER — ACETAMINOPHEN 325 MG/1
650 TABLET ORAL ONCE
Status: DISCONTINUED | OUTPATIENT
Start: 2022-02-23 | End: 2022-02-23 | Stop reason: HOSPADM

## 2022-02-23 RX ORDER — ACETAMINOPHEN 500 MG
500 TABLET ORAL ONCE AS NEEDED
Status: CANCELLED
Start: 2022-03-23

## 2022-02-23 RX ORDER — DIPHENHYDRAMINE HYDROCHLORIDE 50 MG/ML
50 INJECTION INTRAMUSCULAR; INTRAVENOUS AS NEEDED
Status: CANCELLED | OUTPATIENT
Start: 2022-03-23

## 2022-02-23 RX ORDER — DIPHENHYDRAMINE HCL 25 MG
25 CAPSULE ORAL ONCE
Status: DISCONTINUED | OUTPATIENT
Start: 2022-02-23 | End: 2022-02-23 | Stop reason: HOSPADM

## 2022-02-23 RX ORDER — EPINEPHRINE 0.3 MG/.3ML
0.3 INJECTION SUBCUTANEOUS ONCE
Status: CANCELLED
Start: 2022-03-23 | End: 2022-03-23

## 2022-02-23 RX ORDER — ACETAMINOPHEN 325 MG/1
650 TABLET ORAL ONCE
Status: CANCELLED | OUTPATIENT
Start: 2022-03-23

## 2022-02-23 RX ORDER — DIPHENHYDRAMINE HCL 25 MG
25 CAPSULE ORAL ONCE
Status: CANCELLED | OUTPATIENT
Start: 2022-03-23

## 2022-02-23 RX ADMIN — IMMUNE GLOBULIN (HUMAN) 40 G: 10 INJECTION INTRAVENOUS; SUBCUTANEOUS at 08:36

## 2022-02-23 NOTE — NURSING NOTE
Patient is tolerating IVIG without difficulty. Written order per Dr. Isael Chowdhury to continue IVIG as ordered.

## 2022-03-21 RX ORDER — LEVOTHYROXINE SODIUM 112 MCG
112 TABLET ORAL DAILY
Qty: 90 TABLET | Refills: 1 | Status: SHIPPED | OUTPATIENT
Start: 2022-03-21 | End: 2022-09-15

## 2022-03-23 ENCOUNTER — INFUSION (OUTPATIENT)
Dept: ONCOLOGY | Facility: HOSPITAL | Age: 67
End: 2022-03-23

## 2022-03-23 VITALS
SYSTOLIC BLOOD PRESSURE: 128 MMHG | HEART RATE: 97 BPM | HEIGHT: 65 IN | DIASTOLIC BLOOD PRESSURE: 75 MMHG | RESPIRATION RATE: 18 BRPM | WEIGHT: 182 LBS | OXYGEN SATURATION: 98 % | TEMPERATURE: 97.4 F | BODY MASS INDEX: 30.32 KG/M2

## 2022-03-23 DIAGNOSIS — D83.9 COMMON VARIABLE IMMUNODEFICIENCY: ICD-10-CM

## 2022-03-23 DIAGNOSIS — D83.8 OTHER COMMON VARIABLE IMMUNODEFICIENCIES: Primary | ICD-10-CM

## 2022-03-23 PROCEDURE — 96366 THER/PROPH/DIAG IV INF ADDON: CPT

## 2022-03-23 PROCEDURE — 96365 THER/PROPH/DIAG IV INF INIT: CPT

## 2022-03-23 PROCEDURE — 25010000002 IMMUNE GLOBULIN (HUMAN) 20 GM/200ML SOLUTION: Performed by: ALLERGY & IMMUNOLOGY

## 2022-03-23 RX ORDER — EPINEPHRINE 0.3 MG/.3ML
0.3 INJECTION SUBCUTANEOUS ONCE
Status: CANCELLED
Start: 2022-04-20 | End: 2022-04-20

## 2022-03-23 RX ORDER — ACETAMINOPHEN 500 MG
500 TABLET ORAL ONCE AS NEEDED
Status: CANCELLED
Start: 2022-04-20

## 2022-03-23 RX ORDER — DIPHENHYDRAMINE HYDROCHLORIDE 50 MG/ML
50 INJECTION INTRAMUSCULAR; INTRAVENOUS AS NEEDED
Status: CANCELLED | OUTPATIENT
Start: 2022-04-20

## 2022-03-23 RX ORDER — DIPHENHYDRAMINE HCL 25 MG
25 CAPSULE ORAL ONCE
Status: CANCELLED | OUTPATIENT
Start: 2022-04-20

## 2022-03-23 RX ORDER — ACETAMINOPHEN 325 MG/1
650 TABLET ORAL ONCE
Status: CANCELLED | OUTPATIENT
Start: 2022-04-20

## 2022-03-23 RX ADMIN — IMMUNE GLOBULIN (HUMAN) 40 G: 10 INJECTION INTRAVENOUS; SUBCUTANEOUS at 08:43

## 2022-03-28 ENCOUNTER — OFFICE VISIT (OUTPATIENT)
Dept: ORTHOPEDIC SURGERY | Facility: CLINIC | Age: 67
End: 2022-03-28

## 2022-03-28 VITALS — HEIGHT: 65 IN | BODY MASS INDEX: 29.99 KG/M2 | TEMPERATURE: 97.3 F | WEIGHT: 180 LBS

## 2022-03-28 DIAGNOSIS — Z96.651 HISTORY OF TOTAL RIGHT KNEE REPLACEMENT (TKR): ICD-10-CM

## 2022-03-28 DIAGNOSIS — M25.561 ACUTE PAIN OF RIGHT KNEE: Primary | ICD-10-CM

## 2022-03-28 PROCEDURE — 99213 OFFICE O/P EST LOW 20 MIN: CPT | Performed by: NURSE PRACTITIONER

## 2022-03-28 PROCEDURE — 73562 X-RAY EXAM OF KNEE 3: CPT | Performed by: NURSE PRACTITIONER

## 2022-03-28 NOTE — PROGRESS NOTES
"  Patient: Dara Medina    YOB: 1955    Medical Record Number: [id number]    Chief Complaints:  Right knee pain    History of Present Illness:     66 y.o. female patient who presents for evaluation of right knee pain.  She has a history of right total knee arthroplasty with Dr. Guo in December 2017.  Reports her knee has been doing well until 1 week ago.  Reports she sat down on her sofa recliner for approximately 1-1/2 hours to watch a ball game.  Upon standing, she experienced pain and says, \"It felt like air had been blown into my knee\".  Reports she has a history of rheumatoid arthritis which has been in remission for approximately 2-1/2 years.  She has noticed hand pain return simultaneously with her knee pain.  She feels this may be a rheumatologic issue, however, she is concerned about her knee replacement and wants to make sure everything is okay.  Reports her rheumatologist has agreed to prescribe her prednisone if there are no complicating factors with her knee implant.  Her current pain is described as moderate, constant, and aching.  Rising from a chair, walking, and turning in bed all aggravate her pain.  Prior to this incident she was treadmill walking several times per week, but she has not attempted this activity during the last week.  Denies instability.  Denies numbness or tingling in the lower leg or foot.    Allergies:   Allergies   Allergen Reactions   • Clavulanic Acid Provider Review Needed and Unknown - High Severity   • Codeine Nausea And Vomiting and Unknown - High Severity     Home Medications:      Current Outpatient Medications:   •  atorvastatin (LIPITOR) 20 MG tablet, TAKE 1 TABLET BY MOUTH DAILY., Disp: 90 tablet, Rfl: 3  •  B Complex-C (B COMPLEX-B12-C IJ), Inject 1,000 mcg under the skin into the appropriate area as directed Every 30 (Thirty) Days., Disp: , Rfl:   •  buPROPion XL (WELLBUTRIN XL) 300 MG 24 hr tablet, TAKE 1 TABLET BY MOUTH EVERY DAY, Disp: 90 tablet, " "Rfl: 3  •  cyanocobalamin 1000 MCG/ML injection, INJECT 1,000 MCG AS DIRECTED EVERY 30 (THIRTY) DAYS., Disp: 1 mL, Rfl: 11  •  cyclobenzaprine (FLEXERIL) 10 MG tablet, TAKE 1 TABLET BY MOUTH THREE TIMES A DAY AS NEEDED, Disp: , Rfl:   •  folic acid (FOLVITE) 1 MG tablet, TAKE 1 TABLET BY MOUTH EVERY DAY, Disp: , Rfl:   •  Needle, Disp, 27G X 1/2\" misc, USE ONCE MONTHLY FOR B-12 INJECTION., Disp: 3 each, Rfl: 3  •  Synthroid 112 MCG tablet, Take 1 tablet by mouth Daily., Disp: 90 tablet, Rfl: 1  •  azithromycin (Zithromax Z-Juliocesar) 250 MG tablet, Take 2 tablets the first day, then 1 tablet daily for 4 days., Disp: 6 tablet, Rfl: 0  •  COPPER CAPS 2 MG capsule, Take 4 mg by mouth 2 (Two) Times a Day. (Patient taking differently: Take 6 mg by mouth 2 (Two) Times a Day.), Disp: 360 capsule, Rfl: 3  •  escitalopram (Lexapro) 10 MG tablet, Take 1 tablet by mouth Daily., Disp: 90 tablet, Rfl: 3  •  topiramate (TOPAMAX) 100 MG tablet, Take 1 tablet by mouth 2 (two) times a day., Disp: 60 tablet, Rfl: 11    Past Medical History:   Diagnosis Date   • Acute colitis 1/18/2017   • Allergic Codeine, some antibiotics   • Anemia    • Cataract Removed    2012   • Chronic depression    • Colon polyp 1 removed    2016   • Fracture of rib    • GERD (gastroesophageal reflux disease)    • H/O Wrist fracture, right    • Headache    • History of bronchitis    • History of pneumonia    • History of transfusion    • Hyperlipidemia    • Hypothyroidism    • Inflammatory bowel disease    • Iron deficiency    • Irritable bowel syndrome    • Low serum copper for age 8/30/2019   • Migraine    • Obesity    • Osteopenia    • Osteoporosis    • Pneumonia June 2016   • RA (rheumatoid arthritis) ([facility])    • Sensory neuropathy    • Sepsis, unspecified organism ([facility]) 1/18/2017   • Vitamin D deficiency        Past Surgical History:   Procedure Laterality Date   • ADENOIDECTOMY     • AUGMENTATION MAMMAPLASTY Bilateral 2000    removed about 2014   • BREAST " AUGMENTATION     • BREAST IMPLANT REMOVAL Bilateral    • CARPAL TUNNEL RELEASE Left    • COLONOSCOPY     • COLONOSCOPY N/A 9/3/2021    Procedure: COLONOSCOPY;  Surgeon: Prasad Celeste MD;  Location: [facility] MAIN OR;  Service: Gastroenterology;  Laterality: N/A;   • EYE SURGERY Bilateral    • HYSTERECTOMY     • LASIK Bilateral    • ORIF WRIST FRACTURE Right    • FL TOTAL KNEE ARTHROPLASTY Right 2017    Procedure: TOTAL KNEE ARTHROPLASTY;  Surgeon: Zion Guo MD;  Location: [facility] MAIN OR;  Service: Orthopedics   • SINUS SURGERY      x2   • TONSILLECTOMY         Social History     Occupational History   • Occupation: Homemaker     Employer: RETIRED   Tobacco Use   • Smoking status: Former Smoker     Packs/day: 0.50     Years: 5.00     Pack years: 2.50     Types: Cigarettes     Start date: 1974     Quit date: 1978     Years since quittin.0   • Smokeless tobacco: Never Used   • Tobacco comment: QUIT AGE 23   Substance and Sexual Activity   • Alcohol use: No     Comment: STOPPED    • Drug use: No   • Sexual activity: Not on file      Social History     Social History Narrative   • Not on file       Family History   Problem Relation Age of Onset   • Hyperlipidemia Mother    • Hypertension Mother    • Migraines Mother    • Colon cancer Father    • Diabetes Father    • Cancer Father    • Hyperlipidemia Brother    • Migraines Brother    • Migraines Sister    • Malig Hyperthermia Neg Hx    • Breast cancer Neg Hx        Review of Systems:      Constitutional: Denies fever, shaking or chills   Eyes: Denies change in visual acuity   HEENT: Denies nasal congestion or sore throat   Respiratory: Denies cough or shortness of breath   Cardiovascular: Denies chest pain or edema  Endocrine: Denies tremors, palpitations, intolerance of heat or cold, polyuria, polydipsia.  GI: Denies abdominal pain, nausea, vomiting, bloody stools or diarrhea  : Denies frequency, urgency, incontinence,  "retention, or nocturia.  Musculoskeletal: Denies numbness, tingling or loss of motor function except as above  Integument: Denies rash, lesion or ulceration   Neurologic: Denies headache or focal weakness, deficits  Heme: Denies spontaneous or excessive bleeding, epistaxis, hematuria, melena, fatigue, enlarged or tender lymph nodes.      All other pertinent positives and negatives as noted above in HPI.    Physical Exam: 66 y.o. female    Vitals:    03/28/22 0952   Temp: 97.3 °F (36.3 °C)   TempSrc: Temporal   Weight: 81.6 kg (180 lb)   Height: 165.1 cm (65\")       General:  Patient is awake and alert.  Appears in no acute distress or discomfort.    Psych:  Affect and demeanor are appropriate.    Eyes:  Conjunctiva and sclera appear grossly normal.  Eyes track well and EOM seem to be intact.    Ears:  No gross abnormalities.  Hearing adequate for the exam.    Cardiovascular:  Regular rate and rhythm.    Lungs:  Good chest expansion.  Breathing unlabored.    Lymph:  No palpable masses or adenopathy in the affected extremity    Extremities:  The right knee is examined.  Skin with benign appearing surgical scar.  No erythema.  No edema.  No increased warmth.  Tenderness to palpation over the quadriceps tendon.  Range of motion is measured at full extension to 120° of flexion.  The calf is soft and nontender with a negative Homans sign.  Alignment is neutral.  Good quad strength. There is no evidence of varus/valgus or flexion instability. No effusion.  Intact sensation to light touch.  Palpable pedal pulses         Radiology:   Dr. Guo and I reviewed the x-rays together.  AP, merchant and lateral views of the right knee were ordered and reviewed for evaluation of recent knee replacement.  The x-rays demonstrate a well positioned, well aligned knee replacement without complicating factors noted.  In comparison with previous films there has been no change.    Assessment/Plan:  1.  Acute right knee pain, suspect " quadriceps tendinitis, possible rheumatoid arthritis flare  2.  History of right TKA    Patient I reviewed the x-rays together.  I explained Dr. Guo has reviewed the x-rays as well.  No complicating factors noted with the implants.  I suspect she may have some tendinitis of the quadriceps tendon versus rheumatoid flare.  I recommended she follow-up with her rheumatologist for the prednisone prescription as discussed.  Additionally, I recommended physical therapy.  She agreed.  I have entered this referral for [facility] per her request.  I recommended she always stretch prior to and after exercise.  Going forward, she will follow-up as needed.    Dea Bravo, APRN    03/28/2022    CC to Randi Yang MD

## 2022-04-12 RX ORDER — CYANOCOBALAMIN 1000 UG/ML
1000 INJECTION, SOLUTION INTRAMUSCULAR; SUBCUTANEOUS
Qty: 3 ML | Refills: 3 | Status: SHIPPED | OUTPATIENT
Start: 2022-04-12 | End: 2022-08-31 | Stop reason: SDUPTHER

## 2022-04-20 ENCOUNTER — APPOINTMENT (OUTPATIENT)
Dept: ONCOLOGY | Facility: HOSPITAL | Age: 67
End: 2022-04-20

## 2022-05-10 ENCOUNTER — TELEPHONE (OUTPATIENT)
Dept: INTERNAL MEDICINE | Facility: CLINIC | Age: 67
End: 2022-05-10

## 2022-05-10 RX ORDER — FLUCONAZOLE 150 MG/1
TABLET ORAL
Qty: 2 TABLET | Refills: 0 | Status: SHIPPED | OUTPATIENT
Start: 2022-05-10 | End: 2022-06-01

## 2022-05-10 RX ORDER — NEEDLES, SAFETY 22GX1 1/2"
NEEDLE, DISPOSABLE MISCELLANEOUS
Qty: 3 EACH | Refills: 3 | Status: SHIPPED | OUTPATIENT
Start: 2022-05-10 | End: 2022-08-31 | Stop reason: SDUPTHER

## 2022-05-10 NOTE — TELEPHONE ENCOUNTER
Call patient.  I sent in diflucan.  [facility]  SUZANNE

## 2022-05-10 NOTE — TELEPHONE ENCOUNTER
----- Message from Judie Leonardo MA sent at 5/10/2022  9:17 AM EDT -----  Regarding: FW: two prescriptions    ----- Message -----  From: Dara Medina  Sent: 5/10/2022   8:16 AM EDT  To: [facility]  Subject: two prescriptions                                Dr. Yang,  I was out seeing Kera and family in [place] and got sick, took antibiotics and now have a yeast infection.  I tried Monistat hoping that would work but of course it didn’t!!!      Could you prescribe Diflucan for me please?  I haven’t had one of these in forever but UGH!! I  need it gone.  Also, I got needles from you for my B12 shots but no cylinders to put the medicine.  Could you also send a script into [facility] for that?    Thanks and hope all is well with you and yours.    Dara

## 2022-05-18 ENCOUNTER — INFUSION (OUTPATIENT)
Dept: ONCOLOGY | Facility: HOSPITAL | Age: 67
End: 2022-05-18

## 2022-05-18 VITALS
TEMPERATURE: 96.8 F | WEIGHT: 175.8 LBS | DIASTOLIC BLOOD PRESSURE: 70 MMHG | SYSTOLIC BLOOD PRESSURE: 111 MMHG | HEART RATE: 89 BPM | BODY MASS INDEX: 29.29 KG/M2 | RESPIRATION RATE: 18 BRPM | OXYGEN SATURATION: 99 % | HEIGHT: 65 IN

## 2022-05-18 DIAGNOSIS — D83.9 COMMON VARIABLE IMMUNODEFICIENCY: ICD-10-CM

## 2022-05-18 DIAGNOSIS — D83.8 OTHER COMMON VARIABLE IMMUNODEFICIENCIES: Primary | ICD-10-CM

## 2022-05-18 PROCEDURE — 96366 THER/PROPH/DIAG IV INF ADDON: CPT

## 2022-05-18 PROCEDURE — 25010000002 IMMUNE GLOBULIN (HUMAN) 20 GM/200ML SOLUTION: Performed by: ALLERGY & IMMUNOLOGY

## 2022-05-18 PROCEDURE — 96365 THER/PROPH/DIAG IV INF INIT: CPT

## 2022-05-18 RX ORDER — DIPHENHYDRAMINE HYDROCHLORIDE 50 MG/ML
50 INJECTION INTRAMUSCULAR; INTRAVENOUS AS NEEDED
Status: CANCELLED | OUTPATIENT
Start: 2022-06-15

## 2022-05-18 RX ORDER — ACETAMINOPHEN 325 MG/1
650 TABLET ORAL ONCE
Status: DISCONTINUED | OUTPATIENT
Start: 2022-05-18 | End: 2022-05-18 | Stop reason: HOSPADM

## 2022-05-18 RX ORDER — DIPHENHYDRAMINE HCL 25 MG
25 CAPSULE ORAL ONCE
Status: DISCONTINUED | OUTPATIENT
Start: 2022-05-18 | End: 2022-05-18 | Stop reason: HOSPADM

## 2022-05-18 RX ORDER — EPINEPHRINE 0.3 MG/.3ML
0.3 INJECTION SUBCUTANEOUS ONCE
Status: CANCELLED
Start: 2022-06-15 | End: 2022-06-15

## 2022-05-18 RX ORDER — ACETAMINOPHEN 500 MG
500 TABLET ORAL ONCE AS NEEDED
Status: CANCELLED
Start: 2022-06-15

## 2022-05-18 RX ORDER — DIPHENHYDRAMINE HCL 25 MG
25 CAPSULE ORAL ONCE
Status: CANCELLED | OUTPATIENT
Start: 2022-06-15

## 2022-05-18 RX ORDER — ACETAMINOPHEN 325 MG/1
650 TABLET ORAL ONCE
Status: CANCELLED | OUTPATIENT
Start: 2022-06-15

## 2022-05-18 RX ADMIN — IMMUNE GLOBULIN (HUMAN) 40 G: 10 INJECTION INTRAVENOUS; SUBCUTANEOUS at 09:01

## 2022-05-19 DIAGNOSIS — E78.2 MIXED HYPERLIPIDEMIA: Primary | ICD-10-CM

## 2022-05-19 DIAGNOSIS — D51.0 PERNICIOUS ANEMIA: ICD-10-CM

## 2022-05-19 DIAGNOSIS — E53.8 VITAMIN B12 DEFICIENCY: ICD-10-CM

## 2022-05-19 DIAGNOSIS — E55.9 VITAMIN D DEFICIENCY: ICD-10-CM

## 2022-05-19 DIAGNOSIS — E03.9 ACQUIRED HYPOTHYROIDISM: ICD-10-CM

## 2022-05-26 LAB
25(OH)D3+25(OH)D2 SERPL-MCNC: 67.4 NG/ML (ref 30–100)
ALBUMIN SERPL-MCNC: 4 G/DL (ref 3.8–4.8)
ALBUMIN/GLOB SERPL: 1.3 {RATIO} (ref 1.2–2.2)
ALP SERPL-CCNC: 51 IU/L (ref 44–121)
ALT SERPL-CCNC: 31 IU/L (ref 0–32)
APPEARANCE UR: CLEAR
AST SERPL-CCNC: 25 IU/L (ref 0–40)
BACTERIA #/AREA URNS HPF: NORMAL /[HPF]
BASOPHILS # BLD AUTO: 0 X10E3/UL (ref 0–0.2)
BASOPHILS NFR BLD AUTO: 1 %
BILIRUB SERPL-MCNC: 0.5 MG/DL (ref 0–1.2)
BILIRUB UR QL STRIP: NEGATIVE
BUN SERPL-MCNC: 16 MG/DL (ref 8–27)
BUN/CREAT SERPL: 16 (ref 12–28)
CALCIUM SERPL-MCNC: 9.8 MG/DL (ref 8.7–10.3)
CASTS URNS QL MICRO: NORMAL /LPF
CHLORIDE SERPL-SCNC: 110 MMOL/L (ref 96–106)
CHOLEST SERPL-MCNC: 137 MG/DL (ref 100–199)
CO2 SERPL-SCNC: 21 MMOL/L (ref 20–29)
COLOR UR: YELLOW
CREAT SERPL-MCNC: 0.99 MG/DL (ref 0.57–1)
EGFRCR SERPLBLD CKD-EPI 2021: 62 ML/MIN/1.73
EOSINOPHIL # BLD AUTO: 0.1 X10E3/UL (ref 0–0.4)
EOSINOPHIL NFR BLD AUTO: 3 %
EPI CELLS #/AREA URNS HPF: NORMAL /HPF (ref 0–10)
ERYTHROCYTE [DISTWIDTH] IN BLOOD BY AUTOMATED COUNT: 15.8 % (ref 11.7–15.4)
GLOBULIN SER CALC-MCNC: 3 G/DL (ref 1.5–4.5)
GLUCOSE SERPL-MCNC: 93 MG/DL (ref 65–99)
GLUCOSE UR QL STRIP: NEGATIVE
HCT VFR BLD AUTO: 40.2 % (ref 34–46.6)
HDLC SERPL-MCNC: 58 MG/DL
HGB BLD-MCNC: 13.4 G/DL (ref 11.1–15.9)
HGB UR QL STRIP: NEGATIVE
IMM GRANULOCYTES # BLD AUTO: 0 X10E3/UL (ref 0–0.1)
IMM GRANULOCYTES NFR BLD AUTO: 0 %
KETONES UR QL STRIP: NEGATIVE
LDLC SERPL CALC-MCNC: 65 MG/DL (ref 0–99)
LEUKOCYTE ESTERASE UR QL STRIP: NEGATIVE
LYMPHOCYTES # BLD AUTO: 0.6 X10E3/UL (ref 0.7–3.1)
LYMPHOCYTES NFR BLD AUTO: 14 %
MCH RBC QN AUTO: 33.5 PG (ref 26.6–33)
MCHC RBC AUTO-ENTMCNC: 33.3 G/DL (ref 31.5–35.7)
MCV RBC AUTO: 101 FL (ref 79–97)
MICRO URNS: NORMAL
MICRO URNS: NORMAL
MONOCYTES # BLD AUTO: 0.2 X10E3/UL (ref 0.1–0.9)
MONOCYTES NFR BLD AUTO: 4 %
NEUTROPHILS # BLD AUTO: 3.3 X10E3/UL (ref 1.4–7)
NEUTROPHILS NFR BLD AUTO: 78 %
NITRITE UR QL STRIP: NEGATIVE
PH UR STRIP: 7 [PH] (ref 5–7.5)
PLATELET # BLD AUTO: 247 X10E3/UL (ref 150–450)
POTASSIUM SERPL-SCNC: 5.2 MMOL/L (ref 3.5–5.2)
PROT SERPL-MCNC: 7 G/DL (ref 6–8.5)
PROT UR QL STRIP: NEGATIVE
RBC # BLD AUTO: 4 X10E6/UL (ref 3.77–5.28)
RBC #/AREA URNS HPF: NORMAL /HPF (ref 0–2)
SODIUM SERPL-SCNC: 145 MMOL/L (ref 134–144)
SP GR UR STRIP: 1.02 (ref 1–1.03)
T4 FREE SERPL-MCNC: 1.63 NG/DL (ref 0.82–1.77)
TRIGL SERPL-MCNC: 68 MG/DL (ref 0–149)
TSH SERPL DL<=0.005 MIU/L-ACNC: 0.43 UIU/ML (ref 0.45–4.5)
UROBILINOGEN UR STRIP-MCNC: 1 MG/DL (ref 0.2–1)
VIT B12 SERPL-MCNC: 814 PG/ML (ref 232–1245)
VLDLC SERPL CALC-MCNC: 14 MG/DL (ref 5–40)
WBC # BLD AUTO: 4.2 X10E3/UL (ref 3.4–10.8)
WBC #/AREA URNS HPF: NORMAL /HPF (ref 0–5)

## 2022-06-01 ENCOUNTER — OFFICE VISIT (OUTPATIENT)
Dept: INTERNAL MEDICINE | Facility: CLINIC | Age: 67
End: 2022-06-01

## 2022-06-01 VITALS
HEIGHT: 65 IN | OXYGEN SATURATION: 96 % | RESPIRATION RATE: 14 BRPM | DIASTOLIC BLOOD PRESSURE: 68 MMHG | SYSTOLIC BLOOD PRESSURE: 110 MMHG | TEMPERATURE: 96.4 F | BODY MASS INDEX: 29.16 KG/M2 | WEIGHT: 175 LBS | HEART RATE: 97 BPM

## 2022-06-01 DIAGNOSIS — D83.8 OTHER COMMON VARIABLE IMMUNODEFICIENCIES: ICD-10-CM

## 2022-06-01 DIAGNOSIS — R73.03 PREDIABETES: ICD-10-CM

## 2022-06-01 DIAGNOSIS — F43.21 SITUATIONAL DEPRESSION: ICD-10-CM

## 2022-06-01 DIAGNOSIS — M81.8 OTHER OSTEOPOROSIS, UNSPECIFIED PATHOLOGICAL FRACTURE PRESENCE: ICD-10-CM

## 2022-06-01 DIAGNOSIS — E78.2 MIXED HYPERLIPIDEMIA: ICD-10-CM

## 2022-06-01 DIAGNOSIS — E03.9 ACQUIRED HYPOTHYROIDISM: ICD-10-CM

## 2022-06-01 DIAGNOSIS — Z00.00 MEDICARE ANNUAL WELLNESS VISIT, SUBSEQUENT: Primary | ICD-10-CM

## 2022-06-01 DIAGNOSIS — J84.10 PULMONARY FIBROSIS: ICD-10-CM

## 2022-06-01 PROBLEM — M05.10 RHEUMATOID LUNG DISEASE: Status: RESOLVED | Noted: 2018-06-18 | Resolved: 2022-06-01

## 2022-06-01 PROBLEM — E66.09 OBESITY DUE TO EXCESS CALORIES WITHOUT SERIOUS COMORBIDITY: Status: RESOLVED | Noted: 2019-11-27 | Resolved: 2022-06-01

## 2022-06-01 PROBLEM — Z12.11 ENCOUNTER FOR SCREENING FOR MALIGNANT NEOPLASM OF COLON: Status: RESOLVED | Noted: 2021-04-20 | Resolved: 2022-06-01

## 2022-06-01 PROBLEM — U07.1 CLINICAL DIAGNOSIS OF COVID-19: Status: RESOLVED | Noted: 2022-01-14 | Resolved: 2022-06-01

## 2022-06-01 PROBLEM — Z80.0 FAMILY HISTORY OF COLON CANCER: Status: RESOLVED | Noted: 2021-04-20 | Resolved: 2022-06-01

## 2022-06-01 PROBLEM — R90.89 ABNORMAL FINDING ON MRI OF BRAIN: Status: RESOLVED | Noted: 2018-02-06 | Resolved: 2022-06-01

## 2022-06-01 PROCEDURE — G0439 PPPS, SUBSEQ VISIT: HCPCS | Performed by: INTERNAL MEDICINE

## 2022-06-01 PROCEDURE — 99214 OFFICE O/P EST MOD 30 MIN: CPT | Performed by: INTERNAL MEDICINE

## 2022-06-01 PROCEDURE — 1159F MED LIST DOCD IN RCRD: CPT | Performed by: INTERNAL MEDICINE

## 2022-06-01 PROCEDURE — 1170F FXNL STATUS ASSESSED: CPT | Performed by: INTERNAL MEDICINE

## 2022-06-01 PROCEDURE — 1126F AMNT PAIN NOTED NONE PRSNT: CPT | Performed by: INTERNAL MEDICINE

## 2022-06-01 RX ORDER — PREDNISONE 1 MG/1
TABLET ORAL
COMMUNITY
Start: 2022-04-11 | End: 2022-07-28

## 2022-06-01 RX ORDER — IMMUNE GLOBULIN INFUSION (HUMAN) 100 MG/ML
100 INJECTION, SOLUTION INTRAVENOUS; SUBCUTANEOUS
COMMUNITY

## 2022-06-01 RX ORDER — AZATHIOPRINE 50 MG/1
100 TABLET ORAL 2 TIMES DAILY
COMMUNITY
Start: 2022-04-11

## 2022-06-01 RX ORDER — FLUOXETINE HYDROCHLORIDE 20 MG/1
20 CAPSULE ORAL DAILY
Qty: 90 CAPSULE | Refills: 3 | Status: SHIPPED | OUTPATIENT
Start: 2022-06-01 | End: 2022-07-28

## 2022-06-01 NOTE — PROGRESS NOTES
The ABCs of the Annual Wellness Visit  Subsequent Medicare Wellness Visit    Chief Complaint   Patient presents with   • Annual Exam   • Medicare Wellness-subsequent   • Hypertension   • Hyperlipidemia   • Hypothyroidism      Subjective    History of Present Illness:  Dara Medina is a 67 y.o. female who presents for a Subsequent Medicare Wellness Visit.    The following data was reviewed by: Randi Yang MD on 06/01/2022:  Common labs    Common Labsle 11/30/21 11/30/21 11/30/21 1/26/22 5/25/22 5/25/22 5/25/22    1017 1017 1017  0807 0807 0807   Glucose 83   138 (A)  93    BUN 15   13  16    Creatinine 1.11 (A)   1.08 (A)  0.99    eGFR Non African Am 52 (A)   51 (A)      eGFR African Am 60         Sodium 143   144  145 (A)    Potassium 4.9   4.6  5.2    Chloride 110 (A)   111 (A)  110 (A)    Calcium 9.6   9.5  9.8    Total Protein 6.7     7.0    Albumin 4.3   3.70  4.0    Total Bilirubin 0.4   0.4  0.5    Alkaline Phosphatase 84   87  51    AST (SGOT) 19   22  25    ALT (SGPT) 16   34 (A)  31    WBC   7.3  4.2     Hemoglobin   14.1  13.4     Hematocrit   42.6  40.2     Platelets   271  247     Total Cholesterol  164     137   Triglycerides  116     68   HDL Cholesterol  62     58   LDL Cholesterol   81     65   (A) Abnormal value       Comments are available for some flowsheets but are not being displayed.           HLD.  Control is good.    Hypothyroidism.  Control is good.    Prediabetes.  BS is well control.  Situational depression.  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  It is very stressful.  She feels depression from this.    CVIG.  No current issues.  Pulmonary fibrosis.  Breathing is good.      The following portions of the patient's history were reviewed and   updated as appropriate: allergies, current medications, past family history, past medical history, past social history, past surgical history and problem list.    Compared to one year ago, the patient feels her physical   health is the  "same.    Compared to one year ago, the patient feels her mental   health is the same.    Recent Hospitalizations:  She was not admitted to the hospital during the last year.       Current Medical Providers:  Patient Care Team:  Randi Yang MD as PCP - General (Internal Medicine)  Howard Contreras MD as Consulting Physician (Rheumatology)  Florentin Camara MD (Pulmonary Disease)  Ivory Noel MD PhD as Consulting Physician (Hematology and Oncology)  Randi Yang MD as Referring Physician (Internal Medicine)  Isael Chowdhury MD as Consulting Physician (Allergy)    Outpatient Medications Prior to Visit   Medication Sig Dispense Refill   • atorvastatin (LIPITOR) 20 MG tablet TAKE 1 TABLET BY MOUTH DAILY. 90 tablet 3   • azaTHIOprine (IMURAN) 50 MG tablet Take 100 mg by mouth 2 (Two) Times a Day.     • B Complex-C (B COMPLEX-B12-C IJ) Inject 1,000 mcg under the skin into the appropriate area as directed Every 30 (Thirty) Days.     • buPROPion XL (WELLBUTRIN XL) 300 MG 24 hr tablet TAKE 1 TABLET BY MOUTH EVERY DAY 90 tablet 3   • cyanocobalamin 1000 MCG/ML injection Inject 1 mL into the appropriate muscle as directed by prescriber Every 30 (Thirty) Days. 3 mL 3   • cyclobenzaprine (FLEXERIL) 10 MG tablet TAKE 1 TABLET BY MOUTH THREE TIMES A DAY AS NEEDED     • immune globulin, human, (Gammagard) 10 GM/100ML solution infusion Infuse 100 mL into a venous catheter.     • predniSONE (DELTASONE) 5 MG tablet 3 po qd for 4 days, 2 po qd for 1 week, 1 po qd 2 weeks then stop. **Extra for flare**     • Synthroid 112 MCG tablet Take 1 tablet by mouth Daily. 90 tablet 1   • Syringe/Needle, Disp, (BD SafetyGlide Syringe/Needle) 27G X 5/8\" 1 ML misc USE ONCE MONTHLY FOR B12 INJECTIONS. 3 each 3   • azithromycin (Zithromax Z-Juliocesar) 250 MG tablet Take 2 tablets the first day, then 1 tablet daily for 4 days. 6 tablet 0   • COPPER CAPS 2 MG capsule Take 4 mg by mouth 2 (Two) Times a Day. (Patient taking differently: Take 6 " "mg by mouth 2 (Two) Times a Day.) 360 capsule 3   • escitalopram (Lexapro) 10 MG tablet Take 1 tablet by mouth Daily. 90 tablet 3   • fluconazole (Diflucan) 150 MG tablet Take one and repeat in three days. 2 tablet 0   • folic acid (FOLVITE) 1 MG tablet TAKE 1 TABLET BY MOUTH EVERY DAY     • topiramate (TOPAMAX) 100 MG tablet Take 1 tablet by mouth 2 (two) times a day. 60 tablet 11     No facility-administered medications prior to visit.       No opioid medication identified on active medication list. I have reviewed chart for other potential  high risk medication/s and harmful drug interactions in the elderly.          Aspirin is not on active medication list.  Aspirin use is not indicated based on review of current medical condition/s. Risk of harm outweighs potential benefits.  .    Patient Active Problem List   Diagnosis   • Hypothyroidism   • Sensory neuropathy   • Osteoporosis   • Vitamin D deficiency   • Common variable immunodeficiency (HCC)   • Pulmonary fibrosis (HCC)   • Iron deficiency anemia   • Pernicious anemia   • Prediabetes   • Tear of medial meniscus of right knee, current   • Mixed hyperlipidemia   • Primary osteoarthritis of right knee   • Adverse effect of iron or its compound, sequela   • Vitamin B12 deficiency   • Leukemoid reaction   • Macrocytosis without anemia   • Acquired lymphocytopenia   • Chronic idiopathic constipation   • Chronic copper deficiency   • IgG monoclonal gammopathy of uncertain significance   • Personal history of colonic polyps   • Situational depression   • Other common variable immunodeficiencies (HCC)     Advance Care Planning  Advance Directive is not on file.  ACP discussion was held with the patient during this visit. Patient has an advance directive (not in EMR), copy requested.          Objective    Vitals:    06/01/22 0740   BP: 110/68   Pulse: 97   Resp: 14   Temp: 96.4 °F (35.8 °C)   SpO2: 96%   Weight: 79.4 kg (175 lb)   Height: 165.1 cm (65\")   PainSc: 0-No " pain     Body mass index is 29.12 kg/m².  BMI is >= 25 and < 30. (Overweight) The following options were offered after discussion: exercise counseling/recommendations    Does the patient have evidence of cognitive impairment? No    Physical Exam  Lab Results   Component Value Date    CHLPL 137 2022    TRIG 68 2022    HDL 58 2022    LDL 65 2022    VLDL 14 2022            HEALTH RISK ASSESSMENT    Smoking Status:  Social History     Tobacco Use   Smoking Status Former Smoker   • Packs/day: 0.50   • Years: 5.00   • Pack years: 2.50   • Types: Cigarettes   • Start date: 1974   • Quit date: 1978   • Years since quittin.1   Smokeless Tobacco Never Used   Tobacco Comment    QUIT AGE 23     Alcohol Consumption:  Social History     Substance and Sexual Activity   Alcohol Use No    Comment: STOPPED      Fall Risk Screen:    SAMINA Fall Risk Assessment was completed, and patient is at LOW risk for falls.Assessment completed on:2022    Depression Screening:  PHQ-2/PHQ-9 Depression Screening 2022   Retired PHQ-9 Total Score -   Retired Total Score -   Little Interest or Pleasure in Doing Things 1-->several days   Feeling Down, Depressed or Hopeless 1-->several days   Trouble Falling or Staying Asleep, or Sleeping Too Much 0-->not at all   Feeling Tired or Having Little Energy 0-->not at all   Poor Appetite or Overeating 0-->not at all   Feeling Bad about Yourself - or that You are a Failure or Have Let Yourself or Your Family Down 0-->not at all   Trouble Concentrating on Things, Such as Reading the Newspaper or Watching Television 1-->several days   Moving or Speaking So Slowly that Other People Could Have Noticed? Or the Opposite - Being So Fidgety 0-->not at all   Thoughts that You Would be Better Off Dead or of Hurting Yourself in Some Way 0-->not at all   PHQ-9: Brief Depression Severity Measure Score 3   If You Checked Off Any Problems, How Difficult Have These  Problems Made It For You to Do Your Work, Take Care of Things at Home, or Get Along with Other People? not difficult at all       Health Habits and Functional and Cognitive Screening:  Functional & Cognitive Status 6/1/2022   Do you have difficulty preparing food and eating? No   Do you have difficulty bathing yourself, getting dressed or grooming yourself? No   Do you have difficulty using the toilet? No   Do you have difficulty moving around from place to place? No   Do you have trouble with steps or getting out of a bed or a chair? No   Current Diet Well Balanced Diet   Dental Exam Up to date   Eye Exam Up to date   Exercise (times per week) 0 times per week   Current Exercises Include No Regular Exercise   Current Exercise Activities Include -   Do you need help using the phone?  No   Are you deaf or do you have serious difficulty hearing?  No   Do you need help with transportation? No   Do you need help shopping? No   Do you need help preparing meals?  No   Do you need help with housework?  No   Do you need help with laundry? No   Do you need help taking your medications? No   Do you need help managing money? No   Do you ever drive or ride in a car without wearing a seat belt? No   Have you felt unusual stress, anger or loneliness in the last month? No   Who do you live with? Other   If you need help, do you have trouble finding someone available to you? No   Have you been bothered in the last four weeks by sexual problems? No   Do you have difficulty concentrating, remembering or making decisions? -       Age-appropriate Screening Schedule:  Refer to the list below for future screening recommendations based on patient's age, sex and/or medical conditions. Orders for these recommended tests are listed in the plan section. The patient has been provided with a written plan.    Health Maintenance   Topic Date Due   • INFLUENZA VACCINE  08/01/2022   • LIPID PANEL  05/25/2023   • MAMMOGRAM  08/26/2023   • DXA SCAN   12/16/2023   • TDAP/TD VACCINES (2 - Td or Tdap) 10/23/2027   • ZOSTER VACCINE  Addressed   • PAP SMEAR  Discontinued              Assessment & Plan   CMS Preventative Services Quick Reference  Risk Factors Identified During Encounter  Immunizations Discussed/Encouraged (specific Immunizations; COVID19  The above risks/problems have been discussed with the patient.  Follow up actions/plans if indicated are seen below in the Assessment/Plan Section.  Pertinent information has been shared with the patient in the After Visit Summary.    Diagnoses and all orders for this visit:    1. Medicare annual wellness visit, subsequent (Primary)    2. Mixed hyperlipidemia    3. Acquired hypothyroidism    4. Prediabetes    5. Situational depression    6. Other osteoporosis, unspecified pathological fracture presence    7. Other common variable immunodeficiencies (HCC)    8. Pulmonary fibrosis (HCC)    Other orders  -     FLUoxetine (PROzac) 20 MG capsule; Take 1 capsule by mouth Daily.  Dispense: 90 capsule; Refill: 3    HLD.  Control is good.  The patient is advised to continue current dosage of atorvastatin.    Hypothyroidism.  Control is good.  The patient is advised to continue current dosage of levothyroxine.    Prediabetes.  Continue healthy diet.  Situational depression.  Trial or adding Prozac.    OP.  Continue Prolia.    CVIG.  Doing well on shots.    Pulmonary fibrosis.  Disease is stable.  Followed by pulmonary.      Follow Up:   Return in about 6 months (around 12/1/2022) for Recheck.     An After Visit Summary and PPPS were made available to the patient.        Patient has been erroneously marked as diabetic. Based on the available clinical information, she does not have diabetes and should therefore be excluded from diabetic health maintenance and quality measures for the remainder of the reporting period.

## 2022-06-01 NOTE — PATIENT INSTRUCTIONS
Medicare Wellness  Personal Prevention Plan of Service     Date of Office Visit:    Encounter Provider:  Randi Yang MD  Place of Service:  [facility]  Patient Name: Dara Medina  :  1955    As part of the Medicare Wellness portion of your visit today, we are providing you with this personalized preventive plan of services (PPPS). This plan is based upon recommendations of the [place] Preventive Services Task Force (USPSTF) and the Advisory Committee on Immunization Practices (ACIP).    This lists the preventive care services that should be considered, and provides dates of when you are due. Items listed as completed are up-to-date and do not require any further intervention.    Health Maintenance   Topic Date Due    ANNUAL WELLNESS VISIT  2022    INFLUENZA VACCINE  2022    Pneumococcal Vaccine 65+ (4 - PPSV23 or PCV20) 10/02/2022    LIPID PANEL  2023    MAMMOGRAM  2023    DXA SCAN  2023    COLORECTAL CANCER SCREENING  2026    TDAP/TD VACCINES (2 - Td or Tdap) 10/23/2027    HEPATITIS C SCREENING  Completed    ZOSTER VACCINE  Addressed    COVID-19 Vaccine  Discontinued    PAP SMEAR  Discontinued       No orders of the defined types were placed in this encounter.      Return in about 6 months (around 2022) for Recheck.

## 2022-06-15 ENCOUNTER — TRANSCRIBE ORDERS (OUTPATIENT)
Dept: ADMINISTRATIVE | Facility: HOSPITAL | Age: 67
End: 2022-06-15

## 2022-06-15 ENCOUNTER — INFUSION (OUTPATIENT)
Dept: ONCOLOGY | Facility: HOSPITAL | Age: 67
End: 2022-06-15

## 2022-06-15 VITALS
HEIGHT: 65 IN | SYSTOLIC BLOOD PRESSURE: 123 MMHG | DIASTOLIC BLOOD PRESSURE: 73 MMHG | HEART RATE: 87 BPM | RESPIRATION RATE: 20 BRPM | WEIGHT: 174.2 LBS | OXYGEN SATURATION: 98 % | BODY MASS INDEX: 29.02 KG/M2 | TEMPERATURE: 96.6 F

## 2022-06-15 DIAGNOSIS — D83.8 OTHER COMMON VARIABLE IMMUNODEFICIENCIES: Primary | ICD-10-CM

## 2022-06-15 DIAGNOSIS — D83.9 COMMON VARIABLE IMMUNODEFICIENCY: ICD-10-CM

## 2022-06-15 DIAGNOSIS — I27.20 PHT (PULMONARY HYPERTENSION): Primary | ICD-10-CM

## 2022-06-15 PROCEDURE — 25010000002 IMMUNE GLOBULIN (HUMAN) 20 GM/200ML SOLUTION: Performed by: ALLERGY & IMMUNOLOGY

## 2022-06-15 PROCEDURE — 96366 THER/PROPH/DIAG IV INF ADDON: CPT

## 2022-06-15 PROCEDURE — 96365 THER/PROPH/DIAG IV INF INIT: CPT

## 2022-06-15 RX ORDER — ACETAMINOPHEN 325 MG/1
650 TABLET ORAL ONCE
Status: CANCELLED | OUTPATIENT
Start: 2022-07-13

## 2022-06-15 RX ORDER — ACETAMINOPHEN 500 MG
500 TABLET ORAL ONCE AS NEEDED
Status: CANCELLED
Start: 2022-07-13

## 2022-06-15 RX ORDER — DIPHENHYDRAMINE HYDROCHLORIDE 50 MG/ML
50 INJECTION INTRAMUSCULAR; INTRAVENOUS AS NEEDED
Status: CANCELLED | OUTPATIENT
Start: 2022-07-13

## 2022-06-15 RX ORDER — EPINEPHRINE 0.3 MG/.3ML
0.3 INJECTION SUBCUTANEOUS ONCE
Status: CANCELLED
Start: 2022-07-13 | End: 2022-07-13

## 2022-06-15 RX ORDER — DIPHENHYDRAMINE HCL 25 MG
25 CAPSULE ORAL ONCE
Status: CANCELLED | OUTPATIENT
Start: 2022-07-13

## 2022-06-15 RX ADMIN — IMMUNE GLOBULIN (HUMAN) 40 G: 10 INJECTION INTRAVENOUS; SUBCUTANEOUS at 08:43

## 2022-06-22 ENCOUNTER — HOSPITAL ENCOUNTER (OUTPATIENT)
Dept: CARDIOLOGY | Facility: HOSPITAL | Age: 67
Discharge: HOME | End: 2022-06-22
Admitting: INTERNAL MEDICINE

## 2022-06-22 VITALS
SYSTOLIC BLOOD PRESSURE: 121 MMHG | BODY MASS INDEX: 28.99 KG/M2 | HEART RATE: 95 BPM | HEIGHT: 65 IN | WEIGHT: 174 LBS | DIASTOLIC BLOOD PRESSURE: 73 MMHG

## 2022-06-22 DIAGNOSIS — I27.20 PHT (PULMONARY HYPERTENSION): ICD-10-CM

## 2022-06-22 LAB
AORTIC DIMENSIONLESS INDEX: 0.9 (DI)
BH CV ECHO MEAS - AO MAX PG: 5.8 MMHG
BH CV ECHO MEAS - AO MEAN PG: 3.6 MMHG
BH CV ECHO MEAS - AO V2 MAX: 120.2 CM/SEC
BH CV ECHO MEAS - AO V2 VTI: 23.6 CM
BH CV ECHO MEAS - AVA(I,D): 2.45 CM2
BH CV ECHO MEAS - CONTRAST EF 4CH: 60 CM2
BH CV ECHO MEAS - EDV(CUBED): 55.4 ML
BH CV ECHO MEAS - EDV(MOD-SP2): 93 ML
BH CV ECHO MEAS - EDV(MOD-SP4): 73 ML
BH CV ECHO MEAS - EF(MOD-BP): 60 %
BH CV ECHO MEAS - EF(MOD-SP2): 50.5 %
BH CV ECHO MEAS - EF(MOD-SP4): 65.8 %
BH CV ECHO MEAS - ESV(CUBED): 17 ML
BH CV ECHO MEAS - ESV(MOD-SP2): 46 ML
BH CV ECHO MEAS - ESV(MOD-SP4): 25 ML
BH CV ECHO MEAS - FS: 32.5 %
BH CV ECHO MEAS - IVS/LVPW: 0.78 CM
BH CV ECHO MEAS - IVSD: 1.04 CM
BH CV ECHO MEAS - LAT PEAK E' VEL: 8.4 CM/SEC
BH CV ECHO MEAS - LV DIASTOLIC VOL/BSA (35-75): 39.2 CM2
BH CV ECHO MEAS - LV MASS(C)D: 150.4 GRAMS
BH CV ECHO MEAS - LV MAX PG: 3.9 MMHG
BH CV ECHO MEAS - LV MEAN PG: 2.21 MMHG
BH CV ECHO MEAS - LV SYSTOLIC VOL/BSA (12-30): 13.4 CM2
BH CV ECHO MEAS - LV V1 MAX: 98.5 CM/SEC
BH CV ECHO MEAS - LV V1 VTI: 20.9 CM
BH CV ECHO MEAS - LVIDD: 3.8 CM
BH CV ECHO MEAS - LVIDS: 2.6 CM
BH CV ECHO MEAS - LVOT AREA: 2.8 CM2
BH CV ECHO MEAS - LVOT DIAM: 1.88 CM
BH CV ECHO MEAS - LVPWD: 1.33 CM
BH CV ECHO MEAS - MED PEAK E' VEL: 6.6 CM/SEC
BH CV ECHO MEAS - MV A DUR: 0.11 SEC
BH CV ECHO MEAS - MV A MAX VEL: 103.7 CM/SEC
BH CV ECHO MEAS - MV DEC SLOPE: 224.9 CM/SEC2
BH CV ECHO MEAS - MV DEC TIME: 310 MSEC
BH CV ECHO MEAS - MV E MAX VEL: 72 CM/SEC
BH CV ECHO MEAS - MV E/A: 0.69
BH CV ECHO MEAS - MV MAX PG: 3.4 MMHG
BH CV ECHO MEAS - MV MEAN PG: 1.74 MMHG
BH CV ECHO MEAS - MV P1/2T: 88.9 MSEC
BH CV ECHO MEAS - MV V2 VTI: 20.7 CM
BH CV ECHO MEAS - MVA(P1/2T): 2.48 CM2
BH CV ECHO MEAS - MVA(VTI): 2.8 CM2
BH CV ECHO MEAS - PA ACC TIME: 0.1 SEC
BH CV ECHO MEAS - PA PR(ACCEL): 32.7 MMHG
BH CV ECHO MEAS - PA V2 MAX: 90.9 CM/SEC
BH CV ECHO MEAS - PULM A REVS DUR: 0.15 SEC
BH CV ECHO MEAS - PULM A REVS VEL: 37.7 CM/SEC
BH CV ECHO MEAS - PULM DIAS VEL: 24 CM/SEC
BH CV ECHO MEAS - PULM S/D: 2.05
BH CV ECHO MEAS - PULM SYS VEL: 49.3 CM/SEC
BH CV ECHO MEAS - RAP SYSTOLE: 3 MMHG
BH CV ECHO MEAS - RV MAX PG: 1.36 MMHG
BH CV ECHO MEAS - RV V1 MAX: 58.3 CM/SEC
BH CV ECHO MEAS - RV V1 VTI: 13.2 CM
BH CV ECHO MEAS - SI(MOD-SP2): 25.2 ML/M2
BH CV ECHO MEAS - SI(MOD-SP4): 25.7 ML/M2
BH CV ECHO MEAS - SV(LVOT): 57.8 ML
BH CV ECHO MEAS - SV(MOD-SP2): 47 ML
BH CV ECHO MEAS - SV(MOD-SP4): 48 ML
BH CV ECHO MEAS - TAPSE (>1.6): 1.97 CM
BH CV ECHO MEASUREMENTS AVERAGE E/E' RATIO: 9.6
BH CV VAS BP RIGHT ARM: NORMAL MMHG
BH CV XLRA - RV BASE: 3.4 CM
BH CV XLRA - RV LENGTH: 7.5 CM
BH CV XLRA - RV MID: 2.7 CM
BH CV XLRA - TDI S': 12 CM/SEC
LEFT ATRIUM VOLUME INDEX: 29.8 ML/M2
MAXIMAL PREDICTED HEART RATE: 153 BPM
SINUS: 2.7 CM
STRESS TARGET HR: 130 BPM

## 2022-06-22 PROCEDURE — 93306 TTE W/DOPPLER COMPLETE: CPT

## 2022-06-22 PROCEDURE — 93306 TTE W/DOPPLER COMPLETE: CPT | Performed by: INTERNAL MEDICINE

## 2022-06-22 PROCEDURE — 25010000002 PERFLUTREN (DEFINITY) 8.476 MG IN SODIUM CHLORIDE (PF) 0.9 % 10 ML INJECTION: Performed by: INTERNAL MEDICINE

## 2022-06-22 RX ADMIN — SODIUM CHLORIDE 2 ML: 9 INJECTION INTRAMUSCULAR; INTRAVENOUS; SUBCUTANEOUS at 08:51

## 2022-07-12 ENCOUNTER — TRANSCRIBE ORDERS (OUTPATIENT)
Dept: ADMINISTRATIVE | Facility: HOSPITAL | Age: 67
End: 2022-07-12

## 2022-07-12 DIAGNOSIS — Z12.31 VISIT FOR SCREENING MAMMOGRAM: Primary | ICD-10-CM

## 2022-07-13 ENCOUNTER — INFUSION (OUTPATIENT)
Dept: ONCOLOGY | Facility: HOSPITAL | Age: 67
End: 2022-07-13

## 2022-07-13 VITALS
SYSTOLIC BLOOD PRESSURE: 140 MMHG | TEMPERATURE: 97.2 F | HEIGHT: 65 IN | OXYGEN SATURATION: 98 % | RESPIRATION RATE: 18 BRPM | WEIGHT: 172.4 LBS | DIASTOLIC BLOOD PRESSURE: 64 MMHG | BODY MASS INDEX: 28.72 KG/M2 | HEART RATE: 90 BPM

## 2022-07-13 DIAGNOSIS — D83.8 OTHER COMMON VARIABLE IMMUNODEFICIENCIES: Primary | ICD-10-CM

## 2022-07-13 DIAGNOSIS — D83.9 COMMON VARIABLE IMMUNODEFICIENCY: ICD-10-CM

## 2022-07-13 PROCEDURE — 96366 THER/PROPH/DIAG IV INF ADDON: CPT

## 2022-07-13 PROCEDURE — 25010000002 IMMUNE GLOBULIN (HUMAN) 20 GM/200ML SOLUTION: Performed by: ALLERGY & IMMUNOLOGY

## 2022-07-13 PROCEDURE — 96365 THER/PROPH/DIAG IV INF INIT: CPT

## 2022-07-13 RX ORDER — EPINEPHRINE 0.3 MG/.3ML
0.3 INJECTION SUBCUTANEOUS ONCE
Status: CANCELLED
Start: 2022-08-10 | End: 2022-08-10

## 2022-07-13 RX ORDER — DIPHENHYDRAMINE HYDROCHLORIDE 50 MG/ML
50 INJECTION INTRAMUSCULAR; INTRAVENOUS AS NEEDED
Status: CANCELLED | OUTPATIENT
Start: 2022-08-10

## 2022-07-13 RX ORDER — DIPHENHYDRAMINE HCL 25 MG
25 CAPSULE ORAL ONCE
Status: CANCELLED | OUTPATIENT
Start: 2022-08-10

## 2022-07-13 RX ORDER — ACETAMINOPHEN 325 MG/1
650 TABLET ORAL ONCE
Status: CANCELLED | OUTPATIENT
Start: 2022-08-10

## 2022-07-13 RX ORDER — ACETAMINOPHEN 500 MG
500 TABLET ORAL ONCE AS NEEDED
Status: CANCELLED
Start: 2022-08-10

## 2022-07-13 RX ADMIN — IMMUNE GLOBULIN (HUMAN) 40 G: 10 INJECTION INTRAVENOUS; SUBCUTANEOUS at 08:30

## 2022-07-18 RX ORDER — BUPROPION HYDROCHLORIDE 300 MG/1
300 TABLET ORAL DAILY
Qty: 90 TABLET | Refills: 3 | Status: SHIPPED | OUTPATIENT
Start: 2022-07-18

## 2022-07-28 ENCOUNTER — LAB (OUTPATIENT)
Dept: OTHER | Facility: HOSPITAL | Age: 67
End: 2022-07-28

## 2022-07-28 ENCOUNTER — INFUSION (OUTPATIENT)
Dept: ONCOLOGY | Facility: HOSPITAL | Age: 67
End: 2022-07-28

## 2022-07-28 ENCOUNTER — OFFICE VISIT (OUTPATIENT)
Dept: ONCOLOGY | Facility: CLINIC | Age: 67
End: 2022-07-28

## 2022-07-28 VITALS
SYSTOLIC BLOOD PRESSURE: 123 MMHG | BODY MASS INDEX: 28.32 KG/M2 | OXYGEN SATURATION: 96 % | RESPIRATION RATE: 16 BRPM | HEART RATE: 83 BPM | DIASTOLIC BLOOD PRESSURE: 78 MMHG | HEIGHT: 65 IN | WEIGHT: 170 LBS | TEMPERATURE: 97.1 F

## 2022-07-28 DIAGNOSIS — D47.2 IGG MONOCLONAL GAMMOPATHY OF UNCERTAIN SIGNIFICANCE: ICD-10-CM

## 2022-07-28 DIAGNOSIS — E53.8 VITAMIN B12 DEFICIENCY: ICD-10-CM

## 2022-07-28 DIAGNOSIS — D50.9 IRON DEFICIENCY ANEMIA, UNSPECIFIED IRON DEFICIENCY ANEMIA TYPE: ICD-10-CM

## 2022-07-28 DIAGNOSIS — E61.0 CHRONIC COPPER DEFICIENCY: ICD-10-CM

## 2022-07-28 DIAGNOSIS — E53.8 VITAMIN B12 DEFICIENCY: Primary | ICD-10-CM

## 2022-07-28 DIAGNOSIS — M81.8 OTHER OSTEOPOROSIS, UNSPECIFIED PATHOLOGICAL FRACTURE PRESENCE: ICD-10-CM

## 2022-07-28 DIAGNOSIS — M81.8 OTHER OSTEOPOROSIS, UNSPECIFIED PATHOLOGICAL FRACTURE PRESENCE: Primary | ICD-10-CM

## 2022-07-28 LAB
ALBUMIN SERPL-MCNC: 4.1 G/DL (ref 3.5–5.2)
ALBUMIN/GLOB SERPL: 1.4 G/DL
ALP SERPL-CCNC: 55 U/L (ref 39–117)
ALT SERPL W P-5'-P-CCNC: 44 U/L (ref 1–33)
ANION GAP SERPL CALCULATED.3IONS-SCNC: 9.7 MMOL/L (ref 5–15)
AST SERPL-CCNC: 37 U/L (ref 1–32)
BASOPHILS # BLD AUTO: 0.03 10*3/MM3 (ref 0–0.2)
BASOPHILS NFR BLD AUTO: 0.6 % (ref 0–1.5)
BILIRUB SERPL-MCNC: 0.8 MG/DL (ref 0–1.2)
BUN SERPL-MCNC: 21 MG/DL (ref 8–23)
BUN/CREAT SERPL: 17.5 (ref 7–25)
CALCIUM SPEC-SCNC: 9.6 MG/DL (ref 8.6–10.5)
CHLORIDE SERPL-SCNC: 111 MMOL/L (ref 98–107)
CO2 SERPL-SCNC: 24.3 MMOL/L (ref 22–29)
CREAT SERPL-MCNC: 1.2 MG/DL (ref 0.57–1)
DEPRECATED RDW RBC AUTO: 52.6 FL (ref 37–54)
EGFRCR SERPLBLD CKD-EPI 2021: 49.7 ML/MIN/1.73
EOSINOPHIL # BLD AUTO: 0.09 10*3/MM3 (ref 0–0.4)
EOSINOPHIL NFR BLD AUTO: 1.9 % (ref 0.3–6.2)
ERYTHROCYTE [DISTWIDTH] IN BLOOD BY AUTOMATED COUNT: 13.3 % (ref 12.3–15.4)
FERRITIN SERPL-MCNC: 254.7 NG/ML (ref 13–150)
GLOBULIN UR ELPH-MCNC: 3 GM/DL
GLUCOSE SERPL-MCNC: 116 MG/DL (ref 65–99)
HCT VFR BLD AUTO: 40.9 % (ref 34–46.6)
HGB BLD-MCNC: 13.7 G/DL (ref 12–15.9)
IMM GRANULOCYTES # BLD AUTO: 0.02 10*3/MM3 (ref 0–0.05)
IMM GRANULOCYTES NFR BLD AUTO: 0.4 % (ref 0–0.5)
IRON 24H UR-MRATE: 118 MCG/DL (ref 37–145)
IRON SATN MFR SERPL: 35 % (ref 20–50)
LYMPHOCYTES # BLD AUTO: 0.44 10*3/MM3 (ref 0.7–3.1)
LYMPHOCYTES NFR BLD AUTO: 9.1 % (ref 19.6–45.3)
MAGNESIUM SERPL-MCNC: 1.9 MG/DL (ref 1.6–2.4)
MCH RBC QN AUTO: 35.9 PG (ref 26.6–33)
MCHC RBC AUTO-ENTMCNC: 33.5 G/DL (ref 31.5–35.7)
MCV RBC AUTO: 107.1 FL (ref 79–97)
MONOCYTES # BLD AUTO: 0.32 10*3/MM3 (ref 0.1–0.9)
MONOCYTES NFR BLD AUTO: 6.6 % (ref 5–12)
NEUTROPHILS NFR BLD AUTO: 3.93 10*3/MM3 (ref 1.7–7)
NEUTROPHILS NFR BLD AUTO: 81.4 % (ref 42.7–76)
NRBC BLD AUTO-RTO: 0 /100 WBC (ref 0–0.2)
PHOSPHATE SERPL-MCNC: 3.5 MG/DL (ref 2.5–4.5)
PLAT MORPH BLD: NORMAL
PLATELET # BLD AUTO: 266 10*3/MM3 (ref 140–450)
PMV BLD AUTO: 10.8 FL (ref 6–12)
POTASSIUM SERPL-SCNC: 5 MMOL/L (ref 3.5–5.2)
PROT SERPL-MCNC: 7.1 G/DL (ref 6–8.5)
RBC # BLD AUTO: 3.82 10*6/MM3 (ref 3.77–5.28)
SODIUM SERPL-SCNC: 145 MMOL/L (ref 136–145)
SPHEROCYTES BLD QL SMEAR: NORMAL
TIBC SERPL-MCNC: 337 MCG/DL (ref 298–536)
TRANSFERRIN SERPL-MCNC: 226 MG/DL (ref 200–360)
VIT B12 BLD-MCNC: 696 PG/ML (ref 211–946)
WBC MORPH BLD: NORMAL
WBC NRBC COR # BLD: 4.83 10*3/MM3 (ref 3.4–10.8)

## 2022-07-28 PROCEDURE — 83540 ASSAY OF IRON: CPT | Performed by: INTERNAL MEDICINE

## 2022-07-28 PROCEDURE — 83735 ASSAY OF MAGNESIUM: CPT | Performed by: INTERNAL MEDICINE

## 2022-07-28 PROCEDURE — 96372 THER/PROPH/DIAG INJ SC/IM: CPT

## 2022-07-28 PROCEDURE — 82784 ASSAY IGA/IGD/IGG/IGM EACH: CPT | Performed by: INTERNAL MEDICINE

## 2022-07-28 PROCEDURE — 83521 IG LIGHT CHAINS FREE EACH: CPT | Performed by: INTERNAL MEDICINE

## 2022-07-28 PROCEDURE — 85007 BL SMEAR W/DIFF WBC COUNT: CPT | Performed by: INTERNAL MEDICINE

## 2022-07-28 PROCEDURE — 84466 ASSAY OF TRANSFERRIN: CPT | Performed by: INTERNAL MEDICINE

## 2022-07-28 PROCEDURE — 84165 PROTEIN E-PHORESIS SERUM: CPT | Performed by: INTERNAL MEDICINE

## 2022-07-28 PROCEDURE — 80053 COMPREHEN METABOLIC PANEL: CPT | Performed by: INTERNAL MEDICINE

## 2022-07-28 PROCEDURE — 86334 IMMUNOFIX E-PHORESIS SERUM: CPT | Performed by: INTERNAL MEDICINE

## 2022-07-28 PROCEDURE — 99214 OFFICE O/P EST MOD 30 MIN: CPT | Performed by: INTERNAL MEDICINE

## 2022-07-28 PROCEDURE — 36415 COLL VENOUS BLD VENIPUNCTURE: CPT

## 2022-07-28 PROCEDURE — 82728 ASSAY OF FERRITIN: CPT | Performed by: INTERNAL MEDICINE

## 2022-07-28 PROCEDURE — 84100 ASSAY OF PHOSPHORUS: CPT | Performed by: INTERNAL MEDICINE

## 2022-07-28 PROCEDURE — 85025 COMPLETE CBC W/AUTO DIFF WBC: CPT | Performed by: INTERNAL MEDICINE

## 2022-07-28 PROCEDURE — 25010000002 DENOSUMAB 60 MG/ML SOLUTION PREFILLED SYRINGE: Performed by: INTERNAL MEDICINE

## 2022-07-28 PROCEDURE — 82607 VITAMIN B-12: CPT | Performed by: INTERNAL MEDICINE

## 2022-07-28 PROCEDURE — 82525 ASSAY OF COPPER: CPT | Performed by: INTERNAL MEDICINE

## 2022-07-28 RX ORDER — TOPIRAMATE 100 MG/1
100 TABLET, FILM COATED ORAL DAILY
COMMUNITY
End: 2022-11-15 | Stop reason: SDUPTHER

## 2022-07-28 RX ADMIN — DENOSUMAB 60 MG: 60 INJECTION SUBCUTANEOUS at 10:15

## 2022-07-28 NOTE — PROGRESS NOTES
REASON FOR FOLLOW UP:     1. Iron deficiency anemia, recurrent, not able to tolerate oral iron treatment due to severe constipation.  Patient was given PRBC transfusion and IV iron therapy in 2016.    · Patient has a recurrent severe iron deficiency in late August 2018, and was given Injectafer 2 doses in September 2018.  Had great response with normalization of hemoglobin and iron studies.  2. Persistent worsening macrocytosis.  Patient was found to having copper deficiency.    · Patient started on oral copper gluconate 2 mg once daily on 9/4/2019.    · The most recent laboratory study on 2/12/2020 reported persistent copper deficiency.  Copper gluconate increased to 2 mg twice a day.  · Patient is also on Imuran for rheumatoid disease.   · Copper was further increased to 6 mg twice a day on 2/11/2021.  3. Monoclonal gammopathy of undetermined significance, IgG lambda, unable to be quantified by SPEP on 9/23/2020.          HISTORY OF PRESENT ILLNESS:  The patient is a 67 y.o. female who is here for 6 month follow-up.      Patient reports she has been off Imuran for the past several months.    Patient reports she has been taking copper gluconate 6 mg twice a day for the past 6 months as I instructed.  She has been doing well.  She has no significant fatigue.  No fever sweating chills.  No bone pains.     Patient reports that she is at baseline condition.  Patient continuing one B12 injection monthly.  She is also taking oral folic acid as well as vitamin B complex daily.    Patient continues to follow-up with rheumatologist.     Laboratory studies today on 7/29/2021 reported normal hemoglobin 14.4 and improved MCV 99.6.  Normal platelets 277,000 and WBC 6500 including ANC 5380.  Her iron study reported ferritin 106 and iron saturation 28%.  Pending study results for serum copper, zinc, B12 level and serum paraprotein studies.      Patient now has improved and marginally normal copper level.  We will advise  patient to further increase oral copper gluconate to 8 mg twice a day.  I called and left a message for patient today.    Serum paraprotein studies are negative for monoclonal protein this time.        Past Medical History:   Diagnosis Date   • Acute colitis 1/18/2017   • Allergic Codeine, some antibiotics   • Anemia    • Cataract Removed    2012   • Chronic depression    • Colon polyp 1 removed    2016   • Fracture of rib    • GERD (gastroesophageal reflux disease)    • H/O Wrist fracture, right    • Headache    • History of bronchitis    • History of pneumonia    • History of transfusion    • Hyperlipidemia    • Hypothyroidism    • Inflammatory bowel disease    • Iron deficiency    • Irritable bowel syndrome    • Low serum copper for age 8/30/2019   • Migraine    • Obesity    • Osteopenia    • Osteoporosis    • Pneumonia June 2016   • RA (rheumatoid arthritis) ([facility])    • Sensory neuropathy    • Sepsis, unspecified organism (HCC) 1/18/2017   • Vitamin D deficiency      Past Surgical History:   Procedure Laterality Date   • ADENOIDECTOMY     • AUGMENTATION MAMMAPLASTY Bilateral 2000    removed about 2014   • BREAST AUGMENTATION     • BREAST IMPLANT REMOVAL Bilateral    • CARPAL TUNNEL RELEASE Left 2015   • COLONOSCOPY     • COLONOSCOPY N/A 09/03/2021    Procedure: COLONOSCOPY;  Surgeon: Prasad Celeste MD;  Location: [facility] OR;  Service: Gastroenterology;  Laterality: N/A;   • EYE SURGERY Bilateral    • FRACTURE SURGERY     • HYSTERECTOMY     • JOINT REPLACEMENT  2017    R knee   • LASIK Bilateral    • ORIF WRIST FRACTURE Right    • IN TOTAL KNEE ARTHROPLASTY Right 12/14/2017    Procedure: TOTAL KNEE ARTHROPLASTY;  Surgeon: Zion Guo MD;  Location: [facility] OR;  Service: Orthopedics   • SINUS SURGERY      x2   • TONSILLECTOMY         HEMATOLOGIC/ONCOLOGIC HISTORY:  The patient is a 63 y.o. year old femalewho is here for evaluation and management of her anemia. This patient has chronic  disease including rheumatoid arthritis. She was on methotrexate for about 11 years and currently on Imuran and also history for pernicious anemia/vitamin B12 deficiency undergoing monthly intramuscular injection, history of hyperlipidemia, hypothyroidism, gastroesophageal reflux disease and rheumatoid arthritis. The patient reports she was not able to tolerate oral iron treatment because of significant constipation. The last time she was taking oral iron was about 2-3 years ago at which time she had significant anemia and was given 2 units PRBC transfusion. She was also given intravenous iron therapy at that time. Patient reports she had GI evaluation by Dr. Celeste with both EGD and a colonoscopic examination a couple of years ago. I searched electronic medical records at [facility] and according to Dr. Celeste’s clinical note in 08/2016 and 11/2016, she had both EGD and colonoscopic examination supposedly in August; however, I could not find the procedure note itself. Nevertheless, patient reports she was told not having malignancy. I did find the pathology evaluation on 08/24/2016, which reported mild to focally moderate chronic active gastritis with intestinal metaplasia; negative for H. pylori. The small intestinal biopsy was benign. No signs for abnormal histology. The distal esophagus shows moderate chronic active inflammation and no intestinal metaplasia or dysplasia. The descending colon polyp was tubular adenoma with low-grade dysplasia. Patient reports no melena, no hematochezia. She does have pica for ice chips.     Patient reports she was diagnosed of rheumatoid arthritis in 2003. She was on methotrexate from 2003 to 2014. Subsequently treatment changed to Imuran. Patient reports she recently has been on large dose prednisone 40 mg daily for about 1 year and is in the process of tapering down of her prednisone. She also previously was given Rituxan treatment about 10 years ago. Most  recently about 4 months ago she was restarted back on Rituxan.     Patient also reports about 2 years ago, she had recurrent pneumonia and hospitalization and since that time she has been given IVIG regularly for the past 2 years and since that time she has been doing very well. No recurrent pneumonia.     Patient complains of significant fatigue, pica for ice chips. She also complains of exertional dyspnea. No cough or hemoptysis. She has also soreness on her tongue. She has vague intermittent abdominal pain and constipation. Denies melena or hematochezia. No nausea. No vomiting. She has some weight gain secondary to long-term oral steroid use. She also reports heat intolerance. Patient continues to have joint swelling and pain from her rheumatoid arthritis. She also has chronic low back pain. She reports intermittent headaches and also numbness involving her extremities mostly on hands but denies fainting or syncope. She has no easy bleeding or bruising.    I reviewed her laboratory results within the [facility] system. She had most recent iron study on 04/27/2018, which reported iron deficiency with ferritin 8.28 ng/mL, serum free iron 32 mcg/dL with no iron saturation. She had supratherapeutic folic acid level more than 20 ng/mL. However, had low normal vitamin B12 level 346 pg/mL. She had mild anemia; hemoglobin 10.7, MCV 92.2, MCHC 29.3. Her platelets were 506,000 and WBC 10,990 including neutrophils 20642, lymphocytes 780 and monocytes 410. Her chemistry lab updated earlier on 04/23/2018 reported creatinine 1.02. Normal electrolytes. Glucose 190. Marginally elevated alkaline phosphatase 118 and otherwise normal liver function panel. Total serum protein 7.2 and albumin 3.9. Had a normal TSH of 1.57.     On 01/22/2017, she had serum free iron 31, TIBC 463 and iron saturation 7% without ferritin level. Folic acid was more than 20 ng/mL and vitamin B12 was 1022 pg/mL. She had hemoglobin 11.4, MCV  85.1, MCHC 29.8. Her platelets were 487,000 and WBC was 23,700. Apparently, patient was hospitalized at that time at [facility] for about a week because of sepsis and acute colitis. Laboratory study on 06/06/2016 reported serum ferritin 9.0 ng/mL, free iron 22, TIBC 416 and iron saturation 5%. Her vitamin B12 level was 287 pg/mL and RBC folic acid was 1575 ng/mL. Her hemoglobin was 8.6, MCV 76.3, MCHC 29.4. Platelets were 617,000 and WBC 12,000. This patient had worsening hemoglobin on 06/07/2016 with hemoglobin 7.6 and she was given 2 units PRBC transfusion. Post transfusion hemoglobin was 10.4 on 06/08/2016. It was at that time the patient had hospitalization for pneumonia.    This patient has chronic iron deficiency for the past several years. She was not able to tolerate oral iron treatment because of severe constipation. She previously in 2016 received 2 units PRBC transfusion and intravenous iron therapy. She had workup with both EGD and colonoscopy in 08/2016 shortly after her discharge from hospital at that time. Procedure report was not able to be found, however, I reviewed pathology report from that procedure. There was chronic gastritis and esophagitis but no report of ulceration. She also had benign colon polyp.     Laboratory study on 8/29/2018 reported anemia Hb 10.3, platelets 412,000 and WBC 11,400 including in C 9800.  Iron study reported a ferritin less than 5 ng/mL, free iron 32 TIBC 454 and iron saturation 7%.    This patient is symptomatic with profound fatigue and pica for ice chips. I discussed with the patient, recommend stat iron study and arrange for her intravenous iron therapy with Injectafer 750 mg once a week for 2 doses in September 2018.      Patient had resolution of pica for ice chips after IV iron therapy.  Repeated laboratory study on 9/27/2018 reported normalized iron saturation 29%, and supratherapeutic ferritin 553 ng/mL.  She had a normalization of hemoglobin  12.4 and platelets 307,000.    Laboratory study on 8/26/2019 reported normal Hb 14.5 however worsening macrocytosis .0.  She has normal WBC 10,650, but elevated ANC 9950 and decreased lymphocytes 250.  She has normal platelets 317,000.  Her iron study was normal reporting ferritin 216, iron saturation 35%, slightly supratherapeutic B12 level at 1220 pg/mL, however her copper deficiency 68 mcg/dL.     Patient was started on copper gluconate 2 mg daily.    Patient was here on 2/19/2020 for 6-month re-evaluation of her iron deficiency anemia, copper deficiency and macrocytosis with laboratory review.  The patient presents by herself today.    The patient reports feeling well overall today.  She notes some neck pain, but denies any adenopathy.  She has not been checking herself regularly for adenopathy.    She continues on monthly Vitamin B12 injections.  She feels more energized right after receiving the B-12 injections but notes this is not long lasting.  She is also taking the oral copper gluconate 2 mg once daily with good tolerance.  She has a good performance status.      The patient is followed by rheumatology for her rheumatoid arthritis.  She has been on Rituxan for 2 years and oral Azathioprine 100 mg two times a day for the past 4-5 years, with good control of her RA.  She notes some joint swelling in her hands and fingers, but she denies any leg swelling.      Recent laboratory studies on 2/12/2020 showed persistent copper deficiency at 60 mcg/dL.  She has, however with normal hemoglobin 14.6.  She also has normal  her iron level reported ferritin 236, free iron 73 TIBC 281 and iron saturation 26%.  She has 0 and platelets 340,000.  Excellent RBC folate 1174 ng/mL.    Lab study on 11/20/2019 reported excellent vitamin B12 level more than 2000 pg/mL.     On 9/23/2019, patient was found to have a monoclonal gammopathy, IgG lambda subtype however unable to be quantified by SPEP.She had a normal  serum IgG 811 mg/dL, IgA 100, IgM 20.  No free light chains were measured.    Patient has persistently low copper level 60 mcg/dL around 2/12/2020.  Had a persistent macrocytosis .0 but a normal hemoglobin 14.6 and normal platelets and WBC.  Oral copper gluconate was increased to 2 mg twice a day.    Laboratory studies from 8/5/2020 reported normal CBC including WBC 4810 with ANC 4020 lymphocytes 300 monocytes 410, platelets 260,000, and hemoglobin 13.7, MCV 99.8 MCHC 33.7.  Her serum copper was marginally low at 69 mcg/dL, and unremarkable CMP.  Serum protein study was negative for monoclonal proteins by immunofixation and protein electrophoresis.  She had a mildly elevated serum free kappa chain 27.0 mg/L, normal lambda chain 19.5, normal serum IgG B 61, IgA 98 and IgM 28, beta-2 myoglobin 2.1 mg/L.     Laboratory studies on 1/27/2021 reported serum copper 58 mcg/dL, normal B12 level at 909 pg/mL and supratherapeutic folate more than 20 ng/mL.  Normal hemoglobin 14.4 however .6, normal platelets 250,000 and a WBC 5240 including ANC 4320 lymphocytes 430..  Chemistry lab reported creatinine 1.14, otherwise normal electrolytes, liver function panel, total protein 7.9 albumin 4.3 and globulin 3.6 and glucose 107.      We instructed patient on 2/11/2021 to increase oral copper gluconate to 6 mg twice a day       MEDICATIONS    Current Outpatient Medications:   •  atorvastatin (LIPITOR) 20 MG tablet, TAKE 1 TABLET BY MOUTH DAILY., Disp: 90 tablet, Rfl: 3  •  azaTHIOprine (IMURAN) 50 MG tablet, Take 100 mg by mouth 2 (Two) Times a Day. 150 mg daily, Disp: , Rfl:   •  B Complex-C (B COMPLEX-B12-C IJ), Inject 1,000 mcg under the skin into the appropriate area as directed Every 30 (Thirty) Days., Disp: , Rfl:   •  buPROPion XL (WELLBUTRIN XL) 300 MG 24 hr tablet, Take 1 tablet by mouth Daily., Disp: 90 tablet, Rfl: 3  •  cyanocobalamin 1000 MCG/ML injection, Inject 1 mL into the appropriate muscle as directed  "by prescriber Every 30 (Thirty) Days., Disp: 3 mL, Rfl: 3  •  cyclobenzaprine (FLEXERIL) 10 MG tablet, TAKE 1 TABLET BY MOUTH THREE TIMES A DAY AS NEEDED, Disp: , Rfl:   •  immune globulin, human, (Gammagard) 10 GM/100ML solution infusion, Infuse 100 mL into a venous catheter., Disp: , Rfl:   •  Synthroid 112 MCG tablet, Take 1 tablet by mouth Daily., Disp: 90 tablet, Rfl: 1  •  Syringe/Needle, Disp, (BD SafetyGlide Syringe/Needle) 27G X 5/8\" 1 ML misc, USE ONCE MONTHLY FOR B12 INJECTIONS., Disp: 3 each, Rfl: 3  •  topiramate (TOPAMAX) 100 MG tablet, Take 100 mg by mouth Daily., Disp: , Rfl:   •  FLUoxetine (PROzac) 20 MG capsule, Take 1 capsule by mouth Daily., Disp: 90 capsule, Rfl: 3    ALLERGIES:     Allergies   Allergen Reactions   • Clavulanic Acid Provider Review Needed and Unknown - High Severity   • Codeine Nausea And Vomiting and Unknown - High Severity       SOCIAL HISTORY:       Social History     Socioeconomic History   • Marital status:    • Number of children: 2   • Years of education: College   Tobacco Use   • Smoking status: Former Smoker     Packs/day: 0.50     Years: 5.00     Pack years: 2.50     Types: Cigarettes     Start date: 1974     Quit date: 1978     Years since quittin.3   • Smokeless tobacco: Never Used   • Tobacco comment: QUIT AGE 23   Substance and Sexual Activity   • Alcohol use: No     Comment: STOPPED    • Drug use: No   • Sexual activity: Not Currently     Partners: Male     Birth control/protection: None        FAMILY HISTORY:   Father diagnosed of colon cancer at age 64,  of colon cancer age 65.  Mother is in excellent health condition in her early 90s.  Patient has a brother and a sister both living excellent condition.  Patient has 2 adult children both in excellent health condition.    Family History   Problem Relation Age of Onset   • Hyperlipidemia Mother    • Hypertension Mother    • Migraines Mother    • Colon cancer Father    • Diabetes " "Father    • Cancer Father    • Hyperlipidemia Brother    • Migraines Brother    • Migraines Sister    • Malig Hyperthermia Neg Hx    • Breast cancer Neg Hx        REVIEW OF SYSTEMS:  Constitutional: Negative for appetite change, fatigue, fever and unexpected weight change.   HENT: Negative for mouth sores, rhinorrhea and sore throat.    Eyes: Negative for pain and visual disturbance.   Respiratory: Negative for cough and shortness of breath.    Cardiovascular: Negative for chest pain, palpitations and leg swelling.   Gastrointestinal: Negative for abdominal pain, blood in stool and nausea.   Endocrine: Positive for heat intolerance. Negative for polyphagia.   Genitourinary: Negative for dysuria and hematuria.   Musculoskeletal: Positive for arthralgias, joint swelling (hands) and neck pain.   Skin: Negative for rash.   Allergic/Immunologic: Positive for immunocompromised state (On Imuran treatment for her rheumatoid arthritis).   Hematological: Negative for adenopathy. Does not bruise/bleed easily.   Psychiatric/Behavioral: Negative for agitation.              Vitals:    07/28/22 0921   BP: 123/78   Pulse: 83   Resp: 16   Temp: 97.1 °F (36.2 °C)   TempSrc: Temporal   SpO2: 96%   Weight: 77.1 kg (170 lb)   Height: 165.1 cm (65\")   PainSc: 0-No pain     Current Status 1/26/2022   ECOG score 0      PHYSICAL EXAM:   GENERAL:  Well-developed, well-nourished female in no acute distress.    SKIN:  Warm, dry without rashes, purpura or petechiae.  HEENT:  Normocephalic.  Wearing mask.   LYMPHATICS:  No cervical, supraclavicular adenopathy.  CHEST: Normal respiratory effort.  Lungs clear to auscultation. Good airflow.  CARDIAC:  Regular rate and rhythm without murmurs. Normal S1,S2.  ABDOMEN:  Soft, nontender with no organomegaly or masses.  Bowel sounds normal.  EXTREMITIES:  No clubbing, cyanosis or edema.  NEUROLOGICAL:  Grossly intact.  No focal neurological deficits.  PSYCHIATRIC:  Normal affect and mood.      RECENT " LABS:  Lab Results   Component Value Date    WBC 4.83 07/28/2022    HGB 13.7 07/28/2022    HCT 40.9 07/28/2022    .1 (H) 07/28/2022     07/28/2022     Lab Results   Component Value Date    NEUTROABS 3.93 07/28/2022     Lab Results   Component Value Date    FOLATE >20.00 01/27/2021     Lab Results   Component Value Date    IRON 118 07/28/2022    TIBC 337 07/28/2022    FERRITIN 254.70 (H) 07/28/2022   Iron saturation 35% on 7/28/2022     Pending results for copper, zinc and paraprotein studies.    Serum copper 81 mcg/dL 7/29/2021  Serum copper 58 mcg/dL 1/27/2021.  Serum copper 69 mcg/dL 8/5/2020.  Serum copper 60 mcg/dL 2/12/2020,   Serum copper 68 mcg/dL on 8/26/2019.      Assessment & Plan      1. Recurrent iron deficiency in the past.   · She was not able to tolerate oral iron treatment because of severe constipation. She previously in 2016 received 2 units PRBC transfusion and intravenous iron therapy. She had workup with both EGD and colonoscopy in 08/2016 with pathology evaluation reported chronic gastritis and esophagitis but no report of ulceration.    · This patient is symptomatic with profound fatigue and pica for ice chips and recurrent iron deficiency anemia in August 2018.  We treated her with 2 doses of Injectafer in September 2018.   · This patient had resolution of pica for ice chips and improved energy level, normalization of hemoglobin after IV iron therapy.  Post treatment ferritin was 553 on 9/26/2018.  3/8/2019 laboratory study reported worsened but is still normal ferritin at 240 mg/mL, and good iron saturation 33%.   · On 08/26/2019, laboratory study reported ferritin at 216 and iron saturation of 35%.  · Laboratory studies on 2/12/2020 showed excellent ferritin 236 and iron saturation 26%.   · Maintains normal hemoglobin 13.7 on 8/5/2020.  No iron studies.   · Normal hemoglobin 14.4 on 1/27/2021.   · On 7/29/2021, patient has normal hemoglobin 14.4, MCV 99.6, and the normal iron  saturation 28%, ferritin 106 ng/mL.  · Lab study today on 7/28/2022 reported normal hemoglobin 13.7, however recurrent macrocytosis .1.  Iron study reported normal iron saturation 35%, and ferritin 255 ng/mL.  We are rechecking her copper level.    2. Pernicious anemia/vitamin B12 deficiency. This patient has monthly vitamin B12 injection.   I advised patient to start taking oral vitamin B12 at 1000 mcg daily.   · On 3/8/2019 her vitamin B12 level is 701 pg/mL, not as high as expected.  · On 08/26/2019, Vitamin B12 reported supratheraputicat 1,220, patient is to continue on Vitamin B12 injections.  · Patient had excellent supratherapeutic B12 level more than 2000 on 11/20/2019.  · Normal vitamin B12 level 688 pg/mL 5/20/2020.  She will continue vitamin B12 injections.  · Normal vitamin B12 level 909 pg/mL on 1/27/2021.  Continue monthly B12 injection.  · Recheck vitamin B12 level on 7/29/2021.  Patient continues on vitamin B12 monthly injection.  · As of 7/28/2022 patient reports monthly vitamin B12 injection.  Pending levels.    3.  Macrocytosis despite normal vitamin B12 level.  Copper deficiency.    · Her macrocytosis was newly developed in September 2018.  Patient reports no history of hepatitis or alcohol use.    · She previously had supratherapeutic folic acid level in April 2018.    · Macrocytosis needs to be monitored.  This could be caused by medication Imuran use for her rheumatoid arthritis  · I discussed with patient 3/8/2019.  Her previous CT scan examination for abdomen and pelvis on 1/18/2017 reported a mild fatty liver.  Not sure whether this is the cause of her macrocytosis.    · Laboratory study on 8/26/2019 reported serum copper level was low at 68.   · On 9/4/2019, I explained to the patient that she will need to take copper supplementation once daily.  Patient is agreeable.  We started her on copper gluconate 2 mg daily.   · Laboratory study on 2/12/2020 showed a lower serum copper level  at 60.    · Patient is compliant with copper gluconate.  We discussed with patient on 2/19/2020, we will increase the dose to 2 mg twice daily.  · 8/5/2020, copper 69 mcg/dL, still low.  We discussed 8/13/2020, since the patient is still copper deficient at this time, we would increase her copper gluconate to 4 mg twice a day.  I also discussed with the patient the Imuran could cause macrocytosis.   · On 1/27/2021, copper level 58 mcg/dL.  However she has normal vitamin B12 level and a supratherapeutic folate level.  .6 and hemoglobin 14.4.  She also continues Imuran 100 mg twice a day.  This could also cause macrocytosis.  She was off oral copper, due to lack of supply from pharmacy, however it is being on OTC copper gluconate 4 mg twice a day for the past 3 months.  I discussed with patient today and recommended to increase his 6 mg twice a day.  She voiced understanding and agreeable.   · Marginally normal results 7/29/2021 with the copper 81 mcg/dL.   · On 7/28/2022 patient reports she was not able to tolerate oral copper gluconate anymore, with significant nausea and not feeling well.  So she stopped oral copper supplement shortly after last visit in 2021.      4.  Lymphocytopenia.  This is secondary to Rituxan treatment in October for rheumatoid arthritis.  I reviewed medical records from her dermatologist office.   · Lymphocytes 430 on 1/27/2021.    · Lymphocytes 570 on 7/29/2021.    · Lymphocytes 440 on 7/28/2022.  Continue to monitor.    5.  Monoclonal gammopathy IgG lambda (MGUS) on 9/23/2019.  · She is positive for serum protein immunofixation, unable to be quantified by SPEP.  Had  normal serum IgG 811 mg/dL, IgA 100, IgM 20.  No free light chains were measured.    · Laboratory study on 8/5/2020 reported no detectable monoclonal proteins by immunofixation and protein electrophoresis.  There was marginally elevated serum kappa free light chain 27 mg/L, however no elevation of serum IgG/A/M or free  lambda chain.  We will monitor on annual basis.  · Laboratory study on 7/29/2021 was negative for serum protein immunofixation and protein electrophoresis.  No evidence for monoclonal gammopathy.    5.  Pulmonary fibrosis.  Patient reports she has been on Imuran 100 mg twice a day.  She follows pulmonologist routinely.  · Patient reports on 7/29/2021, and her Imuran has been discontinued within the past 6 months.  · On 7/28/2022 patient reports she was being started back on amiodarone 100 mg twice a day.  She continues taking vitamin B complex, and the vitamin B12 injection.      PLAN:   1. Pending results for serum copper, vitamin B12, and paraprotein studies.    2. If patient has recurrent copper deficiency, will need intravenous copper therapy.  3. Continue monthly injection of B12 at Dr. Yang's office.    4. Continue amiodarone and followed by pulmonary service.  5. Follow up with me in 12 months with labs CMP, B12, CBC, zinc and Copper, ferritin, and serum paraprotein studies.    Discussed with patient about laboratory results and further management plan.  She voiced understanding.      ALEJANDRO METCALF M.D., Ph.D.    7/28/2022      Addendum:    Component      Latest Ref Rng & Units 7/29/2021 5/25/2022 7/28/2022   IgG      586 - 1602 mg/dL 1162  1212   IgA      87 - 352 mg/dL 125  144   IgM      26 - 217 mg/dL 27  67   Total Protein      6.0 - 8.5 g/dL 6.9  6.5   Albumin      2.9 - 4.4 g/dL 3.7  3.7   Alpha-1-Globulin      0.0 - 0.4 g/dL 0.2  0.2   Alpha-2-Globulin      0.4 - 1.0 g/dL 0.9  0.7   Beta Globulin      0.7 - 1.3 g/dL 1.0  0.8   Gamma Globulin      0.4 - 1.8 g/dL 1.1  1.1   M-Dioni      Not Observed g/dL Not Observed  Not Observed   Globulin      2.2 - 3.9 g/dL 3.2  2.8   A/G Ratio      0.7 - 1.7 1.2  1.4   Immunofixation Reflex, Serum       Comment:  Comment:   Please note       Comment  Comment   Kappa FLC      3.3 - 19.4 mg/L 33.6 (H)  44.2 (H)   Free Lambda Light Chains      5.7 - 26.3 mg/L 23.3   28.3 (H)   Kappa/Lambda Ratio      0.26 - 1.65 1.44  1.56   Copper      80 - 158 ug/dL 81  67 (L)   Vitamin B-12      211 - 946 pg/mL  814 696       Negative serum protein immunofixation and the protein electrophoresis, maintains normal vitamin B12 level.  However patient has recurrent copper deficiency again.    Since she was not able to tolerate oral copper, we will try to arrange intravenous copper therapy.  We will check with the pharmacist.       ALEJANDRO METCALF M.D., Ph.D.    7/30/2022      CC: MD Howard Caraballo M.D. James E. McKiernan, M.D.

## 2022-07-29 ENCOUNTER — OFFICE VISIT (OUTPATIENT)
Dept: INTERNAL MEDICINE | Facility: CLINIC | Age: 67
End: 2022-07-29

## 2022-07-29 VITALS
HEART RATE: 86 BPM | OXYGEN SATURATION: 98 % | HEIGHT: 65 IN | WEIGHT: 170 LBS | SYSTOLIC BLOOD PRESSURE: 114 MMHG | BODY MASS INDEX: 28.32 KG/M2 | DIASTOLIC BLOOD PRESSURE: 74 MMHG

## 2022-07-29 DIAGNOSIS — H92.01 RIGHT EAR PAIN: ICD-10-CM

## 2022-07-29 DIAGNOSIS — F43.21 SITUATIONAL DEPRESSION: Primary | ICD-10-CM

## 2022-07-29 LAB
ALBUMIN SERPL ELPH-MCNC: 3.7 G/DL (ref 2.9–4.4)
ALBUMIN/GLOB SERPL: 1.4 {RATIO} (ref 0.7–1.7)
ALPHA1 GLOB SERPL ELPH-MCNC: 0.2 G/DL (ref 0–0.4)
ALPHA2 GLOB SERPL ELPH-MCNC: 0.7 G/DL (ref 0.4–1)
B-GLOBULIN SERPL ELPH-MCNC: 0.8 G/DL (ref 0.7–1.3)
GAMMA GLOB SERPL ELPH-MCNC: 1.1 G/DL (ref 0.4–1.8)
GLOBULIN SER-MCNC: 2.8 G/DL (ref 2.2–3.9)
IGA SERPL-MCNC: 144 MG/DL (ref 87–352)
IGG SERPL-MCNC: 1212 MG/DL (ref 586–1602)
IGM SERPL-MCNC: 67 MG/DL (ref 26–217)
INTERPRETATION SERPL IEP-IMP: ABNORMAL
KAPPA LC FREE SER-MCNC: 44.2 MG/L (ref 3.3–19.4)
KAPPA LC FREE/LAMBDA FREE SER: 1.56 {RATIO} (ref 0.26–1.65)
LABORATORY COMMENT REPORT: ABNORMAL
LAMBDA LC FREE SERPL-MCNC: 28.3 MG/L (ref 5.7–26.3)
M PROTEIN SERPL ELPH-MCNC: ABNORMAL G/DL
PROT SERPL-MCNC: 6.5 G/DL (ref 6–8.5)

## 2022-07-29 PROCEDURE — 99213 OFFICE O/P EST LOW 20 MIN: CPT | Performed by: INTERNAL MEDICINE

## 2022-07-29 RX ORDER — FLUOXETINE HYDROCHLORIDE 20 MG/1
20 CAPSULE ORAL DAILY
Qty: 90 CAPSULE | Refills: 3
Start: 2022-07-29

## 2022-07-29 NOTE — PROGRESS NOTES
Subjective     Dara Medina is a 67 y.o. female who presents with   Chief Complaint   Patient presents with   • Depression       History of Present Illness     C/o depression.  Mainly situational.  She cares for her mother who is 95 years old.      C/o right ear pain.  Going on one month.  Right ear pressure which comes and goes.  No fever.  No congestion.  No sore throat.       Review of Systems   HENT: Negative for sore throat.    Respiratory: Negative.    Cardiovascular: Negative.        The following portions of the patient's history were reviewed and updated as appropriate: allergies, current medications and problem list.    Patient Active Problem List    Diagnosis Date Noted   • Other common variable immunodeficiencies (HCC) 01/20/2022   • Situational depression 12/08/2021   • Personal history of colonic polyps 04/20/2021     Note Last Updated: 4/20/2021     Added automatically from request for surgery [id number]     • IgG monoclonal gammopathy of uncertain significance 02/20/2020   • Chronic copper deficiency 09/04/2019   • Chronic idiopathic constipation 06/18/2019   • Macrocytosis without anemia 03/09/2019   • Acquired lymphocytopenia 03/09/2019   • Adverse effect of iron or its compound, sequela 08/29/2018   • Vitamin B12 deficiency 08/29/2018   • Leukemoid reaction 08/29/2018   • Primary osteoarthritis of right knee 11/22/2017     Note Last Updated: 11/22/2017     Added automatically from request for surgery [id number]     • Mixed hyperlipidemia 05/05/2017   • Tear of medial meniscus of right knee, current 05/04/2017   • Prediabetes 04/12/2017   • Pulmonary fibrosis (HCC) 07/06/2016     Note Last Updated: 10/23/2017     Secondary to methotrexate.       • Iron deficiency anemia 07/06/2016   • Pernicious anemia 07/06/2016   • Common variable immunodeficiency (HCC) 07/01/2016   • Hypothyroidism 05/23/2016   • Sensory neuropathy 05/23/2016   • Osteoporosis 05/23/2016     Note Last Updated: 6/1/2022     H/o two year  "Forteo.  Not on bisphosphonates because of dental issues.  Fosamax in past caused stomach problems.  Start Evenity 5/2020 for one.year.  Prolia started 2022.       • Vitamin D deficiency 05/23/2016       Current Outpatient Medications on File Prior to Visit   Medication Sig Dispense Refill   • atorvastatin (LIPITOR) 20 MG tablet TAKE 1 TABLET BY MOUTH DAILY. 90 tablet 3   • azaTHIOprine (IMURAN) 50 MG tablet Take 100 mg by mouth 2 (Two) Times a Day. 150 mg daily     • B Complex-C (B COMPLEX-B12-C IJ) Inject 1,000 mcg under the skin into the appropriate area as directed Every 30 (Thirty) Days.     • buPROPion XL (WELLBUTRIN XL) 300 MG 24 hr tablet Take 1 tablet by mouth Daily. 90 tablet 3   • cyanocobalamin 1000 MCG/ML injection Inject 1 mL into the appropriate muscle as directed by prescriber Every 30 (Thirty) Days. 3 mL 3   • cyclobenzaprine (FLEXERIL) 10 MG tablet TAKE 1 TABLET BY MOUTH THREE TIMES A DAY AS NEEDED     • immune globulin, human, (Gammagard) 10 GM/100ML solution infusion Infuse 100 mL into a venous catheter.     • Synthroid 112 MCG tablet Take 1 tablet by mouth Daily. 90 tablet 1   • Syringe/Needle, Disp, (BD SafetyGlide Syringe/Needle) 27G X 5/8\" 1 ML misc USE ONCE MONTHLY FOR B12 INJECTIONS. 3 each 3   • topiramate (TOPAMAX) 100 MG tablet Take 100 mg by mouth Daily.       Current Facility-Administered Medications on File Prior to Visit   Medication Dose Route Frequency Provider Last Rate Last Admin   • [COMPLETED] denosumab (PROLIA) syringe 60 mg  60 mg Subcutaneous Once Randi Yang MD   60 mg at 07/28/22 1015       Objective     /74   Pulse 86   Ht 165.1 cm (65\")   Wt 77.1 kg (170 lb)   SpO2 98%   BMI 28.29 kg/m²     Physical Exam  Constitutional:       Appearance: She is well-developed.   HENT:      Head: Normocephalic and atraumatic.      Right Ear: Hearing and tympanic membrane normal.      Left Ear: Hearing and tympanic membrane normal.      Mouth/Throat:      Pharynx: No " oropharyngeal exudate or posterior oropharyngeal erythema.   Cardiovascular:      Rate and Rhythm: Normal rate and regular rhythm.      Heart sounds: Normal heart sounds.   Pulmonary:      Effort: Pulmonary effort is normal.      Breath sounds: Normal breath sounds.   Skin:     General: Skin is warm and dry.   Neurological:      Mental Status: She is alert and oriented to person, place, and time.   Psychiatric:         Behavior: Behavior normal.         Assessment & Plan   Diagnoses and all orders for this visit:    1. Situational depression (Primary)    2. Right ear pain    Other orders  -     FLUoxetine (PROzac) 20 MG capsule; Take 1 capsule by mouth Daily.  Dispense: 90 capsule; Refill: 3        Discussion    Situational depression.  Add Prozac.  Discussed with the patient onset on action and the potential side effects including nausea.  The patient will let me know of any side effects from the medication.      Right ear pain.  C/w eustachian tube dysfunction.  Add sudafed and Flonase as needed.  The patient is instructed to let our office know if not feeling better over the next several days or if there is any change in symptoms.             Future Appointments   Date Time Provider Department Center   8/10/2022  8:00 AM REFERRED INFUSION CHAIR 12 [facility] INFUS [facility]   8/29/2022 10:45 AM [facility] 2 [facility]   9/7/2022  8:00 AM REFERRED INFUSION CHAIR 12 [facility] INFUS [facility]   10/5/2022  8:00 AM REFERRED INFUSION CHAIR 12 [facility] INF[facility]   11/2/2022  8:00 AM REFERRED INFUSION CHAIR 12 [facility] INFUS EP [facility]   11/11/2022  8:50 AM [facility]   11/15/2022  9:30 AM Randi Yang MD [facility]   11/30/2022  8:00 AM REFERRED INFUSION CHAIR 12 [facility] INFUS [facility]   12/28/2022  8:00 AM REFERRED INFUSION CHAIR 12 EP [facility] INFUS EP [facility]   7/27/2023  9:00 AM LAB CHAIR 1 [facility]   7/27/2023  9:40 AM Ivory Noel MD PhD K [facility]

## 2022-07-30 PROBLEM — T39.1X5A: Status: ACTIVE | Noted: 2022-07-30

## 2022-07-30 LAB — COPPER SERPL-MCNC: 67 UG/DL (ref 80–158)

## 2022-08-01 ENCOUNTER — OFFICE VISIT (OUTPATIENT)
Dept: INTERNAL MEDICINE | Facility: CLINIC | Age: 67
End: 2022-08-01

## 2022-08-01 VITALS
BODY MASS INDEX: 28.32 KG/M2 | HEIGHT: 65 IN | HEART RATE: 88 BPM | SYSTOLIC BLOOD PRESSURE: 130 MMHG | DIASTOLIC BLOOD PRESSURE: 68 MMHG | OXYGEN SATURATION: 98 % | WEIGHT: 170 LBS

## 2022-08-01 DIAGNOSIS — N30.00 ACUTE CYSTITIS WITHOUT HEMATURIA: Primary | ICD-10-CM

## 2022-08-01 PROBLEM — R79.0 ABNORMAL BLOOD LEVEL OF COPPER: Status: ACTIVE | Noted: 2022-08-01

## 2022-08-01 PROBLEM — E61.0 COPPER DEFICIENCY: Status: ACTIVE | Noted: 2022-08-01

## 2022-08-01 LAB
BILIRUB BLD-MCNC: NEGATIVE MG/DL
CLARITY, POC: CLEAR
COLOR UR: ABNORMAL
EXPIRATION DATE: ABNORMAL
GLUCOSE UR STRIP-MCNC: NEGATIVE MG/DL
KETONES UR QL: NEGATIVE
LEUKOCYTE EST, POC: ABNORMAL
Lab: ABNORMAL
NITRITE UR-MCNC: NEGATIVE MG/ML
PH UR: 5.5 [PH] (ref 5–8)
PROT UR STRIP-MCNC: NEGATIVE MG/DL
RBC # UR STRIP: ABNORMAL /UL
SP GR UR: 1.02 (ref 1–1.03)
UROBILINOGEN UR QL: NORMAL

## 2022-08-01 PROCEDURE — 81003 URINALYSIS AUTO W/O SCOPE: CPT | Performed by: INTERNAL MEDICINE

## 2022-08-01 PROCEDURE — 99213 OFFICE O/P EST LOW 20 MIN: CPT | Performed by: INTERNAL MEDICINE

## 2022-08-01 RX ORDER — SULFAMETHOXAZOLE AND TRIMETHOPRIM 800; 160 MG/1; MG/1
1 TABLET ORAL 2 TIMES DAILY
Qty: 14 TABLET | Refills: 0 | Status: SHIPPED | OUTPATIENT
Start: 2022-08-01 | End: 2022-08-12

## 2022-08-01 NOTE — PROGRESS NOTES
Subjective     Dara Medina is a 67 y.o. female who presents with   Chief Complaint   Patient presents with   • Abdominal Pain   • Difficulty Urinating       History of Present Illness     Burning and urgency started this morning.  Suprapubic.  Mild back pain.  No fever.      Review of Systems   Gastrointestinal: Negative for nausea and vomiting.       The following portions of the patient's history were reviewed and updated as appropriate: allergies, current medications and problem list.    Patient Active Problem List    Diagnosis Date Noted   • Abnormal blood level of copper 08/01/2022   • Copper deficiency 08/01/2022   • Adverse effect of 4-aminophenol derivative 07/30/2022   • Other common variable immunodeficiencies (HCC) 01/20/2022   • Situational depression 12/08/2021   • Personal history of colonic polyps 04/20/2021     Note Last Updated: 4/20/2021     Added automatically from request for surgery [id number]     • IgG monoclonal gammopathy of uncertain significance 02/20/2020   • Chronic copper deficiency 09/04/2019   • Chronic idiopathic constipation 06/18/2019   • Macrocytosis without anemia 03/09/2019   • Acquired lymphocytopenia 03/09/2019   • Adverse effect of iron or its compound, sequela 08/29/2018   • Vitamin B12 deficiency 08/29/2018   • Leukemoid reaction 08/29/2018   • Primary osteoarthritis of right knee 11/22/2017     Note Last Updated: 11/22/2017     Added automatically from request for surgery [id number]     • Mixed hyperlipidemia 05/05/2017   • Tear of medial meniscus of right knee, current 05/04/2017   • Prediabetes 04/12/2017   • Pulmonary fibrosis (HCC) 07/06/2016     Note Last Updated: 10/23/2017     Secondary to methotrexate.       • Iron deficiency anemia 07/06/2016   • Pernicious anemia 07/06/2016   • Common variable immunodeficiency (HCC) 07/01/2016   • Hypothyroidism 05/23/2016   • Sensory neuropathy 05/23/2016   • Osteoporosis 05/23/2016     Note Last Updated: 6/1/2022     H/o two year  "Forteo.  Not on bisphosphonates because of dental issues.  Fosamax in past caused stomach problems.  Start Evenity 5/2020 for one.year.  Prolia started 2022.       • Vitamin D deficiency 05/23/2016       Current Outpatient Medications on File Prior to Visit   Medication Sig Dispense Refill   • atorvastatin (LIPITOR) 20 MG tablet TAKE 1 TABLET BY MOUTH DAILY. 90 tablet 3   • azaTHIOprine (IMURAN) 50 MG tablet Take 100 mg by mouth 2 (Two) Times a Day. 150 mg daily     • B Complex-C (B COMPLEX-B12-C IJ) Inject 1,000 mcg under the skin into the appropriate area as directed Every 30 (Thirty) Days.     • buPROPion XL (WELLBUTRIN XL) 300 MG 24 hr tablet Take 1 tablet by mouth Daily. 90 tablet 3   • cyanocobalamin 1000 MCG/ML injection Inject 1 mL into the appropriate muscle as directed by prescriber Every 30 (Thirty) Days. 3 mL 3   • cyclobenzaprine (FLEXERIL) 10 MG tablet TAKE 1 TABLET BY MOUTH THREE TIMES A DAY AS NEEDED     • FLUoxetine (PROzac) 20 MG capsule Take 1 capsule by mouth Daily. 90 capsule 3   • immune globulin, human, (Gammagard) 10 GM/100ML solution infusion Infuse 100 mL into a venous catheter.     • Synthroid 112 MCG tablet Take 1 tablet by mouth Daily. 90 tablet 1   • Syringe/Needle, Disp, (BD SafetyGlide Syringe/Needle) 27G X 5/8\" 1 ML misc USE ONCE MONTHLY FOR B12 INJECTIONS. 3 each 3   • topiramate (TOPAMAX) 100 MG tablet Take 100 mg by mouth Daily.       No current facility-administered medications on file prior to visit.       Objective     /68   Pulse 88   Ht 165.1 cm (65\")   Wt 77.1 kg (170 lb)   SpO2 98%   BMI 28.29 kg/m²     Physical Exam  Constitutional:       Appearance: She is well-developed.   HENT:      Head: Normocephalic and atraumatic.   Pulmonary:      Effort: Pulmonary effort is normal.   Abdominal:      General: There is no distension.      Palpations: Abdomen is soft. There is no mass.      Tenderness: There is no abdominal tenderness. There is no guarding or rebound. "   Neurological:      Mental Status: She is alert.         Assessment & Plan   Diagnoses and all orders for this visit:    1. Acute cystitis without hematuria (Primary)  -     POC Urinalysis Dipstick, Automated  -     Urine Culture - Urine, Urine, Clean Catch    Other orders  -     sulfamethoxazole-trimethoprim (Bactrim DS) 800-160 MG per tablet; Take 1 tablet by mouth 2 (Two) Times a Day.  Dispense: 14 tablet; Refill: 0        Discussion    Patient presents with symptoms of acute cystitis.  Urine dip is positive for markers of infection.  We will send for culture and follow up on that.  Prescription for antibiotics is sent in.  The patient is instructed to let our office know if not feeling better over the next several days or if there is any change in symptoms.          Future Appointments   Date Time Provider Department Center   8/10/2022  8:00 AM REFERRED INFUSION CHAIR 12 EP [facility] INFUS [facility]   8/29/2022 10:45 AM [facility] 2 [facility]MO [facility]   9/7/2022  8:00 AM REFERRED INFUSION CHAIR 12 EP [facility] INFUS EP [facility]   10/5/2022  8:00 AM REFERRED INFUSION CHAIR 12 EP [facility] INFUS EP [facility]   11/2/2022  8:00 AM REFERRED INFUSION CHAIR 12 EP [facility] INFUS EP [facility]   11/11/2022  8:50 AM [facility]   11/15/2022  9:30 AM Randi Yang MD [facility]   11/30/2022  8:00 AM REFERRED INFUSION CHAIR 12 EP [facility] INFUS EP [facility]   12/28/2022  8:00 AM REFERRED INFUSION CHAIR 12 EP [facility] INFUS EP [facility]   7/27/2023  9:00 AM LAB CHAIR 1 [facility]   7/27/2023  9:40 AM Ivory Noel MD PhD [facility]

## 2022-08-02 ENCOUNTER — TELEPHONE (OUTPATIENT)
Dept: GENERAL RADIOLOGY | Facility: HOSPITAL | Age: 67
End: 2022-08-02

## 2022-08-02 RX ORDER — SODIUM CHLORIDE 9 MG/ML
250 INJECTION, SOLUTION INTRAVENOUS ONCE
Status: CANCELLED | OUTPATIENT
Start: 2022-08-04

## 2022-08-02 RX ORDER — SODIUM CHLORIDE 9 MG/ML
250 INJECTION, SOLUTION INTRAVENOUS ONCE
Status: CANCELLED | OUTPATIENT
Start: 2022-08-12

## 2022-08-02 NOTE — TELEPHONE ENCOUNTER
PT HAS BEEN SCHEDULED FOR ALL HER COPPER INFUSIONS AND SHE HAS BEEN CALLED AND A VOICEMAIL WAS LEFT ON HER PHONE FOR HER FIRST INFUSION ON Friday 8/5/22 AT THE [facility]

## 2022-08-02 NOTE — TELEPHONE ENCOUNTER
----- Message from Kacey Robbins RN sent at 8/1/2022  3:35 PM EDT -----  Regarding: IV copper  Patient needs to be schedule [facility]. Patient is requesting to do twice a week for 2 weeks and then one the third week. Call me if this does not make sense.  Thanks

## 2022-08-04 ENCOUNTER — INFUSION (OUTPATIENT)
Dept: ONCOLOGY | Facility: HOSPITAL | Age: 67
End: 2022-08-04

## 2022-08-04 VITALS
SYSTOLIC BLOOD PRESSURE: 137 MMHG | OXYGEN SATURATION: 97 % | BODY MASS INDEX: 28.32 KG/M2 | HEIGHT: 65 IN | DIASTOLIC BLOOD PRESSURE: 85 MMHG | WEIGHT: 170 LBS | RESPIRATION RATE: 18 BRPM | TEMPERATURE: 97.5 F

## 2022-08-04 DIAGNOSIS — E61.0 COPPER DEFICIENCY: ICD-10-CM

## 2022-08-04 DIAGNOSIS — R79.0 ABNORMAL BLOOD LEVEL OF COPPER: Primary | ICD-10-CM

## 2022-08-04 LAB
BACTERIA UR CULT: ABNORMAL
BACTERIA UR CULT: ABNORMAL
OTHER ANTIBIOTIC SUSC ISLT: ABNORMAL

## 2022-08-04 PROCEDURE — 96365 THER/PROPH/DIAG IV INF INIT: CPT

## 2022-08-04 PROCEDURE — 96366 THER/PROPH/DIAG IV INF ADDON: CPT

## 2022-08-04 RX ORDER — SODIUM CHLORIDE 9 MG/ML
250 INJECTION, SOLUTION INTRAVENOUS ONCE
Status: COMPLETED | OUTPATIENT
Start: 2022-08-04 | End: 2022-08-04

## 2022-08-04 RX ADMIN — CUPRIC CHLORIDE 2 MG: 0.4 INJECTION, SOLUTION INTRAVENOUS at 11:23

## 2022-08-04 RX ADMIN — SODIUM CHLORIDE 250 ML: 900 INJECTION, SOLUTION INTRAVENOUS at 11:06

## 2022-08-05 ENCOUNTER — APPOINTMENT (OUTPATIENT)
Dept: ONCOLOGY | Facility: HOSPITAL | Age: 67
End: 2022-08-05

## 2022-08-10 ENCOUNTER — INFUSION (OUTPATIENT)
Dept: ONCOLOGY | Facility: HOSPITAL | Age: 67
End: 2022-08-10

## 2022-08-10 VITALS
HEART RATE: 84 BPM | DIASTOLIC BLOOD PRESSURE: 75 MMHG | SYSTOLIC BLOOD PRESSURE: 121 MMHG | OXYGEN SATURATION: 97 % | RESPIRATION RATE: 20 BRPM | BODY MASS INDEX: 27.96 KG/M2 | WEIGHT: 168 LBS | TEMPERATURE: 96.5 F

## 2022-08-10 DIAGNOSIS — D83.9 COMMON VARIABLE IMMUNODEFICIENCY: ICD-10-CM

## 2022-08-10 DIAGNOSIS — D83.8 OTHER COMMON VARIABLE IMMUNODEFICIENCIES: Primary | ICD-10-CM

## 2022-08-10 PROCEDURE — 96365 THER/PROPH/DIAG IV INF INIT: CPT

## 2022-08-10 PROCEDURE — 25010000002 IMMUNE GLOBULIN (HUMAN) 20 GM/200ML SOLUTION: Performed by: ALLERGY & IMMUNOLOGY

## 2022-08-10 PROCEDURE — 96366 THER/PROPH/DIAG IV INF ADDON: CPT

## 2022-08-10 RX ORDER — EPINEPHRINE 0.3 MG/.3ML
0.3 INJECTION SUBCUTANEOUS ONCE
Status: CANCELLED
Start: 2022-09-07 | End: 2022-09-07

## 2022-08-10 RX ORDER — ACETAMINOPHEN 500 MG
500 TABLET ORAL ONCE AS NEEDED
Status: CANCELLED
Start: 2022-09-07

## 2022-08-10 RX ORDER — ACETAMINOPHEN 325 MG/1
650 TABLET ORAL ONCE
Status: CANCELLED | OUTPATIENT
Start: 2022-09-07

## 2022-08-10 RX ORDER — DIPHENHYDRAMINE HCL 25 MG
25 CAPSULE ORAL ONCE
Status: CANCELLED | OUTPATIENT
Start: 2022-09-07

## 2022-08-10 RX ORDER — DIPHENHYDRAMINE HYDROCHLORIDE 50 MG/ML
50 INJECTION INTRAMUSCULAR; INTRAVENOUS AS NEEDED
Status: CANCELLED | OUTPATIENT
Start: 2022-09-07

## 2022-08-10 RX ADMIN — IMMUNE GLOBULIN (HUMAN) 40 G: 10 INJECTION INTRAVENOUS; SUBCUTANEOUS at 08:41

## 2022-08-12 ENCOUNTER — INFUSION (OUTPATIENT)
Dept: ONCOLOGY | Facility: HOSPITAL | Age: 67
End: 2022-08-12

## 2022-08-12 VITALS
HEART RATE: 88 BPM | RESPIRATION RATE: 18 BRPM | SYSTOLIC BLOOD PRESSURE: 143 MMHG | TEMPERATURE: 97.5 F | OXYGEN SATURATION: 95 % | DIASTOLIC BLOOD PRESSURE: 78 MMHG

## 2022-08-12 DIAGNOSIS — E61.0 COPPER DEFICIENCY: ICD-10-CM

## 2022-08-12 DIAGNOSIS — R79.0 ABNORMAL BLOOD LEVEL OF COPPER: Primary | ICD-10-CM

## 2022-08-12 PROCEDURE — 96360 HYDRATION IV INFUSION INIT: CPT

## 2022-08-12 PROCEDURE — 96365 THER/PROPH/DIAG IV INF INIT: CPT

## 2022-08-12 PROCEDURE — 96361 HYDRATE IV INFUSION ADD-ON: CPT

## 2022-08-12 PROCEDURE — 96366 THER/PROPH/DIAG IV INF ADDON: CPT

## 2022-08-12 RX ORDER — SODIUM CHLORIDE 9 MG/ML
250 INJECTION, SOLUTION INTRAVENOUS ONCE
Status: COMPLETED | OUTPATIENT
Start: 2022-08-12 | End: 2022-08-12

## 2022-08-12 RX ADMIN — SODIUM CHLORIDE 250 ML: 9 INJECTION, SOLUTION INTRAVENOUS at 11:32

## 2022-08-12 RX ADMIN — CUPRIC CHLORIDE 2 MG: 0.4 INJECTION, SOLUTION INTRAVENOUS at 11:35

## 2022-08-19 ENCOUNTER — INFUSION (OUTPATIENT)
Dept: ONCOLOGY | Facility: HOSPITAL | Age: 67
End: 2022-08-19

## 2022-08-19 ENCOUNTER — APPOINTMENT (OUTPATIENT)
Dept: ONCOLOGY | Facility: HOSPITAL | Age: 67
End: 2022-08-19

## 2022-08-19 VITALS
HEART RATE: 77 BPM | BODY MASS INDEX: 28.06 KG/M2 | OXYGEN SATURATION: 99 % | RESPIRATION RATE: 18 BRPM | HEIGHT: 65 IN | TEMPERATURE: 96.9 F | WEIGHT: 168.4 LBS | SYSTOLIC BLOOD PRESSURE: 123 MMHG | DIASTOLIC BLOOD PRESSURE: 78 MMHG

## 2022-08-19 DIAGNOSIS — E61.0 COPPER DEFICIENCY: ICD-10-CM

## 2022-08-19 DIAGNOSIS — R79.0 ABNORMAL BLOOD LEVEL OF COPPER: Primary | ICD-10-CM

## 2022-08-19 PROCEDURE — 96366 THER/PROPH/DIAG IV INF ADDON: CPT

## 2022-08-19 PROCEDURE — 96365 THER/PROPH/DIAG IV INF INIT: CPT

## 2022-08-19 RX ORDER — SODIUM CHLORIDE 9 MG/ML
250 INJECTION, SOLUTION INTRAVENOUS ONCE
Status: COMPLETED | OUTPATIENT
Start: 2022-08-19 | End: 2022-08-19

## 2022-08-19 RX ADMIN — CUPRIC CHLORIDE 2 MG: 0.4 INJECTION, SOLUTION INTRAVENOUS at 09:12

## 2022-08-19 RX ADMIN — SODIUM CHLORIDE 250 ML: 9 INJECTION, SOLUTION INTRAVENOUS at 09:12

## 2022-08-19 NOTE — NURSING NOTE
Pt presents for 3rd IV copper infusion. She states she has noticed a flare in rheumatoid arthritis in the joints of her hand and back of neck since starting copper infusions. She c/o headache and muscle tightness in the back of her neck, currently rates 7/10. Pt declines the need for tylenol and states she has ibuprofen with her if needed. Pt questions when copper lab will be drawn again. Called and reviewed with Dr. Noel. Per ASHER, will draw copper three months after last infusion. Will draw on 11/30 when pt comes for IVIG infusion. IV copper not known to cause rheumatoid flare, pt informed and instructed to take ibuprofen and prescribed muscle relaxer as needed and call if pain worsens. Pt v/u.

## 2022-08-26 ENCOUNTER — INFUSION (OUTPATIENT)
Dept: ONCOLOGY | Facility: HOSPITAL | Age: 67
End: 2022-08-26

## 2022-08-26 VITALS
DIASTOLIC BLOOD PRESSURE: 67 MMHG | RESPIRATION RATE: 18 BRPM | HEART RATE: 79 BPM | TEMPERATURE: 97.8 F | WEIGHT: 168 LBS | HEIGHT: 65 IN | OXYGEN SATURATION: 98 % | BODY MASS INDEX: 27.99 KG/M2 | SYSTOLIC BLOOD PRESSURE: 126 MMHG

## 2022-08-26 DIAGNOSIS — R79.0 ABNORMAL BLOOD LEVEL OF COPPER: Primary | ICD-10-CM

## 2022-08-26 DIAGNOSIS — E61.0 COPPER DEFICIENCY: ICD-10-CM

## 2022-08-26 PROCEDURE — 96365 THER/PROPH/DIAG IV INF INIT: CPT

## 2022-08-26 PROCEDURE — 96366 THER/PROPH/DIAG IV INF ADDON: CPT

## 2022-08-26 RX ORDER — SODIUM CHLORIDE 9 MG/ML
250 INJECTION, SOLUTION INTRAVENOUS ONCE
Status: COMPLETED | OUTPATIENT
Start: 2022-08-26 | End: 2022-08-26

## 2022-08-26 RX ADMIN — SODIUM CHLORIDE 250 ML: 9 INJECTION, SOLUTION INTRAVENOUS at 09:39

## 2022-08-26 RX ADMIN — CUPRIC CHLORIDE 2 MG: 0.4 INJECTION, SOLUTION INTRAVENOUS at 09:39

## 2022-08-29 ENCOUNTER — HOSPITAL ENCOUNTER (OUTPATIENT)
Dept: MAMMOGRAPHY | Facility: HOSPITAL | Age: 67
Discharge: HOME | End: 2022-08-29
Admitting: INTERNAL MEDICINE

## 2022-08-29 DIAGNOSIS — Z12.31 VISIT FOR SCREENING MAMMOGRAM: ICD-10-CM

## 2022-08-29 PROCEDURE — 77067 SCR MAMMO BI INCL CAD: CPT

## 2022-08-29 PROCEDURE — 77063 BREAST TOMOSYNTHESIS BI: CPT

## 2022-08-31 RX ORDER — CYANOCOBALAMIN 1000 UG/ML
1000 INJECTION, SOLUTION INTRAMUSCULAR; SUBCUTANEOUS
Qty: 3 ML | Refills: 3 | Status: SHIPPED | OUTPATIENT
Start: 2022-08-31 | End: 2022-11-15 | Stop reason: SDUPTHER

## 2022-08-31 RX ORDER — NEEDLES, SAFETY 22GX1 1/2"
NEEDLE, DISPOSABLE MISCELLANEOUS
Qty: 3 EACH | Refills: 3 | Status: SHIPPED | OUTPATIENT
Start: 2022-08-31 | End: 2022-11-15 | Stop reason: SDUPTHER

## 2022-09-01 ENCOUNTER — TELEPHONE (OUTPATIENT)
Dept: ONCOLOGY | Facility: HOSPITAL | Age: 67
End: 2022-09-01

## 2022-09-02 ENCOUNTER — INFUSION (OUTPATIENT)
Dept: ONCOLOGY | Facility: HOSPITAL | Age: 67
End: 2022-09-02

## 2022-09-02 VITALS
WEIGHT: 167 LBS | SYSTOLIC BLOOD PRESSURE: 122 MMHG | OXYGEN SATURATION: 99 % | BODY MASS INDEX: 27.82 KG/M2 | DIASTOLIC BLOOD PRESSURE: 72 MMHG | HEART RATE: 78 BPM | RESPIRATION RATE: 18 BRPM | TEMPERATURE: 97.8 F | HEIGHT: 65 IN

## 2022-09-02 DIAGNOSIS — E61.0 COPPER DEFICIENCY: ICD-10-CM

## 2022-09-02 DIAGNOSIS — R79.0 ABNORMAL BLOOD LEVEL OF COPPER: Primary | ICD-10-CM

## 2022-09-02 PROCEDURE — 96365 THER/PROPH/DIAG IV INF INIT: CPT

## 2022-09-02 PROCEDURE — 96366 THER/PROPH/DIAG IV INF ADDON: CPT

## 2022-09-02 RX ORDER — SODIUM CHLORIDE 9 MG/ML
250 INJECTION, SOLUTION INTRAVENOUS ONCE
Status: COMPLETED | OUTPATIENT
Start: 2022-09-02 | End: 2022-09-02

## 2022-09-02 RX ADMIN — CUPRIC CHLORIDE 2 MG: 0.4 INJECTION, SOLUTION INTRAVENOUS at 08:47

## 2022-09-02 RX ADMIN — SODIUM CHLORIDE 250 ML: 9 INJECTION, SOLUTION INTRAVENOUS at 08:20

## 2022-09-07 ENCOUNTER — INFUSION (OUTPATIENT)
Dept: ONCOLOGY | Facility: HOSPITAL | Age: 67
End: 2022-09-07

## 2022-09-07 VITALS
BODY MASS INDEX: 27.62 KG/M2 | HEART RATE: 87 BPM | WEIGHT: 166 LBS | OXYGEN SATURATION: 96 % | RESPIRATION RATE: 16 BRPM | TEMPERATURE: 97 F | SYSTOLIC BLOOD PRESSURE: 112 MMHG | DIASTOLIC BLOOD PRESSURE: 70 MMHG

## 2022-09-07 DIAGNOSIS — D83.9 COMMON VARIABLE IMMUNODEFICIENCY: ICD-10-CM

## 2022-09-07 DIAGNOSIS — D83.8 OTHER COMMON VARIABLE IMMUNODEFICIENCIES: Primary | ICD-10-CM

## 2022-09-07 PROCEDURE — 96365 THER/PROPH/DIAG IV INF INIT: CPT

## 2022-09-07 PROCEDURE — 96366 THER/PROPH/DIAG IV INF ADDON: CPT

## 2022-09-07 PROCEDURE — 25010000002 IMMUNE GLOBULIN (HUMAN) 20 GM/200ML SOLUTION: Performed by: ALLERGY & IMMUNOLOGY

## 2022-09-07 RX ORDER — EPINEPHRINE 0.3 MG/.3ML
0.3 INJECTION SUBCUTANEOUS ONCE AS NEEDED
Status: CANCELLED
Start: 2022-10-05

## 2022-09-07 RX ORDER — ACETAMINOPHEN 500 MG
500 TABLET ORAL ONCE AS NEEDED
Status: CANCELLED
Start: 2022-10-05

## 2022-09-07 RX ORDER — DIPHENHYDRAMINE HYDROCHLORIDE 50 MG/ML
50 INJECTION INTRAMUSCULAR; INTRAVENOUS AS NEEDED
Status: CANCELLED | OUTPATIENT
Start: 2022-10-05

## 2022-09-07 RX ORDER — EPINEPHRINE 0.3 MG/.3ML
0.3 INJECTION SUBCUTANEOUS ONCE
Status: CANCELLED
Start: 2022-10-05 | End: 2022-10-05

## 2022-09-07 RX ORDER — ACETAMINOPHEN 500 MG
500 TABLET ORAL ONCE AS NEEDED
Status: CANCELLED
Start: 2022-09-07

## 2022-09-07 RX ORDER — EPINEPHRINE 0.3 MG/.3ML
0.3 INJECTION SUBCUTANEOUS ONCE AS NEEDED
Status: CANCELLED
Start: 2022-09-07

## 2022-09-07 RX ORDER — DIPHENHYDRAMINE HYDROCHLORIDE 50 MG/ML
50 INJECTION INTRAMUSCULAR; INTRAVENOUS AS NEEDED
Status: CANCELLED | OUTPATIENT
Start: 2022-09-07

## 2022-09-07 RX ORDER — DIPHENHYDRAMINE HCL 25 MG
25 CAPSULE ORAL ONCE
Status: CANCELLED | OUTPATIENT
Start: 2022-10-05

## 2022-09-07 RX ORDER — ACETAMINOPHEN 325 MG/1
650 TABLET ORAL ONCE
Status: CANCELLED | OUTPATIENT
Start: 2022-10-05

## 2022-09-07 RX ADMIN — IMMUNE GLOBULIN (HUMAN) 40 G: 10 INJECTION INTRAVENOUS; SUBCUTANEOUS at 08:17

## 2022-09-09 ENCOUNTER — APPOINTMENT (OUTPATIENT)
Dept: ONCOLOGY | Facility: HOSPITAL | Age: 67
End: 2022-09-09

## 2022-09-15 RX ORDER — LEVOTHYROXINE SODIUM 112 MCG
TABLET ORAL
Qty: 90 TABLET | Refills: 1 | Status: SHIPPED | OUTPATIENT
Start: 2022-09-15

## 2022-10-05 ENCOUNTER — INFUSION (OUTPATIENT)
Dept: ONCOLOGY | Facility: HOSPITAL | Age: 67
End: 2022-10-05

## 2022-10-05 VITALS
BODY MASS INDEX: 27.72 KG/M2 | RESPIRATION RATE: 18 BRPM | DIASTOLIC BLOOD PRESSURE: 71 MMHG | OXYGEN SATURATION: 97 % | SYSTOLIC BLOOD PRESSURE: 114 MMHG | HEART RATE: 99 BPM | TEMPERATURE: 97.5 F | WEIGHT: 166.6 LBS

## 2022-10-05 DIAGNOSIS — D83.9 COMMON VARIABLE IMMUNODEFICIENCY: ICD-10-CM

## 2022-10-05 DIAGNOSIS — D83.8 OTHER COMMON VARIABLE IMMUNODEFICIENCIES: Primary | ICD-10-CM

## 2022-10-05 PROCEDURE — 96366 THER/PROPH/DIAG IV INF ADDON: CPT

## 2022-10-05 PROCEDURE — 96365 THER/PROPH/DIAG IV INF INIT: CPT

## 2022-10-05 PROCEDURE — 25010000002 IMMUNE GLOBULIN (HUMAN) 20 GM/200ML SOLUTION: Performed by: ALLERGY & IMMUNOLOGY

## 2022-10-05 RX ORDER — DIPHENHYDRAMINE HYDROCHLORIDE 50 MG/ML
50 INJECTION INTRAMUSCULAR; INTRAVENOUS AS NEEDED
Status: CANCELLED | OUTPATIENT
Start: 2022-11-02

## 2022-10-05 RX ORDER — ACETAMINOPHEN 325 MG/1
650 TABLET ORAL ONCE
Status: CANCELLED | OUTPATIENT
Start: 2022-11-02

## 2022-10-05 RX ORDER — EPINEPHRINE 0.3 MG/.3ML
0.3 INJECTION SUBCUTANEOUS ONCE AS NEEDED
Status: CANCELLED
Start: 2022-11-02

## 2022-10-05 RX ORDER — ACETAMINOPHEN 500 MG
500 TABLET ORAL ONCE AS NEEDED
Status: CANCELLED
Start: 2022-11-02

## 2022-10-05 RX ORDER — DIPHENHYDRAMINE HCL 25 MG
25 CAPSULE ORAL ONCE
Status: CANCELLED | OUTPATIENT
Start: 2022-11-02

## 2022-10-05 RX ADMIN — IMMUNE GLOBULIN (HUMAN) 40 G: 10 INJECTION INTRAVENOUS; SUBCUTANEOUS at 08:37

## 2022-10-21 RX ORDER — ATORVASTATIN CALCIUM 20 MG/1
TABLET, FILM COATED ORAL
Qty: 90 TABLET | Refills: 3 | Status: SHIPPED | OUTPATIENT
Start: 2022-10-21

## 2022-11-02 ENCOUNTER — INFUSION (OUTPATIENT)
Dept: ONCOLOGY | Facility: HOSPITAL | Age: 67
End: 2022-11-02

## 2022-11-02 ENCOUNTER — TELEPHONE (OUTPATIENT)
Dept: INTERNAL MEDICINE | Facility: CLINIC | Age: 67
End: 2022-11-02

## 2022-11-02 VITALS
HEIGHT: 64 IN | WEIGHT: 168 LBS | HEART RATE: 88 BPM | DIASTOLIC BLOOD PRESSURE: 53 MMHG | OXYGEN SATURATION: 98 % | SYSTOLIC BLOOD PRESSURE: 96 MMHG | RESPIRATION RATE: 18 BRPM | TEMPERATURE: 96.7 F | BODY MASS INDEX: 28.68 KG/M2

## 2022-11-02 DIAGNOSIS — D83.8 OTHER COMMON VARIABLE IMMUNODEFICIENCIES: Primary | ICD-10-CM

## 2022-11-02 DIAGNOSIS — M54.2 NECK PAIN: Primary | ICD-10-CM

## 2022-11-02 DIAGNOSIS — D83.9 COMMON VARIABLE IMMUNODEFICIENCY: ICD-10-CM

## 2022-11-02 PROCEDURE — 96366 THER/PROPH/DIAG IV INF ADDON: CPT

## 2022-11-02 PROCEDURE — 96365 THER/PROPH/DIAG IV INF INIT: CPT

## 2022-11-02 PROCEDURE — 25010000002 IMMUNE GLOBULIN (HUMAN) 20 GM/200ML SOLUTION: Performed by: ALLERGY & IMMUNOLOGY

## 2022-11-02 RX ORDER — DIPHENHYDRAMINE HCL 25 MG
25 CAPSULE ORAL ONCE
Status: CANCELLED | OUTPATIENT
Start: 2022-11-30

## 2022-11-02 RX ORDER — ACETAMINOPHEN 325 MG/1
650 TABLET ORAL ONCE
Status: CANCELLED | OUTPATIENT
Start: 2022-11-30

## 2022-11-02 RX ORDER — DIPHENHYDRAMINE HYDROCHLORIDE 50 MG/ML
50 INJECTION INTRAMUSCULAR; INTRAVENOUS AS NEEDED
Status: CANCELLED | OUTPATIENT
Start: 2022-11-30

## 2022-11-02 RX ORDER — ACETAMINOPHEN 500 MG
500 TABLET ORAL ONCE AS NEEDED
Status: CANCELLED
Start: 2022-11-30

## 2022-11-02 RX ORDER — EPINEPHRINE 0.3 MG/.3ML
0.3 INJECTION SUBCUTANEOUS ONCE AS NEEDED
Status: CANCELLED
Start: 2022-11-30

## 2022-11-02 RX ADMIN — IMMUNE GLOBULIN (HUMAN) 40 G: 10 INJECTION INTRAVENOUS; SUBCUTANEOUS at 08:23

## 2022-11-02 NOTE — TELEPHONE ENCOUNTER
----- Message from Judie Leonardo MA sent at 11/2/2022 10:15 AM EDT -----  Regarding: FW: Muscle spasms in my neck  Contact: [phone]    ----- Message -----  From: Dara Medina  Sent: 11/2/2022  10:07 AM EDT  To: [facility]  Subject: Muscle spasms in my neck                         Dr Yang,  [facility] needs a referral from you for me to be treated for dry needling which I’ve had there before but it’s been awhile and they need a new one.      I’m having muscle spasms in my neck and shoulders from something I did!  It always helps and gives me relief.    [facility] is [facility].    Thank you,  Dara

## 2022-11-10 DIAGNOSIS — E03.9 ACQUIRED HYPOTHYROIDISM: ICD-10-CM

## 2022-11-10 DIAGNOSIS — R73.03 PREDIABETES: ICD-10-CM

## 2022-11-10 DIAGNOSIS — E78.2 MIXED HYPERLIPIDEMIA: Primary | ICD-10-CM

## 2022-11-14 LAB
ALBUMIN SERPL-MCNC: 3.8 G/DL (ref 3.5–5.2)
ALBUMIN/GLOB SERPL: 1.3 G/DL
ALP SERPL-CCNC: 39 U/L (ref 39–117)
ALT SERPL-CCNC: 58 U/L (ref 1–33)
AST SERPL-CCNC: 52 U/L (ref 1–32)
BASOPHILS # BLD MANUAL: 0.05 10*3/MM3 (ref 0–0.2)
BASOPHILS NFR BLD MANUAL: 1 % (ref 0–1.5)
BILIRUB SERPL-MCNC: 0.6 MG/DL (ref 0–1.2)
BUN SERPL-MCNC: 18 MG/DL (ref 8–23)
BUN/CREAT SERPL: 15.8 (ref 7–25)
CALCIUM SERPL-MCNC: 9.4 MG/DL (ref 8.6–10.5)
CHLORIDE SERPL-SCNC: 109 MMOL/L (ref 98–107)
CHOLEST SERPL-MCNC: 136 MG/DL (ref 0–200)
CO2 SERPL-SCNC: 26.6 MMOL/L (ref 22–29)
CREAT SERPL-MCNC: 1.14 MG/DL (ref 0.57–1)
DIFFERENTIAL COMMENT: ABNORMAL
EGFRCR SERPLBLD CKD-EPI 2021: 52.9 ML/MIN/1.73
EOSINOPHIL # BLD MANUAL: 0.26 10*3/MM3 (ref 0–0.4)
EOSINOPHIL NFR BLD MANUAL: 5.1 % (ref 0.3–6.2)
ERYTHROCYTE [DISTWIDTH] IN BLOOD BY AUTOMATED COUNT: 14.4 % (ref 12.3–15.4)
GLOBULIN SER CALC-MCNC: 3 GM/DL
GLUCOSE SERPL-MCNC: 99 MG/DL (ref 65–99)
HBA1C MFR BLD: 4.9 % (ref 4.8–5.6)
HCT VFR BLD AUTO: 39.6 % (ref 34–46.6)
HDLC SERPL-MCNC: 67 MG/DL (ref 40–60)
HGB BLD-MCNC: 13.7 G/DL (ref 12–15.9)
LDLC SERPL CALC-MCNC: 54 MG/DL (ref 0–100)
LYMPHOCYTES # BLD MANUAL: 0.36 10*3/MM3 (ref 0.7–3.1)
LYMPHOCYTES NFR BLD MANUAL: 7.1 % (ref 19.6–45.3)
MCH RBC QN AUTO: 37.3 PG (ref 26.6–33)
MCHC RBC AUTO-ENTMCNC: 34.6 G/DL (ref 31.5–35.7)
MCV RBC AUTO: 107.9 FL (ref 79–97)
MONOCYTES # BLD MANUAL: 0.2 10*3/MM3 (ref 0.1–0.9)
MONOCYTES NFR BLD MANUAL: 4 % (ref 5–12)
NEUTROPHILS # BLD MANUAL: 4.19 10*3/MM3 (ref 1.7–7)
NEUTROPHILS NFR BLD MANUAL: 82.8 % (ref 42.7–76)
NRBC BLD AUTO-RTO: 0.2 /100 WBC (ref 0–0.2)
PLATELET # BLD AUTO: 221 10*3/MM3 (ref 140–450)
PLATELET BLD QL SMEAR: ABNORMAL
POTASSIUM SERPL-SCNC: 5.5 MMOL/L (ref 3.5–5.2)
PROT SERPL-MCNC: 6.8 G/DL (ref 6–8.5)
RBC # BLD AUTO: 3.67 10*6/MM3 (ref 3.77–5.28)
RBC MORPH BLD: ABNORMAL
SODIUM SERPL-SCNC: 143 MMOL/L (ref 136–145)
T4 FREE SERPL-MCNC: 1.16 NG/DL (ref 0.93–1.7)
TRIGL SERPL-MCNC: 77 MG/DL (ref 0–150)
TSH SERPL DL<=0.005 MIU/L-ACNC: 0.04 UIU/ML (ref 0.27–4.2)
VLDLC SERPL CALC-MCNC: 15 MG/DL (ref 5–40)
WBC # BLD AUTO: 5.06 10*3/MM3 (ref 3.4–10.8)

## 2022-11-15 ENCOUNTER — OFFICE VISIT (OUTPATIENT)
Dept: INTERNAL MEDICINE | Facility: CLINIC | Age: 67
End: 2022-11-15

## 2022-11-15 VITALS
OXYGEN SATURATION: 98 % | DIASTOLIC BLOOD PRESSURE: 68 MMHG | WEIGHT: 167 LBS | BODY MASS INDEX: 28.51 KG/M2 | HEIGHT: 64 IN | SYSTOLIC BLOOD PRESSURE: 110 MMHG | HEART RATE: 86 BPM

## 2022-11-15 DIAGNOSIS — E03.9 ACQUIRED HYPOTHYROIDISM: ICD-10-CM

## 2022-11-15 DIAGNOSIS — F43.21 SITUATIONAL DEPRESSION: ICD-10-CM

## 2022-11-15 DIAGNOSIS — E78.2 MIXED HYPERLIPIDEMIA: Primary | ICD-10-CM

## 2022-11-15 PROCEDURE — 99214 OFFICE O/P EST MOD 30 MIN: CPT | Performed by: INTERNAL MEDICINE

## 2022-11-15 RX ORDER — TOPIRAMATE 100 MG/1
100 TABLET, FILM COATED ORAL 2 TIMES DAILY
Qty: 180 TABLET | Refills: 3 | Status: SHIPPED | OUTPATIENT
Start: 2022-11-15

## 2022-11-15 RX ORDER — CYANOCOBALAMIN 1000 UG/ML
1000 INJECTION, SOLUTION INTRAMUSCULAR; SUBCUTANEOUS
Qty: 3 ML | Refills: 3 | Status: SHIPPED | OUTPATIENT
Start: 2022-11-15

## 2022-11-15 RX ORDER — NEEDLES, SAFETY 22GX1 1/2"
NEEDLE, DISPOSABLE MISCELLANEOUS
Qty: 3 EACH | Refills: 3 | Status: SHIPPED | OUTPATIENT
Start: 2022-11-15

## 2022-11-15 NOTE — PROGRESS NOTES
Subjective     Dara Medina is a 67 y.o. female who presents with   Chief Complaint   Patient presents with   • Hyperlipidemia   • Hypothyroidism   • Depression       History of Present Illness     The following data was reviewed by: Randi Yang MD on 11/15/2022:  Common labs    Common Labs 5/25/22 5/25/22 5/25/22 7/28/22 7/28/22 7/28/22 11/11/22 11/11/22 11/11/22 11/11/22    0807 0807 0807 0854 0854 0854 0849 0849 0849 0849   Glucose  93   116 (A)   99     BUN  16   21   18     Creatinine  0.99   1.20 (A)   1.14 (A)     Sodium  145 (A)   145   143     Potassium  5.2   5.0   5.5 (A)     Chloride  110 (A)   111 (A)   109 (A)     Calcium  9.8   9.6   9.4     Total Protein  7.0    6.5  6.8     Albumin  4.0   4.10 3.7  3.80     Total Bilirubin  0.5   0.8   0.6     Alkaline Phosphatase  51   55   39     AST (SGOT)  25   37 (A)   52 (A)     ALT (SGPT)  31   44 (A)   58 (A)     WBC 4.2   4.83   5.06      Hemoglobin 13.4   13.7   13.7      Hematocrit 40.2   40.9   39.6      Platelets 247   266   221      Total Cholesterol   137      136    Triglycerides   68      77    HDL Cholesterol   58      67 (A)    LDL Cholesterol    65      54    Hemoglobin A1C          4.90   (A) Abnormal value       Comments are available for some flowsheets but are not being displayed.           HLD.  Good lipid control.  Hypothyroidism.  Thyroid is well controlled.  Situational depression.  She feels better with addition of Prozac.      Review of Systems   Respiratory: Negative.    Cardiovascular: Negative.    Musculoskeletal: Positive for myalgias and neck pain.       The following portions of the patient's history were reviewed and updated as appropriate: allergies, current medications and problem list.    Patient Active Problem List    Diagnosis Date Noted   • Abnormal blood level of copper 08/01/2022   • Copper deficiency 08/01/2022   • Adverse effect of 4-aminophenol derivative 07/30/2022   • Other common variable immunodeficiencies  ([facility]) 01/20/2022   • Situational depression 12/08/2021   • Personal history of colonic polyps 04/20/2021     Note Last Updated: 4/20/2021     Added automatically from request for surgery [id number]     • IgG monoclonal gammopathy of uncertain significance 02/20/2020   • Chronic copper deficiency 09/04/2019   • Chronic idiopathic constipation 06/18/2019   • Macrocytosis without anemia 03/09/2019   • Acquired lymphocytopenia 03/09/2019   • Adverse effect of iron or its compound, sequela 08/29/2018   • Vitamin B12 deficiency 08/29/2018   • Leukemoid reaction 08/29/2018   • Primary osteoarthritis of right knee 11/22/2017     Note Last Updated: 11/22/2017     Added automatically from request for surgery [id number]     • Mixed hyperlipidemia 05/05/2017   • Tear of medial meniscus of right knee, current 05/04/2017   • Prediabetes 04/12/2017   • Pulmonary fibrosis ([facility]) 07/06/2016     Note Last Updated: 10/23/2017     Secondary to methotrexate.       • Iron deficiency anemia 07/06/2016   • Pernicious anemia 07/06/2016   • Common variable immunodeficiency (HCC) 07/01/2016   • Hypothyroidism 05/23/2016   • Sensory neuropathy 05/23/2016   • Osteoporosis 05/23/2016     Note Last Updated: 6/1/2022     H/o two year Forteo.  Not on bisphosphonates because of dental issues.  Fosamax in past caused stomach problems.  Start Evenity 5/2020 for one.year.  Prolia started 2022.       • Vitamin D deficiency 05/23/2016       Current Outpatient Medications on File Prior to Visit   Medication Sig Dispense Refill   • atorvastatin (LIPITOR) 20 MG tablet TAKE 1 TABLET BY MOUTH EVERY DAY 90 tablet 3   • azaTHIOprine (IMURAN) 50 MG tablet Take 100 mg by mouth 2 (Two) Times a Day. 150 mg daily     • B Complex-C (B COMPLEX-B12-C IJ) Inject 1,000 mcg under the skin into the appropriate area as directed Every 30 (Thirty) Days.     • buPROPion XL (WELLBUTRIN XL) 300 MG 24 hr tablet Take 1 tablet by mouth Daily. 90 tablet 3   • cyclobenzaprine (FLEXERIL) 10  "MG tablet TAKE 1 TABLET BY MOUTH THREE TIMES A DAY AS NEEDED     • FLUoxetine (PROzac) 20 MG capsule Take 1 capsule by mouth Daily. 90 capsule 3   • immune globulin, human, (Gammagard) 10 GM/100ML solution infusion Infuse 100 mL into a venous catheter.     • Synthroid 112 MCG tablet TAKE 1 TABLET BY MOUTH EVERY DAY 90 tablet 1   • [DISCONTINUED] cyanocobalamin 1000 MCG/ML injection Inject 1 mL into the appropriate muscle as directed by prescriber Every 30 (Thirty) Days. 3 mL 3   • [DISCONTINUED] Syringe/Needle, Disp, (BD SafetyGlide Syringe/Needle) 27G X 5/8\" 1 ML misc USE ONCE MONTHLY FOR B12 INJECTIONS. 3 each 3   • [DISCONTINUED] topiramate (TOPAMAX) 100 MG tablet Take 100 mg by mouth Daily.       No current facility-administered medications on file prior to visit.       Objective     /68   Pulse 86   Ht 162.6 cm (64.02\")   Wt 75.8 kg (167 lb)   SpO2 98%   BMI 28.65 kg/m²     Physical Exam  Constitutional:       Appearance: She is well-developed.   HENT:      Head: Normocephalic and atraumatic.   Cardiovascular:      Rate and Rhythm: Normal rate and regular rhythm.      Heart sounds: Normal heart sounds.   Pulmonary:      Effort: Pulmonary effort is normal.      Breath sounds: Normal breath sounds.   Skin:     General: Skin is warm and dry.   Neurological:      Mental Status: She is alert and oriented to person, place, and time.   Psychiatric:         Behavior: Behavior normal.         Assessment & Plan   Diagnoses and all orders for this visit:    1. Mixed hyperlipidemia (Primary)    2. Acquired hypothyroidism    3. Situational depression    Other orders  -     topiramate (TOPAMAX) 100 MG tablet; Take 1 tablet by mouth 2 (Two) Times a Day.  Dispense: 180 tablet; Refill: 3  -     Syringe/Needle, Disp, (BD SafetyGlide Syringe/Needle) 27G X 5/8\" 1 ML misc; USE ONCE MONTHLY FOR B12 INJECTIONS.  Dispense: 3 each; Refill: 3  -     cyanocobalamin 1000 MCG/ML injection; Inject 1 mL into the appropriate muscle " as directed by prescriber Every 30 (Thirty) Days.  Dispense: 3 mL; Refill: 3        Discussion    HLD.  Control is good.  The patient is advised to continue current dosage of atorvastatin.   Hypothyroidism.  Control is good.  The patient is advised to continue current dosage of levothyroxine.    Situational depression.  Control is good.  The patient is advised to continue current dosage of Prozac and Wellbutrin.           Future Appointments   Date Time Provider Department Center   11/30/2022  8:00 AM REFERRED INFUSION CHAIR 12 [facility] INFUS [facility]   12/28/2022  8:00 AM REFERRED INFUSION CHAIR 12 [facility] INFUS [facility]   6/7/2023  8:20 AM [facility]   6/14/2023  8:15 AM Randi Yang MD [facility]   7/27/2023  9:00 AM LAB CHAIR 1 [facility]   7/27/2023  9:40 AM Ivory Noel MD PhD K [facility]

## 2022-11-30 ENCOUNTER — INFUSION (OUTPATIENT)
Dept: ONCOLOGY | Facility: HOSPITAL | Age: 67
End: 2022-11-30

## 2022-11-30 VITALS
HEART RATE: 91 BPM | TEMPERATURE: 97.6 F | SYSTOLIC BLOOD PRESSURE: 132 MMHG | WEIGHT: 166.7 LBS | DIASTOLIC BLOOD PRESSURE: 80 MMHG | HEIGHT: 64 IN | RESPIRATION RATE: 18 BRPM | OXYGEN SATURATION: 99 % | BODY MASS INDEX: 28.46 KG/M2

## 2022-11-30 DIAGNOSIS — D83.9 COMMON VARIABLE IMMUNODEFICIENCY: ICD-10-CM

## 2022-11-30 DIAGNOSIS — R79.0 ABNORMAL BLOOD LEVEL OF COPPER: Primary | ICD-10-CM

## 2022-11-30 DIAGNOSIS — D83.8 OTHER COMMON VARIABLE IMMUNODEFICIENCIES: ICD-10-CM

## 2022-11-30 DIAGNOSIS — E61.0 COPPER DEFICIENCY: ICD-10-CM

## 2022-11-30 PROCEDURE — 96365 THER/PROPH/DIAG IV INF INIT: CPT

## 2022-11-30 PROCEDURE — 25010000002 IMMUNE GLOBULIN (HUMAN) 20 GM/200ML SOLUTION: Performed by: ALLERGY & IMMUNOLOGY

## 2022-11-30 PROCEDURE — 96366 THER/PROPH/DIAG IV INF ADDON: CPT

## 2022-11-30 PROCEDURE — 82525 ASSAY OF COPPER: CPT | Performed by: INTERNAL MEDICINE

## 2022-11-30 RX ORDER — EPINEPHRINE 0.3 MG/.3ML
0.3 INJECTION SUBCUTANEOUS AS NEEDED
Status: CANCELLED
Start: 2022-11-30

## 2022-11-30 RX ORDER — EPINEPHRINE 0.3 MG/.3ML
0.3 INJECTION SUBCUTANEOUS ONCE AS NEEDED
Status: CANCELLED
Start: 2022-12-28

## 2022-11-30 RX ORDER — ACETAMINOPHEN 325 MG/1
650 TABLET ORAL ONCE
Status: CANCELLED | OUTPATIENT
Start: 2022-12-28

## 2022-11-30 RX ORDER — DIPHENHYDRAMINE HYDROCHLORIDE 50 MG/ML
50 INJECTION INTRAMUSCULAR; INTRAVENOUS AS NEEDED
Status: CANCELLED | OUTPATIENT
Start: 2022-12-28

## 2022-11-30 RX ORDER — ACETAMINOPHEN 500 MG
500 TABLET ORAL ONCE AS NEEDED
Status: CANCELLED
Start: 2022-11-30

## 2022-11-30 RX ORDER — EPINEPHRINE 0.3 MG/.3ML
0.3 INJECTION SUBCUTANEOUS AS NEEDED
Status: CANCELLED
Start: 2022-12-28

## 2022-11-30 RX ORDER — ACETAMINOPHEN 325 MG/1
650 TABLET ORAL ONCE
Status: CANCELLED | OUTPATIENT
Start: 2022-11-30

## 2022-11-30 RX ORDER — ACETAMINOPHEN 500 MG
500 TABLET ORAL ONCE AS NEEDED
Status: CANCELLED
Start: 2022-12-28

## 2022-11-30 RX ORDER — DIPHENHYDRAMINE HYDROCHLORIDE 50 MG/ML
25 INJECTION INTRAMUSCULAR; INTRAVENOUS AS NEEDED
Status: CANCELLED | OUTPATIENT
Start: 2022-11-30

## 2022-11-30 RX ORDER — DIPHENHYDRAMINE HCL 25 MG
25 CAPSULE ORAL ONCE
Status: CANCELLED
Start: 2022-11-30 | End: 2022-11-30

## 2022-11-30 RX ORDER — DIPHENHYDRAMINE HYDROCHLORIDE 50 MG/ML
50 INJECTION INTRAMUSCULAR; INTRAVENOUS AS NEEDED
Status: CANCELLED | OUTPATIENT
Start: 2022-11-30

## 2022-11-30 RX ORDER — DIPHENHYDRAMINE HCL 25 MG
25 CAPSULE ORAL ONCE
Status: CANCELLED
Start: 2022-12-28 | End: 2022-12-28

## 2022-11-30 RX ORDER — DIPHENHYDRAMINE HYDROCHLORIDE 50 MG/ML
25 INJECTION INTRAMUSCULAR; INTRAVENOUS AS NEEDED
Status: CANCELLED | OUTPATIENT
Start: 2022-12-28

## 2022-11-30 RX ORDER — DIPHENHYDRAMINE HCL 25 MG
25 CAPSULE ORAL ONCE
Status: CANCELLED | OUTPATIENT
Start: 2022-12-28

## 2022-11-30 RX ADMIN — IMMUNE GLOBULIN (HUMAN) 40 G: 10 INJECTION INTRAVENOUS; SUBCUTANEOUS at 09:36

## 2022-12-03 LAB — COPPER SERPL-MCNC: 71 UG/DL (ref 80–158)

## 2022-12-28 ENCOUNTER — INFUSION (OUTPATIENT)
Dept: ONCOLOGY | Facility: HOSPITAL | Age: 67
End: 2022-12-28

## 2022-12-28 VITALS
RESPIRATION RATE: 16 BRPM | DIASTOLIC BLOOD PRESSURE: 72 MMHG | HEART RATE: 84 BPM | OXYGEN SATURATION: 97 % | BODY MASS INDEX: 29.21 KG/M2 | WEIGHT: 170.2 LBS | SYSTOLIC BLOOD PRESSURE: 115 MMHG | TEMPERATURE: 97.8 F

## 2022-12-28 DIAGNOSIS — D83.8 OTHER COMMON VARIABLE IMMUNODEFICIENCIES: Primary | ICD-10-CM

## 2022-12-28 DIAGNOSIS — D83.9 COMMON VARIABLE IMMUNODEFICIENCY: ICD-10-CM

## 2022-12-28 PROCEDURE — 25010000002 IMMUNE GLOBULIN (HUMAN) 20 GM/200ML SOLUTION: Performed by: ALLERGY & IMMUNOLOGY

## 2022-12-28 PROCEDURE — 96365 THER/PROPH/DIAG IV INF INIT: CPT

## 2022-12-28 PROCEDURE — 96366 THER/PROPH/DIAG IV INF ADDON: CPT

## 2022-12-28 RX ORDER — ACETAMINOPHEN 500 MG
500 TABLET ORAL ONCE AS NEEDED
Status: CANCELLED
Start: 2023-01-25

## 2022-12-28 RX ORDER — EPINEPHRINE 0.3 MG/.3ML
0.3 INJECTION SUBCUTANEOUS AS NEEDED
Status: CANCELLED
Start: 2023-01-25

## 2022-12-28 RX ORDER — DIPHENHYDRAMINE HCL 25 MG
25 CAPSULE ORAL ONCE
Status: CANCELLED
Start: 2023-01-25 | End: 2023-01-25

## 2022-12-28 RX ORDER — DIPHENHYDRAMINE HYDROCHLORIDE 50 MG/ML
50 INJECTION INTRAMUSCULAR; INTRAVENOUS AS NEEDED
Status: CANCELLED | OUTPATIENT
Start: 2023-01-25

## 2022-12-28 RX ORDER — ACETAMINOPHEN 325 MG/1
650 TABLET ORAL ONCE
Status: CANCELLED | OUTPATIENT
Start: 2023-01-25

## 2022-12-28 RX ADMIN — IMMUNE GLOBULIN (HUMAN) 40 G: 10 INJECTION INTRAVENOUS; SUBCUTANEOUS at 08:41

## 2023-01-25 ENCOUNTER — INFUSION (OUTPATIENT)
Dept: ONCOLOGY | Facility: HOSPITAL | Age: 68
End: 2023-01-25
Payer: MEDICARE

## 2023-01-25 VITALS
BODY MASS INDEX: 29.26 KG/M2 | RESPIRATION RATE: 18 BRPM | WEIGHT: 171.4 LBS | DIASTOLIC BLOOD PRESSURE: 61 MMHG | HEIGHT: 64 IN | OXYGEN SATURATION: 100 % | TEMPERATURE: 96.6 F | HEART RATE: 80 BPM | SYSTOLIC BLOOD PRESSURE: 95 MMHG

## 2023-01-25 DIAGNOSIS — D83.8 OTHER COMMON VARIABLE IMMUNODEFICIENCIES: Primary | ICD-10-CM

## 2023-01-25 DIAGNOSIS — D83.9 COMMON VARIABLE IMMUNODEFICIENCY: ICD-10-CM

## 2023-01-25 PROCEDURE — 96366 THER/PROPH/DIAG IV INF ADDON: CPT

## 2023-01-25 PROCEDURE — 96365 THER/PROPH/DIAG IV INF INIT: CPT

## 2023-01-25 PROCEDURE — 25010000002 IMMUNE GLOBULIN (HUMAN) 40 GM/400ML SOLUTION: Performed by: ALLERGY & IMMUNOLOGY

## 2023-01-25 RX ORDER — EPINEPHRINE 0.3 MG/.3ML
0.3 INJECTION SUBCUTANEOUS AS NEEDED
Status: CANCELLED
Start: 2023-02-22

## 2023-01-25 RX ORDER — DIPHENHYDRAMINE HYDROCHLORIDE 50 MG/ML
50 INJECTION INTRAMUSCULAR; INTRAVENOUS AS NEEDED
Status: CANCELLED | OUTPATIENT
Start: 2023-02-22

## 2023-01-25 RX ORDER — DIPHENHYDRAMINE HCL 25 MG
25 CAPSULE ORAL ONCE
Status: CANCELLED
Start: 2023-02-22 | End: 2023-02-22

## 2023-01-25 RX ORDER — ACETAMINOPHEN 500 MG
500 TABLET ORAL ONCE AS NEEDED
Status: CANCELLED
Start: 2023-02-22

## 2023-01-25 RX ORDER — ACETAMINOPHEN 325 MG/1
650 TABLET ORAL ONCE
Status: CANCELLED | OUTPATIENT
Start: 2023-02-22

## 2023-01-25 RX ADMIN — IMMUNE GLOBULIN (HUMAN) 40 G: 10 INJECTION INTRAVENOUS; SUBCUTANEOUS at 08:34

## 2023-02-22 ENCOUNTER — TELEPHONE (OUTPATIENT)
Dept: INTERNAL MEDICINE | Facility: CLINIC | Age: 68
End: 2023-02-22
Payer: MEDICARE

## 2023-02-22 ENCOUNTER — INFUSION (OUTPATIENT)
Dept: ONCOLOGY | Facility: HOSPITAL | Age: 68
End: 2023-02-22
Payer: MEDICARE

## 2023-02-22 VITALS
OXYGEN SATURATION: 97 % | RESPIRATION RATE: 18 BRPM | TEMPERATURE: 96 F | HEIGHT: 64 IN | SYSTOLIC BLOOD PRESSURE: 95 MMHG | DIASTOLIC BLOOD PRESSURE: 63 MMHG | BODY MASS INDEX: 29.12 KG/M2 | HEART RATE: 79 BPM | WEIGHT: 170.6 LBS

## 2023-02-22 DIAGNOSIS — D83.8 OTHER COMMON VARIABLE IMMUNODEFICIENCIES: Primary | ICD-10-CM

## 2023-02-22 DIAGNOSIS — M81.8 OTHER OSTEOPOROSIS, UNSPECIFIED PATHOLOGICAL FRACTURE PRESENCE: Primary | ICD-10-CM

## 2023-02-22 DIAGNOSIS — D83.9 COMMON VARIABLE IMMUNODEFICIENCY: ICD-10-CM

## 2023-02-22 DIAGNOSIS — M81.8 OTHER OSTEOPOROSIS, UNSPECIFIED PATHOLOGICAL FRACTURE PRESENCE: ICD-10-CM

## 2023-02-22 LAB
25(OH)D3 SERPL-MCNC: 71.6 NG/ML (ref 30–100)
ALBUMIN SERPL-MCNC: 3.6 G/DL (ref 3.5–5.2)
ALBUMIN/GLOB SERPL: 1.2 G/DL
ALP SERPL-CCNC: 54 U/L (ref 39–117)
ALT SERPL W P-5'-P-CCNC: 35 U/L (ref 1–33)
ANION GAP SERPL CALCULATED.3IONS-SCNC: 9.8 MMOL/L (ref 5–15)
AST SERPL-CCNC: 30 U/L (ref 1–32)
BILIRUB SERPL-MCNC: 0.5 MG/DL (ref 0–1.2)
BUN SERPL-MCNC: 14 MG/DL (ref 8–23)
BUN/CREAT SERPL: 13.2 (ref 7–25)
CALCIUM SPEC-SCNC: 9.2 MG/DL (ref 8.6–10.5)
CHLORIDE SERPL-SCNC: 111 MMOL/L (ref 98–107)
CO2 SERPL-SCNC: 21.2 MMOL/L (ref 22–29)
CREAT SERPL-MCNC: 1.06 MG/DL (ref 0.57–1)
EGFRCR SERPLBLD CKD-EPI 2021: 57.7 ML/MIN/1.73
GLOBULIN UR ELPH-MCNC: 3.1 GM/DL
GLUCOSE SERPL-MCNC: 106 MG/DL (ref 65–99)
IGG1 SER-MCNC: 962 MG/DL (ref 700–1600)
MAGNESIUM SERPL-MCNC: 2 MG/DL (ref 1.6–2.4)
PHOSPHATE SERPL-MCNC: 3.4 MG/DL (ref 2.5–4.5)
POTASSIUM SERPL-SCNC: 4.4 MMOL/L (ref 3.5–5.2)
PROT SERPL-MCNC: 6.7 G/DL (ref 6–8.5)
SODIUM SERPL-SCNC: 142 MMOL/L (ref 136–145)

## 2023-02-22 PROCEDURE — 25010000002 DENOSUMAB 60 MG/ML SOLUTION PREFILLED SYRINGE: Performed by: INTERNAL MEDICINE

## 2023-02-22 PROCEDURE — 82784 ASSAY IGA/IGD/IGG/IGM EACH: CPT | Performed by: ALLERGY & IMMUNOLOGY

## 2023-02-22 PROCEDURE — 82306 VITAMIN D 25 HYDROXY: CPT | Performed by: INTERNAL MEDICINE

## 2023-02-22 PROCEDURE — 96372 THER/PROPH/DIAG INJ SC/IM: CPT

## 2023-02-22 PROCEDURE — 84100 ASSAY OF PHOSPHORUS: CPT

## 2023-02-22 PROCEDURE — 83735 ASSAY OF MAGNESIUM: CPT

## 2023-02-22 PROCEDURE — 80053 COMPREHEN METABOLIC PANEL: CPT

## 2023-02-22 PROCEDURE — 96366 THER/PROPH/DIAG IV INF ADDON: CPT

## 2023-02-22 PROCEDURE — 25010000002 IMMUNE GLOBULIN (HUMAN) 40 GM/400ML SOLUTION: Performed by: ALLERGY & IMMUNOLOGY

## 2023-02-22 PROCEDURE — 96365 THER/PROPH/DIAG IV INF INIT: CPT

## 2023-02-22 RX ORDER — EPINEPHRINE 0.3 MG/.3ML
0.3 INJECTION SUBCUTANEOUS AS NEEDED
Status: CANCELLED
Start: 2023-03-22

## 2023-02-22 RX ORDER — ACETAMINOPHEN 325 MG/1
650 TABLET ORAL ONCE
Status: CANCELLED | OUTPATIENT
Start: 2023-03-22

## 2023-02-22 RX ORDER — DIPHENHYDRAMINE HCL 25 MG
25 CAPSULE ORAL ONCE
Status: CANCELLED
Start: 2023-03-22 | End: 2023-03-22

## 2023-02-22 RX ORDER — ACETAMINOPHEN 500 MG
500 TABLET ORAL ONCE AS NEEDED
Status: CANCELLED
Start: 2023-03-22

## 2023-02-22 RX ORDER — DIPHENHYDRAMINE HYDROCHLORIDE 50 MG/ML
50 INJECTION INTRAMUSCULAR; INTRAVENOUS AS NEEDED
Status: CANCELLED | OUTPATIENT
Start: 2023-03-22

## 2023-02-22 RX ADMIN — DENOSUMAB 60 MG: 60 INJECTION SUBCUTANEOUS at 11:18

## 2023-02-22 RX ADMIN — IMMUNE GLOBULIN (HUMAN) 40 G: 10 INJECTION INTRAVENOUS; SUBCUTANEOUS at 08:39

## 2023-02-22 NOTE — TELEPHONE ENCOUNTER
Infusion Center calling in regards to mutual pt- she states pt is coming there today to get a prolia injection and needs order/paperwork filled out and sent back over in order to do so- she states she sent it to Dr. Yang through [employer] and just needs it sent back as soon as possible.

## 2023-02-22 NOTE — NURSING NOTE
Arrived  for prolia injection. Indication and side effects reviewed. Denies recent dental work. Labs and medications verified. Prolia administered in left arm without incidence. Instructed to call prescribing MD for any concerns or questions and instructed on how to schedule future appts.  Pt vu and discharged in stable condition.

## 2023-02-24 RX ORDER — FLUCONAZOLE 150 MG/1
150 TABLET ORAL ONCE
Qty: 1 TABLET | Refills: 0 | Status: SHIPPED | OUTPATIENT
Start: 2023-02-24 | End: 2023-02-24

## 2023-03-06 ENCOUNTER — OFFICE VISIT (OUTPATIENT)
Dept: INTERNAL MEDICINE | Facility: CLINIC | Age: 68
End: 2023-03-06
Payer: MEDICARE

## 2023-03-06 VITALS
DIASTOLIC BLOOD PRESSURE: 76 MMHG | WEIGHT: 168 LBS | HEIGHT: 67 IN | BODY MASS INDEX: 26.37 KG/M2 | HEART RATE: 61 BPM | OXYGEN SATURATION: 98 % | SYSTOLIC BLOOD PRESSURE: 120 MMHG

## 2023-03-06 DIAGNOSIS — J20.8 ACUTE BRONCHITIS DUE TO OTHER SPECIFIED ORGANISMS: Primary | ICD-10-CM

## 2023-03-06 PROCEDURE — 99213 OFFICE O/P EST LOW 20 MIN: CPT | Performed by: INTERNAL MEDICINE

## 2023-03-06 RX ORDER — DEXTROMETHORPHAN HYDROBROMIDE AND PROMETHAZINE HYDROCHLORIDE 15; 6.25 MG/5ML; MG/5ML
5 SYRUP ORAL 4 TIMES DAILY PRN
Qty: 118 ML | Refills: 0 | Status: SHIPPED | OUTPATIENT
Start: 2023-03-06

## 2023-03-06 RX ORDER — DOXYCYCLINE 100 MG/1
100 CAPSULE ORAL EVERY 12 HOURS SCHEDULED
Qty: 14 CAPSULE | Refills: 0 | Status: SHIPPED | OUTPATIENT
Start: 2023-03-06

## 2023-03-06 RX ORDER — BENZONATATE 200 MG/1
200 CAPSULE ORAL 3 TIMES DAILY PRN
Qty: 30 CAPSULE | Refills: 0 | Status: SHIPPED | OUTPATIENT
Start: 2023-03-06

## 2023-03-06 NOTE — PROGRESS NOTES
Subjective     Dara Medina is a 67 y.o. female who presents with   Chief Complaint   Patient presents with   • Cough   • URI       History of Present Illness     Persistent cough going on a month.  Production of sputum is associated.  No fever is associated.   She took a round of Augmentin with no resolution.  CXR reportedly negative at [employer] where her daughter used to work.     Review of Systems   Constitutional: Negative for fever.   Respiratory: Positive for cough. Negative for shortness of breath.    Cardiovascular: Negative.  Negative for chest pain.       The following portions of the patient's history were reviewed and updated as appropriate: allergies, current medications and problem list.    Patient Active Problem List    Diagnosis Date Noted   • Abnormal blood level of copper 08/01/2022   • Copper deficiency 08/01/2022   • Adverse effect of 4-aminophenol derivative 07/30/2022   • Other common variable immunodeficiencies (HCC) 01/20/2022   • Situational depression 12/08/2021   • Personal history of colonic polyps 04/20/2021     Note Last Updated: 4/20/2021     Added automatically from request for surgery [id number]     • IgG monoclonal gammopathy of uncertain significance 02/20/2020   • Chronic copper deficiency 09/04/2019   • Chronic idiopathic constipation 06/18/2019   • Macrocytosis without anemia 03/09/2019   • Acquired lymphocytopenia 03/09/2019   • Adverse effect of iron or its compound, sequela 08/29/2018   • Vitamin B12 deficiency 08/29/2018   • Leukemoid reaction 08/29/2018   • Primary osteoarthritis of right knee 11/22/2017     Note Last Updated: 11/22/2017     Added automatically from request for surgery [id number]     • Mixed hyperlipidemia 05/05/2017   • Tear of medial meniscus of right knee, current 05/04/2017   • Prediabetes 04/12/2017   • Pulmonary fibrosis (HCC) 07/06/2016     Note Last Updated: 10/23/2017     Secondary to methotrexate.       • Iron deficiency anemia 07/06/2016   • Pernicious  "anemia 07/06/2016   • Common variable immunodeficiency (HCC) 07/01/2016   • Hypothyroidism 05/23/2016   • Sensory neuropathy 05/23/2016   • Osteoporosis 05/23/2016     Note Last Updated: 6/1/2022     H/o two year Forteo.  Not on bisphosphonates because of dental issues.  Fosamax in past caused stomach problems.  Start Evenity 5/2020 for one.year.  Prolia started 2022.       • Vitamin D deficiency 05/23/2016       Current Outpatient Medications on File Prior to Visit   Medication Sig Dispense Refill   • atorvastatin (LIPITOR) 20 MG tablet TAKE 1 TABLET BY MOUTH EVERY DAY 90 tablet 3   • azaTHIOprine (IMURAN) 50 MG tablet Take 2 tablets by mouth 2 (Two) Times a Day. 150 mg daily     • B Complex-C (B COMPLEX-B12-C IJ) Inject 1,000 mcg under the skin into the appropriate area as directed Every 30 (Thirty) Days.     • buPROPion XL (WELLBUTRIN XL) 300 MG 24 hr tablet Take 1 tablet by mouth Daily. 90 tablet 3   • cyanocobalamin 1000 MCG/ML injection Inject 1 mL into the appropriate muscle as directed by prescriber Every 30 (Thirty) Days. 3 mL 3   • cyclobenzaprine (FLEXERIL) 10 MG tablet TAKE 1 TABLET BY MOUTH THREE TIMES A DAY AS NEEDED     • FLUoxetine (PROzac) 20 MG capsule Take 1 capsule by mouth Daily. 90 capsule 3   • immune globulin, human, (Gammagard) 10 GM/100ML solution infusion Infuse 10 g into a venous catheter.     • Synthroid 112 MCG tablet TAKE 1 TABLET BY MOUTH EVERY DAY 90 tablet 1   • Syringe/Needle, Disp, (BD SafetyGlide Syringe/Needle) 27G X 5/8\" 1 ML misc USE ONCE MONTHLY FOR B12 INJECTIONS. 3 each 3   • topiramate (TOPAMAX) 100 MG tablet Take 1 tablet by mouth 2 (Two) Times a Day. 180 tablet 3     No current facility-administered medications on file prior to visit.       Objective     /76   Pulse 61   Ht 170 cm (66.93\")   Wt 76.2 kg (168 lb)   SpO2 98%   BMI 26.37 kg/m²     Physical Exam  Constitutional:       Appearance: She is well-developed.   HENT:      Head: Normocephalic and " atraumatic.   Cardiovascular:      Rate and Rhythm: Normal rate and regular rhythm.      Heart sounds: Normal heart sounds.   Pulmonary:      Effort: Pulmonary effort is normal.      Breath sounds: Normal breath sounds.   Skin:     General: Skin is warm and dry.   Neurological:      Mental Status: She is alert and oriented to person, place, and time.   Psychiatric:         Behavior: Behavior normal.         Assessment & Plan   Diagnoses and all orders for this visit:    1. Acute bronchitis due to other specified organisms (Primary)    Other orders  -     doxycycline (MONODOX) 100 MG capsule; Take 1 capsule by mouth Every 12 (Twelve) Hours.  Dispense: 14 capsule; Refill: 0  -     benzonatate (TESSALON) 200 MG capsule; Take 1 capsule by mouth 3 (Three) Times a Day As Needed for Cough.  Dispense: 30 capsule; Refill: 0  -     promethazine-dextromethorphan (PROMETHAZINE-DM) 6.25-15 MG/5ML syrup; Take 5 mL by mouth 4 (Four) Times a Day As Needed for Cough.  Dispense: 118 mL; Refill: 0        Discussion    Patient presents with episode of acute bronchitis.  A prescription for antibiotics is provided today.  The patient is instructed to take along with cough medication.  Let me know they are not feeling better over the next 3 days or if there is any change in symptoms.             Future Appointments   Date Time Provider Department Center   3/22/2023  8:00 AM REFERRED INFUSION CHAIR 12 [facility]   6/7/2023  8:20 AM [facility]   6/14/2023  8:15 AM Randi Yang MD [facility]   7/20/2023  9:00 AM LAB CHAIR 1 [facility]   7/20/2023  9:20 AM Ivory Noel MD PhD [facility]

## 2023-03-22 ENCOUNTER — INFUSION (OUTPATIENT)
Dept: ONCOLOGY | Facility: HOSPITAL | Age: 68
End: 2023-03-22
Payer: MEDICARE

## 2023-03-22 VITALS
SYSTOLIC BLOOD PRESSURE: 112 MMHG | DIASTOLIC BLOOD PRESSURE: 71 MMHG | HEART RATE: 90 BPM | WEIGHT: 172 LBS | OXYGEN SATURATION: 100 % | BODY MASS INDEX: 29.37 KG/M2 | RESPIRATION RATE: 18 BRPM | HEIGHT: 64 IN | TEMPERATURE: 96.5 F

## 2023-03-22 DIAGNOSIS — D83.8 OTHER COMMON VARIABLE IMMUNODEFICIENCIES: Primary | ICD-10-CM

## 2023-03-22 DIAGNOSIS — D83.9 COMMON VARIABLE IMMUNODEFICIENCY: ICD-10-CM

## 2023-03-22 PROCEDURE — 25010000002 IMMUNE GLOBULIN (HUMAN) 40 GM/400ML SOLUTION: Performed by: ALLERGY & IMMUNOLOGY

## 2023-03-22 PROCEDURE — 96365 THER/PROPH/DIAG IV INF INIT: CPT

## 2023-03-22 PROCEDURE — 96366 THER/PROPH/DIAG IV INF ADDON: CPT

## 2023-03-22 RX ORDER — ACETAMINOPHEN 325 MG/1
650 TABLET ORAL ONCE
OUTPATIENT
Start: 2023-04-19

## 2023-03-22 RX ORDER — EPINEPHRINE 0.3 MG/.3ML
0.3 INJECTION SUBCUTANEOUS AS NEEDED
Start: 2023-04-19

## 2023-03-22 RX ORDER — DIPHENHYDRAMINE HCL 25 MG
25 CAPSULE ORAL ONCE
Start: 2023-04-19 | End: 2023-04-19

## 2023-03-22 RX ORDER — ACETAMINOPHEN 500 MG
500 TABLET ORAL ONCE AS NEEDED
Start: 2023-04-19

## 2023-03-22 RX ORDER — DIPHENHYDRAMINE HYDROCHLORIDE 50 MG/ML
50 INJECTION INTRAMUSCULAR; INTRAVENOUS AS NEEDED
OUTPATIENT
Start: 2023-04-19

## 2023-03-22 RX ADMIN — IMMUNE GLOBULIN (HUMAN) 40 G: 10 INJECTION INTRAVENOUS; SUBCUTANEOUS at 08:24

## 2023-04-11 ENCOUNTER — TELEPHONE (OUTPATIENT)
Dept: INTERNAL MEDICINE | Facility: CLINIC | Age: 68
End: 2023-04-11
Payer: MEDICARE

## 2023-04-11 ENCOUNTER — OFFICE VISIT (OUTPATIENT)
Dept: INTERNAL MEDICINE | Facility: CLINIC | Age: 68
End: 2023-04-11
Payer: MEDICARE

## 2023-04-11 VITALS
BODY MASS INDEX: 28.85 KG/M2 | WEIGHT: 169 LBS | DIASTOLIC BLOOD PRESSURE: 80 MMHG | HEART RATE: 83 BPM | OXYGEN SATURATION: 99 % | HEIGHT: 64 IN | SYSTOLIC BLOOD PRESSURE: 132 MMHG

## 2023-04-11 DIAGNOSIS — J20.8 ACUTE BRONCHITIS DUE TO OTHER SPECIFIED ORGANISMS: Primary | ICD-10-CM

## 2023-04-11 PROCEDURE — 99213 OFFICE O/P EST LOW 20 MIN: CPT | Performed by: INTERNAL MEDICINE

## 2023-04-11 PROCEDURE — 1160F RVW MEDS BY RX/DR IN RCRD: CPT | Performed by: INTERNAL MEDICINE

## 2023-04-11 PROCEDURE — 1159F MED LIST DOCD IN RCRD: CPT | Performed by: INTERNAL MEDICINE

## 2023-04-11 RX ORDER — REPOSITORY CORTICOTROPIN 80 [USP'U]/ML
INJECTION INTRAMUSCULAR; SUBCUTANEOUS
COMMUNITY
Start: 2023-04-11

## 2023-04-11 RX ORDER — DOXYCYCLINE 100 MG/1
100 CAPSULE ORAL EVERY 12 HOURS SCHEDULED
Qty: 14 CAPSULE | Refills: 0 | Status: SHIPPED | OUTPATIENT
Start: 2023-04-11

## 2023-04-11 RX ORDER — BENZONATATE 200 MG/1
200 CAPSULE ORAL 3 TIMES DAILY PRN
Qty: 30 CAPSULE | Refills: 0 | Status: SHIPPED | OUTPATIENT
Start: 2023-04-11

## 2023-04-11 RX ORDER — DEXTROMETHORPHAN HYDROBROMIDE AND PROMETHAZINE HYDROCHLORIDE 15; 6.25 MG/5ML; MG/5ML
5 SYRUP ORAL 4 TIMES DAILY PRN
Qty: 118 ML | Refills: 0 | Status: SHIPPED | OUTPATIENT
Start: 2023-04-11

## 2023-04-11 NOTE — TELEPHONE ENCOUNTER
----- Message from Judie Leonardo MA sent at 4/11/2023  9:21 AM EDT -----  Regarding: FW: refill on meds  Contact: [phone]    ----- Message -----  From: Dara Medina  Sent: 4/11/2023   8:49 AM EDT  To: Taylor Bill [facility]  Subject: refill on meds                                   Kera Lazo and family were here for a week and were sick.  So guess who’s sick now?  Same as before when I saw you.  nose full, and that chest cough.    Can you just call me in Amoxicillin, Thessalon Pearls and Promethazine without seeing me?  You told me I always needed antibiotics because of my immune system…..you were right!    Thanks,    Dara

## 2023-04-11 NOTE — PROGRESS NOTES
Subjective     Dara Medina is a 68 y.o. female who presents with   Chief Complaint   Patient presents with   • Cough   • URI       History of Present Illness     C/o cough.  Going on for two days.  Production of sputum.  Head and nasal congestion.      Review of Systems   Constitutional: Negative for fever.   Respiratory: Negative for shortness of breath.        The following portions of the patient's history were reviewed and updated as appropriate: allergies, current medications and problem list.    Patient Active Problem List    Diagnosis Date Noted   • Abnormal blood level of copper 08/01/2022   • Copper deficiency 08/01/2022   • Adverse effect of 4-aminophenol derivative 07/30/2022   • Other common variable immunodeficiencies 01/20/2022   • Situational depression 12/08/2021   • Personal history of colonic polyps 04/20/2021     Note Last Updated: 4/20/2021     Added automatically from request for surgery [id number]     • IgG monoclonal gammopathy of uncertain significance 02/20/2020   • Chronic copper deficiency 09/04/2019   • Chronic idiopathic constipation 06/18/2019   • Macrocytosis without anemia 03/09/2019   • Acquired lymphocytopenia 03/09/2019   • Adverse effect of iron or its compound, sequela 08/29/2018   • Vitamin B12 deficiency 08/29/2018   • Leukemoid reaction 08/29/2018   • Primary osteoarthritis of right knee 11/22/2017     Note Last Updated: 11/22/2017     Added automatically from request for surgery [id number]     • Mixed hyperlipidemia 05/05/2017   • Tear of medial meniscus of right knee, current 05/04/2017   • Prediabetes 04/12/2017   • Pulmonary fibrosis 07/06/2016     Note Last Updated: 10/23/2017     Secondary to methotrexate.       • Iron deficiency anemia 07/06/2016   • Pernicious anemia 07/06/2016   • Common variable immunodeficiency 07/01/2016   • Hypothyroidism 05/23/2016   • Sensory neuropathy 05/23/2016   • Osteoporosis 05/23/2016     Note Last Updated: 6/1/2022     H/o two year Forteo.  Not  "on bisphosphonates because of dental issues.  Fosamax in past caused stomach problems.  Start Evenity 5/2020 for one.year.  Prolia started 2022.       • Vitamin D deficiency 05/23/2016       Current Outpatient Medications on File Prior to Visit   Medication Sig Dispense Refill   • atorvastatin (LIPITOR) 20 MG tablet TAKE 1 TABLET BY MOUTH EVERY DAY 90 tablet 3   • azaTHIOprine (IMURAN) 50 MG tablet Take 2 tablets by mouth 2 (Two) Times a Day. 150 mg daily     • B Complex-C (B COMPLEX-B12-C IJ) Inject 1,000 mcg under the skin into the appropriate area as directed Every 30 (Thirty) Days.     • benzonatate (TESSALON) 200 MG capsule Take 1 capsule by mouth 3 (Three) Times a Day As Needed for Cough. 30 capsule 0   • buPROPion XL (WELLBUTRIN XL) 300 MG 24 hr tablet Take 1 tablet by mouth Daily. 90 tablet 3   • cyanocobalamin 1000 MCG/ML injection Inject 1 mL into the appropriate muscle as directed by prescriber Every 30 (Thirty) Days. 3 mL 3   • cyclobenzaprine (FLEXERIL) 10 MG tablet TAKE 1 TABLET BY MOUTH THREE TIMES A DAY AS NEEDED     • doxycycline (MONODOX) 100 MG capsule Take 1 capsule by mouth Every 12 (Twelve) Hours. 14 capsule 0   • FLUoxetine (PROzac) 20 MG capsule Take 1 capsule by mouth Daily. 90 capsule 3   • immune globulin, human, (Gammagard) 10 GM/100ML solution infusion Infuse 10 g into a venous catheter.     • promethazine-dextromethorphan (PROMETHAZINE-DM) 6.25-15 MG/5ML syrup Take 5 mL by mouth 4 (Four) Times a Day As Needed for Cough. 118 mL 0   • Synthroid 112 MCG tablet TAKE 1 TABLET BY MOUTH EVERY DAY 90 tablet 1   • Syringe/Needle, Disp, (BD SafetyGlide Syringe/Needle) 27G X 5/8\" 1 ML misc USE ONCE MONTHLY FOR B12 INJECTIONS. 3 each 3   • topiramate (TOPAMAX) 100 MG tablet Take 1 tablet by mouth 2 (Two) Times a Day. 180 tablet 3     No current facility-administered medications on file prior to visit.       Objective     /80   Pulse 83   Ht 162.6 cm (64.02\")   Wt 76.7 kg (169 lb)   SpO2 " 99%   BMI 28.99 kg/m²     Physical Exam  Constitutional:       Appearance: She is well-developed.   HENT:      Head: Normocephalic and atraumatic.      Right Ear: Hearing and tympanic membrane normal.      Left Ear: Hearing and tympanic membrane normal.      Mouth/Throat:      Pharynx: No oropharyngeal exudate or posterior oropharyngeal erythema.   Cardiovascular:      Rate and Rhythm: Normal rate and regular rhythm.      Heart sounds: Normal heart sounds.   Pulmonary:      Effort: Pulmonary effort is normal.      Breath sounds: Examination of the right-lower field reveals rales. Examination of the left-lower field reveals rales. Rales present.   Skin:     General: Skin is warm and dry.   Neurological:      Mental Status: She is alert and oriented to person, place, and time.   Psychiatric:         Behavior: Behavior normal.         Assessment & Plan   Diagnoses and all orders for this visit:    1. Acute bronchitis due to other specified organisms (Primary)    Other orders  -     doxycycline (MONODOX) 100 MG capsule; Take 1 capsule by mouth Every 12 (Twelve) Hours.  Dispense: 14 capsule; Refill: 0  -     benzonatate (TESSALON) 200 MG capsule; Take 1 capsule by mouth 3 (Three) Times a Day As Needed for Cough.  Dispense: 30 capsule; Refill: 0  -     promethazine-dextromethorphan (PROMETHAZINE-DM) 6.25-15 MG/5ML syrup; Take 5 mL by mouth 4 (Four) Times a Day As Needed for Cough.  Dispense: 118 mL; Refill: 0        Discussion    Patient presents with episode of acute bronchitis.  A prescription for antibiotics is provided today.  The patient is instructed to take along with cough medication provided.  Let me know they are not feeling better over the next 3 days or if there is any change in symptoms.             Future Appointments   Date Time Provider Department Center   6/7/2023  8:20 AM [facility]   6/14/2023  8:15 AM Randi Yang MD [facility]   7/20/2023  9:00 AM LAB CHAIR 1 [facility]   7/20/2023  9:20 AM Ivory Noel MD PhD [facility]

## 2023-04-19 ENCOUNTER — INFUSION (OUTPATIENT)
Dept: ONCOLOGY | Facility: HOSPITAL | Age: 68
End: 2023-04-19
Payer: MEDICARE

## 2023-04-19 VITALS
BODY MASS INDEX: 29.36 KG/M2 | DIASTOLIC BLOOD PRESSURE: 62 MMHG | WEIGHT: 176.2 LBS | OXYGEN SATURATION: 100 % | SYSTOLIC BLOOD PRESSURE: 106 MMHG | HEART RATE: 68 BPM | RESPIRATION RATE: 18 BRPM | TEMPERATURE: 97 F | HEIGHT: 65 IN

## 2023-04-19 DIAGNOSIS — D83.8 OTHER COMMON VARIABLE IMMUNODEFICIENCIES: Primary | ICD-10-CM

## 2023-04-19 DIAGNOSIS — D83.9 COMMON VARIABLE IMMUNODEFICIENCY: ICD-10-CM

## 2023-04-19 PROCEDURE — 96365 THER/PROPH/DIAG IV INF INIT: CPT

## 2023-04-19 PROCEDURE — 96366 THER/PROPH/DIAG IV INF ADDON: CPT

## 2023-04-19 PROCEDURE — 25010000002 IMMUNE GLOBULIN (HUMAN) 40 GM/400ML SOLUTION: Performed by: ALLERGY & IMMUNOLOGY

## 2023-04-19 RX ORDER — ACETAMINOPHEN 500 MG
500 TABLET ORAL ONCE AS NEEDED
Start: 2023-05-17

## 2023-04-19 RX ORDER — DIPHENHYDRAMINE HYDROCHLORIDE 50 MG/ML
50 INJECTION INTRAMUSCULAR; INTRAVENOUS AS NEEDED
OUTPATIENT
Start: 2023-05-17

## 2023-04-19 RX ORDER — DIPHENHYDRAMINE HCL 25 MG
25 CAPSULE ORAL ONCE
Start: 2023-05-17 | End: 2023-05-17

## 2023-04-19 RX ORDER — EPINEPHRINE 0.3 MG/.3ML
0.3 INJECTION SUBCUTANEOUS AS NEEDED
Start: 2023-05-17

## 2023-04-19 RX ORDER — ACETAMINOPHEN 325 MG/1
650 TABLET ORAL ONCE
OUTPATIENT
Start: 2023-05-17

## 2023-04-19 RX ADMIN — IMMUNE GLOBULIN (HUMAN) 40 G: 10 INJECTION INTRAVENOUS; SUBCUTANEOUS at 09:35

## 2023-04-26 RX ORDER — LEVOTHYROXINE SODIUM 112 MCG
TABLET ORAL
Qty: 90 TABLET | Refills: 1 | Status: SHIPPED | OUTPATIENT
Start: 2023-04-26

## 2023-05-08 RX ORDER — FLUOXETINE HYDROCHLORIDE 20 MG/1
CAPSULE ORAL
Qty: 90 CAPSULE | Refills: 3 | Status: SHIPPED | OUTPATIENT
Start: 2023-05-08

## 2023-05-15 ENCOUNTER — OFFICE VISIT (OUTPATIENT)
Dept: INTERNAL MEDICINE | Facility: CLINIC | Age: 68
End: 2023-05-15
Payer: MEDICARE

## 2023-05-15 VITALS
WEIGHT: 173 LBS | HEIGHT: 65 IN | TEMPERATURE: 97.9 F | HEART RATE: 95 BPM | BODY MASS INDEX: 28.82 KG/M2 | OXYGEN SATURATION: 96 % | SYSTOLIC BLOOD PRESSURE: 100 MMHG | DIASTOLIC BLOOD PRESSURE: 60 MMHG

## 2023-05-15 DIAGNOSIS — J40 BRONCHITIS: Primary | ICD-10-CM

## 2023-05-15 PROCEDURE — 99213 OFFICE O/P EST LOW 20 MIN: CPT | Performed by: STUDENT IN AN ORGANIZED HEALTH CARE EDUCATION/TRAINING PROGRAM

## 2023-05-15 RX ORDER — BENZONATATE 200 MG/1
200 CAPSULE ORAL 3 TIMES DAILY PRN
Qty: 30 CAPSULE | Refills: 0 | Status: SHIPPED | OUTPATIENT
Start: 2023-05-15

## 2023-05-15 RX ORDER — DEXTROMETHORPHAN HYDROBROMIDE AND PROMETHAZINE HYDROCHLORIDE 15; 6.25 MG/5ML; MG/5ML
5 SYRUP ORAL 4 TIMES DAILY PRN
Qty: 118 ML | Refills: 0 | Status: SHIPPED | OUTPATIENT
Start: 2023-05-15

## 2023-05-15 RX ORDER — AMOXICILLIN AND CLAVULANATE POTASSIUM 875; 125 MG/1; MG/1
1 TABLET, FILM COATED ORAL 2 TIMES DAILY
Qty: 10 TABLET | Refills: 0 | Status: SHIPPED | OUTPATIENT
Start: 2023-05-15 | End: 2023-05-20

## 2023-05-15 NOTE — PROGRESS NOTES
"  Delbert Chambers D.O.  Internal Medicine  [facility]  [address], [address]  [place] [address]  [phone]      Chief Complaint  Earache and Cough    SUBJECTIVE    History of Present Illness    Dara Medina is a 68 y.o. female who presents to the office today as an established patient of Dr Randi Yang MD here today for an acute care visit.     Patient seen 4/11/2023 with Dr. Yang her primary care provider for cough that was mild for 2 days with sputum production as well as head and nasal congestion.  She was prescribed doxycycline 100 mg twice daily for 7 days, Tessalon Perles, Promethazine DM cough syrup. States after 2 weeks she was cleared of symptoms, lasted 1 week. Symptoms started again 1 week ago with a primarily dry cough, itching sensation in the throat and chest, feels like the glands under the necks are swollen and tender and her ears hurt. She reports she has nearly lost her voice. No fever or chills, \"I don't feel bad\", feels a little more winded than normal. Denies sputum production. She has some nasal drainage during the day but no sinus pressure.       Allergies   Allergen Reactions   • Clavulanic Acid Provider Review Needed and Unknown - High Severity   • Codeine Nausea And Vomiting and Unknown - High Severity        Outpatient Medications Marked as Taking for the 5/15/23 encounter (Office Visit) with Delbert Chambers, DO   Medication Sig Dispense Refill   • atorvastatin (LIPITOR) 20 MG tablet TAKE 1 TABLET BY MOUTH EVERY DAY 90 tablet 3   • azaTHIOprine (IMURAN) 50 MG tablet Take 2 tablets by mouth 2 (Two) Times a Day. 150 mg daily     • B Complex-C (B COMPLEX-B12-C IJ) Inject 1,000 mcg under the skin into the appropriate area as directed Every 30 (Thirty) Days.     • benzonatate (TESSALON) 200 MG capsule Take 1 capsule by mouth 3 (Three) Times a Day As Needed for Cough. 30 capsule 0   • buPROPion XL (WELLBUTRIN XL) 300 MG 24 hr tablet Take 1 tablet by mouth Daily. 90 " "tablet 3   • corticotropin (Acthar) 80 UNIT/ML injectable gel Inject  into the appropriate muscle as directed by prescriber.     • cyanocobalamin 1000 MCG/ML injection Inject 1 mL into the appropriate muscle as directed by prescriber Every 30 (Thirty) Days. 3 mL 3   • cyclobenzaprine (FLEXERIL) 10 MG tablet TAKE 1 TABLET BY MOUTH THREE TIMES A DAY AS NEEDED     • FLUoxetine (PROzac) 20 MG capsule TAKE 1 CAPSULE BY MOUTH EVERY DAY 90 capsule 3   • immune globulin, human, (Gammagard) 10 GM/100ML solution infusion Infuse 10 g into a venous catheter.     • promethazine-dextromethorphan (PROMETHAZINE-DM) 6.25-15 MG/5ML syrup Take 5 mL by mouth 4 (Four) Times a Day As Needed for Cough. 118 mL 0   • Synthroid 112 MCG tablet TAKE 1 TABLET BY MOUTH EVERY DAY 90 tablet 1   • Syringe/Needle, Disp, (BD SafetyGlide Syringe/Needle) 27G X 5/8\" 1 ML misc USE ONCE MONTHLY FOR B12 INJECTIONS. 3 each 3   • topiramate (TOPAMAX) 100 MG tablet Take 1 tablet by mouth 2 (Two) Times a Day. 180 tablet 3        Past Medical History:   Diagnosis Date   • Acute colitis 1/18/2017   • Allergic Codeine, some antibiotics   • Anemia    • Cataract Removed    2012   • Chronic depression    • Colon polyp 1 removed    2016   • Fracture of rib    • GERD (gastroesophageal reflux disease)    • H/O Wrist fracture, right    • Headache    • History of bronchitis    • History of pneumonia    • History of transfusion    • Hyperlipidemia    • Hypothyroidism    • Inflammatory bowel disease    • Iron deficiency    • Irritable bowel syndrome    • Low serum copper for age 8/30/2019   • Migraine    • Obesity    • Osteopenia    • Osteoporosis    • Pneumonia June 2016   • RA (rheumatoid arthritis)    • Sensory neuropathy    • Sepsis, unspecified organism 1/18/2017   • Vitamin D deficiency        OBJECTIVE    Vital Signs:   /60   Pulse 95   Temp 97.9 °F (36.6 °C) (Infrared)   Ht 165.1 cm (65\")   Wt 78.5 kg (173 lb)   SpO2 96%   BMI 28.79 kg/m²     Physical " Exam  Vitals reviewed.   Constitutional:       General: She is not in acute distress.     Appearance: Normal appearance. She is not ill-appearing.   HENT:      Head: Atraumatic.      Right Ear: Tympanic membrane, ear canal and external ear normal. There is no impacted cerumen.      Left Ear: Tympanic membrane and external ear normal. There is no impacted cerumen.      Mouth/Throat:      Mouth: Mucous membranes are moist.      Pharynx: Oropharynx is clear. No oropharyngeal exudate or posterior oropharyngeal erythema.   Eyes:      General: No scleral icterus.  Cardiovascular:      Rate and Rhythm: Normal rate and regular rhythm.      Heart sounds: Normal heart sounds. No murmur heard.  Pulmonary:      Effort: Pulmonary effort is normal. No respiratory distress.      Breath sounds: No stridor. No rhonchi.      Comments: Crackles bilateral lung bases . Occasional dry cough  Musculoskeletal:      Cervical back: Tenderness (submandibular) present.   Skin:     Coloration: Skin is not jaundiced.   Neurological:      Mental Status: She is alert.   Psychiatric:         Mood and Affect: Mood normal.         Thought Content: Thought content normal.                             ASSESSMENT & PLAN     Diagnoses and all orders for this visit:    1. Bronchitis (Primary)  -pt with history of RA on azathioprine with RA-related ILD (followed by [facility] pulmonology) presents to the office today for acute care visit  -Patient seen 4/11/2023 with Dr. Yang her primary care provider for cough that was mild for 2 days with sputum production as well as head and nasal congestion.  She was prescribed doxycycline 100 mg twice daily for 7 days, Tessalon Perles, Promethazine DM cough syrup. States after 2 weeks she was cleared of symptoms, lasted 1 week. Symptoms started again 1 week ago with a primarily dry cough, itching sensation in the throat and chest, feels like the glands under the necks are swollen and tender and her ears hurt. She reports  "she has nearly lost her voice. No fever or chills, \"I don't feel bad\", feels a little more winded than normal. Denies sputum production. She has some nasal drainage during the day but no sinus pressure.   -on exam she is at her baseline BP of 100/60, satting 96 on room air, afebrile. Physical exam findings as above, most significant for occasional cough and b/l lung crackles which she reports as chronic.   -given improvement in symptoms with antibiotic treatment and re-development of symptoms after completing antibiotic regimen with doxycycline, it is possible she had incomplete treatment of a bacterial etiology of bronchitis. At this point will retreat for potential bacterial etiology with Augmentin (listed in chart as allergy but pt states this was diarrhea many years ago and she has taken it multiple other times without allergy). Of course with her complicated history of RA and ILD, if she does not have improvement in symptoms with this treatment she should return ASAP to see her PCP and also see her pulmonologist. She is in agreement with this plan.   -will renew Rx for Tessalon Perles and Promethazine DM as these are providing some symptomatic relief.           The following social determinates of health impact the patient's medical decision making: No social determinates of health were factored in to today's visit.     Follow Up  No follow-ups on file.    Patient/family had no further questions at this time and verbalized understanding of the plan discussed today.   "

## 2023-05-17 ENCOUNTER — INFUSION (OUTPATIENT)
Dept: ONCOLOGY | Facility: HOSPITAL | Age: 68
End: 2023-05-17
Payer: MEDICARE

## 2023-05-17 VITALS
SYSTOLIC BLOOD PRESSURE: 112 MMHG | WEIGHT: 175 LBS | RESPIRATION RATE: 15 BRPM | HEART RATE: 88 BPM | DIASTOLIC BLOOD PRESSURE: 71 MMHG | HEIGHT: 65 IN | OXYGEN SATURATION: 97 % | BODY MASS INDEX: 29.16 KG/M2 | TEMPERATURE: 97 F

## 2023-05-17 DIAGNOSIS — D83.9 COMMON VARIABLE IMMUNODEFICIENCY: ICD-10-CM

## 2023-05-17 DIAGNOSIS — D83.8 OTHER COMMON VARIABLE IMMUNODEFICIENCIES: Primary | ICD-10-CM

## 2023-05-17 PROCEDURE — 96366 THER/PROPH/DIAG IV INF ADDON: CPT

## 2023-05-17 PROCEDURE — 25010000002 IMMUNE GLOBULIN (HUMAN) 40 GM/400ML SOLUTION: Performed by: ALLERGY & IMMUNOLOGY

## 2023-05-17 PROCEDURE — 96365 THER/PROPH/DIAG IV INF INIT: CPT

## 2023-05-17 RX ORDER — EPINEPHRINE 0.3 MG/.3ML
0.3 INJECTION SUBCUTANEOUS AS NEEDED
Start: 2023-06-14

## 2023-05-17 RX ORDER — IMMUNE GLOBULIN (HUMAN) 10 G/100ML
40 INJECTION INTRAVENOUS; SUBCUTANEOUS
COMMUNITY

## 2023-05-17 RX ORDER — ACETAMINOPHEN 325 MG/1
650 TABLET ORAL ONCE
OUTPATIENT
Start: 2023-06-14

## 2023-05-17 RX ORDER — ACETAMINOPHEN 500 MG
500 TABLET ORAL ONCE AS NEEDED
Start: 2023-06-14

## 2023-05-17 RX ORDER — DIPHENHYDRAMINE HYDROCHLORIDE 50 MG/ML
50 INJECTION INTRAMUSCULAR; INTRAVENOUS AS NEEDED
OUTPATIENT
Start: 2023-06-14

## 2023-05-17 RX ORDER — DIPHENHYDRAMINE HCL 25 MG
25 CAPSULE ORAL ONCE
Start: 2023-06-14 | End: 2023-06-14

## 2023-05-17 RX ADMIN — IMMUNE GLOBULIN (HUMAN) 40 G: 10 INJECTION INTRAVENOUS; SUBCUTANEOUS at 08:32

## 2023-06-06 DIAGNOSIS — E55.9 VITAMIN D DEFICIENCY: ICD-10-CM

## 2023-06-06 DIAGNOSIS — E03.9 ACQUIRED HYPOTHYROIDISM: ICD-10-CM

## 2023-06-06 DIAGNOSIS — E78.2 MIXED HYPERLIPIDEMIA: Primary | ICD-10-CM

## 2023-06-06 DIAGNOSIS — E53.8 VITAMIN B12 DEFICIENCY: ICD-10-CM

## 2023-06-06 DIAGNOSIS — R73.03 PREDIABETES: ICD-10-CM

## 2023-06-08 LAB
25(OH)D3+25(OH)D2 SERPL-MCNC: 59.9 NG/ML (ref 30–100)
ALBUMIN SERPL-MCNC: 4.3 G/DL (ref 3.5–5.2)
ALBUMIN/GLOB SERPL: 1.7 G/DL
ALP SERPL-CCNC: 43 U/L (ref 39–117)
ALT SERPL-CCNC: 22 U/L (ref 1–33)
APPEARANCE UR: CLEAR
AST SERPL-CCNC: 28 U/L (ref 1–32)
BACTERIA #/AREA URNS HPF: NORMAL /HPF
BASOPHILS # BLD MANUAL: 0.05 10*3/MM3 (ref 0–0.2)
BASOPHILS NFR BLD MANUAL: 1 % (ref 0–1.5)
BILIRUB SERPL-MCNC: 1.1 MG/DL (ref 0–1.2)
BILIRUB UR QL STRIP: NEGATIVE
BUN SERPL-MCNC: 18 MG/DL (ref 8–23)
BUN/CREAT SERPL: 16.7 (ref 7–25)
CALCIUM SERPL-MCNC: 9.7 MG/DL (ref 8.6–10.5)
CASTS URNS MICRO: NORMAL
CHLORIDE SERPL-SCNC: 113 MMOL/L (ref 98–107)
CHOLEST SERPL-MCNC: 130 MG/DL (ref 0–200)
CO2 SERPL-SCNC: 22.2 MMOL/L (ref 22–29)
COLOR UR: ABNORMAL
CREAT SERPL-MCNC: 1.08 MG/DL (ref 0.57–1)
DIFFERENTIAL COMMENT: ABNORMAL
EGFRCR SERPLBLD CKD-EPI 2021: 56.1 ML/MIN/1.73
EOSINOPHIL # BLD MANUAL: 0.05 10*3/MM3 (ref 0–0.4)
EOSINOPHIL NFR BLD MANUAL: 1 % (ref 0.3–6.2)
EPI CELLS #/AREA URNS HPF: NORMAL /HPF
ERYTHROCYTE [DISTWIDTH] IN BLOOD BY AUTOMATED COUNT: 13.6 % (ref 12.3–15.4)
GLOBULIN SER CALC-MCNC: 2.5 GM/DL
GLUCOSE SERPL-MCNC: 99 MG/DL (ref 65–99)
GLUCOSE UR QL STRIP: NEGATIVE
HBA1C MFR BLD: 5.1 % (ref 4.8–5.6)
HCT VFR BLD AUTO: 39.6 % (ref 34–46.6)
HDLC SERPL-MCNC: 57 MG/DL (ref 40–60)
HGB BLD-MCNC: 13.6 G/DL (ref 12–15.9)
HGB UR QL STRIP: NEGATIVE
KETONES UR QL STRIP: NEGATIVE
LDLC SERPL CALC-MCNC: 62 MG/DL (ref 0–100)
LEUKOCYTE ESTERASE UR QL STRIP: NEGATIVE
LYMPHOCYTES # BLD MANUAL: 0.1 10*3/MM3 (ref 0.7–3.1)
LYMPHOCYTES NFR BLD MANUAL: 2 % (ref 19.6–45.3)
MCH RBC QN AUTO: 36.3 PG (ref 26.6–33)
MCHC RBC AUTO-ENTMCNC: 34.3 G/DL (ref 31.5–35.7)
MCV RBC AUTO: 105.6 FL (ref 79–97)
MONOCYTES # BLD MANUAL: 0.2 10*3/MM3 (ref 0.1–0.9)
MONOCYTES NFR BLD MANUAL: 4 % (ref 5–12)
NEUTROPHILS # BLD MANUAL: 4.65 10*3/MM3 (ref 1.7–7)
NEUTROPHILS NFR BLD MANUAL: 91.9 % (ref 42.7–76)
NITRITE UR QL STRIP: NEGATIVE
NRBC BLD AUTO-RTO: 0 /100 WBC (ref 0–0.2)
PH UR STRIP: 6 [PH] (ref 5–8)
PLATELET # BLD AUTO: 308 10*3/MM3 (ref 140–450)
PLATELET BLD QL SMEAR: ABNORMAL
POTASSIUM SERPL-SCNC: 4.5 MMOL/L (ref 3.5–5.2)
PROT SERPL-MCNC: 6.8 G/DL (ref 6–8.5)
PROT UR QL STRIP: NEGATIVE
RBC # BLD AUTO: 3.75 10*6/MM3 (ref 3.77–5.28)
RBC #/AREA URNS HPF: NORMAL /HPF
RBC MORPH BLD: ABNORMAL
SODIUM SERPL-SCNC: 146 MMOL/L (ref 136–145)
SP GR UR STRIP: 1.01 (ref 1–1.03)
T4 FREE SERPL-MCNC: 1.3 NG/DL (ref 0.93–1.7)
TRIGL SERPL-MCNC: 47 MG/DL (ref 0–150)
TSH SERPL DL<=0.005 MIU/L-ACNC: 0.31 UIU/ML (ref 0.27–4.2)
UROBILINOGEN UR STRIP-MCNC: ABNORMAL MG/DL
VIT B12 SERPL-MCNC: 617 PG/ML (ref 211–946)
VLDLC SERPL CALC-MCNC: 11 MG/DL (ref 5–40)
WBC # BLD AUTO: 5.06 10*3/MM3 (ref 3.4–10.8)
WBC #/AREA URNS HPF: NORMAL /HPF

## 2023-06-14 ENCOUNTER — INFUSION (OUTPATIENT)
Dept: ONCOLOGY | Facility: HOSPITAL | Age: 68
End: 2023-06-14
Payer: MEDICARE

## 2023-06-14 VITALS
RESPIRATION RATE: 15 BRPM | BODY MASS INDEX: 28.99 KG/M2 | WEIGHT: 174 LBS | OXYGEN SATURATION: 100 % | SYSTOLIC BLOOD PRESSURE: 96 MMHG | DIASTOLIC BLOOD PRESSURE: 68 MMHG | HEIGHT: 65 IN | HEART RATE: 102 BPM | TEMPERATURE: 96.8 F

## 2023-06-14 DIAGNOSIS — D83.8 OTHER COMMON VARIABLE IMMUNODEFICIENCIES: Primary | ICD-10-CM

## 2023-06-14 DIAGNOSIS — D83.9 COMMON VARIABLE IMMUNODEFICIENCY: ICD-10-CM

## 2023-06-14 PROCEDURE — 96366 THER/PROPH/DIAG IV INF ADDON: CPT

## 2023-06-14 PROCEDURE — 96365 THER/PROPH/DIAG IV INF INIT: CPT

## 2023-06-14 PROCEDURE — 25010000002 IMMUNE GLOBULIN (HUMAN) 40 GM/400ML SOLUTION: Performed by: ALLERGY & IMMUNOLOGY

## 2023-06-14 RX ORDER — DIPHENHYDRAMINE HYDROCHLORIDE 50 MG/ML
50 INJECTION INTRAMUSCULAR; INTRAVENOUS AS NEEDED
OUTPATIENT
Start: 2023-07-12

## 2023-06-14 RX ORDER — DIPHENHYDRAMINE HCL 25 MG
25 CAPSULE ORAL ONCE
Start: 2023-07-12 | End: 2023-07-12

## 2023-06-14 RX ORDER — ACETAMINOPHEN 500 MG
500 TABLET ORAL ONCE AS NEEDED
Start: 2023-07-12

## 2023-06-14 RX ORDER — EPINEPHRINE 0.3 MG/.3ML
0.3 INJECTION SUBCUTANEOUS AS NEEDED
Start: 2023-07-12

## 2023-06-14 RX ORDER — ACETAMINOPHEN 325 MG/1
650 TABLET ORAL ONCE
OUTPATIENT
Start: 2023-07-12

## 2023-06-14 RX ADMIN — IMMUNE GLOBULIN (HUMAN) 40 G: 10 INJECTION INTRAVENOUS; SUBCUTANEOUS at 08:37

## 2023-06-20 PROBLEM — T39.1X5A: Status: RESOLVED | Noted: 2022-07-30 | Resolved: 2023-06-20

## 2023-06-20 PROBLEM — E61.0 CHRONIC COPPER DEFICIENCY: Status: RESOLVED | Noted: 2019-09-04 | Resolved: 2023-06-20

## 2023-07-20 ENCOUNTER — LAB (OUTPATIENT)
Dept: OTHER | Facility: HOSPITAL | Age: 68
End: 2023-07-20
Payer: MEDICARE

## 2023-07-20 ENCOUNTER — OFFICE VISIT (OUTPATIENT)
Dept: ONCOLOGY | Facility: CLINIC | Age: 68
End: 2023-07-20
Payer: MEDICARE

## 2023-07-20 VITALS
DIASTOLIC BLOOD PRESSURE: 65 MMHG | RESPIRATION RATE: 18 BRPM | TEMPERATURE: 97.5 F | OXYGEN SATURATION: 98 % | HEART RATE: 87 BPM | HEIGHT: 65 IN | WEIGHT: 173 LBS | BODY MASS INDEX: 28.82 KG/M2 | SYSTOLIC BLOOD PRESSURE: 110 MMHG

## 2023-07-20 DIAGNOSIS — R79.0 ABNORMAL BLOOD LEVEL OF COPPER: ICD-10-CM

## 2023-07-20 DIAGNOSIS — D50.9 IRON DEFICIENCY ANEMIA, UNSPECIFIED IRON DEFICIENCY ANEMIA TYPE: ICD-10-CM

## 2023-07-20 DIAGNOSIS — E53.8 VITAMIN B12 DEFICIENCY: ICD-10-CM

## 2023-07-20 DIAGNOSIS — E61.0 COPPER DEFICIENCY: Primary | ICD-10-CM

## 2023-07-20 DIAGNOSIS — D47.2 IGG MONOCLONAL GAMMOPATHY OF UNCERTAIN SIGNIFICANCE: ICD-10-CM

## 2023-07-20 DIAGNOSIS — E61.0 CHRONIC COPPER DEFICIENCY: ICD-10-CM

## 2023-07-20 LAB
ALBUMIN SERPL-MCNC: 3.3 G/DL (ref 3.5–5.2)
ALBUMIN/GLOB SERPL: 0.9 G/DL
ALP SERPL-CCNC: 47 U/L (ref 39–117)
ALT SERPL W P-5'-P-CCNC: 25 U/L (ref 1–33)
ANION GAP SERPL CALCULATED.3IONS-SCNC: 9.8 MMOL/L (ref 5–15)
AST SERPL-CCNC: 25 U/L (ref 1–32)
BASOPHILS # BLD AUTO: 0.03 10*3/MM3 (ref 0–0.2)
BASOPHILS NFR BLD AUTO: 0.5 % (ref 0–1.5)
BILIRUB SERPL-MCNC: 0.5 MG/DL (ref 0–1.2)
BUN SERPL-MCNC: 13 MG/DL (ref 8–23)
BUN/CREAT SERPL: 11.8 (ref 7–25)
CALCIUM SPEC-SCNC: 8.7 MG/DL (ref 8.6–10.5)
CHLORIDE SERPL-SCNC: 111 MMOL/L (ref 98–107)
CO2 SERPL-SCNC: 21.2 MMOL/L (ref 22–29)
CREAT SERPL-MCNC: 1.1 MG/DL (ref 0.57–1)
DEPRECATED RDW RBC AUTO: 53.3 FL (ref 37–54)
EGFRCR SERPLBLD CKD-EPI 2021: 54.8 ML/MIN/1.73
EOSINOPHIL # BLD AUTO: 0.08 10*3/MM3 (ref 0–0.4)
EOSINOPHIL NFR BLD AUTO: 1.3 % (ref 0.3–6.2)
ERYTHROCYTE [DISTWIDTH] IN BLOOD BY AUTOMATED COUNT: 13.9 % (ref 12.3–15.4)
FERRITIN SERPL-MCNC: 117 NG/ML (ref 13–150)
GLOBULIN UR ELPH-MCNC: 3.5 GM/DL
GLUCOSE SERPL-MCNC: 161 MG/DL (ref 65–99)
HCT VFR BLD AUTO: 39.7 % (ref 34–46.6)
HGB BLD-MCNC: 13.4 G/DL (ref 12–15.9)
IMM GRANULOCYTES # BLD AUTO: 0.02 10*3/MM3 (ref 0–0.05)
IMM GRANULOCYTES NFR BLD AUTO: 0.3 % (ref 0–0.5)
IRON 24H UR-MRATE: 93 MCG/DL (ref 37–145)
IRON SATN MFR SERPL: 30 % (ref 20–50)
LYMPHOCYTES # BLD AUTO: 0.28 10*3/MM3 (ref 0.7–3.1)
LYMPHOCYTES NFR BLD AUTO: 4.5 % (ref 19.6–45.3)
MCH RBC QN AUTO: 35.9 PG (ref 26.6–33)
MCHC RBC AUTO-ENTMCNC: 33.8 G/DL (ref 31.5–35.7)
MCV RBC AUTO: 106.4 FL (ref 79–97)
MONOCYTES # BLD AUTO: 0.27 10*3/MM3 (ref 0.1–0.9)
MONOCYTES NFR BLD AUTO: 4.4 % (ref 5–12)
NEUTROPHILS NFR BLD AUTO: 5.5 10*3/MM3 (ref 1.7–7)
NEUTROPHILS NFR BLD AUTO: 89 % (ref 42.7–76)
NRBC BLD AUTO-RTO: 0 /100 WBC (ref 0–0.2)
PLAT MORPH BLD: NORMAL
PLATELET # BLD AUTO: 232 10*3/MM3 (ref 140–450)
PMV BLD AUTO: 10.6 FL (ref 6–12)
POTASSIUM SERPL-SCNC: 4.1 MMOL/L (ref 3.5–5.2)
PROT SERPL-MCNC: 6.8 G/DL (ref 6–8.5)
RBC # BLD AUTO: 3.73 10*6/MM3 (ref 3.77–5.28)
SODIUM SERPL-SCNC: 142 MMOL/L (ref 136–145)
SPHEROCYTES BLD QL SMEAR: NORMAL
STOMATOCYTES BLD QL SMEAR: NORMAL
TIBC SERPL-MCNC: 311 MCG/DL (ref 298–536)
TRANSFERRIN SERPL-MCNC: 209 MG/DL (ref 200–360)
VIT B12 BLD-MCNC: 706 PG/ML (ref 211–946)
WBC MORPH BLD: NORMAL
WBC NRBC COR # BLD: 6.18 10*3/MM3 (ref 3.4–10.8)

## 2023-07-20 PROCEDURE — 83521 IG LIGHT CHAINS FREE EACH: CPT | Performed by: INTERNAL MEDICINE

## 2023-07-20 PROCEDURE — 82728 ASSAY OF FERRITIN: CPT | Performed by: INTERNAL MEDICINE

## 2023-07-20 PROCEDURE — 80053 COMPREHEN METABOLIC PANEL: CPT | Performed by: INTERNAL MEDICINE

## 2023-07-20 PROCEDURE — 84466 ASSAY OF TRANSFERRIN: CPT | Performed by: INTERNAL MEDICINE

## 2023-07-20 PROCEDURE — 83540 ASSAY OF IRON: CPT | Performed by: INTERNAL MEDICINE

## 2023-07-20 PROCEDURE — 82525 ASSAY OF COPPER: CPT | Performed by: INTERNAL MEDICINE

## 2023-07-20 PROCEDURE — 85007 BL SMEAR W/DIFF WBC COUNT: CPT | Performed by: INTERNAL MEDICINE

## 2023-07-20 PROCEDURE — 36415 COLL VENOUS BLD VENIPUNCTURE: CPT

## 2023-07-20 PROCEDURE — 82784 ASSAY IGA/IGD/IGG/IGM EACH: CPT | Performed by: INTERNAL MEDICINE

## 2023-07-20 PROCEDURE — 84165 PROTEIN E-PHORESIS SERUM: CPT | Performed by: INTERNAL MEDICINE

## 2023-07-20 PROCEDURE — 82607 VITAMIN B-12: CPT | Performed by: INTERNAL MEDICINE

## 2023-07-20 PROCEDURE — 86334 IMMUNOFIX E-PHORESIS SERUM: CPT | Performed by: INTERNAL MEDICINE

## 2023-07-20 PROCEDURE — 85025 COMPLETE CBC W/AUTO DIFF WBC: CPT | Performed by: INTERNAL MEDICINE

## 2023-07-20 RX ORDER — PREDNISONE 5 MG/1
TABLET ORAL
COMMUNITY
Start: 2023-06-21

## 2023-07-21 LAB
ALBUMIN SERPL ELPH-MCNC: 3.3 G/DL (ref 2.9–4.4)
ALBUMIN/GLOB SERPL: 1.1 {RATIO} (ref 0.7–1.7)
ALPHA1 GLOB SERPL ELPH-MCNC: 0.2 G/DL (ref 0–0.4)
ALPHA2 GLOB SERPL ELPH-MCNC: 0.8 G/DL (ref 0.4–1)
B-GLOBULIN SERPL ELPH-MCNC: 0.8 G/DL (ref 0.7–1.3)
GAMMA GLOB SERPL ELPH-MCNC: 1.4 G/DL (ref 0.4–1.8)
GLOBULIN SER-MCNC: 3.2 G/DL (ref 2.2–3.9)
IGA SERPL-MCNC: 123 MG/DL (ref 87–352)
IGG SERPL-MCNC: 1495 MG/DL (ref 586–1602)
IGM SERPL-MCNC: 40 MG/DL (ref 26–217)
INTERPRETATION SERPL IEP-IMP: ABNORMAL
KAPPA LC FREE SER-MCNC: 37 MG/L (ref 3.3–19.4)
KAPPA LC FREE/LAMBDA FREE SER: 1.5 {RATIO} (ref 0.26–1.65)
LABORATORY COMMENT REPORT: ABNORMAL
LAMBDA LC FREE SERPL-MCNC: 24.6 MG/L (ref 5.7–26.3)
M PROTEIN SERPL ELPH-MCNC: ABNORMAL G/DL
PROT SERPL-MCNC: 6.5 G/DL (ref 6–8.5)

## 2023-07-25 ENCOUNTER — TREATMENT (OUTPATIENT)
Dept: CARDIAC REHAB | Facility: HOSPITAL | Age: 68
End: 2023-07-25
Payer: MEDICARE

## 2023-07-25 DIAGNOSIS — J84.10 PULMONARY FIBROSIS: Primary | ICD-10-CM

## 2023-07-25 LAB — COPPER SERPL-MCNC: 66 UG/DL (ref 80–158)

## 2023-07-25 PROCEDURE — G0238 OTH RESP PROC, INDIV: HCPCS

## 2023-07-26 ENCOUNTER — TELEPHONE (OUTPATIENT)
Dept: ONCOLOGY | Facility: CLINIC | Age: 68
End: 2023-07-26
Payer: MEDICARE

## 2023-07-26 NOTE — TELEPHONE ENCOUNTER
----- Message from Kacey Robbins RN sent at 7/26/2023 10:34 AM EDT -----  Regarding: IV Copper times 4  Per Dr Noel patient needs to be scheduled for IV Copper times 4 treatments. Thanks

## 2023-07-27 ENCOUNTER — TELEPHONE (OUTPATIENT)
Dept: ONCOLOGY | Facility: CLINIC | Age: 68
End: 2023-07-27

## 2023-07-27 ENCOUNTER — TREATMENT (OUTPATIENT)
Dept: CARDIAC REHAB | Facility: HOSPITAL | Age: 68
End: 2023-07-27
Payer: MEDICARE

## 2023-07-27 DIAGNOSIS — J84.10 PULMONARY FIBROSIS: Primary | ICD-10-CM

## 2023-07-27 PROCEDURE — G0238 OTH RESP PROC, INDIV: HCPCS

## 2023-07-27 NOTE — TELEPHONE ENCOUNTER
Caller: Dara Medina    Relationship: Self    Best call back number: [phone]     What is the best time to reach you: ASAP    Who are you requesting to speak with (clinical staff, provider,  specific staff member): INFUSION SCHEDULING - [facility]      What was the call regarding: PT NORMALLY DOES APPTS AT [facility] AND PREFERS [facility], BUT NOTICED THE NEXT ONE AND A FEW OTHERS ARE SCHEDULED FOR [place], PLEASE CALL PT BACK TO [facility] IF THIS IS CORRECT OR IF THEY CAN BE CHANGED TO [facility].    Is it okay if the provider responds through MyChart: YES

## 2023-07-31 RX ORDER — BUPROPION HYDROCHLORIDE 300 MG/1
TABLET ORAL
Qty: 90 TABLET | Refills: 3 | Status: SHIPPED | OUTPATIENT
Start: 2023-07-31

## 2023-08-01 ENCOUNTER — TREATMENT (OUTPATIENT)
Dept: CARDIAC REHAB | Facility: HOSPITAL | Age: 68
End: 2023-08-01
Payer: MEDICARE

## 2023-08-01 DIAGNOSIS — J84.10 PULMONARY FIBROSIS: Primary | ICD-10-CM

## 2023-08-01 PROCEDURE — G0238 OTH RESP PROC, INDIV: HCPCS

## 2023-08-03 ENCOUNTER — TREATMENT (OUTPATIENT)
Dept: CARDIAC REHAB | Facility: HOSPITAL | Age: 68
End: 2023-08-03
Payer: MEDICARE

## 2023-08-03 DIAGNOSIS — J84.10 PULMONARY FIBROSIS: Primary | ICD-10-CM

## 2023-08-03 PROCEDURE — G0238 OTH RESP PROC, INDIV: HCPCS

## 2023-08-04 ENCOUNTER — INFUSION (OUTPATIENT)
Dept: ONCOLOGY | Facility: HOSPITAL | Age: 68
End: 2023-08-04
Payer: MEDICARE

## 2023-08-04 VITALS
WEIGHT: 175.2 LBS | OXYGEN SATURATION: 96 % | DIASTOLIC BLOOD PRESSURE: 73 MMHG | SYSTOLIC BLOOD PRESSURE: 122 MMHG | HEART RATE: 98 BPM | TEMPERATURE: 97.7 F | RESPIRATION RATE: 16 BRPM | BODY MASS INDEX: 29.15 KG/M2

## 2023-08-04 DIAGNOSIS — E61.0 COPPER DEFICIENCY: Primary | ICD-10-CM

## 2023-08-04 PROCEDURE — 96366 THER/PROPH/DIAG IV INF ADDON: CPT

## 2023-08-04 PROCEDURE — 96365 THER/PROPH/DIAG IV INF INIT: CPT

## 2023-08-04 RX ORDER — SODIUM CHLORIDE 9 MG/ML
250 INJECTION, SOLUTION INTRAVENOUS ONCE
Status: COMPLETED | OUTPATIENT
Start: 2023-08-04 | End: 2023-08-04

## 2023-08-04 RX ORDER — UPADACITINIB 15 MG/1
15 TABLET, EXTENDED RELEASE ORAL DAILY
COMMUNITY

## 2023-08-04 RX ADMIN — CUPRIC CHLORIDE 2 MG: 0.4 INJECTION, SOLUTION INTRAVENOUS at 14:25

## 2023-08-04 RX ADMIN — SODIUM CHLORIDE 250 ML: 9 INJECTION, SOLUTION INTRAVENOUS at 14:25

## 2023-08-07 RX ORDER — DIPHENHYDRAMINE HYDROCHLORIDE 50 MG/ML
50 INJECTION INTRAMUSCULAR; INTRAVENOUS AS NEEDED
Status: CANCELLED | OUTPATIENT
Start: 2023-08-07

## 2023-08-07 RX ORDER — ACETAMINOPHEN 500 MG
500 TABLET ORAL ONCE AS NEEDED
Status: CANCELLED
Start: 2023-08-07

## 2023-08-07 RX ORDER — EPINEPHRINE 0.3 MG/.3ML
0.3 INJECTION SUBCUTANEOUS AS NEEDED
Status: CANCELLED
Start: 2023-08-07

## 2023-08-08 ENCOUNTER — TREATMENT (OUTPATIENT)
Dept: CARDIAC REHAB | Facility: HOSPITAL | Age: 68
End: 2023-08-08
Payer: MEDICARE

## 2023-08-08 DIAGNOSIS — J84.10 PULMONARY FIBROSIS: Primary | ICD-10-CM

## 2023-08-08 PROCEDURE — G0238 OTH RESP PROC, INDIV: HCPCS

## 2023-08-09 ENCOUNTER — INFUSION (OUTPATIENT)
Dept: ONCOLOGY | Facility: HOSPITAL | Age: 68
End: 2023-08-09
Payer: MEDICARE

## 2023-08-09 VITALS
RESPIRATION RATE: 15 BRPM | BODY MASS INDEX: 29.42 KG/M2 | DIASTOLIC BLOOD PRESSURE: 66 MMHG | TEMPERATURE: 98 F | WEIGHT: 176.6 LBS | HEIGHT: 65 IN | HEART RATE: 80 BPM | OXYGEN SATURATION: 100 % | SYSTOLIC BLOOD PRESSURE: 113 MMHG

## 2023-08-09 DIAGNOSIS — E61.0 COPPER DEFICIENCY: Primary | ICD-10-CM

## 2023-08-09 DIAGNOSIS — D83.8 OTHER COMMON VARIABLE IMMUNODEFICIENCIES: ICD-10-CM

## 2023-08-09 DIAGNOSIS — D83.9 COMMON VARIABLE IMMUNODEFICIENCY: Primary | ICD-10-CM

## 2023-08-09 PROCEDURE — 96366 THER/PROPH/DIAG IV INF ADDON: CPT

## 2023-08-09 PROCEDURE — 25010000002 IMMUNE GLOBULIN (HUMAN) 40 GM/400ML SOLUTION: Performed by: ALLERGY & IMMUNOLOGY

## 2023-08-09 PROCEDURE — 96368 THER/DIAG CONCURRENT INF: CPT

## 2023-08-09 PROCEDURE — 96365 THER/PROPH/DIAG IV INF INIT: CPT

## 2023-08-09 RX ORDER — SODIUM CHLORIDE 9 MG/ML
250 INJECTION, SOLUTION INTRAVENOUS ONCE
Status: COMPLETED | OUTPATIENT
Start: 2023-08-09 | End: 2023-08-09

## 2023-08-09 RX ORDER — DIPHENHYDRAMINE HCL 25 MG
25 CAPSULE ORAL ONCE
Start: 2023-09-06 | End: 2023-09-06

## 2023-08-09 RX ORDER — EPINEPHRINE 0.3 MG/.3ML
0.3 INJECTION SUBCUTANEOUS AS NEEDED
Start: 2023-09-06

## 2023-08-09 RX ORDER — ACETAMINOPHEN 325 MG/1
650 TABLET ORAL ONCE
OUTPATIENT
Start: 2023-09-06

## 2023-08-09 RX ORDER — DIPHENHYDRAMINE HYDROCHLORIDE 50 MG/ML
50 INJECTION INTRAMUSCULAR; INTRAVENOUS AS NEEDED
OUTPATIENT
Start: 2023-09-06

## 2023-08-09 RX ORDER — ACETAMINOPHEN 500 MG
500 TABLET ORAL ONCE AS NEEDED
Start: 2023-09-06

## 2023-08-09 RX ADMIN — IMMUNE GLOBULIN (HUMAN) 40 G: 10 INJECTION INTRAVENOUS; SUBCUTANEOUS at 08:19

## 2023-08-09 RX ADMIN — SODIUM CHLORIDE 250 ML: 9 INJECTION, SOLUTION INTRAVENOUS at 08:19

## 2023-08-09 RX ADMIN — CUPRIC CHLORIDE 2 MG: 0.4 INJECTION, SOLUTION INTRAVENOUS at 08:30

## 2023-08-10 ENCOUNTER — TREATMENT (OUTPATIENT)
Dept: CARDIAC REHAB | Facility: HOSPITAL | Age: 68
End: 2023-08-10
Payer: MEDICARE

## 2023-08-10 DIAGNOSIS — J84.10 PULMONARY FIBROSIS: Primary | ICD-10-CM

## 2023-08-10 PROCEDURE — G0238 OTH RESP PROC, INDIV: HCPCS

## 2023-08-15 ENCOUNTER — TREATMENT (OUTPATIENT)
Dept: CARDIAC REHAB | Facility: HOSPITAL | Age: 68
End: 2023-08-15
Payer: MEDICARE

## 2023-08-15 DIAGNOSIS — J84.10 PULMONARY FIBROSIS: Primary | ICD-10-CM

## 2023-08-15 PROCEDURE — G0238 OTH RESP PROC, INDIV: HCPCS

## 2023-08-17 ENCOUNTER — TREATMENT (OUTPATIENT)
Dept: CARDIAC REHAB | Facility: HOSPITAL | Age: 68
End: 2023-08-17
Payer: MEDICARE

## 2023-08-17 DIAGNOSIS — J84.10 PULMONARY FIBROSIS: Primary | ICD-10-CM

## 2023-08-17 PROCEDURE — G0238 OTH RESP PROC, INDIV: HCPCS

## 2023-08-18 ENCOUNTER — INFUSION (OUTPATIENT)
Dept: ONCOLOGY | Facility: HOSPITAL | Age: 68
End: 2023-08-18
Payer: MEDICARE

## 2023-08-18 VITALS
DIASTOLIC BLOOD PRESSURE: 76 MMHG | RESPIRATION RATE: 16 BRPM | BODY MASS INDEX: 29.22 KG/M2 | HEART RATE: 94 BPM | SYSTOLIC BLOOD PRESSURE: 124 MMHG | OXYGEN SATURATION: 97 % | WEIGHT: 175.6 LBS | TEMPERATURE: 98.6 F

## 2023-08-18 DIAGNOSIS — E61.0 COPPER DEFICIENCY: Primary | ICD-10-CM

## 2023-08-18 PROCEDURE — 96365 THER/PROPH/DIAG IV INF INIT: CPT

## 2023-08-18 PROCEDURE — 96366 THER/PROPH/DIAG IV INF ADDON: CPT

## 2023-08-18 RX ORDER — SODIUM CHLORIDE 9 MG/ML
250 INJECTION, SOLUTION INTRAVENOUS ONCE
Status: COMPLETED | OUTPATIENT
Start: 2023-08-18 | End: 2023-08-18

## 2023-08-18 RX ADMIN — SODIUM CHLORIDE 250 ML: 9 INJECTION, SOLUTION INTRAVENOUS at 12:27

## 2023-08-18 RX ADMIN — CUPRIC CHLORIDE 2 MG: 0.4 INJECTION, SOLUTION INTRAVENOUS at 12:28

## 2023-08-22 ENCOUNTER — TREATMENT (OUTPATIENT)
Dept: CARDIAC REHAB | Facility: HOSPITAL | Age: 68
End: 2023-08-22
Payer: MEDICARE

## 2023-08-22 DIAGNOSIS — J84.10 PULMONARY FIBROSIS: Primary | ICD-10-CM

## 2023-08-22 PROCEDURE — G0238 OTH RESP PROC, INDIV: HCPCS

## 2023-08-23 ENCOUNTER — INFUSION (OUTPATIENT)
Dept: ONCOLOGY | Facility: HOSPITAL | Age: 68
End: 2023-08-23
Payer: MEDICARE

## 2023-08-23 VITALS
SYSTOLIC BLOOD PRESSURE: 108 MMHG | OXYGEN SATURATION: 97 % | TEMPERATURE: 96.8 F | RESPIRATION RATE: 18 BRPM | BODY MASS INDEX: 29.29 KG/M2 | WEIGHT: 176 LBS | HEART RATE: 83 BPM | DIASTOLIC BLOOD PRESSURE: 79 MMHG

## 2023-08-23 DIAGNOSIS — E61.0 COPPER DEFICIENCY: Primary | ICD-10-CM

## 2023-08-23 PROCEDURE — 96365 THER/PROPH/DIAG IV INF INIT: CPT

## 2023-08-23 PROCEDURE — 96366 THER/PROPH/DIAG IV INF ADDON: CPT

## 2023-08-23 RX ORDER — SODIUM CHLORIDE 9 MG/ML
250 INJECTION, SOLUTION INTRAVENOUS ONCE
Status: COMPLETED | OUTPATIENT
Start: 2023-08-23 | End: 2023-08-23

## 2023-08-23 RX ADMIN — SODIUM CHLORIDE 250 ML: 9 INJECTION, SOLUTION INTRAVENOUS at 08:14

## 2023-08-23 RX ADMIN — CUPRIC CHLORIDE 2 MG: 0.4 INJECTION, SOLUTION INTRAVENOUS at 08:39

## 2023-08-24 ENCOUNTER — APPOINTMENT (OUTPATIENT)
Dept: CARDIAC REHAB | Facility: HOSPITAL | Age: 68
End: 2023-08-24
Payer: MEDICARE

## 2023-08-29 ENCOUNTER — APPOINTMENT (OUTPATIENT)
Dept: CARDIAC REHAB | Facility: HOSPITAL | Age: 68
End: 2023-08-29
Payer: MEDICARE

## 2023-08-31 ENCOUNTER — TREATMENT (OUTPATIENT)
Dept: CARDIAC REHAB | Facility: HOSPITAL | Age: 68
End: 2023-08-31
Payer: MEDICARE

## 2023-08-31 DIAGNOSIS — J84.10 PULMONARY FIBROSIS: Primary | ICD-10-CM

## 2023-08-31 PROCEDURE — G0238 OTH RESP PROC, INDIV: HCPCS

## 2023-09-05 ENCOUNTER — TREATMENT (OUTPATIENT)
Dept: CARDIAC REHAB | Facility: HOSPITAL | Age: 68
End: 2023-09-05
Payer: MEDICARE

## 2023-09-05 DIAGNOSIS — J84.10 PULMONARY FIBROSIS: Primary | ICD-10-CM

## 2023-09-05 PROCEDURE — G0238 OTH RESP PROC, INDIV: HCPCS

## 2023-09-06 ENCOUNTER — INFUSION (OUTPATIENT)
Dept: ONCOLOGY | Facility: HOSPITAL | Age: 68
End: 2023-09-06
Payer: MEDICARE

## 2023-09-06 VITALS
TEMPERATURE: 96.5 F | DIASTOLIC BLOOD PRESSURE: 71 MMHG | WEIGHT: 177.6 LBS | BODY MASS INDEX: 29.59 KG/M2 | RESPIRATION RATE: 15 BRPM | HEART RATE: 80 BPM | SYSTOLIC BLOOD PRESSURE: 111 MMHG | OXYGEN SATURATION: 99 % | HEIGHT: 65 IN

## 2023-09-06 DIAGNOSIS — D83.9 COMMON VARIABLE IMMUNODEFICIENCY: Primary | ICD-10-CM

## 2023-09-06 DIAGNOSIS — M81.8 OTHER OSTEOPOROSIS, UNSPECIFIED PATHOLOGICAL FRACTURE PRESENCE: ICD-10-CM

## 2023-09-06 DIAGNOSIS — D83.8 OTHER COMMON VARIABLE IMMUNODEFICIENCIES: ICD-10-CM

## 2023-09-06 DIAGNOSIS — M81.8 OTHER OSTEOPOROSIS, UNSPECIFIED PATHOLOGICAL FRACTURE PRESENCE: Primary | ICD-10-CM

## 2023-09-06 LAB
25(OH)D3 SERPL-MCNC: 70.5 NG/ML (ref 30–100)
ALBUMIN SERPL-MCNC: 4 G/DL (ref 3.5–5.2)
ALBUMIN/GLOB SERPL: 1.4 G/DL
ALP SERPL-CCNC: 50 U/L (ref 39–117)
ALT SERPL W P-5'-P-CCNC: 47 U/L (ref 1–33)
ANION GAP SERPL CALCULATED.3IONS-SCNC: 8.8 MMOL/L (ref 5–15)
AST SERPL-CCNC: 52 U/L (ref 1–32)
BILIRUB SERPL-MCNC: 0.8 MG/DL (ref 0–1.2)
BUN SERPL-MCNC: 20 MG/DL (ref 8–23)
BUN/CREAT SERPL: 16.8 (ref 7–25)
CALCIUM SPEC-SCNC: 9.4 MG/DL (ref 8.6–10.5)
CHLORIDE SERPL-SCNC: 109 MMOL/L (ref 98–107)
CO2 SERPL-SCNC: 24.2 MMOL/L (ref 22–29)
CREAT SERPL-MCNC: 1.19 MG/DL (ref 0.57–1)
EGFRCR SERPLBLD CKD-EPI 2021: 49.9 ML/MIN/1.73
GLOBULIN UR ELPH-MCNC: 2.8 GM/DL
GLUCOSE SERPL-MCNC: 104 MG/DL (ref 65–99)
IGG1 SER-MCNC: 1070 MG/DL (ref 700–1600)
MAGNESIUM SERPL-MCNC: 1.9 MG/DL (ref 1.6–2.4)
PHOSPHATE SERPL-MCNC: 4 MG/DL (ref 2.5–4.5)
POTASSIUM SERPL-SCNC: 4.7 MMOL/L (ref 3.5–5.2)
PROT SERPL-MCNC: 6.8 G/DL (ref 6–8.5)
SODIUM SERPL-SCNC: 142 MMOL/L (ref 136–145)

## 2023-09-06 PROCEDURE — 82306 VITAMIN D 25 HYDROXY: CPT | Performed by: INTERNAL MEDICINE

## 2023-09-06 PROCEDURE — 84100 ASSAY OF PHOSPHORUS: CPT | Performed by: INTERNAL MEDICINE

## 2023-09-06 PROCEDURE — 96365 THER/PROPH/DIAG IV INF INIT: CPT

## 2023-09-06 PROCEDURE — 96372 THER/PROPH/DIAG INJ SC/IM: CPT

## 2023-09-06 PROCEDURE — 83735 ASSAY OF MAGNESIUM: CPT | Performed by: INTERNAL MEDICINE

## 2023-09-06 PROCEDURE — 80053 COMPREHEN METABOLIC PANEL: CPT | Performed by: INTERNAL MEDICINE

## 2023-09-06 PROCEDURE — 25010000002 IMMUNE GLOBULIN (HUMAN) 40 GM/400ML SOLUTION: Performed by: ALLERGY & IMMUNOLOGY

## 2023-09-06 PROCEDURE — 96366 THER/PROPH/DIAG IV INF ADDON: CPT

## 2023-09-06 PROCEDURE — 25010000002 DENOSUMAB 60 MG/ML SOLUTION PREFILLED SYRINGE: Performed by: INTERNAL MEDICINE

## 2023-09-06 PROCEDURE — 82784 ASSAY IGA/IGD/IGG/IGM EACH: CPT | Performed by: INTERNAL MEDICINE

## 2023-09-06 RX ORDER — DIPHENHYDRAMINE HCL 25 MG
25 CAPSULE ORAL ONCE
Start: 2023-10-04 | End: 2023-10-04

## 2023-09-06 RX ORDER — SODIUM CHLORIDE 9 MG/ML
250 INJECTION, SOLUTION INTRAVENOUS ONCE
Status: COMPLETED | OUTPATIENT
Start: 2023-09-06 | End: 2023-09-06

## 2023-09-06 RX ORDER — EPINEPHRINE 0.3 MG/.3ML
0.3 INJECTION SUBCUTANEOUS AS NEEDED
Start: 2023-10-04

## 2023-09-06 RX ORDER — DIPHENHYDRAMINE HYDROCHLORIDE 50 MG/ML
50 INJECTION INTRAMUSCULAR; INTRAVENOUS AS NEEDED
OUTPATIENT
Start: 2023-10-04

## 2023-09-06 RX ORDER — ACETAMINOPHEN 500 MG
500 TABLET ORAL ONCE AS NEEDED
Start: 2023-10-04

## 2023-09-06 RX ORDER — ACETAMINOPHEN 325 MG/1
650 TABLET ORAL ONCE
OUTPATIENT
Start: 2023-10-04

## 2023-09-06 RX ADMIN — IMMUNE GLOBULIN (HUMAN) 40 G: 10 INJECTION INTRAVENOUS; SUBCUTANEOUS at 08:38

## 2023-09-06 RX ADMIN — SODIUM CHLORIDE 250 ML: 9 INJECTION, SOLUTION INTRAVENOUS at 08:20

## 2023-09-06 RX ADMIN — DENOSUMAB 60 MG: 60 INJECTION SUBCUTANEOUS at 09:30

## 2023-09-06 NOTE — NURSING NOTE
Patient to get prolia injection today. Indication and side effects reviewed. Denies recent dental work/jaw bone pain. Labs and medications verified. Prolia administered in left arm without incidence. Instructed to call prescribing MD for any concerns or questions and instructed on how to schedule future appts.  Patient will be discharged after IVIG appointment complete.

## 2023-09-07 ENCOUNTER — TREATMENT (OUTPATIENT)
Dept: CARDIAC REHAB | Facility: HOSPITAL | Age: 68
End: 2023-09-07
Payer: MEDICARE

## 2023-09-07 DIAGNOSIS — J84.10 PULMONARY FIBROSIS: Primary | ICD-10-CM

## 2023-09-07 PROCEDURE — G0238 OTH RESP PROC, INDIV: HCPCS

## 2023-09-12 ENCOUNTER — TREATMENT (OUTPATIENT)
Dept: CARDIAC REHAB | Facility: HOSPITAL | Age: 68
End: 2023-09-12
Payer: MEDICARE

## 2023-09-12 DIAGNOSIS — J84.10 PULMONARY FIBROSIS: Primary | ICD-10-CM

## 2023-09-12 PROCEDURE — G0238 OTH RESP PROC, INDIV: HCPCS

## 2023-09-14 ENCOUNTER — TREATMENT (OUTPATIENT)
Dept: CARDIAC REHAB | Facility: HOSPITAL | Age: 68
End: 2023-09-14
Payer: MEDICARE

## 2023-09-14 DIAGNOSIS — J84.10 PULMONARY FIBROSIS: Primary | ICD-10-CM

## 2023-09-14 PROCEDURE — G0238 OTH RESP PROC, INDIV: HCPCS

## 2023-09-19 ENCOUNTER — APPOINTMENT (OUTPATIENT)
Dept: CARDIAC REHAB | Facility: HOSPITAL | Age: 68
End: 2023-09-19
Payer: MEDICARE

## 2023-09-21 ENCOUNTER — TREATMENT (OUTPATIENT)
Dept: CARDIAC REHAB | Facility: HOSPITAL | Age: 68
End: 2023-09-21
Payer: MEDICARE

## 2023-09-21 DIAGNOSIS — J84.10 PULMONARY FIBROSIS: Primary | ICD-10-CM

## 2023-09-21 PROCEDURE — G0238 OTH RESP PROC, INDIV: HCPCS

## 2023-09-26 ENCOUNTER — TREATMENT (OUTPATIENT)
Dept: CARDIAC REHAB | Facility: HOSPITAL | Age: 68
End: 2023-09-26
Payer: MEDICARE

## 2023-09-26 DIAGNOSIS — J84.10 PULMONARY FIBROSIS: Primary | ICD-10-CM

## 2023-09-26 PROCEDURE — G0238 OTH RESP PROC, INDIV: HCPCS

## 2023-09-28 ENCOUNTER — APPOINTMENT (OUTPATIENT)
Dept: CARDIAC REHAB | Facility: HOSPITAL | Age: 68
End: 2023-09-28
Payer: MEDICARE

## 2023-10-03 ENCOUNTER — APPOINTMENT (OUTPATIENT)
Dept: CARDIAC REHAB | Facility: HOSPITAL | Age: 68
End: 2023-10-03
Payer: MEDICARE

## 2023-10-04 ENCOUNTER — INFUSION (OUTPATIENT)
Dept: ONCOLOGY | Facility: HOSPITAL | Age: 68
End: 2023-10-04
Payer: MEDICARE

## 2023-10-04 VITALS
SYSTOLIC BLOOD PRESSURE: 115 MMHG | OXYGEN SATURATION: 99 % | WEIGHT: 180 LBS | BODY MASS INDEX: 29.99 KG/M2 | HEIGHT: 65 IN | DIASTOLIC BLOOD PRESSURE: 75 MMHG | TEMPERATURE: 97 F | RESPIRATION RATE: 15 BRPM | HEART RATE: 73 BPM

## 2023-10-04 DIAGNOSIS — D83.9 COMMON VARIABLE IMMUNODEFICIENCY: Primary | ICD-10-CM

## 2023-10-04 DIAGNOSIS — D83.8 OTHER COMMON VARIABLE IMMUNODEFICIENCIES: ICD-10-CM

## 2023-10-04 PROCEDURE — 96365 THER/PROPH/DIAG IV INF INIT: CPT

## 2023-10-04 PROCEDURE — 96366 THER/PROPH/DIAG IV INF ADDON: CPT

## 2023-10-04 PROCEDURE — 25010000002 IMMUNE GLOBULIN (HUMAN) 40 GM/400ML SOLUTION: Performed by: ALLERGY & IMMUNOLOGY

## 2023-10-04 RX ADMIN — IMMUNE GLOBULIN (HUMAN) 40 G: 10 INJECTION INTRAVENOUS; SUBCUTANEOUS at 08:40

## 2023-10-04 NOTE — NURSING NOTE
Pt states she took home supply of pre-meds tylelnol 650 mg and benadryl 25 mg prior to coming into the office this am.

## 2023-10-05 ENCOUNTER — TREATMENT (OUTPATIENT)
Dept: CARDIAC REHAB | Facility: HOSPITAL | Age: 68
End: 2023-10-05
Payer: MEDICARE

## 2023-10-05 DIAGNOSIS — J84.10 PULMONARY FIBROSIS: Primary | ICD-10-CM

## 2023-10-05 PROCEDURE — G0238 OTH RESP PROC, INDIV: HCPCS

## 2023-10-05 RX ORDER — FLUOXETINE HYDROCHLORIDE 20 MG/1
20 CAPSULE ORAL DAILY
Qty: 90 CAPSULE | Refills: 3 | Status: SHIPPED | OUTPATIENT
Start: 2023-10-05

## 2023-10-10 ENCOUNTER — TREATMENT (OUTPATIENT)
Dept: CARDIAC REHAB | Facility: HOSPITAL | Age: 68
End: 2023-10-10
Payer: MEDICARE

## 2023-10-10 DIAGNOSIS — J84.10 PULMONARY FIBROSIS: Primary | ICD-10-CM

## 2023-10-10 PROCEDURE — G0238 OTH RESP PROC, INDIV: HCPCS

## 2023-10-12 ENCOUNTER — APPOINTMENT (OUTPATIENT)
Dept: CARDIAC REHAB | Facility: HOSPITAL | Age: 68
End: 2023-10-12
Payer: MEDICARE

## 2023-10-17 ENCOUNTER — APPOINTMENT (OUTPATIENT)
Dept: CARDIAC REHAB | Facility: HOSPITAL | Age: 68
End: 2023-10-17
Payer: MEDICARE

## 2023-10-19 ENCOUNTER — APPOINTMENT (OUTPATIENT)
Dept: CARDIAC REHAB | Facility: HOSPITAL | Age: 68
End: 2023-10-19
Payer: MEDICARE

## 2023-10-24 ENCOUNTER — TREATMENT (OUTPATIENT)
Dept: CARDIAC REHAB | Facility: HOSPITAL | Age: 68
End: 2023-10-24
Payer: MEDICARE

## 2023-10-24 DIAGNOSIS — J84.10 PULMONARY FIBROSIS: Primary | ICD-10-CM

## 2023-10-24 PROCEDURE — G0238 OTH RESP PROC, INDIV: HCPCS

## 2023-10-30 RX ORDER — ACETAMINOPHEN 325 MG/1
650 TABLET ORAL ONCE
Status: CANCELLED | OUTPATIENT
Start: 2023-11-01

## 2023-10-30 RX ORDER — DIPHENHYDRAMINE HCL 25 MG
25 CAPSULE ORAL ONCE
Status: CANCELLED
Start: 2023-11-01 | End: 2023-11-01

## 2023-10-30 RX ORDER — ACETAMINOPHEN 500 MG
500 TABLET ORAL ONCE AS NEEDED
Status: CANCELLED
Start: 2023-11-01

## 2023-10-30 RX ORDER — EPINEPHRINE 0.3 MG/.3ML
0.3 INJECTION SUBCUTANEOUS AS NEEDED
Status: CANCELLED
Start: 2023-11-01

## 2023-10-30 RX ORDER — DIPHENHYDRAMINE HYDROCHLORIDE 50 MG/ML
50 INJECTION INTRAMUSCULAR; INTRAVENOUS AS NEEDED
Status: CANCELLED | OUTPATIENT
Start: 2023-11-01

## 2023-10-31 ENCOUNTER — APPOINTMENT (OUTPATIENT)
Dept: CARDIAC REHAB | Facility: HOSPITAL | Age: 68
End: 2023-10-31
Payer: MEDICARE

## 2023-10-31 RX ORDER — TOPIRAMATE 100 MG/1
100 TABLET, FILM COATED ORAL 2 TIMES DAILY
Qty: 180 TABLET | Refills: 3 | Status: SHIPPED | OUTPATIENT
Start: 2023-10-31

## 2023-10-31 RX ORDER — LEVOTHYROXINE SODIUM 112 MCG
TABLET ORAL
Qty: 90 TABLET | Refills: 1 | Status: SHIPPED | OUTPATIENT
Start: 2023-10-31

## 2023-11-01 ENCOUNTER — INFUSION (OUTPATIENT)
Dept: ONCOLOGY | Facility: HOSPITAL | Age: 68
End: 2023-11-01
Payer: MEDICARE

## 2023-11-01 VITALS
TEMPERATURE: 98.4 F | BODY MASS INDEX: 29.96 KG/M2 | SYSTOLIC BLOOD PRESSURE: 114 MMHG | HEART RATE: 88 BPM | HEIGHT: 65 IN | WEIGHT: 179.8 LBS | DIASTOLIC BLOOD PRESSURE: 72 MMHG | RESPIRATION RATE: 18 BRPM | OXYGEN SATURATION: 96 %

## 2023-11-01 DIAGNOSIS — D83.9 COMMON VARIABLE IMMUNODEFICIENCY: Primary | ICD-10-CM

## 2023-11-01 DIAGNOSIS — D83.8 OTHER COMMON VARIABLE IMMUNODEFICIENCIES: ICD-10-CM

## 2023-11-01 PROCEDURE — 96365 THER/PROPH/DIAG IV INF INIT: CPT

## 2023-11-01 PROCEDURE — 25010000002 IMMUNE GLOBULIN (HUMAN) 40 GM/400ML SOLUTION: Performed by: ALLERGY & IMMUNOLOGY

## 2023-11-01 PROCEDURE — 96366 THER/PROPH/DIAG IV INF ADDON: CPT

## 2023-11-01 RX ORDER — EPINEPHRINE 0.3 MG/.3ML
0.3 INJECTION SUBCUTANEOUS AS NEEDED
Start: 2023-11-29

## 2023-11-01 RX ORDER — ACETAMINOPHEN 500 MG
500 TABLET ORAL ONCE AS NEEDED
Start: 2023-11-29

## 2023-11-01 RX ORDER — DIPHENHYDRAMINE HCL 25 MG
25 CAPSULE ORAL ONCE
Start: 2023-11-29 | End: 2023-11-29

## 2023-11-01 RX ORDER — DIPHENHYDRAMINE HYDROCHLORIDE 50 MG/ML
50 INJECTION INTRAMUSCULAR; INTRAVENOUS AS NEEDED
OUTPATIENT
Start: 2023-11-29

## 2023-11-01 RX ORDER — ACETAMINOPHEN 325 MG/1
650 TABLET ORAL ONCE
OUTPATIENT
Start: 2023-11-29

## 2023-11-01 RX ADMIN — IMMUNE GLOBULIN (HUMAN) 40 G: 10 INJECTION INTRAVENOUS; SUBCUTANEOUS at 08:32

## 2023-11-01 NOTE — NURSING NOTE
Pt arrived for IVIG today and reports taking benadryl and tylenol prior to arrival. Tolerated today's treatment without any complaints and discharged in stable condition.

## 2023-11-06 RX ORDER — CYANOCOBALAMIN 1000 UG/ML
1000 INJECTION, SOLUTION INTRAMUSCULAR; SUBCUTANEOUS
Qty: 3 ML | Refills: 3 | Status: SHIPPED | OUTPATIENT
Start: 2023-11-06

## 2023-11-07 ENCOUNTER — TRANSCRIBE ORDERS (OUTPATIENT)
Dept: ADMINISTRATIVE | Facility: HOSPITAL | Age: 68
End: 2023-11-07
Payer: MEDICARE

## 2023-11-07 DIAGNOSIS — Z12.31 VISIT FOR SCREENING MAMMOGRAM: Primary | ICD-10-CM

## 2023-11-20 ENCOUNTER — HOSPITAL ENCOUNTER (OUTPATIENT)
Dept: GENERAL RADIOLOGY | Facility: HOSPITAL | Age: 68
Discharge: HOME | End: 2023-11-20
Admitting: INTERNAL MEDICINE
Payer: MEDICARE

## 2023-11-20 ENCOUNTER — OFFICE VISIT (OUTPATIENT)
Dept: INTERNAL MEDICINE | Facility: CLINIC | Age: 68
End: 2023-11-20
Payer: MEDICARE

## 2023-11-20 VITALS
WEIGHT: 179 LBS | OXYGEN SATURATION: 97 % | BODY MASS INDEX: 29.82 KG/M2 | HEART RATE: 90 BPM | SYSTOLIC BLOOD PRESSURE: 100 MMHG | HEIGHT: 65 IN | DIASTOLIC BLOOD PRESSURE: 60 MMHG

## 2023-11-20 DIAGNOSIS — J20.9 ACUTE BRONCHITIS, UNSPECIFIED ORGANISM: ICD-10-CM

## 2023-11-20 DIAGNOSIS — D83.8 OTHER COMMON VARIABLE IMMUNODEFICIENCIES: ICD-10-CM

## 2023-11-20 DIAGNOSIS — R07.81 RIB PAIN: ICD-10-CM

## 2023-11-20 DIAGNOSIS — R07.81 RIB PAIN: Primary | ICD-10-CM

## 2023-11-20 PROCEDURE — 99213 OFFICE O/P EST LOW 20 MIN: CPT | Performed by: INTERNAL MEDICINE

## 2023-11-20 PROCEDURE — 71101 X-RAY EXAM UNILAT RIBS/CHEST: CPT

## 2023-11-20 RX ORDER — DEXTROMETHORPHAN HYDROBROMIDE AND PROMETHAZINE HYDROCHLORIDE 15; 6.25 MG/5ML; MG/5ML
5 SYRUP ORAL 4 TIMES DAILY PRN
Qty: 118 ML | Refills: 0 | Status: SHIPPED | OUTPATIENT
Start: 2023-11-20

## 2023-11-20 RX ORDER — DOXYCYCLINE HYCLATE 100 MG/1
100 CAPSULE ORAL 2 TIMES DAILY
Qty: 14 CAPSULE | Refills: 0 | Status: SHIPPED | OUTPATIENT
Start: 2023-11-20

## 2023-11-20 NOTE — PROGRESS NOTES
Subjective     Dara Medina is a 68 y.o. female who presents with   Chief Complaint   Patient presents with    Rib Injury       Rib Injury  Associated symptoms include coughing and a fever.        C/o left rib pain.  Started after bending over three weeks ago.   Started with respiratory infection.  Cough and fever.  SOA.  Going on one week.      Review of Systems   Constitutional:  Positive for fever.   Respiratory:  Positive for cough.        The following portions of the patient's history were reviewed and updated as appropriate: allergies, current medications and problem list.    Patient Active Problem List    Diagnosis Date Noted    Abnormal blood level of copper 08/01/2022    Copper deficiency 08/01/2022    Other common variable immunodeficiencies 01/20/2022    Situational depression 12/08/2021    Personal history of colonic polyps 04/20/2021     Note Last Updated: 4/20/2021     Added automatically from request for surgery [id number]      IgG monoclonal gammopathy of uncertain significance 02/20/2020    Chronic idiopathic constipation 06/18/2019    Macrocytosis without anemia 03/09/2019    Acquired lymphocytopenia 03/09/2019    Adverse effect of iron or its compound, sequela 08/29/2018    Vitamin B12 deficiency 08/29/2018    Leukemoid reaction 08/29/2018    Primary osteoarthritis of right knee 11/22/2017     Note Last Updated: 11/22/2017     Added automatically from request for surgery [id number]      Mixed hyperlipidemia 05/05/2017    Tear of medial meniscus of right knee, current 05/04/2017    Prediabetes 04/12/2017    Pulmonary fibrosis 07/06/2016     Note Last Updated: 10/23/2017     Secondary to methotrexate.        Iron deficiency anemia 07/06/2016    Pernicious anemia 07/06/2016    Common variable immunodeficiency 07/01/2016    Hypothyroidism 05/23/2016    Sensory neuropathy 05/23/2016    Osteoporosis 05/23/2016     Note Last Updated: 6/1/2022     H/o two year Forteo.  Not on bisphosphonates because of dental  "issues.  Fosamax in past caused stomach problems.  Start Evenity 5/2020 for one.year.  Prolia started 2022.        Vitamin D deficiency 05/23/2016       Current Outpatient Medications on File Prior to Visit   Medication Sig Dispense Refill    atorvastatin (LIPITOR) 20 MG tablet TAKE 1 TABLET BY MOUTH EVERY DAY 90 tablet 3    azaTHIOprine (IMURAN) 50 MG tablet Take 2 tablets by mouth 2 (Two) Times a Day. 150 mg daily      B Complex-C (B COMPLEX-B12-C IJ) Inject 1,000 mcg under the skin into the appropriate area as directed Every 30 (Thirty) Days.      buPROPion XL (WELLBUTRIN XL) 300 MG 24 hr tablet TAKE 1 TABLET BY MOUTH EVERY DAY 90 tablet 3    cyanocobalamin 1000 MCG/ML injection INJECT 1 ML INTO THE APPROPRIATE MUSCLE AS DIRECTED BY PRESCRIBER EVERY 30 (THIRTY) DAYS. 3 mL 3    cyclobenzaprine (FLEXERIL) 10 MG tablet TAKE 1 TABLET BY MOUTH THREE TIMES A DAY AS NEEDED      FLUoxetine (PROzac) 20 MG capsule Take 1 capsule by mouth Daily. 90 capsule 3    Immune Globulin, Human, (Gamunex-C) 40 GM/400ML solution Infuse 40 g into a venous catheter Every 28 (Twenty-Eight) Days.      Restasis 0.05 % ophthalmic emulsion Administer 1 drop to both eyes Every 12 (Twelve) Hours.      Synthroid 112 MCG tablet TAKE 1 TABLET BY MOUTH EVERY DAY 90 tablet 1    Syringe/Needle, Disp, (BD SafetyGlide Syringe/Needle) 27G X 5/8\" 1 ML misc USE ONCE MONTHLY FOR B12 INJECTIONS. 3 each 3    topiramate (TOPAMAX) 100 MG tablet TAKE 1 TABLET BY MOUTH TWICE A  tablet 3    Upadacitinib ER (Rinvoq) 15 MG tablet sustained-release 24 hour Take 1 tablet by mouth Daily. Indications: Rheumatoid Arthritis      [DISCONTINUED] predniSONE (DELTASONE) 5 MG tablet TAKE 3 TAB DAILY FOR 4 DAYS, 2 TAB DAILY FOR 1 WEEK, 1 TAB DAILY 2 WEEKS THEN STOP *EXTRA FOR FLARE*       No current facility-administered medications on file prior to visit.       Objective     /60   Pulse 90   Ht 165.1 cm (65\")   Wt 81.2 kg (179 lb)   SpO2 97%   BMI 29.79 " kg/m²     Physical Exam  Constitutional:       Appearance: She is well-developed.   HENT:      Head: Normocephalic and atraumatic.      Right Ear: Hearing and tympanic membrane normal.      Left Ear: Hearing and tympanic membrane normal.      Mouth/Throat:      Pharynx: No oropharyngeal exudate or posterior oropharyngeal erythema.   Cardiovascular:      Rate and Rhythm: Normal rate and regular rhythm.      Heart sounds: Normal heart sounds.   Pulmonary:      Effort: Pulmonary effort is normal.      Breath sounds: Normal breath sounds.   Musculoskeletal:      Comments: Pain with palpation over the left rib cage.    Skin:     General: Skin is warm and dry.   Neurological:      Mental Status: She is alert and oriented to person, place, and time.   Psychiatric:         Behavior: Behavior normal.         Assessment & Plan   Diagnoses and all orders for this visit:    1. Rib pain (Primary)  -     XR Ribs Left With PA Chest; Future    2. Acute bronchitis, unspecified organism    3. Other common variable immunodeficiencies    Other orders  -     doxycycline (VIBRAMYCIN) 100 MG capsule; Take 1 capsule by mouth 2 (Two) Times a Day.  Dispense: 14 capsule; Refill: 0  -     promethazine-dextromethorphan (PROMETHAZINE-DM) 6.25-15 MG/5ML syrup; Take 5 mL by mouth 4 (Four) Times a Day As Needed for Cough.  Dispense: 118 mL; Refill: 0        Discussion    Patient presents with rib pain after bending over.  Check x-rays to further evaluate.    Acute bronchitis in an immunocompromised patient.  Patient presents with episode of acute bronchitis.  A prescription for antibiotics is provided today.  The patient is instructed to take along with cough medication.  Let me know they are not feeling better over the next 3 days or if there is any change in symptoms.           Future Appointments   Date Time Provider Department Center   11/29/2023  8:00 AM REFERRED INFUSION CHAIR 12 [facility] INFUS EP [facility]   12/5/2023 10:40 AM [facility] M[facility]M 1  [facility]   12/12/2023  8:50 AM [facility]   12/19/2023  9:45 AM Randi Yang MD [facility]   12/27/2023  8:00 AM REFERRED INFUSION CHAIR 12 [facility] INFUS EP [facility]   1/24/2024  8:00 AM REFERRED INFUSION CHAIR 12 [facility] INFUS EP [facility]   2/21/2024  8:00 AM REFERRED INFUSION CHAIR 12 [facility] INFUS EP [facility]   3/20/2024  8:00 AM REFERRED INFUSION CHAIR 12 [facility] INFUS EP [facility]   4/17/2024  8:00 AM REFERRED INFUSION CHAIR 12 [facility] INFUS EP [facility]   5/15/2024  8:00 AM REFERRED INFUSION CHAIR 12 [facility] INFUS EP [facility]   6/12/2024  8:00 AM REFERRED INFUSION CHAIR 12 [facility] INFUS EP [facility]   7/25/2024 11:00 AM LAB CHAIR 1 [facility]   7/25/2024 11:20 AM Ivory Noel MD PhD [facility]

## 2023-11-29 ENCOUNTER — INFUSION (OUTPATIENT)
Dept: ONCOLOGY | Facility: HOSPITAL | Age: 68
End: 2023-11-29
Payer: MEDICARE

## 2023-11-29 VITALS
BODY MASS INDEX: 29.39 KG/M2 | WEIGHT: 176.6 LBS | HEART RATE: 70 BPM | TEMPERATURE: 97.9 F | SYSTOLIC BLOOD PRESSURE: 120 MMHG | OXYGEN SATURATION: 98 % | DIASTOLIC BLOOD PRESSURE: 71 MMHG | RESPIRATION RATE: 18 BRPM

## 2023-11-29 DIAGNOSIS — D83.8 OTHER COMMON VARIABLE IMMUNODEFICIENCIES: ICD-10-CM

## 2023-11-29 DIAGNOSIS — D83.9 COMMON VARIABLE IMMUNODEFICIENCY: Primary | ICD-10-CM

## 2023-11-29 LAB — IGG1 SER-MCNC: 1153 MG/DL (ref 700–1600)

## 2023-11-29 PROCEDURE — 25010000002 IMMUNE GLOBULIN (HUMAN) 40 GM/400ML SOLUTION: Performed by: ALLERGY & IMMUNOLOGY

## 2023-11-29 PROCEDURE — 96365 THER/PROPH/DIAG IV INF INIT: CPT

## 2023-11-29 PROCEDURE — 82784 ASSAY IGA/IGD/IGG/IGM EACH: CPT | Performed by: ALLERGY & IMMUNOLOGY

## 2023-11-29 PROCEDURE — 96366 THER/PROPH/DIAG IV INF ADDON: CPT

## 2023-11-29 RX ORDER — EPINEPHRINE 0.3 MG/.3ML
0.3 INJECTION SUBCUTANEOUS AS NEEDED
Start: 2023-12-27

## 2023-11-29 RX ORDER — ACETAMINOPHEN 325 MG/1
650 TABLET ORAL ONCE
OUTPATIENT
Start: 2023-12-27

## 2023-11-29 RX ORDER — ACETAMINOPHEN 500 MG
500 TABLET ORAL ONCE AS NEEDED
Start: 2023-12-27

## 2023-11-29 RX ORDER — DIPHENHYDRAMINE HCL 25 MG
25 CAPSULE ORAL ONCE
Start: 2023-12-27 | End: 2023-12-27

## 2023-11-29 RX ORDER — DIPHENHYDRAMINE HYDROCHLORIDE 50 MG/ML
50 INJECTION INTRAMUSCULAR; INTRAVENOUS AS NEEDED
OUTPATIENT
Start: 2023-12-27

## 2023-11-29 RX ADMIN — IMMUNE GLOBULIN (HUMAN) 40 G: 10 INJECTION INTRAVENOUS; SUBCUTANEOUS at 07:58

## 2023-11-29 NOTE — NURSING NOTE
Pt arrived for IVIG infusion stating that she took her premeds at home prior to arriving. Pt tolerated infusion well without difficulty. Pt instructed to contact her prescribing MD with any questions or concerns. Pt verbalized understanding and was discharged in a stable condition.

## 2023-12-04 RX ORDER — FLUCONAZOLE 150 MG/1
150 TABLET ORAL
Qty: 2 TABLET | Refills: 0 | Status: SHIPPED | OUTPATIENT
Start: 2023-12-04

## 2023-12-05 ENCOUNTER — HOSPITAL ENCOUNTER (OUTPATIENT)
Dept: MAMMOGRAPHY | Facility: HOSPITAL | Age: 68
Discharge: HOME | End: 2023-12-05
Admitting: INTERNAL MEDICINE
Payer: MEDICARE

## 2023-12-05 DIAGNOSIS — Z12.31 VISIT FOR SCREENING MAMMOGRAM: ICD-10-CM

## 2023-12-05 PROCEDURE — 77067 SCR MAMMO BI INCL CAD: CPT

## 2023-12-05 PROCEDURE — 77063 BREAST TOMOSYNTHESIS BI: CPT

## 2023-12-06 RX ORDER — ATORVASTATIN CALCIUM 20 MG/1
TABLET, FILM COATED ORAL
Qty: 90 TABLET | Refills: 3 | Status: SHIPPED | OUTPATIENT
Start: 2023-12-06

## 2023-12-11 DIAGNOSIS — E55.9 VITAMIN D DEFICIENCY: ICD-10-CM

## 2023-12-11 DIAGNOSIS — E78.2 MIXED HYPERLIPIDEMIA: Primary | ICD-10-CM

## 2023-12-11 DIAGNOSIS — E03.9 ACQUIRED HYPOTHYROIDISM: ICD-10-CM

## 2023-12-11 DIAGNOSIS — E53.8 VITAMIN B12 DEFICIENCY: ICD-10-CM

## 2023-12-11 DIAGNOSIS — R73.03 PREDIABETES: ICD-10-CM

## 2023-12-13 LAB
25(OH)D3+25(OH)D2 SERPL-MCNC: 43.4 NG/ML (ref 30–100)
ALBUMIN SERPL-MCNC: 4.2 G/DL (ref 3.9–4.9)
ALBUMIN/GLOB SERPL: 1.4 {RATIO} (ref 1.2–2.2)
ALP SERPL-CCNC: 56 IU/L (ref 44–121)
ALT SERPL-CCNC: 16 IU/L (ref 0–32)
AST SERPL-CCNC: 26 IU/L (ref 0–40)
BASOPHILS # BLD AUTO: 0 X10E3/UL (ref 0–0.2)
BASOPHILS NFR BLD AUTO: 1 %
BILIRUB SERPL-MCNC: 0.5 MG/DL (ref 0–1.2)
BUN SERPL-MCNC: 13 MG/DL (ref 8–27)
BUN/CREAT SERPL: 11 (ref 12–28)
CALCIUM SERPL-MCNC: 9.4 MG/DL (ref 8.7–10.3)
CHLORIDE SERPL-SCNC: 111 MMOL/L (ref 96–106)
CHOLEST SERPL-MCNC: 178 MG/DL (ref 100–199)
CO2 SERPL-SCNC: 20 MMOL/L (ref 20–29)
CREAT SERPL-MCNC: 1.22 MG/DL (ref 0.57–1)
EGFRCR SERPLBLD CKD-EPI 2021: 48 ML/MIN/1.73
EOSINOPHIL # BLD AUTO: 0.2 X10E3/UL (ref 0–0.4)
EOSINOPHIL NFR BLD AUTO: 4 %
ERYTHROCYTE [DISTWIDTH] IN BLOOD BY AUTOMATED COUNT: 14.1 % (ref 11.7–15.4)
GLOBULIN SER CALC-MCNC: 3 G/DL (ref 1.5–4.5)
GLUCOSE SERPL-MCNC: 102 MG/DL (ref 70–99)
HBA1C MFR BLD: 5.3 % (ref 4.8–5.6)
HCT VFR BLD AUTO: 40.2 % (ref 34–46.6)
HDLC SERPL-MCNC: 68 MG/DL
HGB BLD-MCNC: 13.4 G/DL (ref 11.1–15.9)
IMM GRANULOCYTES # BLD AUTO: 0 X10E3/UL (ref 0–0.1)
IMM GRANULOCYTES NFR BLD AUTO: 0 %
LDLC SERPL CALC-MCNC: 96 MG/DL (ref 0–99)
LYMPHOCYTES # BLD AUTO: 0.2 X10E3/UL (ref 0.7–3.1)
LYMPHOCYTES NFR BLD AUTO: 4 %
MCH RBC QN AUTO: 35 PG (ref 26.6–33)
MCHC RBC AUTO-ENTMCNC: 33.3 G/DL (ref 31.5–35.7)
MCV RBC AUTO: 105 FL (ref 79–97)
MONOCYTES # BLD AUTO: 0.6 X10E3/UL (ref 0.1–0.9)
MONOCYTES NFR BLD AUTO: 9 %
NEUTROPHILS # BLD AUTO: 4.8 X10E3/UL (ref 1.4–7)
NEUTROPHILS NFR BLD AUTO: 82 %
PLATELET # BLD AUTO: 279 X10E3/UL (ref 150–450)
POTASSIUM SERPL-SCNC: 4.6 MMOL/L (ref 3.5–5.2)
PROT SERPL-MCNC: 7.2 G/DL (ref 6–8.5)
RBC # BLD AUTO: 3.83 X10E6/UL (ref 3.77–5.28)
SODIUM SERPL-SCNC: 144 MMOL/L (ref 134–144)
T4 FREE SERPL-MCNC: 0.99 NG/DL (ref 0.82–1.77)
TRIGL SERPL-MCNC: 76 MG/DL (ref 0–149)
TSH SERPL DL<=0.005 MIU/L-ACNC: 0.35 UIU/ML (ref 0.45–4.5)
UNABLE TO VOID: NORMAL
VIT B12 SERPL-MCNC: 772 PG/ML (ref 232–1245)
VLDLC SERPL CALC-MCNC: 14 MG/DL (ref 5–40)
WBC # BLD AUTO: 5.9 X10E3/UL (ref 3.4–10.8)

## 2023-12-19 ENCOUNTER — OFFICE VISIT (OUTPATIENT)
Dept: INTERNAL MEDICINE | Facility: CLINIC | Age: 68
End: 2023-12-19
Payer: MEDICARE

## 2023-12-19 VITALS
DIASTOLIC BLOOD PRESSURE: 60 MMHG | SYSTOLIC BLOOD PRESSURE: 100 MMHG | BODY MASS INDEX: 29.16 KG/M2 | HEART RATE: 62 BPM | OXYGEN SATURATION: 98 % | WEIGHT: 175 LBS | HEIGHT: 65 IN

## 2023-12-19 DIAGNOSIS — E03.9 ACQUIRED HYPOTHYROIDISM: ICD-10-CM

## 2023-12-19 DIAGNOSIS — E78.2 MIXED HYPERLIPIDEMIA: Primary | ICD-10-CM

## 2023-12-19 PROCEDURE — 1159F MED LIST DOCD IN RCRD: CPT | Performed by: INTERNAL MEDICINE

## 2023-12-19 PROCEDURE — 1160F RVW MEDS BY RX/DR IN RCRD: CPT | Performed by: INTERNAL MEDICINE

## 2023-12-19 PROCEDURE — 99214 OFFICE O/P EST MOD 30 MIN: CPT | Performed by: INTERNAL MEDICINE

## 2023-12-19 RX ORDER — TOFACITINIB 11 MG/1
11 TABLET, FILM COATED, EXTENDED RELEASE ORAL DAILY
COMMUNITY
Start: 2023-12-19

## 2023-12-19 NOTE — PROGRESS NOTES
Subjective     Dara Medina is a 68 y.o. female who presents with   Chief Complaint   Patient presents with    Hyperlipidemia    Hypothyroidism    Prediabetes       Hyperlipidemia  Exacerbating diseases include hypothyroidism.   Hypothyroidism  Associated symptoms include coughing.        The following data was reviewed by: Randi Yang MD on 12/19/2023:  Common labs          7/20/2023    09:08 9/6/2023    08:50 12/12/2023    09:15   Common Labs   Glucose 161  104  102    BUN 13  20  13    Creatinine 1.10  1.19  1.22    Sodium 142  142  144    Potassium 4.1  4.7  4.6    Chloride 111  109  111    Calcium 8.7  9.4  9.4    Total Protein 6.5   7.2    Albumin 3.3     3.3  4.0  4.2    Total Bilirubin 0.5  0.8  0.5    Alkaline Phosphatase 47  50  56    AST (SGOT) 25  52  26    ALT (SGPT) 25  47  16    WBC 6.18   5.9    Hemoglobin 13.4   13.4    Hematocrit 39.7   40.2    Platelets 232   279    Total Cholesterol   178    Triglycerides   76    HDL Cholesterol   68    LDL Cholesterol    96    Hemoglobin A1C   5.3      HLD.  LDL cholesterol is under good control.   Hypothyroidism.  TSH is in acceptable range.   Prediabetes.  A1c is good range.       Review of Systems   Respiratory:  Positive for cough.        The following portions of the patient's history were reviewed and updated as appropriate: allergies, current medications and problem list.    Patient Active Problem List    Diagnosis Date Noted    Abnormal blood level of copper 08/01/2022    Copper deficiency 08/01/2022    Other common variable immunodeficiencies 01/20/2022    Situational depression 12/08/2021    Personal history of colonic polyps 04/20/2021     Note Last Updated: 4/20/2021     Added automatically from request for surgery [id number]      IgG monoclonal gammopathy of uncertain significance 02/20/2020    Chronic idiopathic constipation 06/18/2019    Macrocytosis without anemia 03/09/2019    Acquired lymphocytopenia 03/09/2019    Adverse effect of iron or its  compound, sequela 08/29/2018    Vitamin B12 deficiency 08/29/2018    Leukemoid reaction 08/29/2018    Primary osteoarthritis of right knee 11/22/2017     Note Last Updated: 11/22/2017     Added automatically from request for surgery [id number]      Mixed hyperlipidemia 05/05/2017    Tear of medial meniscus of right knee, current 05/04/2017    Prediabetes 04/12/2017    Pulmonary fibrosis 07/06/2016     Note Last Updated: 10/23/2017     Secondary to methotrexate.        Iron deficiency anemia 07/06/2016    Pernicious anemia 07/06/2016    Common variable immunodeficiency 07/01/2016    Hypothyroidism 05/23/2016    Sensory neuropathy 05/23/2016    Osteoporosis 05/23/2016     Note Last Updated: 6/1/2022     H/o two year Forteo.  Not on bisphosphonates because of dental issues.  Fosamax in past caused stomach problems.  Start Evenity 5/2020 for one.year.  Prolia started 2022.        Vitamin D deficiency 05/23/2016       Current Outpatient Medications on File Prior to Visit   Medication Sig Dispense Refill    atorvastatin (LIPITOR) 20 MG tablet TAKE 1 TABLET BY MOUTH EVERY DAY 90 tablet 3    azaTHIOprine (IMURAN) 50 MG tablet Take 2 tablets by mouth 2 (Two) Times a Day. 150 mg daily      B Complex-C (B COMPLEX-B12-C IJ) Inject 1,000 mcg under the skin into the appropriate area as directed Every 30 (Thirty) Days.      buPROPion XL (WELLBUTRIN XL) 300 MG 24 hr tablet TAKE 1 TABLET BY MOUTH EVERY DAY 90 tablet 3    cyanocobalamin 1000 MCG/ML injection INJECT 1 ML INTO THE APPROPRIATE MUSCLE AS DIRECTED BY PRESCRIBER EVERY 30 (THIRTY) DAYS. 3 mL 3    cyclobenzaprine (FLEXERIL) 10 MG tablet TAKE 1 TABLET BY MOUTH THREE TIMES A DAY AS NEEDED      doxycycline (VIBRAMYCIN) 100 MG capsule Take 1 capsule by mouth 2 (Two) Times a Day. 14 capsule 0    fluconazole (Diflucan) 150 MG tablet Take 1 tablet by mouth Every 72 (Seventy-Two) Hours. For two doses total 2 tablet 0    FLUoxetine (PROzac) 20 MG capsule Take 1 capsule by mouth Daily. 90  "capsule 3    Immune Globulin, Human, (Gamunex-C) 40 GM/400ML solution Infuse 40 g into a venous catheter Every 28 (Twenty-Eight) Days.      promethazine-dextromethorphan (PROMETHAZINE-DM) 6.25-15 MG/5ML syrup Take 5 mL by mouth 4 (Four) Times a Day As Needed for Cough. 118 mL 0    Restasis 0.05 % ophthalmic emulsion Administer 1 drop to both eyes Every 12 (Twelve) Hours.      Synthroid 112 MCG tablet TAKE 1 TABLET BY MOUTH EVERY DAY 90 tablet 1    Syringe/Needle, Disp, (BD SafetyGlide Syringe/Needle) 27G X 5/8\" 1 ML misc USE ONCE MONTHLY FOR B12 INJECTIONS. 3 each 3    topiramate (TOPAMAX) 100 MG tablet TAKE 1 TABLET BY MOUTH TWICE A  tablet 3    Tofacitinib Citrate ER (Xeljanz XR) 11 MG tablet sustained-release 24 hour Take 1 tablet by mouth Daily.      [DISCONTINUED] Upadacitinib ER (Rinvoq) 15 MG tablet sustained-release 24 hour Take 1 tablet by mouth Daily. Indications: Rheumatoid Arthritis (Patient not taking: Reported on 12/19/2023)       No current facility-administered medications on file prior to visit.       Objective     /60   Pulse 62   Ht 165.1 cm (65\")   Wt 79.4 kg (175 lb)   SpO2 98%   BMI 29.12 kg/m²     Physical Exam  Constitutional:       Appearance: She is well-developed.   HENT:      Head: Normocephalic and atraumatic.   Cardiovascular:      Rate and Rhythm: Normal rate and regular rhythm.      Heart sounds: Normal heart sounds.   Pulmonary:      Effort: Pulmonary effort is normal.      Breath sounds: Normal breath sounds.   Skin:     General: Skin is warm and dry.   Neurological:      Mental Status: She is alert and oriented to person, place, and time.   Psychiatric:         Behavior: Behavior normal.         Assessment & Plan   Diagnoses and all orders for this visit:    1. Mixed hyperlipidemia (Primary)    2. Acquired hypothyroidism        Discussion    HLD. Control is good.  The patient is advised to continue current dosage of atorvastatin.    Hypothyroidism.  Control is " good.  The patient is advised to continue current dosage of Synthroid.             Future Appointments   Date Time Provider Department Center   12/27/2023  8:00 AM REFERRED INFUSION CHAIR 12 [facility] INFUS EP [facility]   1/24/2024  8:00 AM REFERRED INFUSION CHAIR 12 [facility] INFUS EP [facility]   2/21/2024  8:00 AM REFERRED INFUSION CHAIR 12 [facility] INFUS EP [facility]   3/20/2024  8:00 AM REFERRED INFUSION CHAIR 12 [facility] INFUS EP [facility]   4/17/2024  8:00 AM REFERRED INFUSION CHAIR 12 [facility] INFUS EP [facility]   5/15/2024  8:00 AM REFERRED INFUSION CHAIR 12 [facility] INFUS EP [facility]   6/12/2024  8:00 AM REFERRED INFUSION CHAIR 12 [facility] INFUS EP [facility]   7/25/2024 11:00 AM LAB CHAIR 1 [facility]   7/25/2024 11:20 AM Ivory Noel MD PhD [facility]

## 2024-01-24 ENCOUNTER — INFUSION (OUTPATIENT)
Dept: ONCOLOGY | Facility: HOSPITAL | Age: 69
End: 2024-01-24
Payer: MEDICARE

## 2024-01-24 VITALS
OXYGEN SATURATION: 98 % | SYSTOLIC BLOOD PRESSURE: 99 MMHG | BODY MASS INDEX: 28.86 KG/M2 | TEMPERATURE: 97.4 F | HEART RATE: 82 BPM | HEIGHT: 65 IN | RESPIRATION RATE: 15 BRPM | WEIGHT: 173.2 LBS | DIASTOLIC BLOOD PRESSURE: 73 MMHG

## 2024-01-24 DIAGNOSIS — D83.9 COMMON VARIABLE IMMUNODEFICIENCY: Primary | ICD-10-CM

## 2024-01-24 DIAGNOSIS — D83.8 OTHER COMMON VARIABLE IMMUNODEFICIENCIES: ICD-10-CM

## 2024-01-24 PROCEDURE — 96365 THER/PROPH/DIAG IV INF INIT: CPT

## 2024-01-24 PROCEDURE — 96366 THER/PROPH/DIAG IV INF ADDON: CPT

## 2024-01-24 PROCEDURE — 25010000002 IMMUNE GLOBULIN (HUMAN) 40 GM/400ML SOLUTION: Performed by: ALLERGY & IMMUNOLOGY

## 2024-01-24 RX ORDER — EPINEPHRINE 0.3 MG/.3ML
0.3 INJECTION SUBCUTANEOUS AS NEEDED
Start: 2024-02-21

## 2024-01-24 RX ORDER — ACETAMINOPHEN 325 MG/1
650 TABLET ORAL ONCE
OUTPATIENT
Start: 2024-02-21

## 2024-01-24 RX ORDER — DIPHENHYDRAMINE HCL 25 MG
25 CAPSULE ORAL ONCE
Start: 2024-02-21 | End: 2024-02-21

## 2024-01-24 RX ORDER — DIPHENHYDRAMINE HYDROCHLORIDE 50 MG/ML
50 INJECTION INTRAMUSCULAR; INTRAVENOUS AS NEEDED
OUTPATIENT
Start: 2024-02-21

## 2024-01-24 RX ORDER — ACETAMINOPHEN 500 MG
500 TABLET ORAL ONCE AS NEEDED
Start: 2024-02-21

## 2024-01-24 RX ADMIN — IMMUNE GLOBULIN (HUMAN) 40 G: 10 INJECTION INTRAVENOUS; SUBCUTANEOUS at 08:49

## 2024-02-21 ENCOUNTER — INFUSION (OUTPATIENT)
Dept: ONCOLOGY | Facility: HOSPITAL | Age: 69
End: 2024-02-21
Payer: MEDICARE

## 2024-02-21 VITALS
SYSTOLIC BLOOD PRESSURE: 116 MMHG | TEMPERATURE: 98.2 F | RESPIRATION RATE: 15 BRPM | HEART RATE: 89 BPM | BODY MASS INDEX: 29.24 KG/M2 | DIASTOLIC BLOOD PRESSURE: 73 MMHG | HEIGHT: 65 IN | WEIGHT: 175.5 LBS | OXYGEN SATURATION: 95 %

## 2024-02-21 DIAGNOSIS — D83.9 COMMON VARIABLE IMMUNODEFICIENCY: Primary | ICD-10-CM

## 2024-02-21 DIAGNOSIS — D83.8 OTHER COMMON VARIABLE IMMUNODEFICIENCIES: ICD-10-CM

## 2024-02-21 PROCEDURE — 96366 THER/PROPH/DIAG IV INF ADDON: CPT

## 2024-02-21 PROCEDURE — 96365 THER/PROPH/DIAG IV INF INIT: CPT

## 2024-02-21 PROCEDURE — 25010000002 IMMUNE GLOBULIN (HUMAN) 40 GM/400ML SOLUTION: Performed by: ALLERGY & IMMUNOLOGY

## 2024-02-21 RX ORDER — ACETAMINOPHEN 325 MG/1
650 TABLET ORAL ONCE
OUTPATIENT
Start: 2024-03-20

## 2024-02-21 RX ORDER — DIPHENHYDRAMINE HYDROCHLORIDE 50 MG/ML
50 INJECTION INTRAMUSCULAR; INTRAVENOUS AS NEEDED
OUTPATIENT
Start: 2024-03-20

## 2024-02-21 RX ORDER — ACETAMINOPHEN 500 MG
500 TABLET ORAL ONCE AS NEEDED
Start: 2024-03-20

## 2024-02-21 RX ORDER — DIPHENHYDRAMINE HCL 25 MG
25 CAPSULE ORAL ONCE
Start: 2024-03-20 | End: 2024-03-20

## 2024-02-21 RX ORDER — EPINEPHRINE 0.3 MG/.3ML
0.3 INJECTION SUBCUTANEOUS AS NEEDED
Start: 2024-03-20

## 2024-02-21 RX ADMIN — IMMUNE GLOBULIN (HUMAN) 40 G: 10 INJECTION INTRAVENOUS; SUBCUTANEOUS at 08:38

## 2024-02-26 ENCOUNTER — OFFICE VISIT (OUTPATIENT)
Dept: INTERNAL MEDICINE | Facility: CLINIC | Age: 69
End: 2024-02-26
Payer: MEDICARE

## 2024-02-26 VITALS
SYSTOLIC BLOOD PRESSURE: 104 MMHG | WEIGHT: 175 LBS | BODY MASS INDEX: 29.16 KG/M2 | DIASTOLIC BLOOD PRESSURE: 70 MMHG | HEIGHT: 65 IN | HEART RATE: 87 BPM | OXYGEN SATURATION: 98 %

## 2024-02-26 DIAGNOSIS — G62.9 PERIPHERAL POLYNEUROPATHY: Primary | ICD-10-CM

## 2024-02-26 PROCEDURE — 99213 OFFICE O/P EST LOW 20 MIN: CPT | Performed by: INTERNAL MEDICINE

## 2024-02-26 PROCEDURE — 1159F MED LIST DOCD IN RCRD: CPT | Performed by: INTERNAL MEDICINE

## 2024-02-26 PROCEDURE — 1160F RVW MEDS BY RX/DR IN RCRD: CPT | Performed by: INTERNAL MEDICINE

## 2024-02-27 NOTE — PROGRESS NOTES
Subjective     Dara Medina is a 68 y.o. female who presents with   Chief Complaint   Patient presents with    Numbness       History of Present Illness     The following data was reviewed by: Randi Yang MD on 02/26/2024:  Common labs          7/20/2023    09:08 9/6/2023    08:50 12/12/2023    09:15   Common Labs   Glucose 161  104  102    BUN 13  20  13    Creatinine 1.10  1.19  1.22    Sodium 142  142  144    Potassium 4.1  4.7  4.6    Chloride 111  109  111    Calcium 8.7  9.4  9.4    Total Protein 6.5   7.2    Albumin 3.3     3.3  4.0  4.2    Total Bilirubin 0.5  0.8  0.5    Alkaline Phosphatase 47  50  56    AST (SGOT) 25  52  26    ALT (SGPT) 25  47  16    WBC 6.18   5.9    Hemoglobin 13.4   13.4    Hematocrit 39.7   40.2    Platelets 232   279    Total Cholesterol   178    Triglycerides   76    HDL Cholesterol   68    LDL Cholesterol    96    Hemoglobin A1C   5.3      Data reviewed : EMG/NCS      B12 772 on 12/12/2023.      Previous diagnosis of neuropathy 12/4/2020.  She has tingling in her feet.  She thinks it has progressed.   She is followed by neurology for migraines.  She has not addressed with him.  Discussed common reasons for neuropathy.  She is on B12 shots and last level was normal. She is not diabetic.      Review of Systems   Respiratory:  Positive for cough.        The following portions of the patient's history were reviewed and updated as appropriate: allergies, current medications and problem list.    Patient Active Problem List    Diagnosis Date Noted    Abnormal blood level of copper 08/01/2022    Copper deficiency 08/01/2022    Other common variable immunodeficiencies 01/20/2022    Situational depression 12/08/2021    Personal history of colonic polyps 04/20/2021     Note Last Updated: 4/20/2021     Added automatically from request for surgery [id number]      IgG monoclonal gammopathy of uncertain significance 02/20/2020    Chronic idiopathic constipation 06/18/2019    Macrocytosis  without anemia 03/09/2019    Acquired lymphocytopenia 03/09/2019    Adverse effect of iron or its compound, sequela 08/29/2018    Vitamin B12 deficiency 08/29/2018    Leukemoid reaction 08/29/2018    Primary osteoarthritis of right knee 11/22/2017     Note Last Updated: 11/22/2017     Added automatically from request for surgery [id number]      Mixed hyperlipidemia 05/05/2017    Tear of medial meniscus of right knee, current 05/04/2017    Prediabetes 04/12/2017    Pulmonary fibrosis 07/06/2016     Note Last Updated: 10/23/2017     Secondary to methotrexate.        Iron deficiency anemia 07/06/2016    Pernicious anemia 07/06/2016    Common variable immunodeficiency 07/01/2016    Hypothyroidism 05/23/2016    Sensory neuropathy 05/23/2016    Osteoporosis 05/23/2016     Note Last Updated: 6/1/2022     H/o two year Forteo.  Not on bisphosphonates because of dental issues.  Fosamax in past caused stomach problems.  Start Evenity 5/2020 for one.year.  Prolia started 2022.        Vitamin D deficiency 05/23/2016       Current Outpatient Medications on File Prior to Visit   Medication Sig Dispense Refill    atorvastatin (LIPITOR) 20 MG tablet TAKE 1 TABLET BY MOUTH EVERY DAY 90 tablet 3    azaTHIOprine (IMURAN) 50 MG tablet Take 2 tablets by mouth 2 (Two) Times a Day. 150 mg daily      B Complex-C (B COMPLEX-B12-C IJ) Inject 1,000 mcg under the skin into the appropriate area as directed Every 30 (Thirty) Days.      buPROPion XL (WELLBUTRIN XL) 300 MG 24 hr tablet TAKE 1 TABLET BY MOUTH EVERY DAY 90 tablet 3    cyanocobalamin 1000 MCG/ML injection INJECT 1 ML INTO THE APPROPRIATE MUSCLE AS DIRECTED BY PRESCRIBER EVERY 30 (THIRTY) DAYS. 3 mL 3    cyclobenzaprine (FLEXERIL) 10 MG tablet TAKE 1 TABLET BY MOUTH THREE TIMES A DAY AS NEEDED      doxycycline (VIBRAMYCIN) 100 MG capsule Take 1 capsule by mouth 2 (Two) Times a Day. 14 capsule 0    fluconazole (Diflucan) 150 MG tablet Take 1 tablet by mouth Every 72 (Seventy-Two) Hours. For  "two doses total 2 tablet 0    Immune Globulin, Human, (Gamunex-C) 40 GM/400ML solution Infuse 40 g into a venous catheter Every 28 (Twenty-Eight) Days.      promethazine-dextromethorphan (PROMETHAZINE-DM) 6.25-15 MG/5ML syrup Take 5 mL by mouth 4 (Four) Times a Day As Needed for Cough. 118 mL 0    Restasis 0.05 % ophthalmic emulsion Administer 1 drop to both eyes Every 12 (Twelve) Hours.      Synthroid 112 MCG tablet TAKE 1 TABLET BY MOUTH EVERY DAY 90 tablet 1    Syringe/Needle, Disp, (BD SafetyGlide Syringe/Needle) 27G X 5/8\" 1 ML misc USE ONCE MONTHLY FOR B12 INJECTIONS. 3 each 3    Tofacitinib Citrate ER (Xeljanz XR) 11 MG tablet sustained-release 24 hour Take 1 tablet by mouth Daily.      topiramate (TOPAMAX) 100 MG tablet TAKE 1 TABLET BY MOUTH TWICE A  tablet 3    FLUoxetine (PROzac) 20 MG capsule Take 1 capsule by mouth Daily. (Patient not taking: Reported on 2/26/2024) 90 capsule 3     No current facility-administered medications on file prior to visit.       Objective     /70   Pulse 87   Ht 165.1 cm (65\")   Wt 79.4 kg (175 lb)   SpO2 98%   BMI 29.12 kg/m²     Physical Exam  Constitutional:       Appearance: She is well-developed.   HENT:      Head: Normocephalic and atraumatic.   Cardiovascular:      Rate and Rhythm: Normal rate and regular rhythm.      Heart sounds: Normal heart sounds.   Pulmonary:      Effort: Pulmonary effort is normal.      Breath sounds: Examination of the right-lower field reveals rales. Examination of the left-lower field reveals rales. Rales present.   Genitourinary:     Labia:         Right: No lesion.         Left: No lesion.       Vagina: Normal.      Cervix: No cervical motion tenderness, discharge or friability.      Adnexa:         Right: No mass or tenderness.          Left: No mass or tenderness.     Skin:     General: Skin is warm and dry.   Neurological:      Mental Status: She is alert and oriented to person, place, and time.   Psychiatric:         " Behavior: Behavior normal.         Assessment & Plan   Diagnoses and all orders for this visit:    1. Peripheral polyneuropathy (Primary)        Discussion    Patient presents in f/u of peripheral neuropathy.  She has good blood sugar control and good B12 levels.  She has an underlying autoimmune disease which could contribute.  Statins are listed as medications that could cause neuropathy.  She is going to address with her neurologist next month.  Offered medication to help the symptoms of neuropathy.  She currently declines.         Future Appointments   Date Time Provider Department Center   3/20/2024  8:00 AM REFERRED INFUSION CHAIR 12 [facility] INFUS EP [facility]   4/17/2024  8:00 AM REFERRED INFUSION CHAIR 12 [facility] INFUS EP [facility]   5/15/2024  8:00 AM REFERRED INFUSION CHAIR 12 [facility] INFUS EP [facility]   6/12/2024  8:00 AM REFERRED INFUSION CHAIR 12 [facility] INFUS EP [facility]   7/23/2024  8:20 AM [facility]   7/25/2024 11:00 AM LAB CHAIR 1 [facility]   7/25/2024 11:20 AM Ivory Noel MD PhD [facility]   7/30/2024  3:30 PM Randi Yang MD [facility]         Answers submitted by the patient for this visit:  Other (Submitted on 2/22/2024)  Please describe your symptoms.: numbness in feet  Have you had these symptoms before?: Yes  How long have you been having these symptoms?: Greater than 2 weeks  Please list any medications you are currently taking for this condition.: 0  Please describe any probable cause for these symptoms. : I had numbness in toes but now its moving up my feet  Primary Reason for Visit (Submitted on 2/22/2024)  What is the primary reason for your visit?: Other

## 2024-03-11 RX ORDER — LEVOTHYROXINE SODIUM 112 MCG
TABLET ORAL
Qty: 90 TABLET | Refills: 1 | Status: SHIPPED | OUTPATIENT
Start: 2024-03-11

## 2024-03-20 ENCOUNTER — INFUSION (OUTPATIENT)
Dept: ONCOLOGY | Facility: HOSPITAL | Age: 69
End: 2024-03-20
Payer: MEDICARE

## 2024-03-20 VITALS
HEART RATE: 96 BPM | TEMPERATURE: 96.9 F | BODY MASS INDEX: 28.99 KG/M2 | RESPIRATION RATE: 15 BRPM | DIASTOLIC BLOOD PRESSURE: 72 MMHG | OXYGEN SATURATION: 98 % | SYSTOLIC BLOOD PRESSURE: 108 MMHG | WEIGHT: 174 LBS | HEIGHT: 65 IN

## 2024-03-20 DIAGNOSIS — M81.8 OTHER OSTEOPOROSIS, UNSPECIFIED PATHOLOGICAL FRACTURE PRESENCE: Primary | ICD-10-CM

## 2024-03-20 DIAGNOSIS — M81.8 OTHER OSTEOPOROSIS, UNSPECIFIED PATHOLOGICAL FRACTURE PRESENCE: ICD-10-CM

## 2024-03-20 DIAGNOSIS — D83.8 OTHER COMMON VARIABLE IMMUNODEFICIENCIES: ICD-10-CM

## 2024-03-20 DIAGNOSIS — D83.9 COMMON VARIABLE IMMUNODEFICIENCY: Primary | ICD-10-CM

## 2024-03-20 DIAGNOSIS — E55.9 VITAMIN D DEFICIENCY: ICD-10-CM

## 2024-03-20 LAB
25(OH)D3 SERPL-MCNC: 61.1 NG/ML (ref 30–100)
ALBUMIN SERPL-MCNC: 3.8 G/DL (ref 3.5–5.2)
ALBUMIN/GLOB SERPL: 1.1 G/DL
ALP SERPL-CCNC: 54 U/L (ref 39–117)
ALT SERPL W P-5'-P-CCNC: 41 U/L (ref 1–33)
ANION GAP SERPL CALCULATED.3IONS-SCNC: 9 MMOL/L (ref 5–15)
AST SERPL-CCNC: 40 U/L (ref 1–32)
BILIRUB SERPL-MCNC: 0.4 MG/DL (ref 0–1.2)
BUN SERPL-MCNC: 17 MG/DL (ref 8–23)
BUN/CREAT SERPL: 14.5 (ref 7–25)
CALCIUM SPEC-SCNC: 9.7 MG/DL (ref 8.6–10.5)
CHLORIDE SERPL-SCNC: 109 MMOL/L (ref 98–107)
CO2 SERPL-SCNC: 21 MMOL/L (ref 22–29)
CREAT SERPL-MCNC: 1.17 MG/DL (ref 0.57–1)
EGFRCR SERPLBLD CKD-EPI 2021: 50.9 ML/MIN/1.73
GLOBULIN UR ELPH-MCNC: 3.5 GM/DL
GLUCOSE SERPL-MCNC: 120 MG/DL (ref 65–99)
MAGNESIUM SERPL-MCNC: 2 MG/DL (ref 1.6–2.4)
PHOSPHATE SERPL-MCNC: 3.2 MG/DL (ref 2.5–4.5)
POTASSIUM SERPL-SCNC: 4.6 MMOL/L (ref 3.5–5.2)
PROT SERPL-MCNC: 7.3 G/DL (ref 6–8.5)
SODIUM SERPL-SCNC: 139 MMOL/L (ref 136–145)

## 2024-03-20 PROCEDURE — 80053 COMPREHEN METABOLIC PANEL: CPT

## 2024-03-20 PROCEDURE — 84100 ASSAY OF PHOSPHORUS: CPT

## 2024-03-20 PROCEDURE — 83735 ASSAY OF MAGNESIUM: CPT

## 2024-03-20 PROCEDURE — 96372 THER/PROPH/DIAG INJ SC/IM: CPT

## 2024-03-20 PROCEDURE — 96366 THER/PROPH/DIAG IV INF ADDON: CPT

## 2024-03-20 PROCEDURE — 25010000002 IMMUNE GLOBULIN (HUMAN) 40 GM/400ML SOLUTION: Performed by: ALLERGY & IMMUNOLOGY

## 2024-03-20 PROCEDURE — 82306 VITAMIN D 25 HYDROXY: CPT

## 2024-03-20 PROCEDURE — 96365 THER/PROPH/DIAG IV INF INIT: CPT

## 2024-03-20 PROCEDURE — 25010000002 DENOSUMAB 60 MG/ML SOLUTION PREFILLED SYRINGE: Performed by: INTERNAL MEDICINE

## 2024-03-20 RX ORDER — EPINEPHRINE 0.3 MG/.3ML
0.3 INJECTION SUBCUTANEOUS AS NEEDED
Start: 2024-04-17

## 2024-03-20 RX ORDER — DIPHENHYDRAMINE HCL 25 MG
25 CAPSULE ORAL ONCE
Start: 2024-04-17 | End: 2024-04-17

## 2024-03-20 RX ORDER — ACETAMINOPHEN 500 MG
500 TABLET ORAL ONCE AS NEEDED
Start: 2024-04-17

## 2024-03-20 RX ORDER — DIPHENHYDRAMINE HYDROCHLORIDE 50 MG/ML
50 INJECTION INTRAMUSCULAR; INTRAVENOUS AS NEEDED
OUTPATIENT
Start: 2024-04-17

## 2024-03-20 RX ORDER — ACETAMINOPHEN 325 MG/1
650 TABLET ORAL ONCE
OUTPATIENT
Start: 2024-04-17

## 2024-03-20 RX ADMIN — IMMUNE GLOBULIN (HUMAN) 40 G: 10 INJECTION INTRAVENOUS; SUBCUTANEOUS at 09:00

## 2024-03-20 RX ADMIN — DENOSUMAB 60 MG: 60 INJECTION SUBCUTANEOUS at 10:34

## 2024-03-20 NOTE — NURSING NOTE
Pt here for IVIG and due for Prolia injection as well. Per pre-cert ok to proceed with Prolia today. Indication and side effects for Prolia reviewed. Denies recent dental work. Labs and medications verified. Prolia administered in left arm without incidence. Instructed to call prescribing MD for any concerns or questions and instructed on how to schedule future appts.     Provided pt with a copy of her labs, she is going to follow up with her Rheumatologist and PCP about abnormal lab values consistent with pt's baseline.

## 2024-04-17 ENCOUNTER — INFUSION (OUTPATIENT)
Dept: ONCOLOGY | Facility: HOSPITAL | Age: 69
End: 2024-04-17
Payer: MEDICARE

## 2024-04-17 VITALS
RESPIRATION RATE: 15 BRPM | TEMPERATURE: 98 F | BODY MASS INDEX: 29.36 KG/M2 | HEART RATE: 84 BPM | OXYGEN SATURATION: 96 % | DIASTOLIC BLOOD PRESSURE: 61 MMHG | HEIGHT: 65 IN | WEIGHT: 176.2 LBS | SYSTOLIC BLOOD PRESSURE: 95 MMHG

## 2024-04-17 DIAGNOSIS — D83.9 COMMON VARIABLE IMMUNODEFICIENCY: Primary | ICD-10-CM

## 2024-04-17 DIAGNOSIS — D83.8 OTHER COMMON VARIABLE IMMUNODEFICIENCIES: ICD-10-CM

## 2024-04-17 PROCEDURE — 25010000002 IMMUNE GLOBULIN (HUMAN) 40 GM/400ML SOLUTION: Performed by: ALLERGY & IMMUNOLOGY

## 2024-04-17 PROCEDURE — 96366 THER/PROPH/DIAG IV INF ADDON: CPT

## 2024-04-17 PROCEDURE — 96365 THER/PROPH/DIAG IV INF INIT: CPT

## 2024-04-17 RX ORDER — ACETAMINOPHEN 500 MG
500 TABLET ORAL ONCE AS NEEDED
Start: 2024-05-15

## 2024-04-17 RX ORDER — DIPHENHYDRAMINE HYDROCHLORIDE 50 MG/ML
50 INJECTION INTRAMUSCULAR; INTRAVENOUS AS NEEDED
OUTPATIENT
Start: 2024-05-15

## 2024-04-17 RX ORDER — DIPHENHYDRAMINE HCL 25 MG
25 CAPSULE ORAL ONCE
Start: 2024-05-15 | End: 2024-05-15

## 2024-04-17 RX ORDER — ACETAMINOPHEN 325 MG/1
650 TABLET ORAL ONCE
OUTPATIENT
Start: 2024-05-15

## 2024-04-17 RX ORDER — EPINEPHRINE 0.3 MG/.3ML
0.3 INJECTION SUBCUTANEOUS AS NEEDED
Start: 2024-05-15

## 2024-04-17 RX ADMIN — IMMUNE GLOBULIN (HUMAN) 40 G: 10 INJECTION INTRAVENOUS; SUBCUTANEOUS at 08:37

## 2024-05-13 DIAGNOSIS — R73.03 PREDIABETES: Primary | ICD-10-CM

## 2024-05-13 DIAGNOSIS — E78.2 MIXED HYPERLIPIDEMIA: ICD-10-CM

## 2024-05-13 DIAGNOSIS — E03.9 ACQUIRED HYPOTHYROIDISM: ICD-10-CM

## 2024-05-14 LAB
ALBUMIN SERPL-MCNC: 4 G/DL (ref 3.5–5.2)
ALBUMIN/GLOB SERPL: 1.5 G/DL
ALP SERPL-CCNC: 44 U/L (ref 39–117)
ALT SERPL-CCNC: 38 U/L (ref 1–33)
AST SERPL-CCNC: 37 U/L (ref 1–32)
BILIRUB SERPL-MCNC: 0.4 MG/DL (ref 0–1.2)
BUN SERPL-MCNC: 20 MG/DL (ref 8–23)
BUN/CREAT SERPL: 17.7 (ref 7–25)
CALCIUM SERPL-MCNC: 8.9 MG/DL (ref 8.6–10.5)
CHLORIDE SERPL-SCNC: 109 MMOL/L (ref 98–107)
CHOLEST SERPL-MCNC: 155 MG/DL (ref 0–200)
CO2 SERPL-SCNC: 21.6 MMOL/L (ref 22–29)
CREAT SERPL-MCNC: 1.13 MG/DL (ref 0.57–1)
EGFRCR SERPLBLD CKD-EPI 2021: 52.8 ML/MIN/1.73
GLOBULIN SER CALC-MCNC: 2.6 GM/DL
GLUCOSE SERPL-MCNC: 106 MG/DL (ref 65–99)
HBA1C MFR BLD: 5.2 % (ref 4.8–5.6)
HDLC SERPL-MCNC: 66 MG/DL (ref 40–60)
LDLC SERPL CALC-MCNC: 75 MG/DL (ref 0–100)
POTASSIUM SERPL-SCNC: 5.1 MMOL/L (ref 3.5–5.2)
PROT SERPL-MCNC: 6.6 G/DL (ref 6–8.5)
SODIUM SERPL-SCNC: 142 MMOL/L (ref 136–145)
T4 FREE SERPL-MCNC: 1.14 NG/DL (ref 0.93–1.7)
TRIGL SERPL-MCNC: 72 MG/DL (ref 0–150)
TSH SERPL DL<=0.005 MIU/L-ACNC: 0.13 UIU/ML (ref 0.27–4.2)
VLDLC SERPL CALC-MCNC: 14 MG/DL (ref 5–40)

## 2024-05-15 ENCOUNTER — INFUSION (OUTPATIENT)
Dept: ONCOLOGY | Facility: HOSPITAL | Age: 69
End: 2024-05-15
Payer: MEDICARE

## 2024-05-15 VITALS
OXYGEN SATURATION: 99 % | DIASTOLIC BLOOD PRESSURE: 74 MMHG | TEMPERATURE: 97.8 F | SYSTOLIC BLOOD PRESSURE: 122 MMHG | HEIGHT: 65 IN | WEIGHT: 171 LBS | RESPIRATION RATE: 15 BRPM | HEART RATE: 91 BPM | BODY MASS INDEX: 28.49 KG/M2

## 2024-05-15 DIAGNOSIS — D83.9 COMMON VARIABLE IMMUNODEFICIENCY: Primary | ICD-10-CM

## 2024-05-15 DIAGNOSIS — D83.8 OTHER COMMON VARIABLE IMMUNODEFICIENCIES: ICD-10-CM

## 2024-05-15 LAB — IGG1 SER-MCNC: 1095 MG/DL (ref 700–1600)

## 2024-05-15 PROCEDURE — 25010000002 IMMUNE GLOBULIN (HUMAN) 40 GM/400ML SOLUTION: Performed by: ALLERGY & IMMUNOLOGY

## 2024-05-15 PROCEDURE — 36415 COLL VENOUS BLD VENIPUNCTURE: CPT

## 2024-05-15 PROCEDURE — 96365 THER/PROPH/DIAG IV INF INIT: CPT

## 2024-05-15 PROCEDURE — 96366 THER/PROPH/DIAG IV INF ADDON: CPT

## 2024-05-15 PROCEDURE — 82784 ASSAY IGA/IGD/IGG/IGM EACH: CPT | Performed by: ALLERGY & IMMUNOLOGY

## 2024-05-15 RX ORDER — ACETAMINOPHEN 500 MG
500 TABLET ORAL ONCE AS NEEDED
Start: 2024-06-12

## 2024-05-15 RX ORDER — DIPHENHYDRAMINE HCL 25 MG
25 CAPSULE ORAL ONCE
Start: 2024-06-12 | End: 2024-06-12

## 2024-05-15 RX ORDER — DIPHENHYDRAMINE HYDROCHLORIDE 50 MG/ML
50 INJECTION INTRAMUSCULAR; INTRAVENOUS AS NEEDED
OUTPATIENT
Start: 2024-06-12

## 2024-05-15 RX ORDER — EPINEPHRINE 0.3 MG/.3ML
0.3 INJECTION SUBCUTANEOUS AS NEEDED
Start: 2024-06-12

## 2024-05-15 RX ORDER — ACETAMINOPHEN 325 MG/1
650 TABLET ORAL ONCE
OUTPATIENT
Start: 2024-06-12

## 2024-05-15 RX ADMIN — IMMUNE GLOBULIN (HUMAN) 40 G: 10 INJECTION INTRAVENOUS; SUBCUTANEOUS at 09:17

## 2024-05-15 NOTE — NURSING NOTE
Pt arrived for IVIG; pt reports taking benadryl prior to arrival and declines tylenol. Tolerated infusion without incident and discharged in stable condition.

## 2024-05-20 ENCOUNTER — TELEPHONE (OUTPATIENT)
Dept: ONCOLOGY | Facility: CLINIC | Age: 69
End: 2024-05-20

## 2024-05-20 ENCOUNTER — OFFICE VISIT (OUTPATIENT)
Dept: INTERNAL MEDICINE | Facility: CLINIC | Age: 69
End: 2024-05-20
Payer: MEDICARE

## 2024-05-20 VITALS
BODY MASS INDEX: 28.49 KG/M2 | WEIGHT: 171 LBS | DIASTOLIC BLOOD PRESSURE: 80 MMHG | HEART RATE: 79 BPM | HEIGHT: 65 IN | SYSTOLIC BLOOD PRESSURE: 120 MMHG | OXYGEN SATURATION: 98 %

## 2024-05-20 DIAGNOSIS — E78.2 MIXED HYPERLIPIDEMIA: Primary | ICD-10-CM

## 2024-05-20 DIAGNOSIS — R73.03 PREDIABETES: ICD-10-CM

## 2024-05-20 DIAGNOSIS — E03.9 ACQUIRED HYPOTHYROIDISM: ICD-10-CM

## 2024-05-20 PROCEDURE — 1159F MED LIST DOCD IN RCRD: CPT | Performed by: INTERNAL MEDICINE

## 2024-05-20 PROCEDURE — 1126F AMNT PAIN NOTED NONE PRSNT: CPT | Performed by: INTERNAL MEDICINE

## 2024-05-20 PROCEDURE — 99214 OFFICE O/P EST MOD 30 MIN: CPT | Performed by: INTERNAL MEDICINE

## 2024-05-20 PROCEDURE — 1160F RVW MEDS BY RX/DR IN RCRD: CPT | Performed by: INTERNAL MEDICINE

## 2024-05-20 RX ORDER — NINTEDANIB 100 MG/1
CAPSULE ORAL 2 TIMES DAILY
COMMUNITY
Start: 2024-05-20

## 2024-05-20 NOTE — PROGRESS NOTES
Subjective     Dara Medina is a 69 y.o. female who presents with   Chief Complaint   Patient presents with    Hypertension    Hypothyroidism    Prediabetes       Hypertension    Hypothyroidism         The following data was reviewed by: Randi Yang MD on 05/20/2024:  Common labs          12/12/2023    09:15 3/20/2024    09:14 5/14/2024    09:04   Common Labs   Glucose 102  120  106    BUN 13  17  20    Creatinine 1.22  1.17  1.13    Sodium 144  139  142    Potassium 4.6  4.6  5.1    Chloride 111  109  109    Calcium 9.4  9.7  8.9    Total Protein 7.2   6.6    Albumin 4.2  3.8  4.0    Total Bilirubin 0.5  0.4  0.4    Alkaline Phosphatase 56  54  44    AST (SGOT) 26  40  37    ALT (SGPT) 16  41  38    WBC 5.9      Hemoglobin 13.4      Hematocrit 40.2      Platelets 279      Total Cholesterol 178   155    Triglycerides 76   72    HDL Cholesterol 68   66    LDL Cholesterol  96   75    Hemoglobin A1C 5.3   5.20      HLD.  LDL control is good.  Hypothyroidism.  TSH slight low.    Prediabetes.  Control is good.     Review of Systems   Respiratory: Negative.     Cardiovascular: Negative.        The following portions of the patient's history were reviewed and updated as appropriate: allergies, current medications and problem list.    Patient Active Problem List    Diagnosis Date Noted    Abnormal blood level of copper 08/01/2022    Copper deficiency 08/01/2022    Other common variable immunodeficiencies 01/20/2022    Situational depression 12/08/2021    Personal history of colonic polyps 04/20/2021     Note Last Updated: 4/20/2021     Added automatically from request for surgery [id number]      IgG monoclonal gammopathy of uncertain significance 02/20/2020    Chronic idiopathic constipation 06/18/2019    Macrocytosis without anemia 03/09/2019    Acquired lymphocytopenia 03/09/2019    Adverse effect of iron or its compound, sequela 08/29/2018    Vitamin B12 deficiency 08/29/2018    Leukemoid reaction 08/29/2018    Primary  osteoarthritis of right knee 11/22/2017     Note Last Updated: 11/22/2017     Added automatically from request for surgery [id number]      Mixed hyperlipidemia 05/05/2017    Tear of medial meniscus of right knee, current 05/04/2017    Prediabetes 04/12/2017    Pulmonary fibrosis 07/06/2016     Note Last Updated: 10/23/2017     Secondary to methotrexate.        Iron deficiency anemia 07/06/2016    Pernicious anemia 07/06/2016    Common variable immunodeficiency 07/01/2016    Hypothyroidism 05/23/2016    Sensory neuropathy 05/23/2016    Osteoporosis 05/23/2016     Note Last Updated: 6/1/2022     H/o two year Forteo.  Not on bisphosphonates because of dental issues.  Fosamax in past caused stomach problems.  Start Evenity 5/2020 for one.year.  Prolia started 2022.        Vitamin D deficiency 05/23/2016       Current Outpatient Medications on File Prior to Visit   Medication Sig Dispense Refill    atorvastatin (LIPITOR) 20 MG tablet TAKE 1 TABLET BY MOUTH EVERY DAY 90 tablet 3    azaTHIOprine (IMURAN) 50 MG tablet Take 2 tablets by mouth 2 (Two) Times a Day. 150 mg daily      B Complex-C (B COMPLEX-B12-C IJ) Inject 1,000 mcg under the skin into the appropriate area as directed Every 30 (Thirty) Days.      buPROPion XL (WELLBUTRIN XL) 300 MG 24 hr tablet TAKE 1 TABLET BY MOUTH EVERY DAY 90 tablet 3    cyanocobalamin 1000 MCG/ML injection INJECT 1 ML INTO THE APPROPRIATE MUSCLE AS DIRECTED BY PRESCRIBER EVERY 30 (THIRTY) DAYS. 3 mL 3    cyclobenzaprine (FLEXERIL) 10 MG tablet TAKE 1 TABLET BY MOUTH THREE TIMES A DAY AS NEEDED      fluconazole (Diflucan) 150 MG tablet Take 1 tablet by mouth Every 72 (Seventy-Two) Hours. For two doses total 2 tablet 0    Immune Globulin, Human, (Gamunex-C) 40 GM/400ML solution Infuse 40 g into a venous catheter Every 28 (Twenty-Eight) Days.      Restasis 0.05 % ophthalmic emulsion Administer 1 drop to both eyes Every 12 (Twelve) Hours.      Synthroid 112 MCG tablet TAKE 1 TABLET BY MOUTH EVERY  "DAY 90 tablet 1    Syringe/Needle, Disp, (BD SafetyGlide Syringe/Needle) 27G X 5/8\" 1 ML misc USE ONCE MONTHLY FOR B12 INJECTIONS. 3 each 3    Tofacitinib Citrate ER (Xeljanz XR) 11 MG tablet sustained-release 24 hour Take 1 tablet by mouth Daily.      topiramate (TOPAMAX) 100 MG tablet TAKE 1 TABLET BY MOUTH TWICE A  tablet 3    [DISCONTINUED] doxycycline (VIBRAMYCIN) 100 MG capsule Take 1 capsule by mouth 2 (Two) Times a Day. 14 capsule 0    [DISCONTINUED] promethazine-dextromethorphan (PROMETHAZINE-DM) 6.25-15 MG/5ML syrup Take 5 mL by mouth 4 (Four) Times a Day As Needed for Cough. 118 mL 0    Nintedanib Esylate (Ofev) 100 MG capsule Take  by mouth 2 (Two) Times a Day.       No current facility-administered medications on file prior to visit.       Objective     /80   Pulse 79   Ht 165.1 cm (65\")   Wt 77.6 kg (171 lb)   SpO2 98%   BMI 28.46 kg/m²     Physical Exam  Constitutional:       Appearance: She is well-developed.   HENT:      Head: Normocephalic and atraumatic.   Cardiovascular:      Rate and Rhythm: Normal rate and regular rhythm.      Heart sounds: Normal heart sounds.   Pulmonary:      Effort: Pulmonary effort is normal.      Breath sounds: Examination of the right-lower field reveals rales. Examination of the left-lower field reveals rales. Rales present.   Skin:     General: Skin is warm and dry.   Neurological:      Mental Status: She is alert and oriented to person, place, and time.   Psychiatric:         Behavior: Behavior normal.         Assessment & Plan   Diagnoses and all orders for this visit:    1. Mixed hyperlipidemia (Primary)    2. Acquired hypothyroidism    3. Prediabetes        Discussion    HLD.  Control is good.  The patient is advised to continue current dosage of atorvastatin.    Hypothyroidism. Control is slight over control.  The patient is advised to continue current dosage of levothyroxine.    Prediabetes.  Control is good.  The patient is advised to continue " attention to healthy diet.               Future Appointments   Date Time Provider Department Center   5/30/2024 11:00 AM LAB CHAIR 1 [facility]   5/30/2024 11:20 AM Ivory Noel MD PhD [facility]   7/23/2024  8:20 AM [facility] SANDERSON

## 2024-05-20 NOTE — TELEPHONE ENCOUNTER
Caller: Dara Medina    Relationship to patient: Self    Best call back number: [phone]    Chief complaint: PT CALLED TO CANCEL AND NO RESCHEDULE   Type of visit: LAB AND FOLLOW UP 1      If rescheduling, when is the original appointment: 5-30-24

## 2024-06-03 RX ORDER — BUPROPION HYDROCHLORIDE 300 MG/1
TABLET ORAL
Qty: 90 TABLET | Refills: 3 | Status: SHIPPED | OUTPATIENT
Start: 2024-06-03

## 2024-09-04 RX ORDER — LEVOTHYROXINE SODIUM 112 MCG
TABLET ORAL
Qty: 90 TABLET | Refills: 1 | Status: SHIPPED | OUTPATIENT
Start: 2024-09-04

## 2024-09-10 RX ORDER — TOPIRAMATE 100 MG/1
100 TABLET, FILM COATED ORAL 2 TIMES DAILY
Qty: 180 TABLET | Refills: 1 | Status: SHIPPED | OUTPATIENT
Start: 2024-09-10

## 2024-11-27 RX ORDER — CYANOCOBALAMIN 1000 UG/ML
1000 INJECTION, SOLUTION INTRAMUSCULAR; SUBCUTANEOUS
Qty: 3 ML | Refills: 3 | Status: SHIPPED | OUTPATIENT
Start: 2024-11-27

## 2025-03-03 RX ORDER — LEVOTHYROXINE SODIUM 112 MCG
TABLET ORAL
Qty: 90 TABLET | Refills: 1 | Status: SHIPPED | OUTPATIENT
Start: 2025-03-03

## 2025-03-27 RX ORDER — BUPROPION HYDROCHLORIDE 300 MG/1
300 TABLET ORAL DAILY
Qty: 90 TABLET | Refills: 2 | Status: SHIPPED | OUTPATIENT
Start: 2025-03-27

## 2025-08-22 RX ORDER — LEVOTHYROXINE SODIUM 112 MCG
112 TABLET ORAL DAILY
Qty: 90 TABLET | Refills: 1 | OUTPATIENT
Start: 2025-08-22

## (undated) DEVICE — PAD,ABDOMINAL,8"X10",ST,LF: Brand: MEDLINE

## (undated) DEVICE — Device

## (undated) DEVICE — ANTIBACTERIAL UNDYED BRAIDED (POLYGLACTIN 910), SYNTHETIC ABSORBABLE SUTURE: Brand: COATED VICRYL

## (undated) DEVICE — SOL NACL 0.9PCT 100ML SGL

## (undated) DEVICE — SKIN PREP TRAY W/CHG: Brand: MEDLINE INDUSTRIES, INC.

## (undated) DEVICE — KT DRN EVAC WND PVC PCH WTROC RND 10F400

## (undated) DEVICE — ADHS SKIN DERMABOND

## (undated) DEVICE — JACKT LAB F/R KNIT CUFF/COLR XLG BLU

## (undated) DEVICE — SOL ISO/ALC RUB 70PCT 4OZ

## (undated) DEVICE — DRSNG TELFA ILND ADH 4X6IN

## (undated) DEVICE — SUT VIC 2/0 CT2 27IN J269H

## (undated) DEVICE — UNDERCAST PADDING: Brand: DEROYAL

## (undated) DEVICE — NDL SPINE 22G 31/2IN BLK

## (undated) DEVICE — SUT VICRYL 1 CT1 27IN  JJ40G

## (undated) DEVICE — GLV SURG BIOGEL LTX PF 8 1/2

## (undated) DEVICE — DUAL CUT SAGITTAL BLADE

## (undated) DEVICE — PK KN TOTL 40

## (undated) DEVICE — GLV SURG TRIUMPH CLASSIC PF LTX 8.5 STRL

## (undated) DEVICE — KT ORCA ORCAPOD DISP STRL

## (undated) DEVICE — DRSNG TELFA PAD NONADH STR 1S 3X8IN

## (undated) DEVICE — APPL CHLORAPREP W/TINT 26ML ORNG

## (undated) DEVICE — PREP SOL POVIDONE/IODINE BT 4OZ

## (undated) DEVICE — DRSNG SURESITE WNDW 4X4.5

## (undated) DEVICE — SPNG GZ WOVN 4X4IN 12PLY 10/BX STRL

## (undated) DEVICE — CANN NASL CO2 TRULINK W/O2 A/

## (undated) DEVICE — ADHS SKIN DERMABOND TOP ADVANCED

## (undated) DEVICE — FLEX ADVANTAGE 1500CC: Brand: FLEX ADVANTAGE

## (undated) DEVICE — BNDG ELAS ELITE V/CLOSE 6IN 5YD LF STRL

## (undated) DEVICE — MAT FLR ABSORBENT LG 4FT 10 2.5FT